# Patient Record
Sex: FEMALE | Race: OTHER | NOT HISPANIC OR LATINO | Employment: OTHER | URBAN - METROPOLITAN AREA
[De-identification: names, ages, dates, MRNs, and addresses within clinical notes are randomized per-mention and may not be internally consistent; named-entity substitution may affect disease eponyms.]

---

## 2017-03-28 ENCOUNTER — ALLSCRIPTS OFFICE VISIT (OUTPATIENT)
Dept: OTHER | Facility: OTHER | Age: 57
End: 2017-03-28

## 2017-03-28 ENCOUNTER — HOSPITAL ENCOUNTER (OUTPATIENT)
Dept: RADIOLOGY | Facility: HOSPITAL | Age: 57
Discharge: HOME | End: 2017-03-28
Attending: ORTHOPAEDIC SURGERY
Payer: COMMERCIAL

## 2017-03-28 DIAGNOSIS — M25.551 PAIN IN RIGHT HIP: ICD-10-CM

## 2017-03-28 PROCEDURE — 73502 X-RAY EXAM HIP UNI 2-3 VIEWS: CPT

## 2017-06-13 ENCOUNTER — GENERIC CONVERSION - ENCOUNTER (OUTPATIENT)
Dept: OTHER | Facility: OTHER | Age: 57
End: 2017-06-13

## 2017-06-13 ENCOUNTER — HOSPITAL ENCOUNTER (OUTPATIENT)
Dept: RADIOLOGY | Facility: HOSPITAL | Age: 57
Discharge: HOME | End: 2017-06-13
Attending: ORTHOPAEDIC SURGERY
Payer: COMMERCIAL

## 2017-06-13 DIAGNOSIS — M25.551 PAIN IN RIGHT HIP: ICD-10-CM

## 2017-06-13 PROCEDURE — 73502 X-RAY EXAM HIP UNI 2-3 VIEWS: CPT

## 2017-06-27 ENCOUNTER — APPOINTMENT (OUTPATIENT)
Dept: PHYSICAL THERAPY | Facility: CLINIC | Age: 57
End: 2017-06-27
Payer: COMMERCIAL

## 2017-06-27 ENCOUNTER — GENERIC CONVERSION - ENCOUNTER (OUTPATIENT)
Dept: OTHER | Facility: OTHER | Age: 57
End: 2017-06-27

## 2017-06-27 DIAGNOSIS — M25.551 PAIN IN RIGHT HIP: ICD-10-CM

## 2017-06-27 PROCEDURE — 97161 PT EVAL LOW COMPLEX 20 MIN: CPT

## 2017-06-29 ENCOUNTER — APPOINTMENT (OUTPATIENT)
Dept: PHYSICAL THERAPY | Facility: CLINIC | Age: 57
End: 2017-06-29
Payer: COMMERCIAL

## 2017-06-29 PROCEDURE — 97110 THERAPEUTIC EXERCISES: CPT

## 2017-06-29 PROCEDURE — G0283 ELEC STIM OTHER THAN WOUND: HCPCS

## 2017-06-29 PROCEDURE — 97014 ELECTRIC STIMULATION THERAPY: CPT

## 2017-07-06 ENCOUNTER — APPOINTMENT (OUTPATIENT)
Dept: PHYSICAL THERAPY | Facility: CLINIC | Age: 57
End: 2017-07-06
Payer: COMMERCIAL

## 2017-07-06 PROCEDURE — 97110 THERAPEUTIC EXERCISES: CPT

## 2017-07-06 PROCEDURE — 97035 APP MDLTY 1+ULTRASOUND EA 15: CPT

## 2017-07-06 PROCEDURE — G0283 ELEC STIM OTHER THAN WOUND: HCPCS

## 2017-07-06 PROCEDURE — 97014 ELECTRIC STIMULATION THERAPY: CPT

## 2017-07-10 ENCOUNTER — APPOINTMENT (OUTPATIENT)
Dept: PHYSICAL THERAPY | Facility: CLINIC | Age: 57
End: 2017-07-10
Payer: COMMERCIAL

## 2017-07-10 PROCEDURE — G0283 ELEC STIM OTHER THAN WOUND: HCPCS

## 2017-07-10 PROCEDURE — 97110 THERAPEUTIC EXERCISES: CPT

## 2017-07-10 PROCEDURE — 97014 ELECTRIC STIMULATION THERAPY: CPT

## 2017-07-24 ENCOUNTER — APPOINTMENT (OUTPATIENT)
Dept: PHYSICAL THERAPY | Facility: CLINIC | Age: 57
End: 2017-07-24
Payer: COMMERCIAL

## 2017-07-27 ENCOUNTER — APPOINTMENT (OUTPATIENT)
Dept: PHYSICAL THERAPY | Facility: CLINIC | Age: 57
End: 2017-07-27
Payer: COMMERCIAL

## 2017-10-03 ENCOUNTER — ALLSCRIPTS OFFICE VISIT (OUTPATIENT)
Dept: OTHER | Facility: OTHER | Age: 57
End: 2017-10-03

## 2017-10-03 ENCOUNTER — HOSPITAL ENCOUNTER (OUTPATIENT)
Dept: RADIOLOGY | Facility: HOSPITAL | Age: 57
Discharge: HOME | End: 2017-10-03
Attending: ORTHOPAEDIC SURGERY
Payer: COMMERCIAL

## 2017-10-03 DIAGNOSIS — M25.551 PAIN IN RIGHT HIP: ICD-10-CM

## 2017-10-03 PROCEDURE — 73502 X-RAY EXAM HIP UNI 2-3 VIEWS: CPT

## 2017-10-04 ENCOUNTER — TRANSCRIBE ORDERS (OUTPATIENT)
Dept: ADMINISTRATIVE | Facility: HOSPITAL | Age: 57
End: 2017-10-04

## 2017-10-04 DIAGNOSIS — M25.551 RIGHT HIP PAIN: Primary | ICD-10-CM

## 2017-10-10 ENCOUNTER — HOSPITAL ENCOUNTER (OUTPATIENT)
Dept: RADIOLOGY | Facility: HOSPITAL | Age: 57
Discharge: HOME | End: 2017-10-10
Attending: ORTHOPAEDIC SURGERY
Payer: COMMERCIAL

## 2017-10-10 DIAGNOSIS — M25.551 RIGHT HIP PAIN: ICD-10-CM

## 2017-10-10 PROCEDURE — 77002 NEEDLE LOCALIZATION BY XRAY: CPT

## 2017-10-10 PROCEDURE — 20610 DRAIN/INJ JOINT/BURSA W/O US: CPT

## 2017-10-10 RX ORDER — BUPIVACAINE HYDROCHLORIDE 2.5 MG/ML
30 INJECTION, SOLUTION EPIDURAL; INFILTRATION; INTRACAUDAL
Status: COMPLETED | OUTPATIENT
Start: 2017-10-10 | End: 2017-10-10

## 2017-10-10 RX ORDER — METHYLPREDNISOLONE ACETATE 80 MG/ML
80 INJECTION, SUSPENSION INTRA-ARTICULAR; INTRALESIONAL; INTRAMUSCULAR; SOFT TISSUE
Status: COMPLETED | OUTPATIENT
Start: 2017-10-10 | End: 2017-10-10

## 2017-10-10 RX ORDER — LIDOCAINE HYDROCHLORIDE 10 MG/ML
20 INJECTION, SOLUTION INFILTRATION; PERINEURAL
Status: COMPLETED | OUTPATIENT
Start: 2017-10-10 | End: 2017-10-10

## 2017-10-10 RX ADMIN — METHYLPREDNISOLONE ACETATE 80 MG: 80 INJECTION, SUSPENSION INTRA-ARTICULAR; INTRALESIONAL; INTRAMUSCULAR; SOFT TISSUE at 16:11

## 2017-10-10 RX ADMIN — LIDOCAINE HYDROCHLORIDE 20 ML: 10 INJECTION, SOLUTION INFILTRATION; PERINEURAL at 16:11

## 2017-10-10 RX ADMIN — IOHEXOL 5 ML: 300 INJECTION, SOLUTION INTRAVENOUS at 16:10

## 2017-10-10 RX ADMIN — BUPIVACAINE HYDROCHLORIDE 30 ML: 2.5 INJECTION, SOLUTION EPIDURAL; INFILTRATION; INTRACAUDAL at 16:11

## 2017-10-27 NOTE — PROGRESS NOTES
Assessment  1  Avascular necrosis of hip, right (733 42) (M87 051)    Plan  Avascular necrosis of hip, right    · Follow-up Visit in 4 Weeks Evaluation and Treatment  Follow-up  Status: Hold For -  Scheduling  Requested for: [id number]   · Fluoroscopic Guidance For Needle Placement; Status:Active; Requested for:03Oct2017;   Right hip pain    · * XR HIP/PELV 2-3 VWS RIGHT W PELVIS IF PERFORMED; Status:Active -  Retrospective By Protocol Authorization; Requested [id number];    · Fluoroscopic Guidance For Needle Placement; Status:Hold For -  Scheduling,Retrospective Authorization; Requested [id number];     Discussion/Summary    This patient is afraid of joint injections  We had a long discussion about next steps  This is bothering her enough that is keeping her awake at night  She would like to proceed with a hip replacement  I explained her that I think at this point would be worth trying a cortisone shot to see we can make her hip last longer  I said this in the face of the, knowing it  This problem is secondary to avascular necrosis but I think we're at a point where were choosing between surgery or an injection and even if an injection fails, there is less possible right morbidity from the injection and the risks of surgery  She agreed and wants to proceed with injection  See her back in follow-up in one month to see how she is doing  History of Present Illness  HPI: This patient returns today for follow-up visit  Regarding her right hip pain  She has avascular necrosis and it is creating significant problem with groin pain  She also has issues with falling asleep at night because of right groin  She is refused any injections into the right hip, she fears joint injection      Review of Systems    Constitutional: No fever, no chills, feels well, no tiredness, no recent weight gain or loss  Eyes: No complaints of eyesight problems, no red eyes  ENT: no loss of hearing, no nosebleeds, no sore throat  Cardiovascular: No complaints of chest pain, no palpitations, no leg claudication or lower extremity edema  Respiratory: no compliants of shortness of breath, no wheezing, no cough  Gastrointestinal: no complaints of abdominal pain, no constipation, no nausea or diarrhea, no vomiting, no bloody stools  Genitourinary: no complaints of dysuria, no incontinence  Musculoskeletal: as noted in HPI  Integumentary: no complaints of skin rash or lesion, no itching or dry skin, no skin wounds  Neurological: no complaints of headache, no confusion, no numbness or tingling, no dizziness  Endocrine: No complaints of muscle weakness, no feelings of weakness, no frequent urination, no excessive thirst    Psychiatric: No suicidal thoughts, no anxiety, no feelings of depression  Active Problems  1  Right hip pain (719 45) (M25 661)    Past Medical History    The active problems and past medical history were reviewed and updated today  Surgical History    The surgical history was reviewed and updated today  Family History    The family history was reviewed and updated today  Social History   · Never a smoker  The social history was reviewed and updated today  The social history was reviewed and is unchanged  Current Meds   1  Acetaminophen-Codeine #3 300-30 MG Oral Tablet; TAKE 1 TABLET 4 TIMES DAILY AS   NEEDED FOR PAIN;   Therapy: [id number] to (Evaluate:07Apr2017); Last Rx:28Mar2017 Ordered   2  Codeine Sulfate 30 MG Oral Tablet; TAKE 1 TABLET EVERY 6 HOURS AS NEEDED; Therapy: [id number] to (Evaluate:14Oct2017); Last Rx:[id number] Ordered   3  Fenofibric Acid TABS; Therapy: (Recorded:28Mar2017) to Recorded   4  Flonase 50 MCG/ACT SUSP; Therapy: (Recorded:28Mar2017) to Recorded   5  Percocet TABS; Therapy: (Recorded:28Mar2017) to Recorded   6  Protonix 40 MG Oral Packet; Therapy: (Recorded:28Mar2017) to Recorded   7  Symbicort AERO; Therapy: (Recorded:28Mar2017) to Recorded   8  Vitamin C 100 MG Oral Tablet; Therapy: (Recorded:28Mar2017) to Recorded   9  Vitamin D 1000 UNIT CAPS; Therapy: (Recorded:28Mar2017) to Recorded    The medication list was reviewed and updated today  Allergies  1  Aspirin TABS   2  Penicillins    Physical Exam  There is pain with flexion, extension, internal, external rotation of the right hip  Pain is focused in the groin  Neurovascular exam the right lower extremity is grossly intact        Signatures   Electronically signed by :  TASIA Leal ; Oct  3 2017  6:26PM EST                       (Author)

## 2017-11-14 ENCOUNTER — GENERIC CONVERSION - ENCOUNTER (OUTPATIENT)
Dept: OTHER | Facility: OTHER | Age: 57
End: 2017-11-14

## 2018-01-15 VITALS
HEIGHT: 63 IN | WEIGHT: 165 LBS | BODY MASS INDEX: 29.23 KG/M2 | DIASTOLIC BLOOD PRESSURE: 81 MMHG | SYSTOLIC BLOOD PRESSURE: 130 MMHG | HEART RATE: 75 BPM

## 2018-01-22 ENCOUNTER — GENERIC CONVERSION - ENCOUNTER (OUTPATIENT)
Dept: OTHER | Facility: OTHER | Age: 58
End: 2018-01-22

## 2018-01-22 VITALS — HEART RATE: 78 BPM | HEIGHT: 63 IN | SYSTOLIC BLOOD PRESSURE: 118 MMHG | DIASTOLIC BLOOD PRESSURE: 70 MMHG

## 2018-01-22 VITALS — HEART RATE: 71 BPM | SYSTOLIC BLOOD PRESSURE: 117 MMHG | HEIGHT: 63 IN | DIASTOLIC BLOOD PRESSURE: 76 MMHG

## 2018-01-24 NOTE — RESULT NOTES
Verified Results  FL INJECTION RIGHT HIP (STEROIDS) [id number] 03:14PM Nimisha Aiken     Test Name Result Flag Reference   [facility] INJECTION RIGHT HIP (STEROIDS) (Report)     RIGHT HIP INJECTION     INDICATION: Chronic right hip pain  COMPARISON: 10/3/2017 XR Right Hip/Pelvis     IMAGES: 4     FLUOROSCOPY TIME:  0 15 minutes     FINDINGS:     After the risks and benefits of the procedure were thoroughly explained, informed consent was obtained  The patient verbalized expressed understanding of the above risks and wished to proceed with the procedure  Final standard time-out procedure performed  The patient was prepped and draped in the usual sterile fashion  1% lidocaine solution was utilized for local anesthesia  Intermittent fluoroscopy was utilized for placement a 20 gauge 3 5 inch spinal needle within the right hip joint  After    positioning was confirmed with 3 mL of Omnipaque 300, a solution comprised of 1 mL 80 mg/mL Depomedrol, 2 mL of 0 25% Sensorcaine and 2 mL 1% Xylocaine was injected into the right hip joint  The patient tolerated the procedure well  There were no    complications  I asked the patient to call us with any questions, concerns, or acute problems  The patient expressed understanding of the above  IMPRESSION:     Technically successful right hip steroid injection  Procedure was performed by Mary Jo Vang PA-C under the direct supervision of Dr Fernando Oakes  PERFORMED, DICTATED AND SIGNED BY: David Bobby PA-C       Workstation performed: [id number]     Signed by:   Fernando Oakes MD   10/10/17     * XR HIP/PELV 2-3 VWS RIGHT W PELVIS IF PERFORMED 03Oct2017 05:44PM Rickey Roseann Order Number: [id number]   Performing Comments:  6     Test Name Result Flag Reference   * XR HIP/PELV 2-3 VWS RIGHT (Report)     RIGHT HIP     INDICATION: Right hip pain       COMPARISON: June 23, 2017     VIEWS: AP pelvis and 2 coned down views of the hip     IMAGES: 3 FINDINGS:     There is no acute fracture or dislocation  Joint spaces are preserved  Sclerotic changes again noted within the right femoral head without evidence of collapse  Femoral head maintains normal shape and orientation  Soft tissues are unremarkable  IMPRESSION:     Stable sclerotic changes within the right femoral head without evidence of collapse         Workstation performed: [id number]     Signed by:   Kellie Heath MD   10/8/17                               Plan  Right hip pain    · Fluoroscopic Guidance For Needle Placement; Status:Hold For - Scheduling; Requested  MQF:[id number];

## 2018-02-13 ENCOUNTER — HOSPITAL ENCOUNTER (OUTPATIENT)
Dept: RADIOLOGY | Facility: HOSPITAL | Age: 58
Discharge: HOME | End: 2018-02-13
Attending: ORTHOPAEDIC SURGERY
Payer: COMMERCIAL

## 2018-02-13 ENCOUNTER — OFFICE VISIT (OUTPATIENT)
Dept: OBGYN CLINIC | Facility: HOSPITAL | Age: 58
End: 2018-02-13
Payer: COMMERCIAL

## 2018-02-13 VITALS — DIASTOLIC BLOOD PRESSURE: 77 MMHG | SYSTOLIC BLOOD PRESSURE: 134 MMHG

## 2018-02-13 DIAGNOSIS — M87.051 AVASCULAR NECROSIS OF BONE OF RIGHT HIP (HCC): ICD-10-CM

## 2018-02-13 DIAGNOSIS — M25.551 PAIN IN RIGHT HIP: Primary | ICD-10-CM

## 2018-02-13 DIAGNOSIS — M54.16 LUMBAR RADICULAR PAIN: ICD-10-CM

## 2018-02-13 PROCEDURE — 99214 OFFICE O/P EST MOD 30 MIN: CPT | Performed by: ORTHOPAEDIC SURGERY

## 2018-02-13 PROCEDURE — 73502 X-RAY EXAM HIP UNI 2-3 VIEWS: CPT

## 2018-02-13 RX ORDER — ACETAMINOPHEN AND CODEINE PHOSPHATE 300; 30 MG/1; MG/1
1 TABLET ORAL EVERY 6 HOURS PRN
Qty: 30 TABLET | Refills: 0 | Status: SHIPPED | OUTPATIENT
Start: 2018-02-13 | End: 2019-03-06

## 2018-02-13 NOTE — PROGRESS NOTES
59 y o female presenting to the office for evaluation of right hip pain/avascular necrosis of the hip  Patient had a corticosteroid injection on 10/10/17 with numbness in the RLE for the first day with increased pain for 4 days  She then had a few weeks of improved pain and then the pain returned  Patient has groin pain and lateral thigh pain  Patient has low back pain  She states that the pain increases with ambulatory activities and keeps her awake at night  Patient denies any other acute or associated complaints  Review of Systems  Review of systems negative unless otherwise specified in HPI    Past Medical History  No past medical history on file  Past Surgical History  No past surgical history on file  Current Medications  No current outpatient prescriptions on file prior to visit  No current facility-administered medications on file prior to visit  Physical exam  · General: Awake, Alert, Oriented  · Eyes: Pupils equal, round and reactive to light  · Heart: regular rate and rhythm  · Lungs: No audible wheezing  · Abdomen: soft  right Lower extremity  · Tender to palpation anterior hip joint and lateral thigh  · Minimally limited ROM  · Pain with resisted flexion of the hip  · Sensation intact  · Positive SLR    Imaging  No significant changes noted on x-ray    Procedure  none    Assessment/Plan:   59 y  o female with avascular necrosis at the hip and lumbar radicular pain  Discussed with patient that as she cannot definitively no improvement from the hip corticosteroid injection, it would be better to evaluate for lumbar pain contributing to some of the pain that she is having during the day  Discussed with patient which she get a lumbar MRI to re-evaluate and may consider getting a Spine and Pain consult on her once those results are in  Discussed with patient that if we cannot prove that this pain is coming from the hip, that a total hip replacement may not relieve her symptoms   Patient to follow up after MRI completed

## 2018-02-16 ENCOUNTER — TELEPHONE (OUTPATIENT)
Dept: OBGYN CLINIC | Facility: HOSPITAL | Age: 58
End: 2018-02-16

## 2019-01-15 ENCOUNTER — HOSPITAL ENCOUNTER (OUTPATIENT)
Dept: RADIOLOGY | Facility: HOSPITAL | Age: 59
Discharge: HOME | End: 2019-01-15
Attending: ORTHOPAEDIC SURGERY
Payer: COMMERCIAL

## 2019-01-15 ENCOUNTER — EVALUATION (OUTPATIENT)
Dept: PHYSICAL THERAPY | Facility: CLINIC | Age: 59
End: 2019-01-15
Payer: COMMERCIAL

## 2019-01-15 ENCOUNTER — OFFICE VISIT (OUTPATIENT)
Dept: OBGYN CLINIC | Facility: HOSPITAL | Age: 59
End: 2019-01-15
Payer: COMMERCIAL

## 2019-01-15 VITALS
HEIGHT: 63 IN | BODY MASS INDEX: 28.35 KG/M2 | DIASTOLIC BLOOD PRESSURE: 77 MMHG | HEART RATE: 69 BPM | SYSTOLIC BLOOD PRESSURE: 122 MMHG | WEIGHT: 160 LBS

## 2019-01-15 DIAGNOSIS — G62.9 NEUROPATHY: ICD-10-CM

## 2019-01-15 DIAGNOSIS — M25.551 PAIN IN RIGHT HIP: Primary | ICD-10-CM

## 2019-01-15 DIAGNOSIS — M54.16 LUMBAR RADICULAR PAIN: ICD-10-CM

## 2019-01-15 DIAGNOSIS — M54.16 LUMBAR RADICULAR PAIN: Primary | ICD-10-CM

## 2019-01-15 DIAGNOSIS — M25.551 PAIN IN RIGHT HIP: ICD-10-CM

## 2019-01-15 PROCEDURE — G8991 OTHER PT/OT GOAL STATUS: HCPCS | Performed by: PHYSICAL THERAPIST

## 2019-01-15 PROCEDURE — 99213 OFFICE O/P EST LOW 20 MIN: CPT | Performed by: ORTHOPAEDIC SURGERY

## 2019-01-15 PROCEDURE — 97162 PT EVAL MOD COMPLEX 30 MIN: CPT | Performed by: PHYSICAL THERAPIST

## 2019-01-15 PROCEDURE — 73522 X-RAY EXAM HIPS BI 3-4 VIEWS: CPT

## 2019-01-15 PROCEDURE — G8990 OTHER PT/OT CURRENT STATUS: HCPCS | Performed by: PHYSICAL THERAPIST

## 2019-01-15 PROCEDURE — 72110 X-RAY EXAM L-2 SPINE 4/>VWS: CPT

## 2019-01-15 RX ORDER — HYDROXYCHLOROQUINE SULFATE 200 MG/1
200 TABLET, FILM COATED ORAL 2 TIMES DAILY WITH MEALS
COMMUNITY
Start: 2018-12-07

## 2019-01-15 RX ORDER — EPINASTINE HCL 0.05 %
1 DROPS OPHTHALMIC (EYE) 2 TIMES DAILY PRN
COMMUNITY
Start: 2018-11-21 | End: 2021-10-12

## 2019-01-15 RX ORDER — OXYCODONE AND ACETAMINOPHEN 10; 325 MG/1; MG/1
1 TABLET ORAL EVERY 8 HOURS PRN
Refills: 0 | COMMUNITY
Start: 2018-12-27 | End: 2019-03-06

## 2019-01-15 RX ORDER — LEVOFLOXACIN 500 MG/1
750 TABLET, FILM COATED ORAL EVERY 24 HOURS
COMMUNITY
End: 2019-03-06

## 2019-01-15 RX ORDER — CIPROFLOXACIN 500 MG/1
500 TABLET, FILM COATED ORAL 2 TIMES DAILY
Refills: 1 | COMMUNITY
Start: 2018-12-21 | End: 2019-02-27 | Stop reason: ALTCHOICE

## 2019-01-15 RX ORDER — GABAPENTIN 100 MG/1
100 CAPSULE ORAL 3 TIMES DAILY
Qty: 90 CAPSULE | Refills: 0 | Status: SHIPPED | OUTPATIENT
Start: 2019-01-15 | End: 2019-02-11 | Stop reason: SDUPTHER

## 2019-01-15 RX ORDER — LEVALBUTEROL INHALATION SOLUTION 1.25 MG/3ML
1.25 SOLUTION RESPIRATORY (INHALATION) EVERY 4 HOURS PRN
COMMUNITY
Start: 2018-12-12 | End: 2019-11-14 | Stop reason: ALTCHOICE

## 2019-01-15 NOTE — PROGRESS NOTES
59 y o  female presenting to the office for follow up of right sided back and hip pain  Patient has a history of AVN of the right hip and has had corticosteroid injection therapy without benefit previously  She has history of RA, and is currently on treatment for this  Patient currently has had worsening and new presentation of symptoms  She now has pain radiating down the buttock to the toes  She has singling sensation along multiple areas of the lower leg and a weakness on the right sided  Patient has improvement in symptoms with forward flexion at the spine  Patient has significant QOL limitations due to this pain  ROS  Review of Systems   Constitutional: Negative for fever and unexpected weight change  HENT: Negative for hearing loss, nosebleeds and sore throat  Eyes: Negative for pain, redness and visual disturbance  Respiratory: Negative for cough, shortness of breath and wheezing  Cardiovascular: Negative for chest pain, palpitations and leg swelling  Gastrointestinal: Negative for abdominal pain, nausea and vomiting  Endocrine: Negative for polydipsia and polyuria  Genitourinary: Negative for dysuria and hematuria  Skin: Negative for rash and wound  Neurological: Negative for dizziness and headaches  Psychiatric/Behavioral: Negative for agitation and suicidal ideas  History reviewed  No pertinent past medical history  History reviewed  No pertinent surgical history  Results Reviewed     None          Physical Exam  Physical Exam   Constitutional: She is oriented to person, place, and time  She appears well-developed and well-nourished  HENT:   Head: Normocephalic and atraumatic  Eyes: Pupils are equal, round, and reactive to light  EOM are normal    Neck: Neck supple  No tracheal deviation present  Cardiovascular: Normal rate and regular rhythm  Pulmonary/Chest: Effort normal and breath sounds normal    Abdominal: There is no guarding     Neurological: She is alert and oriented to person, place, and time  Skin: Skin is warm and dry  Psychiatric: She has a normal mood and affect  Her behavior is normal      Back Exam     Tenderness   The patient is experiencing tenderness in the thoracic  Range of Motion   Extension: normal   Flexion: abnormal   Lateral Bend Right: abnormal   Lateral Bend Left: abnormal   Rotation Right: abnormal   Rotation Left: abnormal     Muscle Strength   Right Quadriceps:  4/5   Left Quadriceps:  5/5   Right Hamstrings:  4/5   Left Hamstrings:  5/5     Tests   Straight leg raise right: positive  Straight leg raise left: positive    Other   Back sensation: decreased with tingling sensation to light touch on lateral and medial aspects of the lower leg  Gait: abnormal         Imaging  I personally reviewed these images : significant arthritic changes on the lumbar spine x-rays  Moderate arthritic changes to bilateral hip x-rays    Procedures  none    Assessment/Plan  59 y o  female    1  Lumbar radicular pain  - XR spine lumbar minimum 4 views non injury; Future  - MRI lumbar spine wo contrast; Future  - Ambulatory referral to Physical Therapy; Future  - gabapentin (NEURONTIN) 100 mg capsule; Take 1 capsule (100 mg total) by mouth 3 (three) times a day  Dispense: 90 capsule; Refill: 0    2  Pain in right hip  - XR hips bilateral 3-4 vw w pelvis if performed; Future  - Ambulatory referral to Physical Therapy; Future  - gabapentin (NEURONTIN) 100 mg capsule; Take 1 capsule (100 mg total) by mouth 3 (three) times a day  Dispense: 90 capsule; Refill: 0    3  Neuropathy  - MRI lumbar spine wo contrast; Future  - Ambulatory referral to Physical Therapy; Future  - gabapentin (NEURONTIN) 100 mg capsule; Take 1 capsule (100 mg total) by mouth 3 (three) times a day  Dispense: 90 capsule; Refill: 0    - discussed with patient that at this time, we need to distinguish primary back vs  Hip pain   Recommend that she start PT for the back, but due to new neuropathy and weakness, patient needs MRI to evaluate for nerve compression  --- ordered gabapentin to help with neuropathy pain symptoms  --- cannot tolerate NSAIDs due to GI issues  - follow up after MRI   --- needs to be done under sedation due to patient's anxiety with closed spaces  Open MRIs do not provide adequate enough quality for evaluation and are not recommended for this patient

## 2019-01-15 NOTE — PROGRESS NOTES
PT Evaluation     Today's date: 1/15/2019  Patient name: Lavon Wang  : 1960  MRN: [id number]  Referring provider: Pelon Nails PA-C  Dx:   Encounter Diagnosis     ICD-10-CM    1  Lumbar radicular pain M54 16 Ambulatory referral to Physical Therapy     PT plan of care cert/re-cert   2  Pain in right hip M25 551 Ambulatory referral to Physical Therapy   3  Neuropathy G62 9 Ambulatory referral to Physical Therapy       Start Time: 1600  Stop Time: 7260  Total time in clinic (min): 45 minutes    Assessment  Assessment details: Pt is a 58 y/o female presenting to physical therapy with chief complaint of R hip, lumbar and RLE pain  Pt presents with increased TTP over the R greater trochanter and throughout the lumbar SPs  Unable to properly assess joint play secondary to pain  Grade 1 lumbar P-A joint mobs generated sxs down the RLE  In addition to this, pt presents with sciatic nerve tension in the RLE, which is consistent with lumbar radiculopathy  Pt's increased TTP throughout the hip joint in combination with (+) FADIR may also suggest a hip pathology  Pt would benefit from physical therapy in order to improve pain, radicular sxs, TTP, and strength in order to return to PLOF  Impairments: abnormal gait, abnormal or restricted ROM, activity intolerance, impaired balance, impaired physical strength, lacks appropriate home exercise program and pain with function  Functional limitations: sitting, lifting, walking  Symptom irritability: moderateUnderstanding of Dx/Px/POC: good   Prognosis: good    Goals  ST weeks  1  Pt will demonstrate independence with HEP  2  Pt will improve lumbar AROM by 10%  3  Pt will improve RLE strength by at least 1/2 grade  4  Pt will report pain no more than 5/10  5  Pt will generate proper TA contraction without cuing to show improved NM control    LT weeks  1  Pt will improve lumbar AROM to at least 80% in all directions  2  Pt will report pain no more than 2/10  3  Pt will be able to stand/sit for at least 30 minutes without pain to return to PLOF  4  Pt will be able to lift at least 10lbs from floor to waist without pain  5  Pt will have no TTP over the R greater trochanter  6  Pt will improve RLE strength to at least 4+/5    Plan  Patient would benefit from: skilled physical therapy  Planned modality interventions: cryotherapy and thermotherapy: hydrocollator packs  Planned therapy interventions: therapeutic exercise, therapeutic activities, stretching, strengthening, patient education, neuromuscular re-education, massage, manual therapy, balance, gait training and home exercise program  Frequency: 2x week  Duration in weeks: 4  Treatment plan discussed with: patient        Subjective Evaluation    History of Present Illness  Mechanism of injury: Pt reports her back pain has been here for about 1 month  Pt reports pain does go down the R leg  She reports she can't sit for a long time and turning over while sleeping is painful  Pt reports she does not do heavy housework due to COPD  She reports her back hurts when she stands to cook but she stand anyway  Pain  Current pain ratin  At best pain ratin  At worst pain ratin  Quality: sharp  Relieving factors: heat and medications          Objective     Tenderness     Right Hip   Tenderness in the PSIS and greater trochanter       Active Range of Motion     Additional Active Range of Motion Details  Lumbar AROM:  Flexion: 75%  Extension: 25%  R SG:10%  L SG: 10%    Joint Play     Additional Joint Play Details  Unable to attest secondary to pain    Strength/Myotome Testing     Left Hip   Planes of Motion   Flexion: 4+  Abduction: 4+  External rotation: 4+  Internal rotation: 4+    Right Hip   Planes of Motion   Flexion: 4-  Abduction: 4-  External rotation: 4-  Internal rotation: 4-    Left Knee   Flexion: 4+  Extension: 4+    Right Knee   Flexion: 4-  Extension: 4-    Left Ankle/Foot   Dorsiflexion: 4+    Right Ankle/Foot   Dorsiflexion: 4-    Tests       Thoracic   Positive slump  Lumbar   Positive slumped  Right Hip   Positive FADIR         Flowsheet Rows      Most Recent Value   PT/OT G-Codes   Current Score  31   Projected Score  48   FOTO information reviewed  Yes   Assessment Type  Evaluation   G code set  Other PT/OT Primary   Other PT Primary Current Status ()  CL   Other PT Primary Goal Status ()  CK          Precautions: COPD, possible AVN R hip    Daily Treatment Diary     Manual  1/15            R hip stretching             Lumbar STM                                                        Exercise Diary  1/15            Bike             BERNADETTE             Bridges             Clamshells             Standing hip 3-way             DLS ball press             SKFO             STS                                                                                                                                                                             Modalities

## 2019-01-17 ENCOUNTER — APPOINTMENT (OUTPATIENT)
Dept: PHYSICAL THERAPY | Facility: CLINIC | Age: 59
End: 2019-01-17
Payer: COMMERCIAL

## 2019-01-22 ENCOUNTER — OFFICE VISIT (OUTPATIENT)
Dept: PHYSICAL THERAPY | Facility: CLINIC | Age: 59
End: 2019-01-22
Payer: COMMERCIAL

## 2019-01-22 DIAGNOSIS — M54.16 LUMBAR RADICULAR PAIN: Primary | ICD-10-CM

## 2019-01-22 DIAGNOSIS — G62.9 NEUROPATHY: ICD-10-CM

## 2019-01-22 DIAGNOSIS — M25.551 PAIN IN RIGHT HIP: ICD-10-CM

## 2019-01-22 PROCEDURE — 97140 MANUAL THERAPY 1/> REGIONS: CPT

## 2019-01-22 PROCEDURE — 97110 THERAPEUTIC EXERCISES: CPT

## 2019-01-22 NOTE — PROGRESS NOTES
Daily Note     Today's date: 2019  Patient name: Josette Reeves  : 1960  MRN: [id number]  Referring provider: Lali Barraza PA-C  Dx:   Encounter Diagnosis     ICD-10-CM    1  Lumbar radicular pain M54 16    2  Pain in right hip M25 551    3  Neuropathy G62 9                   Subjective: Upon presentation patient repots pain with movement and activity  Objective: See treatment diary below    Precautions: COPD, possible AVN R hip    Daily Treatment Diary     Manual  1/15 1/22           R hip stretching  2'           Lumbar STM  6'                                                      Exercise Diary  1/15 1/22           Bike  Pain! BERNADETTE  5 min           Bridges             Clamshells             Standing hip 3-way             DLS ball press             SKFO             STS  2x5           LAQ  10x           Supine marches  10x           Seated add/add  20x           Seated marches  10x                                                                                                                       Modalities                                                       Assessment: Patient unable to tolerate bike, and any supine/prone TE this visit  TTP over lumbar paraspinals, light stm had no effect on pain  Patient unable to tolerate gentle hip PROM this visit therefore held  Patient deferred [address] post session  Patient offers no increase in sx post session  Plan: Cont per POC

## 2019-01-29 ENCOUNTER — APPOINTMENT (OUTPATIENT)
Dept: PHYSICAL THERAPY | Facility: CLINIC | Age: 59
End: 2019-01-29
Payer: COMMERCIAL

## 2019-01-31 ENCOUNTER — OFFICE VISIT (OUTPATIENT)
Dept: PHYSICAL THERAPY | Facility: CLINIC | Age: 59
End: 2019-01-31
Payer: COMMERCIAL

## 2019-01-31 DIAGNOSIS — M54.16 LUMBAR RADICULAR PAIN: Primary | ICD-10-CM

## 2019-01-31 DIAGNOSIS — M25.551 PAIN IN RIGHT HIP: ICD-10-CM

## 2019-01-31 DIAGNOSIS — G62.9 NEUROPATHY: ICD-10-CM

## 2019-01-31 PROCEDURE — 97110 THERAPEUTIC EXERCISES: CPT | Performed by: PHYSICAL THERAPIST

## 2019-01-31 NOTE — PROGRESS NOTES
Daily Note     Today's date: 2019  Patient name: Geneva King  : 1960  MRN: [id number]  Referring provider: Carolina Yan PA-C  Dx:   Encounter Diagnosis     ICD-10-CM    1  Lumbar radicular pain M54 16    2  Pain in right hip M25 551    3  Neuropathy G62 9        Start Time: 1702  Stop Time: 1735  Total time in clinic (min): 33 minutes    Subjective: Pt reports she is in a lot of pain  Objective: See treatment diary below  Precautions: COPD, possible AVN R hip     Daily Treatment Diary      Manual  1/15 1/22 1/31                 R hip stretching   2'                   Lumbar STM   6' 5'                                                                                               Exercise Diary  1/15 1/22 1/31                 Bike   Pain!                   BERNADETTE   5 min                   Bridges                       Clamshells                       Standing hip 3-way                       DLS ball press     8"P00                 SKFO                       STS   2x5                   SAQ/LAQ   10x 20x                 Supine marches   10x 10x                 Seated add/add   20x 5"x20                 Seated marches   10x 10x                                                                                                                                                                                                                       Modalities                                                                                                      Assessment: Pt has significantly decreased exercise tolerance due to pain in the lumbar spine and the R posterior hip  Pt unable to tolerate manual therapy to the lumbar spine  Plan: Progress as tolerated

## 2019-02-05 ENCOUNTER — OFFICE VISIT (OUTPATIENT)
Dept: PHYSICAL THERAPY | Facility: CLINIC | Age: 59
End: 2019-02-05
Payer: COMMERCIAL

## 2019-02-05 DIAGNOSIS — M54.16 LUMBAR RADICULAR PAIN: Primary | ICD-10-CM

## 2019-02-05 DIAGNOSIS — M25.551 PAIN IN RIGHT HIP: ICD-10-CM

## 2019-02-05 DIAGNOSIS — G62.9 NEUROPATHY: ICD-10-CM

## 2019-02-05 PROCEDURE — 97110 THERAPEUTIC EXERCISES: CPT

## 2019-02-05 PROCEDURE — 97140 MANUAL THERAPY 1/> REGIONS: CPT

## 2019-02-05 NOTE — PROGRESS NOTES
Daily Note     Today's date: 2019  Patient name: Josette Reeves  : 1960  MRN: [id number]  Referring provider: Lali Barraza PA-C  Dx:   Encounter Diagnosis     ICD-10-CM    1  Lumbar radicular pain M54 16    2  Pain in right hip M25 551    3  Neuropathy G62 9                   Subjective: Upon presentation patient reports her hip is bothering her, reports her hip pain increases with prolonged sitting  Objective: See treatment diary below  Precautions: COPD, possible AVN R hip     Daily Treatment Diary      Manual  1/15 1/22 1/31  25               R hip stretching   2'    10'               Lumbar STM   6' 5'                                                                                               Exercise Diary  1/15 1/22 1/31  2               Bike   Pain!                   BERNADETTE   5 min                   Bridges        [place]               Clamshells        [place]               Standing hip 3-way                       DLS ball press     7"G78  5"x20               SKFO        [place]               STS   2x5    [place]               SAQ/LAQ   10x 20x  20x               Supine marches   10x 10x  10x               Seated add/add   20x 5"x20  5"x20               Seated marches   10x 10x  15x                                                                                                                                                                                                                     Modalities                                                                                                    Assessment: Patient demonstrating slight improvement in exercise tolerance, however exercise tolerance remains poor  Patient denies any increase in pain or sx t/o TE and post session  Consider additional TE NV  Plan: Cont per POC

## 2019-02-07 ENCOUNTER — APPOINTMENT (OUTPATIENT)
Dept: PHYSICAL THERAPY | Facility: CLINIC | Age: 59
End: 2019-02-07
Payer: COMMERCIAL

## 2019-02-11 DIAGNOSIS — M25.551 PAIN IN RIGHT HIP: ICD-10-CM

## 2019-02-11 DIAGNOSIS — M54.16 LUMBAR RADICULAR PAIN: ICD-10-CM

## 2019-02-11 DIAGNOSIS — G62.9 NEUROPATHY: ICD-10-CM

## 2019-02-11 RX ORDER — GABAPENTIN 100 MG/1
CAPSULE ORAL
Qty: 90 CAPSULE | Refills: 0 | Status: SHIPPED | OUTPATIENT
Start: 2019-02-11 | End: 2019-03-06

## 2019-02-12 ENCOUNTER — APPOINTMENT (OUTPATIENT)
Dept: PHYSICAL THERAPY | Facility: CLINIC | Age: 59
End: 2019-02-12
Payer: COMMERCIAL

## 2019-02-19 ENCOUNTER — OFFICE VISIT (OUTPATIENT)
Dept: PHYSICAL THERAPY | Facility: CLINIC | Age: 59
End: 2019-02-19
Payer: COMMERCIAL

## 2019-02-19 DIAGNOSIS — K21.9 GASTROESOPHAGEAL REFLUX DISEASE WITHOUT ESOPHAGITIS: Primary | ICD-10-CM

## 2019-02-19 DIAGNOSIS — M25.551 PAIN IN RIGHT HIP: ICD-10-CM

## 2019-02-19 DIAGNOSIS — G62.9 NEUROPATHY: ICD-10-CM

## 2019-02-19 DIAGNOSIS — M54.16 LUMBAR RADICULAR PAIN: Primary | ICD-10-CM

## 2019-02-19 PROCEDURE — 97110 THERAPEUTIC EXERCISES: CPT | Performed by: PHYSICAL THERAPIST

## 2019-02-19 PROCEDURE — 97140 MANUAL THERAPY 1/> REGIONS: CPT | Performed by: PHYSICAL THERAPIST

## 2019-02-19 RX ORDER — PANTOPRAZOLE SODIUM 40 MG/1
40 TABLET, DELAYED RELEASE ORAL DAILY
Qty: 30 TABLET | Refills: 11 | Status: SHIPPED | OUTPATIENT
Start: 2019-02-19 | End: 2019-03-12

## 2019-02-19 NOTE — PROGRESS NOTES
Daily Note     Today's date: 2019  Patient name: Grabiel Leslie  : 1960  MRN: [id number]  Referring provider: Orlin Pardo PA-C  Dx:   Encounter Diagnosis     ICD-10-CM    1  Lumbar radicular pain M54 16    2  Pain in right hip M25 551    3  Neuropathy G62 9        Start Time: 5712  Stop Time: 9614  Total time in clinic (min): 25 minutes    Subjective: Pt reports her back and hip are still hurting and sometimes she gets pain in the front  Objective: See treatment diary below  Precautions: COPD, possible AVN R hip     Daily Treatment Diary      Manual  1/15 1/22 1/31  2/5 2/19             R hip stretching   2'    10' 8'             Lumbar STM   6' 5'                                                                                               Exercise Diary  1/15 1/22 1/31  2/5 2/19             Bike   Pain!                   BERNADETTE   5 min     5'             Bridges        [place]               Clamshells        [place]               Standing hip 3-way                       DLS ball press     4"Y85  5"x20 5"x20             SKFO        NV               STS   2x5    NV               SAQ/LAQ   10x 20x  20x 25x             Supine marches   10x 10x  10x               Seated add/add   20x 5"x20  5"x20 5"x20             Seated marches   10x 10x  15x 20x                                                                                                                                                                                                                   Modalities                                                                                                    Assessment: Pt able to tolerate R hip PROM and stretching with minimal pain at end range  Pt able to tolerate hip strengthening and DLS exercises with decreased pain compared to previous visits  Pt reported muscle fatigue following exercises  Plan: Progress as tolerated

## 2019-02-19 NOTE — TELEPHONE ENCOUNTER
Pt of Dr Carmen Melvin       Pt is requesting a 3month [facility]

## 2019-02-21 NOTE — TELEPHONE ENCOUNTER
[facility]ed [facility] and gave verbal for qty 90 , 3 refills for Pantoprazole 40 mg taken once a day

## 2019-02-22 ENCOUNTER — TRANSCRIBE ORDERS (OUTPATIENT)
Dept: ADMINISTRATIVE | Facility: HOSPITAL | Age: 59
End: 2019-02-22

## 2019-02-22 DIAGNOSIS — M54.16 LUMBAR RADICULOPATHY: Primary | ICD-10-CM

## 2019-02-25 ENCOUNTER — APPOINTMENT (OUTPATIENT)
Dept: LAB | Facility: HOSPITAL | Age: 59
End: 2019-02-25
Payer: COMMERCIAL

## 2019-02-25 DIAGNOSIS — M54.16 LUMBAR RADICULOPATHY: ICD-10-CM

## 2019-02-25 LAB
BUN SERPL-MCNC: 15 MG/DL (ref 5–25)
CREAT SERPL-MCNC: 0.87 MG/DL (ref 0.6–1.3)
GFR SERPL CREATININE-BSD FRML MDRD: 73 ML/MIN/1.73SQ M

## 2019-02-25 PROCEDURE — 84520 ASSAY OF UREA NITROGEN: CPT

## 2019-02-25 PROCEDURE — 36415 COLL VENOUS BLD VENIPUNCTURE: CPT

## 2019-02-25 PROCEDURE — 82565 ASSAY OF CREATININE: CPT

## 2019-02-27 ENCOUNTER — ANESTHESIA EVENT (OUTPATIENT)
Dept: RADIOLOGY | Facility: HOSPITAL | Age: 59
End: 2019-02-27

## 2019-02-27 RX ORDER — OXYCODONE HYDROCHLORIDE 5 MG/1
5 TABLET ORAL EVERY 4 HOURS PRN
COMMUNITY
End: 2021-10-12

## 2019-02-27 RX ORDER — ESOMEPRAZOLE MAGNESIUM 40 MG/1
40 CAPSULE, DELAYED RELEASE ORAL
COMMUNITY
End: 2019-03-06

## 2019-02-27 RX ORDER — CODEINE SULFATE 30 MG/1
30 TABLET ORAL EVERY 6 HOURS PRN
COMMUNITY
End: 2019-03-06

## 2019-02-27 RX ORDER — FEXOFENADINE HCL 180 MG/1
180 TABLET ORAL AS NEEDED
COMMUNITY

## 2019-02-27 RX ORDER — ALENDRONATE SODIUM 70 MG/1
70 TABLET ORAL
COMMUNITY
End: 2019-03-06

## 2019-02-27 NOTE — PRE-PROCEDURE INSTRUCTIONS
Pre-Surgery Instructions:   Medication Instructions    alendronate (FOSAMAX) 70 mg tablet Instructed patient per Anesthesia Guidelines   Budesonide-Formoterol Fumarate (SYMBICORT IN) Instructed patient per Anesthesia Guidelines   Cholecalciferol (VITAMIN D3) 2000 units CHEW Instructed patient per Anesthesia Guidelines   codeine 30 mg tablet Instructed patient per Anesthesia Guidelines   Epinastine HCl 0 05 % ophthalmic solution Instructed patient per Anesthesia Guidelines   esomeprazole (NexIUM) 40 MG capsule Instructed patient per Anesthesia Guidelines   FENOFIBRATE PO Instructed patient per Anesthesia Guidelines   fexofenadine (ALLEGRA) 180 MG tablet Instructed patient per Anesthesia Guidelines   hydroxychloroquine (PLAQUENIL) 200 mg tablet Instructed patient per Anesthesia Guidelines   levalbuterol (XOPENEX) 1 25 mg/3 mL nebulizer solution Instructed patient per Anesthesia Guidelines   oxyCODONE (ROXICODONE) 5 mg immediate release tablet Instructed patient per Anesthesia Guidelines   oxyCODONE-acetaminophen (PERCOCET)  mg per tablet Instructed patient per Anesthesia Guidelines   pantoprazole (PROTONIX) 40 mg tablet Instructed patient per Anesthesia Guidelines  PT  VERY AGITATED TRIED TO GIVE MED INSTR BUT PT CONT TO TALK OVER ME INSTR  ON ASC LOC  ,BRING PHOTO ID/MED LIST/INS  INFO , SHOWER REV , NO ASA/NSAIDS/VIT 1 WEEK PREOP

## 2019-03-06 ENCOUNTER — HOSPITAL ENCOUNTER (OUTPATIENT)
Dept: RADIOLOGY | Facility: HOSPITAL | Age: 59
Discharge: HOME | End: 2019-03-06
Payer: COMMERCIAL

## 2019-03-06 ENCOUNTER — ANESTHESIA (OUTPATIENT)
Dept: RADIOLOGY | Facility: HOSPITAL | Age: 59
End: 2019-03-06

## 2019-03-06 ENCOUNTER — TELEPHONE (OUTPATIENT)
Dept: GASTROENTEROLOGY | Facility: CLINIC | Age: 59
End: 2019-03-06

## 2019-03-06 VITALS
HEIGHT: 63 IN | WEIGHT: 163 LBS | DIASTOLIC BLOOD PRESSURE: 63 MMHG | OXYGEN SATURATION: 96 % | SYSTOLIC BLOOD PRESSURE: 133 MMHG | HEART RATE: 74 BPM | RESPIRATION RATE: 16 BRPM | TEMPERATURE: 97.6 F | BODY MASS INDEX: 28.88 KG/M2

## 2019-03-06 DIAGNOSIS — G62.9 NEUROPATHY: ICD-10-CM

## 2019-03-06 DIAGNOSIS — M54.16 LUMBAR RADICULAR PAIN: ICD-10-CM

## 2019-03-06 LAB — GLUCOSE SERPL-MCNC: 81 MG/DL (ref 65–140)

## 2019-03-06 PROCEDURE — 72148 MRI LUMBAR SPINE W/O DYE: CPT

## 2019-03-06 PROCEDURE — 82948 REAGENT STRIP/BLOOD GLUCOSE: CPT

## 2019-03-06 RX ORDER — METOCLOPRAMIDE HYDROCHLORIDE 5 MG/ML
10 INJECTION INTRAMUSCULAR; INTRAVENOUS EVERY 4 HOURS PRN
Status: CANCELLED | OUTPATIENT
Start: 2019-03-06

## 2019-03-06 RX ORDER — ONDANSETRON 2 MG/ML
INJECTION INTRAMUSCULAR; INTRAVENOUS AS NEEDED
Status: DISCONTINUED | OUTPATIENT
Start: 2019-03-06 | End: 2019-03-06 | Stop reason: SURG

## 2019-03-06 RX ORDER — SODIUM CHLORIDE, SODIUM LACTATE, POTASSIUM CHLORIDE, CALCIUM CHLORIDE 600; 310; 30; 20 MG/100ML; MG/100ML; MG/100ML; MG/100ML
75 INJECTION, SOLUTION INTRAVENOUS CONTINUOUS
Status: CANCELLED | OUTPATIENT
Start: 2019-03-06

## 2019-03-06 RX ORDER — LIDOCAINE HYDROCHLORIDE 10 MG/ML
INJECTION, SOLUTION INFILTRATION; PERINEURAL AS NEEDED
Status: DISCONTINUED | OUTPATIENT
Start: 2019-03-06 | End: 2019-03-06 | Stop reason: SURG

## 2019-03-06 RX ORDER — EPHEDRINE SULFATE 50 MG/ML
INJECTION INTRAVENOUS AS NEEDED
Status: DISCONTINUED | OUTPATIENT
Start: 2019-03-06 | End: 2019-03-06 | Stop reason: SURG

## 2019-03-06 RX ORDER — PROPOFOL 10 MG/ML
INJECTION, EMULSION INTRAVENOUS AS NEEDED
Status: DISCONTINUED | OUTPATIENT
Start: 2019-03-06 | End: 2019-03-06 | Stop reason: SURG

## 2019-03-06 RX ORDER — SODIUM CHLORIDE, SODIUM LACTATE, POTASSIUM CHLORIDE, CALCIUM CHLORIDE 600; 310; 30; 20 MG/100ML; MG/100ML; MG/100ML; MG/100ML
125 INJECTION, SOLUTION INTRAVENOUS CONTINUOUS
Status: CANCELLED | OUTPATIENT
Start: 2019-03-06

## 2019-03-06 RX ORDER — ONDANSETRON 2 MG/ML
4 INJECTION INTRAMUSCULAR; INTRAVENOUS EVERY 4 HOURS PRN
Status: CANCELLED | OUTPATIENT
Start: 2019-03-06

## 2019-03-06 RX ORDER — SODIUM CHLORIDE, SODIUM LACTATE, POTASSIUM CHLORIDE, CALCIUM CHLORIDE 600; 310; 30; 20 MG/100ML; MG/100ML; MG/100ML; MG/100ML
INJECTION, SOLUTION INTRAVENOUS CONTINUOUS PRN
Status: DISCONTINUED | OUTPATIENT
Start: 2019-03-06 | End: 2019-03-06 | Stop reason: SURG

## 2019-03-06 RX ORDER — SODIUM CHLORIDE, SODIUM LACTATE, POTASSIUM CHLORIDE, CALCIUM CHLORIDE 600; 310; 30; 20 MG/100ML; MG/100ML; MG/100ML; MG/100ML
125 INJECTION, SOLUTION INTRAVENOUS CONTINUOUS
Status: DISCONTINUED | OUTPATIENT
Start: 2019-03-06 | End: 2019-03-07 | Stop reason: HOSPADM

## 2019-03-06 RX ORDER — METOCLOPRAMIDE HYDROCHLORIDE 5 MG/ML
INJECTION INTRAMUSCULAR; INTRAVENOUS AS NEEDED
Status: DISCONTINUED | OUTPATIENT
Start: 2019-03-06 | End: 2019-03-06 | Stop reason: SURG

## 2019-03-06 RX ADMIN — SODIUM CHLORIDE, SODIUM LACTATE, POTASSIUM CHLORIDE, AND CALCIUM CHLORIDE 125 ML/HR: .6; .31; .03; .02 INJECTION, SOLUTION INTRAVENOUS at 11:12

## 2019-03-06 RX ADMIN — METOCLOPRAMIDE 10 MG: 5 INJECTION, SOLUTION INTRAMUSCULAR; INTRAVENOUS at 12:34

## 2019-03-06 RX ADMIN — PROPOFOL 150 MG: 10 INJECTION, EMULSION INTRAVENOUS at 12:34

## 2019-03-06 RX ADMIN — SODIUM CHLORIDE, SODIUM LACTATE, POTASSIUM CHLORIDE, AND CALCIUM CHLORIDE: .6; .31; .03; .02 INJECTION, SOLUTION INTRAVENOUS at 12:23

## 2019-03-06 RX ADMIN — LIDOCAINE HYDROCHLORIDE 50 MG: 10 INJECTION, SOLUTION INFILTRATION; PERINEURAL at 12:34

## 2019-03-06 RX ADMIN — EPHEDRINE SULFATE 10 MG: 50 INJECTION, SOLUTION INTRAVENOUS at 12:34

## 2019-03-06 RX ADMIN — ONDANSETRON 4 MG: 2 INJECTION INTRAMUSCULAR; INTRAVENOUS at 12:34

## 2019-03-06 NOTE — PROGRESS NOTES
Pt awake and alert  VS stable  Pt's questions answered  Pt notified of transfer to [facility], called [facility] spoke with Luis Manuel Mcdaniel  Pt has no further questions at this time

## 2019-03-06 NOTE — ANESTHESIA POSTPROCEDURE EVALUATION
Post-Op Assessment Note    CV Status:  Stable    Pain management: adequate     Mental Status:  Alert and awake   Hydration Status:  Euvolemic   PONV Controlled:  Controlled   Airway Patency:  Patent   Post Op Vitals Reviewed: Yes      Staff: Anesthesiologist           BP   126/64   Temp   97 6   Pulse  85   Resp   16   SpO2   96

## 2019-03-06 NOTE — ANESTHESIA PREPROCEDURE EVALUATION
Review of Systems/Medical History  Patient summary reviewed  Chart reviewed  No history of anesthetic complications     Cardiovascular  Exercise tolerance (METS): >4,     Pulmonary  Smoker ex-smoker  , COPD moderate- medication dependent , Asthma , PRN med  controlled ,        GI/Hepatic    GERD well controlled,        Negative  ROS        Endo/Other  Negative endo/other ROS      GYN       Hematology   Musculoskeletal    Arthritis (RA)     Neurology      Comment: Lumbar radiculopathy Psychology     Chronic opioid dependence            Physical Exam    Airway    Mallampati score: II  TM Distance: >3 FB  Neck ROM: full     Dental   upper dentures,     Cardiovascular  Rhythm: regular, Rate: normal,     Pulmonary      Other Findings        Anesthesia Plan  ASA Score- 3     Anesthesia Type- general with ASA Monitors  Additional Monitors:   Airway Plan: LMA  Comment: Patients says she has easily removable dentures but refusing to remove them  She understands that these will likely need to removed while she is sleeping and that she cannot proceed without consenting to removal under anesthesia  Plan Factors-    Induction- intravenous  Postoperative Plan-     Informed Consent- Anesthetic plan and risks discussed with patient  I personally reviewed this patient with the CRNA  Discussed and agreed on the Anesthesia Plan with the CRNA  Maria E Ayala

## 2019-03-07 ENCOUNTER — OFFICE VISIT (OUTPATIENT)
Dept: OBGYN CLINIC | Facility: HOSPITAL | Age: 59
End: 2019-03-07
Payer: COMMERCIAL

## 2019-03-07 VITALS
HEIGHT: 63 IN | SYSTOLIC BLOOD PRESSURE: 115 MMHG | DIASTOLIC BLOOD PRESSURE: 73 MMHG | HEART RATE: 80 BPM | BODY MASS INDEX: 28.87 KG/M2

## 2019-03-07 DIAGNOSIS — M79.604 RIGHT LEG PAIN: ICD-10-CM

## 2019-03-07 DIAGNOSIS — M87.051 AVASCULAR NECROSIS OF BONE OF RIGHT HIP (HCC): Primary | ICD-10-CM

## 2019-03-07 PROCEDURE — 99213 OFFICE O/P EST LOW 20 MIN: CPT | Performed by: ORTHOPAEDIC SURGERY

## 2019-03-07 NOTE — PROGRESS NOTES
Assessment:  1  Avascular necrosis of bone of right hip ([facility] )     2  Right leg pain       Patient Active Problem List   Diagnosis    Avascular necrosis of bone of right hip ([facility] )    Lumbar radicular pain           Plan       [place] has a history of rheumatoid arthritis for which she sees Dr Leora Abdalla   She has had many medications offered to her for her joint pain which she has refused  At this time recommendations are made for a referral to the Spine and Pain team for possible injection however she would like to hold off on this  She also known understands that she may undergo a repeat right hip intra-articular cortisone injection in the future since she did have some mild improvement with this  She will call to set either of these when and if she wishes  Patient was seen, examined and plan formulated by Dr Manny Kaur            Subjective:     Patient ID:    Chief Aga Remy 59 y o  female      HPI    78-year-old female who is here for recheck of her right leg  She still noting right lateral posterior and anterior hip pain  She had her MRI of her lumbar spine done which she is here to discuss the results of today         The following portions of the patient's history were reviewed and updated as appropriate: allergies, current medications, past family history, past social history, past surgical history and problem list     All organ systems normal except: as per HPI    Social History     Socioeconomic History    Marital status: /Civil Union     Spouse name: Not on file    Number of children: Not on file    Years of education: Not on file    Highest education level: Not on file   Occupational History    Not on file   Social Needs    Financial resource strain: Not on file    Food insecurity:     Worry: Not on file     Inability: Not on file    Transportation needs:     Medical: Not on file     Non-medical: Not on file   Tobacco Use    Smoking status: Former Smoker    Smokeless tobacco: Never Used   Substance and Sexual Activity    Alcohol use: No    Drug use: Not on file    Sexual activity: Not on file   Lifestyle    Physical activity:     Days per week: Not on file     Minutes per session: Not on file    Stress: Not on file   Relationships    Social connections:     Talks on phone: Not on file     Gets together: Not on file     Attends Jainism service: Not on file     Active member of club or organization: Not on file     Attends meetings of clubs or organizations: Not on file     Relationship status: Not on file    Intimate partner violence:     Fear of current or ex partner: Not on file     Emotionally abused: Not on file     Physically abused: Not on file     Forced sexual activity: Not on file   Other Topics Concern    Not on file   Social History Narrative    Not on file     Past Medical History:   Diagnosis Date    Arthritis     Asthma     Avascular necrosis of bone of hip, right ([facility] )     COPD (chronic obstructive pulmonary disease) ([facility] )     GERD (gastroesophageal reflux disease)     Hypoglycemia     Lumbar radicular pain     Lyme disease     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      Past Surgical History:   Procedure Laterality Date    APPENDECTOMY      CARPAL TUNNEL RELEASE      CHOLECYSTECTOMY      SINUS SURGERY       Allergies   Allergen Reactions    Aspirin Anaphylaxis    Iodine Anaphylaxis    Penicillins Anaphylaxis     Current Outpatient Medications on File Prior to Visit   Medication Sig Dispense Refill    Budesonide-Formoterol Fumarate (SYMBICORT IN) Inhale 2 puffs daily 160-4 50MCG/ACTUATION      Cholecalciferol (VITAMIN D3) 2000 units CHEW Chew 5,000 Units daily       Epinastine HCl 0 05 % ophthalmic solution Administer 1 drop to both eyes 2 (two) times a day as needed       FENOFIBRATE PO Take 135 mg by mouth daily       fexofenadine (ALLEGRA) 180 MG tablet Take 180 mg by mouth daily      hydroxychloroquine (PLAQUENIL) 200 mg tablet Take 200 mg by mouth 2 (two) times a day with meals       levalbuterol (XOPENEX) 1 25 mg/3 mL nebulizer solution Take 1 25 mg by nebulization every 4 (four) hours as needed       oxyCODONE (ROXICODONE) 5 mg immediate release tablet Take 5 mg by mouth every 4 (four) hours as needed for moderate pain      pantoprazole (PROTONIX) 40 mg tablet Take 1 tablet (40 mg total) by mouth daily 30 tablet 11     Current Facility-Administered Medications on File Prior to Visit   Medication Dose Route Frequency Provider Last Rate Last Dose    [DISCONTINUED] ePHEDrine Sulfate SOLN   Intravenous PRN Zak Severino MD   10 mg at 03/06/19 1234    [DISCONTINUED] lactated ringers infusion  125 mL/hr Intravenous Continuous Zak Severino  mL/hr at 03/06/19 1112 125 mL/hr at 03/06/19 1112    [DISCONTINUED] lactated ringers infusion   Intravenous Continuous PRN Zak Severino MD   Stopped at 03/06/19 1310    [DISCONTINUED] lidocaine (XYLOCAINE) 1 % injection    PRN Zak Severino MD   50 mg at 03/06/19 1234    [DISCONTINUED] metoclopramide (REGLAN) injection   Intravenous PRN Zak Severino MD   10 mg at 03/06/19 1234    [DISCONTINUED] ondansetron (ZOFRAN) injection   Intravenous PRN Zak Severino MD   4 mg at 03/06/19 1234    [DISCONTINUED] propofol (DIPRIVAN) 200 MG/20ML bolus injection   Intravenous PRN Zak Severino MD   150 mg at 03/06/19 1234              Objective:        Right Hip Exam     Range of Motion   The patient has normal right hip ROM  Muscle Strength   The patient has normal right hip strength      Other   Erythema: absent  Sensation: normal  Pulse: present    Comments:  Right groin pain with passive motion/flexion                no new imaging today    Portions of the record may have been created with voice recognition software   Occasional wrong word or "sound a like" substitutions may have occurred due to the inherent limitations of voice recognition software   Read the chart carefully and recognize, using context, where substitutions have occurred

## 2019-03-12 ENCOUNTER — OFFICE VISIT (OUTPATIENT)
Dept: GASTROENTEROLOGY | Facility: CLINIC | Age: 59
End: 2019-03-12
Payer: COMMERCIAL

## 2019-03-12 VITALS
DIASTOLIC BLOOD PRESSURE: 66 MMHG | WEIGHT: 162.6 LBS | SYSTOLIC BLOOD PRESSURE: 138 MMHG | BODY MASS INDEX: 28.8 KG/M2 | HEART RATE: 75 BPM

## 2019-03-12 DIAGNOSIS — R10.13 EPIGASTRIC PAIN: ICD-10-CM

## 2019-03-12 DIAGNOSIS — K21.9 GASTROESOPHAGEAL REFLUX DISEASE WITHOUT ESOPHAGITIS: Primary | ICD-10-CM

## 2019-03-12 PROCEDURE — 99214 OFFICE O/P EST MOD 30 MIN: CPT | Performed by: PHYSICIAN ASSISTANT

## 2019-03-12 RX ORDER — PANTOPRAZOLE SODIUM 40 MG/1
40 TABLET, DELAYED RELEASE ORAL DAILY
Qty: 30 TABLET | Refills: 11 | Status: SHIPPED | OUTPATIENT
Start: 2019-03-12 | End: 2021-07-09

## 2019-03-12 NOTE — PROGRESS NOTES
[facility] - Outpatient Follow-up Note  Bonifacio Zhang 59 y o  female MRN: [id number]  Encounter: [id number]          ASSESSMENT AND PLAN:      1  Gastroesophageal reflux disease without esophagitis  - Will do EGD with biopsy  - Will start Pantoprazole 40mg QD  2  Epigastric pain  - Will do RUQ US   ____________________________________________________________________    SUBJECTIVE:    59 year female with GERD and epigastric pain  She reports nausea but denies vomiting  She denies any diarrhea or constipation  She denies any melena or RB  She is on Pantoprazole 40mg; but she does not respond as good to this as she does to Protonix (brand)  She had her GB out 3 years ago but reports pain in the RUQ now  Her last EGD was several years ago  REVIEW OF SYSTEMS IS OTHERWISE NEGATIVE        Historical Information   Past Medical History:   Diagnosis Date    Arthritis     Asthma     Avascular necrosis of bone of hip, right (HCC)     COPD (chronic obstructive pulmonary disease) (HCC)     GERD (gastroesophageal reflux disease)     Hypoglycemia     Lumbar radicular pain     Lyme disease     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HCC)      Past Surgical History:   Procedure Laterality Date    APPENDECTOMY      CARPAL TUNNEL RELEASE      CHOLECYSTECTOMY      SINUS SURGERY       Social History   Social History     Substance and Sexual Activity   Alcohol Use No     Social History     Substance and Sexual Activity   Drug Use Not on file     Social History     Tobacco Use   Smoking Status Former Smoker   Smokeless Tobacco Never Used     Family History   Problem Relation Age of Onset    No Known Problems Mother        Meds/Allergies       Current Outpatient Medications:     Budesonide-Formoterol Fumarate (SYMBICORT IN)    Cholecalciferol (VITAMIN D3) 2000 units CHEW    Epinastine HCl 0 05 % ophthalmic solution    FENOFIBRATE PO    fexofenadine (ALLEGRA) 180 MG tablet   hydroxychloroquine (PLAQUENIL) 200 mg tablet    levalbuterol (XOPENEX) 1 25 mg/3 mL nebulizer solution    oxyCODONE (ROXICODONE) 5 mg immediate release tablet    pantoprazole (PROTONIX) 40 mg tablet    Allergies   Allergen Reactions    Aspirin Anaphylaxis    Iodine Anaphylaxis    Penicillins Anaphylaxis           Objective     Blood pressure 138/66, pulse 75, weight 73 8 kg (162 lb 9 6 oz)  Body mass index is 28 8 kg/m²  PHYSICAL EXAM:      General Appearance:   Alert, cooperative, no distress   HEENT:   Normocephalic, atraumatic, anicteric      Neck:  Supple, symmetrical, trachea midline   Lungs:   Clear to auscultation bilaterally; no rales, rhonchi or wheezing; respirations unlabored    Heart[de-identified]   Regular rate and rhythm; no murmur, rub, or gallop  Abdomen:   Soft, non-tender, non-distended; normal bowel sounds; no masses, no organomegaly    Genitalia:   Deferred    Rectal:   Deferred    Extremities:  No cyanosis, clubbing or edema    Pulses:  2+ and symmetric    Skin:  No jaundice, rashes, or lesions    Lymph nodes:  No palpable cervical lymphadenopathy        Lab Results:   No visits with results within 1 Day(s) from this visit  Latest known visit with results is:   Hospital Outpatient Visit on 03/06/2019   Component Date Value    POC Glucose 03/06/2019 81          Radiology Results:   Mri Lumbar Spine Wo Contrast    Result Date: 3/7/2019  Narrative: MRI LUMBAR SPINE WITHOUT CONTRAST INDICATION: M54 16: Radiculopathy, lumbar region G62 9: Polyneuropathy, unspecified  Low back and bilateral leg pain with weakness COMPARISON:  MR 11/7/2012, x-ray 1/15/2019 TECHNIQUE:  Sagittal T1, sagittal T2, sagittal inversion recovery, axial T1 and axial T2, coronal T2  Patient was prepared for and monitored during this exam by anesthesia personnel  IMAGE QUALITY:  Diagnostic FINDINGS: VERTEBRAL BODIES:  Minor straightening of normal lumbar lordosis  Very minor left convex L2-3 apex scoliosis    Leftward translation of L3 and L4  Normal marrow signal is identified within the visualized bony structures  No discrete marrow lesion  SACRUM:  Normal signal within the sacrum  No evidence of insufficiency or stress fracture  DISTAL CORD AND CONUS:  Normal size and signal within the distal cord and conus  Conus terminates at L2  PARASPINAL SOFT TISSUES:  Paraspinal soft tissues are unremarkable  LOWER THORACIC DISC SPACES:  Normal disc height and signal   No disc herniation, canal stenosis or foraminal narrowing  LUMBAR DISC SPACES: L1-L2:  Circumferential bulge, right greater than left facet arthrosis L2-L3:  Circumferential bulge, marginal osteophytes, mild bilateral facet arthrosis L3-L4:  Circumferential bulge, marginal osteophytes, minor facet arthrosis L4-L5:  Moderate bilateral facet arthrosis, circumferential bulge  L5-S1:  Moderate bilateral facet arthrosis, circumferential bulge  Impression: Relatively stable multilevel degenerative changes which do not result in nerve root compression   Workstation performed: [id number]

## 2019-03-12 NOTE — PATIENT INSTRUCTIONS
Gastroesophageal Reflux Disease   WHAT YOU NEED TO KNOW:   Gastroesophageal reflux occurs when acid and food in the stomach back up into the esophagus  Gastroesophageal reflux disease (GERD) is reflux that occurs more than twice a week for a few weeks  It usually causes heartburn and other symptoms  GERD can cause other health problems over time if it is not treated  DISCHARGE INSTRUCTIONS:   Return to the emergency department if:   · You feel full and cannot burp or vomit  · You have severe chest pain and sudden trouble breathing  · Your bowel movements are black, bloody, or tarry-looking  · Your vomit looks like coffee grounds or has blood in it  Contact your healthcare provider if:   · You vomit large amounts, or you vomit often  · You have trouble breathing after you vomit  · You have trouble swallowing, or pain with swallowing  · You are losing weight without trying  · Your symptoms get worse or do not improve with treatment  · You have questions or concerns about your condition or care  Medicines:   · Medicines  are used to decrease stomach acid  Medicine may also be used to help your lower esophageal sphincter and stomach contract (tighten) more  · Take your medicine as directed  Contact your healthcare provider if you think your medicine is not helping or if you have side effects  Tell him of her if you are allergic to any medicine  Keep a list of the medicines, vitamins, and herbs you take  Include the amounts, and when and why you take them  Bring the list or the pill bottles to follow-up visits  Carry your medicine list with you in case of an emergency  Manage GERD:   · Do not have foods or drinks that may increase heartburn  These include chocolate, peppermint, fried or fatty foods, drinks that contain caffeine, or carbonated drinks (soda)  Other foods include spicy foods, onions, tomatoes, and tomato-based foods   Do not have foods or drinks that can irritate your esophagus, such as citrus fruits, juices, and alcohol  · Do not eat large meals  When you eat a lot of food at one time, your stomach needs more acid to digest it  Eat 6 small meals each day instead of 3 large ones, and eat slowly  Do not eat meals 2 to 3 hours before bedtime  · Elevate the head of your bed  Place 6-inch blocks under the head of your bed frame  You may also use more than one pillow under your head and shoulders while you sleep  · Maintain a healthy weight  If you are overweight, weight loss may help relieve symptoms of GERD  · Do not smoke  Smoking weakens the lower esophageal sphincter and increases the risk of GERD  Ask your healthcare provider for information if you currently smoke and need help to quit  E-cigarettes or smokeless tobacco still contain nicotine  Talk to your healthcare provider before you use these products  · Do not wear clothing that is tight around your waist   Tight clothing can put pressure on your stomach and cause or worsen GERD symptoms  Follow up with your healthcare provider as directed:  Write down your questions so you remember to ask them during your visits  © 2017 [facility] Information is for End User's use only and may not be sold, redistributed or otherwise used for commercial purposes  All illustrations and images included in CareNotes® are the copyrighted property of [employer]  or Cristian [employer]  The above information is an  only  It is not intended as medical advice for individual conditions or treatments  Talk to your doctor, nurse or pharmacist before following any medical regimen to see if it is safe and effective for you

## 2019-03-12 NOTE — LETTER
March 12, 2019     Chandu Magana, [address] [place] [address]    Patient: Adali Bang   YOB: 1960   Date of Visit: 3/12/2019       Dear Dr Judith Dickson: Thank you for referring Adali Bang to me for evaluation  Below are my notes for this consultation  If you have questions, please do not hesitate to call me  I look forward to following your patient along with you  Sincerely,        Jesika Gay PA-C        CC: No Recipients  Jesika Gay [place]  3/12/2019  2:20 PM  Sign at close encounter  [facility] - Outpatient Follow-up Note  Adali Bang 59 y o  female MRN: [id number]  Encounter: [id number]          ASSESSMENT AND PLAN:      1  Gastroesophageal reflux disease without esophagitis  - Will do EGD with biopsy  - Will start Pantoprazole 40mg QD  2  Epigastric pain  - Will do RUQ US   ____________________________________________________________________    SUBJECTIVE:    59 year female with GERD and epigastric pain  She reports nausea but denies vomiting  She denies any diarrhea or constipation  She denies any melena or RB  She is on Pantoprazole 40mg; but she does not respond as good to this as she does to Protonix (brand)  She had her GB out 3 years ago but reports pain in the RUQ now  Her last EGD was several years ago  REVIEW OF SYSTEMS IS OTHERWISE NEGATIVE        Historical Information   Past Medical History:   Diagnosis Date    Arthritis     Asthma     Avascular necrosis of bone of hip, right (HCC)     COPD (chronic obstructive pulmonary disease) (HCC)     GERD (gastroesophageal reflux disease)     Hypoglycemia     Lumbar radicular pain     Lyme disease     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[facility] [address] )      Past Surgical History:   Procedure Laterality Date    APPENDECTOMY      CARPAL TUNNEL RELEASE      CHOLECYSTECTOMY      SINUS SURGERY       Social History   Social History     Substance and Sexual Activity Alcohol Use No     Social History     Substance and Sexual Activity   Drug Use Not on file     Social History     Tobacco Use   Smoking Status Former Smoker   Smokeless Tobacco Never Used     Family History   Problem Relation Age of Onset    No Known Problems Mother        Meds/Allergies       Current Outpatient Medications:     Budesonide-Formoterol Fumarate (SYMBICORT IN)    Cholecalciferol (VITAMIN D3) 2000 units CHEW    Epinastine HCl 0 05 % ophthalmic solution    FENOFIBRATE PO    fexofenadine (ALLEGRA) 180 MG tablet    hydroxychloroquine (PLAQUENIL) 200 mg tablet    levalbuterol (XOPENEX) 1 25 mg/3 mL nebulizer solution    oxyCODONE (ROXICODONE) 5 mg immediate release tablet    pantoprazole (PROTONIX) 40 mg tablet    Allergies   Allergen Reactions    Aspirin Anaphylaxis    Iodine Anaphylaxis    Penicillins Anaphylaxis           Objective     Blood pressure 138/66, pulse 75, weight 73 8 kg (162 lb 9 6 oz)  Body mass index is 28 8 kg/m²  PHYSICAL EXAM:      General Appearance:   Alert, cooperative, no distress   HEENT:   Normocephalic, atraumatic, anicteric      Neck:  Supple, symmetrical, trachea midline   Lungs:   Clear to auscultation bilaterally; no rales, rhonchi or wheezing; respirations unlabored    Heart[de-identified]   Regular rate and rhythm; no murmur, rub, or gallop  Abdomen:   Soft, non-tender, non-distended; normal bowel sounds; no masses, no organomegaly    Genitalia:   Deferred    Rectal:   Deferred    Extremities:  No cyanosis, clubbing or edema    Pulses:  2+ and symmetric    Skin:  No jaundice, rashes, or lesions    Lymph nodes:  No palpable cervical lymphadenopathy        Lab Results:   No visits with results within 1 Day(s) from this visit     Latest known visit with results is:   Hospital Outpatient Visit on 03/06/2019   Component Date Value    POC Glucose 03/06/2019 81          Radiology Results:   Mri Lumbar Spine Wo Contrast    Result Date: 3/7/2019  Narrative: MRI LUMBAR SPINE WITHOUT CONTRAST INDICATION: M54 16: Radiculopathy, lumbar region G62 9: Polyneuropathy, unspecified  Low back and bilateral leg pain with weakness COMPARISON:  MR 11/7/2012, x-ray 1/15/2019 TECHNIQUE:  Sagittal T1, sagittal T2, sagittal inversion recovery, axial T1 and axial T2, coronal T2  Patient was prepared for and monitored during this exam by anesthesia personnel  IMAGE QUALITY:  Diagnostic FINDINGS: VERTEBRAL BODIES:  Minor straightening of normal lumbar lordosis  Very minor left convex L2-3 apex scoliosis  Leftward translation of L3 and L4  Normal marrow signal is identified within the visualized bony structures  No discrete marrow lesion  SACRUM:  Normal signal within the sacrum  No evidence of insufficiency or stress fracture  DISTAL CORD AND CONUS:  Normal size and signal within the distal cord and conus  Conus terminates at L2  PARASPINAL SOFT TISSUES:  Paraspinal soft tissues are unremarkable  LOWER THORACIC DISC SPACES:  Normal disc height and signal   No disc herniation, canal stenosis or foraminal narrowing  LUMBAR DISC SPACES: L1-L2:  Circumferential bulge, right greater than left facet arthrosis L2-L3:  Circumferential bulge, marginal osteophytes, mild bilateral facet arthrosis L3-L4:  Circumferential bulge, marginal osteophytes, minor facet arthrosis L4-L5:  Moderate bilateral facet arthrosis, circumferential bulge  L5-S1:  Moderate bilateral facet arthrosis, circumferential bulge  Impression: Relatively stable multilevel degenerative changes which do not result in nerve root compression   Workstation performed: [id number]

## 2019-03-15 ENCOUNTER — HOSPITAL ENCOUNTER (OUTPATIENT)
Dept: ULTRASOUND IMAGING | Facility: CLINIC | Age: 59
Discharge: HOME | End: 2019-03-15
Payer: COMMERCIAL

## 2019-03-15 DIAGNOSIS — R10.13 EPIGASTRIC PAIN: ICD-10-CM

## 2019-03-15 DIAGNOSIS — K21.9 GASTROESOPHAGEAL REFLUX DISEASE WITHOUT ESOPHAGITIS: ICD-10-CM

## 2019-03-15 PROCEDURE — 76705 ECHO EXAM OF ABDOMEN: CPT

## 2019-05-14 ENCOUNTER — TELEPHONE (OUTPATIENT)
Dept: GASTROENTEROLOGY | Facility: CLINIC | Age: 59
End: 2019-05-14

## 2019-10-24 LAB — HCV AB SER-ACNC: NORMAL

## 2019-10-30 ENCOUNTER — PREP FOR PROCEDURE (OUTPATIENT)
Dept: GASTROENTEROLOGY | Facility: CLINIC | Age: 59
End: 2019-10-30

## 2019-10-30 ENCOUNTER — OFFICE VISIT (OUTPATIENT)
Dept: GASTROENTEROLOGY | Facility: CLINIC | Age: 59
End: 2019-10-30
Payer: COMMERCIAL

## 2019-10-30 VITALS
SYSTOLIC BLOOD PRESSURE: 130 MMHG | HEIGHT: 63 IN | DIASTOLIC BLOOD PRESSURE: 78 MMHG | WEIGHT: 162 LBS | BODY MASS INDEX: 28.7 KG/M2 | HEART RATE: 80 BPM

## 2019-10-30 DIAGNOSIS — K21.9 GASTROESOPHAGEAL REFLUX DISEASE WITHOUT ESOPHAGITIS: Primary | ICD-10-CM

## 2019-10-30 PROCEDURE — 99214 OFFICE O/P EST MOD 30 MIN: CPT | Performed by: PHYSICIAN ASSISTANT

## 2019-10-30 RX ORDER — CODEINE SULFATE 30 MG/1
TABLET ORAL
COMMUNITY
Start: 2017-10-11 | End: 2019-11-14 | Stop reason: ALTCHOICE

## 2019-10-30 RX ORDER — IPRATROPIUM BROMIDE AND ALBUTEROL SULFATE 2.5; .5 MG/3ML; MG/3ML
SOLUTION RESPIRATORY (INHALATION)
COMMUNITY
End: 2019-11-14 | Stop reason: ALTCHOICE

## 2019-10-30 RX ORDER — ESOMEPRAZOLE MAGNESIUM 40 MG/1
CAPSULE, DELAYED RELEASE ORAL
COMMUNITY
End: 2019-11-14 | Stop reason: ALTCHOICE

## 2019-10-30 RX ORDER — ALBUTEROL SULFATE 90 UG/1
AEROSOL, METERED RESPIRATORY (INHALATION)
Refills: 0 | COMMUNITY
Start: 2019-09-25 | End: 2021-12-20 | Stop reason: SDUPTHER

## 2019-10-30 RX ORDER — OMEPRAZOLE 20 MG/1
20 CAPSULE, DELAYED RELEASE ORAL DAILY
Qty: 30 CAPSULE | Refills: 3 | Status: SHIPPED | OUTPATIENT
Start: 2019-10-30 | End: 2019-11-04 | Stop reason: SDUPTHER

## 2019-10-30 RX ORDER — OLOPATADINE HYDROCHLORIDE 2 MG/ML
SOLUTION/ DROPS OPHTHALMIC
COMMUNITY
End: 2021-07-09

## 2019-10-30 RX ORDER — OXYCODONE AND ACETAMINOPHEN 10; 325 MG/1; MG/1
1 TABLET ORAL EVERY 8 HOURS PRN
Refills: 0 | COMMUNITY
Start: 2019-10-06 | End: 2021-10-12

## 2019-10-30 NOTE — PROGRESS NOTES
[facility] - Outpatient Follow-up Note  Laury Green 62 y o  female MRN: [id number]  Encounter: [id number]          ASSESSMENT AND PLAN:      1  Gastroesophageal reflux disease without esophagitis  Will do EGD with biopsy  Will start patient on Prilosec 20 milligrams daily  Patient does not have hepatitis a or B  ______________________________________________________________________    SUBJECTIVE:   33-year-old female who is here for follow-up of acid reflux disease as well as referral for a positive hepatitis a antibody IgG as well as a positive hepatitis B core total antibody  Patient reports that she never rescheduled her endoscopy that was supposed to be due in the spring of 2019  She reports chronic acid reflux disease as well as right upper quadrant abdominal pain  I have explained to the patient she does not have hepatitis a or B      REVIEW OF SYSTEMS IS OTHERWISE NEGATIVE        Historical Information   Past Medical History:   Diagnosis Date    Arthritis     Asthma     Avascular necrosis of bone of hip, right (HCC)     COPD (chronic obstructive pulmonary disease) (HCC)     GERD (gastroesophageal reflux disease)     Hypoglycemia     Infectious viral hepatitis     Lumbar radicular pain     Lyme disease     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HCC)      Past Surgical History:   Procedure Laterality Date    APPENDECTOMY      CARPAL TUNNEL RELEASE      CHOLECYSTECTOMY      SINUS SURGERY       Social History   Social History     Substance and Sexual Activity   Alcohol Use No     Social History     Substance and Sexual Activity   Drug Use Never     Social History     Tobacco Use   Smoking Status Former Smoker   Smokeless Tobacco Never Used     Family History   Problem Relation Age of Onset    No Known Problems Mother        Meds/Allergies       Current Outpatient Medications:     albuterol (PROVENTIL HFA,VENTOLIN HFA) 90 mcg/act inhaler    Budesonide-Formoterol Fumarate (SYMBICORT IN)    Cholecalciferol (VITAMIN D3) 2000 units CHEW    codeine 30 mg tablet    Epinastine HCl 0 05 % ophthalmic solution    esomeprazole (NEXIUM) 40 MG capsule    FENOFIBRATE PO    fexofenadine (ALLEGRA) 180 MG tablet    hydroxychloroquine (PLAQUENIL) 200 mg tablet    ipratropium-albuterol (DUO-NEB) 0 5-2 5 mg/3 mL nebulizer solution    levalbuterol (XOPENEX) 1 25 mg/3 mL nebulizer solution    loteprednol (ALREX) 0 2 % SUSP    olopatadine HCl (PATADAY) 0 2 % opth drops    oxyCODONE (ROXICODONE) 5 mg immediate release tablet    oxyCODONE-acetaminophen (PERCOCET)  mg per tablet    pantoprazole (PROTONIX) 40 mg tablet    omeprazole (PriLOSEC) 20 mg delayed release capsule    Allergies   Allergen Reactions    Aspirin Anaphylaxis    Iodine Anaphylaxis    Penicillins Anaphylaxis           Objective     Blood pressure 130/78, pulse 80, height 5' 3" (1 6 m), weight 73 5 kg (162 lb)  Body mass index is 28 7 kg/m²  PHYSICAL EXAM:      General Appearance:   Alert, cooperative, no distress   HEENT:   Normocephalic, atraumatic, anicteric      Neck:  Supple, symmetrical, trachea midline   Lungs:   Clear to auscultation bilaterally; no rales, rhonchi or wheezing; respirations unlabored    Heart[de-identified]   Regular rate and rhythm; no murmur, rub, or gallop  Abdomen:   Soft, non-tender, non-distended; normal bowel sounds; no masses, no organomegaly    Genitalia:   Deferred    Rectal:   Deferred    Extremities:  No cyanosis, clubbing or edema    Pulses:  2+ and symmetric    Skin:  No jaundice, rashes, or lesions    Lymph nodes:  No palpable cervical lymphadenopathy        Lab Results:   No visits with results within 1 Day(s) from this visit  Latest known visit with results is:   Hospital Outpatient Visit on 03/06/2019   Component Date Value    POC Glucose 03/06/2019 81          Radiology Results:   No results found

## 2019-10-30 NOTE — PATIENT INSTRUCTIONS
Gastroesophageal Reflux Disease   WHAT YOU NEED TO KNOW:   Gastroesophageal reflux occurs when acid and food in the stomach back up into the esophagus  Gastroesophageal reflux disease (GERD) is reflux that occurs more than twice a week for a few weeks  It usually causes heartburn and other symptoms  GERD can cause other health problems over time if it is not treated  DISCHARGE INSTRUCTIONS:   Return to the emergency department if:   · You feel full and cannot burp or vomit  · You have severe chest pain and sudden trouble breathing  · Your bowel movements are black, bloody, or tarry-looking  · Your vomit looks like coffee grounds or has blood in it  Contact your healthcare provider if:   · You vomit large amounts, or you vomit often  · You have trouble breathing after you vomit  · You have trouble swallowing, or pain with swallowing  · You are losing weight without trying  · Your symptoms get worse or do not improve with treatment  · You have questions or concerns about your condition or care  Medicines:   · Medicines  are used to decrease stomach acid  Medicine may also be used to help your lower esophageal sphincter and stomach contract (tighten) more  · Take your medicine as directed  Contact your healthcare provider if you think your medicine is not helping or if you have side effects  Tell him of her if you are allergic to any medicine  Keep a list of the medicines, vitamins, and herbs you take  Include the amounts, and when and why you take them  Bring the list or the pill bottles to follow-up visits  Carry your medicine list with you in case of an emergency  Manage GERD:   · Do not have foods or drinks that may increase heartburn  These include chocolate, peppermint, fried or fatty foods, drinks that contain caffeine, or carbonated drinks (soda)  Other foods include spicy foods, onions, tomatoes, and tomato-based foods   Do not have foods or drinks that can irritate your esophagus, such as citrus fruits, juices, and alcohol  · Do not eat large meals  When you eat a lot of food at one time, your stomach needs more acid to digest it  Eat 6 small meals each day instead of 3 large ones, and eat slowly  Do not eat meals 2 to 3 hours before bedtime  · Elevate the head of your bed  Place 6-inch blocks under the head of your bed frame  You may also use more than one pillow under your head and shoulders while you sleep  · Maintain a healthy weight  If you are overweight, weight loss may help relieve symptoms of GERD  · Do not smoke  Smoking weakens the lower esophageal sphincter and increases the risk of GERD  Ask your healthcare provider for information if you currently smoke and need help to quit  E-cigarettes or smokeless tobacco still contain nicotine  Talk to your healthcare provider before you use these products  · Do not wear clothing that is tight around your waist   Tight clothing can put pressure on your stomach and cause or worsen GERD symptoms  Follow up with your healthcare provider as directed:  Write down your questions so you remember to ask them during your visits  © 2017 [facility] Information is for End User's use only and may not be sold, redistributed or otherwise used for commercial purposes  All illustrations and images included in CareNotes® are the copyrighted property of [employer]  or Cristian Nelson  The above information is an  only  It is not intended as medical advice for individual conditions or treatments  Talk to your doctor, nurse or pharmacist before following any medical regimen to see if it is safe and effective for you

## 2019-11-04 DIAGNOSIS — K21.9 GASTROESOPHAGEAL REFLUX DISEASE WITHOUT ESOPHAGITIS: ICD-10-CM

## 2019-11-05 RX ORDER — OMEPRAZOLE 20 MG/1
20 CAPSULE, DELAYED RELEASE ORAL DAILY
Qty: 90 CAPSULE | Refills: 3 | Status: SHIPPED | OUTPATIENT
Start: 2019-11-05 | End: 2021-07-09

## 2019-11-12 ENCOUNTER — TELEPHONE (OUTPATIENT)
Dept: SURGERY | Facility: HOSPITAL | Age: 59
End: 2019-11-12

## 2019-11-13 ENCOUNTER — TELEPHONE (OUTPATIENT)
Dept: SURGERY | Facility: HOSPITAL | Age: 59
End: 2019-11-13

## 2019-11-14 ENCOUNTER — ANESTHESIA (OUTPATIENT)
Dept: GASTROENTEROLOGY | Facility: HOSPITAL | Age: 59
End: 2019-11-14

## 2019-11-14 ENCOUNTER — ANESTHESIA EVENT (OUTPATIENT)
Dept: GASTROENTEROLOGY | Facility: HOSPITAL | Age: 59
End: 2019-11-14

## 2019-11-14 ENCOUNTER — HOSPITAL ENCOUNTER (OUTPATIENT)
Dept: GASTROENTEROLOGY | Facility: HOSPITAL | Age: 59
Setting detail: OUTPATIENT SURGERY
Discharge: HOME | End: 2019-11-14
Attending: INTERNAL MEDICINE | Admitting: INTERNAL MEDICINE
Payer: COMMERCIAL

## 2019-11-14 VITALS
SYSTOLIC BLOOD PRESSURE: 133 MMHG | OXYGEN SATURATION: 97 % | TEMPERATURE: 98.2 F | BODY MASS INDEX: 28.63 KG/M2 | WEIGHT: 161.6 LBS | HEIGHT: 63 IN | DIASTOLIC BLOOD PRESSURE: 57 MMHG | HEART RATE: 63 BPM | RESPIRATION RATE: 18 BRPM

## 2019-11-14 DIAGNOSIS — K21.9 GASTROESOPHAGEAL REFLUX DISEASE WITHOUT ESOPHAGITIS: ICD-10-CM

## 2019-11-14 PROCEDURE — 43239 EGD BIOPSY SINGLE/MULTIPLE: CPT | Performed by: INTERNAL MEDICINE

## 2019-11-14 PROCEDURE — 88305 TISSUE EXAM BY PATHOLOGIST: CPT | Performed by: PATHOLOGY

## 2019-11-14 RX ORDER — LIDOCAINE HYDROCHLORIDE 20 MG/ML
INJECTION, SOLUTION INFILTRATION; PERINEURAL AS NEEDED
Status: DISCONTINUED | OUTPATIENT
Start: 2019-11-14 | End: 2019-11-14 | Stop reason: SURG

## 2019-11-14 RX ORDER — PROPOFOL 10 MG/ML
INJECTION, EMULSION INTRAVENOUS AS NEEDED
Status: DISCONTINUED | OUTPATIENT
Start: 2019-11-14 | End: 2019-11-14 | Stop reason: SURG

## 2019-11-14 RX ORDER — KETAMINE HCL IN NACL, ISO-OSM 100MG/10ML
SYRINGE (ML) INJECTION AS NEEDED
Status: DISCONTINUED | OUTPATIENT
Start: 2019-11-14 | End: 2019-11-14 | Stop reason: SURG

## 2019-11-14 RX ORDER — SODIUM CHLORIDE, SODIUM LACTATE, POTASSIUM CHLORIDE, CALCIUM CHLORIDE 600; 310; 30; 20 MG/100ML; MG/100ML; MG/100ML; MG/100ML
INJECTION, SOLUTION INTRAVENOUS CONTINUOUS PRN
Status: DISCONTINUED | OUTPATIENT
Start: 2019-11-14 | End: 2019-11-14 | Stop reason: SURG

## 2019-11-14 RX ADMIN — PROPOFOL 100 MG: 10 INJECTION, EMULSION INTRAVENOUS at 10:24

## 2019-11-14 RX ADMIN — SODIUM CHLORIDE, SODIUM LACTATE, POTASSIUM CHLORIDE, AND CALCIUM CHLORIDE: .6; .31; .03; .02 INJECTION, SOLUTION INTRAVENOUS at 10:17

## 2019-11-14 RX ADMIN — LIDOCAINE HYDROCHLORIDE 5 ML: 20 INJECTION, SOLUTION INFILTRATION; PERINEURAL at 10:24

## 2019-11-14 RX ADMIN — Medication 20 MG: at 10:24

## 2019-11-14 NOTE — ANESTHESIA PREPROCEDURE EVALUATION
Review of Systems/Medical History  Patient summary reviewed  Chart reviewed  No history of anesthetic complications     Cardiovascular  Exercise tolerance (METS): >4,     Pulmonary  COPD moderate- medication dependent ,        GI/Hepatic    GERD poorly controlled,             Endo/Other     GYN       Hematology   Musculoskeletal  Back pain , lumbar pain,   Arthritis     Neurology   Psychology           Physical Exam    Airway    Mallampati score: II  TM Distance: >3 FB  Neck ROM: full     Dental   upper dentures,     Cardiovascular      Pulmonary      Other Findings        Anesthesia Plan  ASA Score- 2     Anesthesia Type- IV sedation with anesthesia with ASA Monitors  Additional Monitors:   Airway Plan:         Plan Factors-    Induction- intravenous  Postoperative Plan-     Informed Consent- Anesthetic plan and risks discussed with patient  I personally reviewed this patient with the CRNA  Discussed and agreed on the Anesthesia Plan with the CRNA  Esequiel Vazquez

## 2019-11-14 NOTE — DISCHARGE INSTRUCTIONS
Upper Endoscopy   WHAT YOU NEED TO KNOW:   An upper endoscopy is also called an upper gastrointestinal (GI) endoscopy, or an esophagogastroduodenoscopy (EGD)  You may feel bloated, gassy, or have some abdominal discomfort after your procedure  Your throat may be sore for 24 to 36 hours  You may burp or pass gas from air that is still inside your body  DISCHARGE INSTRUCTIONS:   Call 911 if:   · You have sudden chest pain or trouble breathing  Seek care immediately if:   · You feel dizzy or faint  · You have trouble swallowing  · You have severe throat pain  · Your bowel movements are very dark or black  · Your abdomen is hard and firm and you have severe pain  · You vomit blood  Contact your healthcare provider if:   · You feel full or bloated and cannot burp or pass gas  · You have not had a bowel movement for 3 days after your procedure  · You have neck pain  · You have a fever or chills  · You have nausea or are vomiting  · You have a rash or hives  · You have questions or concerns about your endoscopy  Relieve a sore throat:  Suck on throat lozenges or crushed ice  Gargle with a small amount of warm salt water  Mix 1 teaspoon of salt and 1 cup of warm water to make salt water  Relieve gas and discomfort from bloating:  Lie on your right side with a heating pad on your abdomen  Take short walks to help pass gas  Eat small meals until bloating is relieved  Rest after your procedure:  Do not drive or make important decisions until the day after your procedure  Return to your normal activity as directed  You can usually return to work the day after your procedure  Follow up with your healthcare provider as directed:  Write down your questions so you remember to ask them during your visits  © 2017 [employer]  Information is for End User's use only and may not be sold, redistributed or otherwise used for commercial purposes   All illustrations and images included in [employer] are the copyrighted property of [employer]  or Cristian Nelson  The above information is an  only  It is not intended as medical advice for individual conditions or treatments  Talk to your doctor, nurse or pharmacist before following any medical regimen to see if it is safe and effective for you

## 2019-11-14 NOTE — ANESTHESIA POSTPROCEDURE EVALUATION
Post-Op Assessment Note    CV Status:  Stable  Pain Score: 0    Pain management: adequate     Mental Status:  Sleepy   Hydration Status:  Stable   PONV Controlled:  None   Airway Patency:  Patent and adequate   Post Op Vitals Reviewed: Yes      Staff: CRNA           BP   161/70   Temp     Pulse  80   Resp  16   SpO2   98%

## 2020-11-02 ENCOUNTER — OFFICE VISIT (OUTPATIENT)
Dept: URGENT CARE | Facility: CLINIC | Age: 60
End: 2020-11-02
Payer: COMMERCIAL

## 2020-11-02 ENCOUNTER — HOSPITAL ENCOUNTER (EMERGENCY)
Facility: HOSPITAL | Age: 60
Discharge: HOME | End: 2020-11-02
Attending: EMERGENCY MEDICINE
Payer: COMMERCIAL

## 2020-11-02 ENCOUNTER — APPOINTMENT (EMERGENCY)
Dept: CT IMAGING | Facility: HOSPITAL | Age: 60
End: 2020-11-02
Payer: COMMERCIAL

## 2020-11-02 ENCOUNTER — APPOINTMENT (EMERGENCY)
Dept: RADIOLOGY | Facility: HOSPITAL | Age: 60
End: 2020-11-02
Payer: COMMERCIAL

## 2020-11-02 VITALS
SYSTOLIC BLOOD PRESSURE: 160 MMHG | TEMPERATURE: 97.5 F | RESPIRATION RATE: 18 BRPM | OXYGEN SATURATION: 98 % | HEART RATE: 80 BPM | DIASTOLIC BLOOD PRESSURE: 88 MMHG

## 2020-11-02 VITALS
SYSTOLIC BLOOD PRESSURE: 142 MMHG | TEMPERATURE: 98.3 F | HEART RATE: 63 BPM | OXYGEN SATURATION: 96 % | HEIGHT: 63 IN | RESPIRATION RATE: 20 BRPM | BODY MASS INDEX: 28.36 KG/M2 | DIASTOLIC BLOOD PRESSURE: 65 MMHG | WEIGHT: 160.05 LBS

## 2020-11-02 DIAGNOSIS — R42 DIZZINESS: ICD-10-CM

## 2020-11-02 DIAGNOSIS — R03.0 ELEVATED BLOOD PRESSURE READING: Primary | ICD-10-CM

## 2020-11-02 DIAGNOSIS — R03.0 ELEVATED BLOOD-PRESSURE READING WITHOUT DIAGNOSIS OF HYPERTENSION: ICD-10-CM

## 2020-11-02 DIAGNOSIS — R51.9 HEADACHE: Primary | ICD-10-CM

## 2020-11-02 DIAGNOSIS — R51.9 ACUTE INTRACTABLE HEADACHE, UNSPECIFIED HEADACHE TYPE: ICD-10-CM

## 2020-11-02 DIAGNOSIS — M79.602 LEFT ARM PAIN: ICD-10-CM

## 2020-11-02 LAB
ALBUMIN SERPL BCP-MCNC: 4.2 G/DL (ref 3.5–5)
ALP SERPL-CCNC: 36 U/L (ref 46–116)
ALT SERPL W P-5'-P-CCNC: 35 U/L (ref 12–78)
ANION GAP SERPL CALCULATED.3IONS-SCNC: 7 MMOL/L (ref 4–13)
APTT PPP: 28 SECONDS (ref 23–37)
AST SERPL W P-5'-P-CCNC: 19 U/L (ref 5–45)
BACTERIA UR QL AUTO: NORMAL /HPF
BASOPHILS # BLD AUTO: 0.06 THOUSANDS/ΜL (ref 0–0.1)
BASOPHILS NFR BLD AUTO: 1 % (ref 0–1)
BILIRUB DIRECT SERPL-MCNC: 0.11 MG/DL (ref 0–0.2)
BILIRUB SERPL-MCNC: 0.3 MG/DL (ref 0.2–1)
BILIRUB UR QL STRIP: NEGATIVE
BUN SERPL-MCNC: 12 MG/DL (ref 5–25)
CALCIUM SERPL-MCNC: 10 MG/DL (ref 8.3–10.1)
CHLORIDE SERPL-SCNC: 107 MMOL/L (ref 100–108)
CLARITY UR: CLEAR
CO2 SERPL-SCNC: 28 MMOL/L (ref 21–32)
COLOR UR: YELLOW
CREAT SERPL-MCNC: 0.81 MG/DL (ref 0.6–1.3)
EOSINOPHIL # BLD AUTO: 0.25 THOUSAND/ΜL (ref 0–0.61)
EOSINOPHIL NFR BLD AUTO: 3 % (ref 0–6)
ERYTHROCYTE [DISTWIDTH] IN BLOOD BY AUTOMATED COUNT: 12.9 % (ref 11.6–15.1)
GFR SERPL CREATININE-BSD FRML MDRD: 78 ML/MIN/1.73SQ M
GLUCOSE SERPL-MCNC: 91 MG/DL (ref 65–140)
GLUCOSE SERPL-MCNC: 96 MG/DL (ref 65–140)
GLUCOSE UR STRIP-MCNC: NEGATIVE MG/DL
HCT VFR BLD AUTO: 45.1 % (ref 34.8–46.1)
HGB BLD-MCNC: 14.4 G/DL (ref 11.5–15.4)
HGB UR QL STRIP.AUTO: NEGATIVE
IMM GRANULOCYTES # BLD AUTO: 0.02 THOUSAND/UL (ref 0–0.2)
IMM GRANULOCYTES NFR BLD AUTO: 0 % (ref 0–2)
INR PPP: 1.07 (ref 0.84–1.19)
KETONES UR STRIP-MCNC: NEGATIVE MG/DL
LEUKOCYTE ESTERASE UR QL STRIP: ABNORMAL
LYMPHOCYTES # BLD AUTO: 3 THOUSANDS/ΜL (ref 0.6–4.47)
LYMPHOCYTES NFR BLD AUTO: 38 % (ref 14–44)
MAGNESIUM SERPL-MCNC: 2.1 MG/DL (ref 1.6–2.6)
MCH RBC QN AUTO: 29.3 PG (ref 26.8–34.3)
MCHC RBC AUTO-ENTMCNC: 31.9 G/DL (ref 31.4–37.4)
MCV RBC AUTO: 92 FL (ref 82–98)
MONOCYTES # BLD AUTO: 0.68 THOUSAND/ΜL (ref 0.17–1.22)
MONOCYTES NFR BLD AUTO: 9 % (ref 4–12)
NEUTROPHILS # BLD AUTO: 3.8 THOUSANDS/ΜL (ref 1.85–7.62)
NEUTS SEG NFR BLD AUTO: 49 % (ref 43–75)
NITRITE UR QL STRIP: NEGATIVE
NON-SQ EPI CELLS URNS QL MICRO: NORMAL /HPF
NRBC BLD AUTO-RTO: 0 /100 WBCS
NT-PROBNP SERPL-MCNC: 55 PG/ML
PH UR STRIP.AUTO: 6.5 [PH]
PLATELET # BLD AUTO: 285 THOUSANDS/UL (ref 149–390)
PMV BLD AUTO: 10.5 FL (ref 8.9–12.7)
POTASSIUM SERPL-SCNC: 3.9 MMOL/L (ref 3.5–5.3)
PROT SERPL-MCNC: 7.2 G/DL (ref 6.4–8.2)
PROT UR STRIP-MCNC: NEGATIVE MG/DL
PROTHROMBIN TIME: 13.4 SECONDS (ref 11.6–14.5)
RBC # BLD AUTO: 4.91 MILLION/UL (ref 3.81–5.12)
RBC #/AREA URNS AUTO: NORMAL /HPF
SL AMB POCT GLUCOSE BLD: 91
SODIUM SERPL-SCNC: 142 MMOL/L (ref 136–145)
SP GR UR STRIP.AUTO: 1.01 (ref 1–1.03)
TROPONIN I SERPL-MCNC: <0.02 NG/ML
TSH SERPL DL<=0.05 MIU/L-ACNC: 2.79 UIU/ML (ref 0.36–3.74)
UROBILINOGEN UR QL STRIP.AUTO: 0.2 E.U./DL
WBC # BLD AUTO: 7.81 THOUSAND/UL (ref 4.31–10.16)
WBC #/AREA URNS AUTO: NORMAL /HPF

## 2020-11-02 PROCEDURE — 93005 ELECTROCARDIOGRAM TRACING: CPT

## 2020-11-02 PROCEDURE — 84484 ASSAY OF TROPONIN QUANT: CPT | Performed by: PHYSICIAN ASSISTANT

## 2020-11-02 PROCEDURE — 80048 BASIC METABOLIC PNL TOTAL CA: CPT | Performed by: PHYSICIAN ASSISTANT

## 2020-11-02 PROCEDURE — 83880 ASSAY OF NATRIURETIC PEPTIDE: CPT | Performed by: PHYSICIAN ASSISTANT

## 2020-11-02 PROCEDURE — 99203 OFFICE O/P NEW LOW 30 MIN: CPT | Performed by: PHYSICIAN ASSISTANT

## 2020-11-02 PROCEDURE — 83735 ASSAY OF MAGNESIUM: CPT | Performed by: PHYSICIAN ASSISTANT

## 2020-11-02 PROCEDURE — 85610 PROTHROMBIN TIME: CPT | Performed by: PHYSICIAN ASSISTANT

## 2020-11-02 PROCEDURE — 99284 EMERGENCY DEPT VISIT MOD MDM: CPT

## 2020-11-02 PROCEDURE — G1004 CDSM NDSC: HCPCS

## 2020-11-02 PROCEDURE — 80076 HEPATIC FUNCTION PANEL: CPT | Performed by: PHYSICIAN ASSISTANT

## 2020-11-02 PROCEDURE — 70450 CT HEAD/BRAIN W/O DYE: CPT

## 2020-11-02 PROCEDURE — 99285 EMERGENCY DEPT VISIT HI MDM: CPT | Performed by: PHYSICIAN ASSISTANT

## 2020-11-02 PROCEDURE — 71045 X-RAY EXAM CHEST 1 VIEW: CPT

## 2020-11-02 PROCEDURE — 96375 TX/PRO/DX INJ NEW DRUG ADDON: CPT

## 2020-11-02 PROCEDURE — 93005 ELECTROCARDIOGRAM TRACING: CPT | Performed by: PHYSICIAN ASSISTANT

## 2020-11-02 PROCEDURE — 81001 URINALYSIS AUTO W/SCOPE: CPT | Performed by: PHYSICIAN ASSISTANT

## 2020-11-02 PROCEDURE — 96365 THER/PROPH/DIAG IV INF INIT: CPT

## 2020-11-02 PROCEDURE — 36415 COLL VENOUS BLD VENIPUNCTURE: CPT | Performed by: PHYSICIAN ASSISTANT

## 2020-11-02 PROCEDURE — 85025 COMPLETE CBC W/AUTO DIFF WBC: CPT | Performed by: PHYSICIAN ASSISTANT

## 2020-11-02 PROCEDURE — 82948 REAGENT STRIP/BLOOD GLUCOSE: CPT | Performed by: PHYSICIAN ASSISTANT

## 2020-11-02 PROCEDURE — 85730 THROMBOPLASTIN TIME PARTIAL: CPT | Performed by: PHYSICIAN ASSISTANT

## 2020-11-02 PROCEDURE — 84443 ASSAY THYROID STIM HORMONE: CPT | Performed by: PHYSICIAN ASSISTANT

## 2020-11-02 RX ORDER — MAGNESIUM SULFATE 1 G/100ML
1 INJECTION INTRAVENOUS ONCE
Status: COMPLETED | OUTPATIENT
Start: 2020-11-02 | End: 2020-11-02

## 2020-11-02 RX ORDER — METOCLOPRAMIDE HYDROCHLORIDE 5 MG/ML
10 INJECTION INTRAMUSCULAR; INTRAVENOUS ONCE
Status: COMPLETED | OUTPATIENT
Start: 2020-11-02 | End: 2020-11-02

## 2020-11-02 RX ORDER — DIPHENHYDRAMINE HYDROCHLORIDE 50 MG/ML
25 INJECTION INTRAMUSCULAR; INTRAVENOUS ONCE
Status: COMPLETED | OUTPATIENT
Start: 2020-11-02 | End: 2020-11-02

## 2020-11-02 RX ADMIN — MAGNESIUM SULFATE HEPTAHYDRATE 1 G: 1 INJECTION, SOLUTION INTRAVENOUS at 21:03

## 2020-11-02 RX ADMIN — METOCLOPRAMIDE HYDROCHLORIDE 10 MG: 5 INJECTION INTRAMUSCULAR; INTRAVENOUS at 21:02

## 2020-11-02 RX ADMIN — DIPHENHYDRAMINE HYDROCHLORIDE 25 MG: 50 INJECTION, SOLUTION INTRAMUSCULAR; INTRAVENOUS at 21:02

## 2020-11-02 RX ADMIN — SODIUM CHLORIDE 1000 ML: 0.9 INJECTION, SOLUTION INTRAVENOUS at 21:02

## 2020-11-03 LAB
ATRIAL RATE: 68 BPM
ATRIAL RATE: 76 BPM
ATRIAL RATE: 76 BPM
P AXIS: 68 DEGREES
P AXIS: 68 DEGREES
P AXIS: 87 DEGREES
PR INTERVAL: 130 MS
PR INTERVAL: 130 MS
PR INTERVAL: 136 MS
QRS AXIS: 73 DEGREES
QRS AXIS: 73 DEGREES
QRS AXIS: 93 DEGREES
QRSD INTERVAL: 86 MS
QT INTERVAL: 402 MS
QT INTERVAL: 402 MS
QT INTERVAL: 416 MS
QTC INTERVAL: 442 MS
QTC INTERVAL: 452 MS
QTC INTERVAL: 452 MS
T WAVE AXIS: 33 DEGREES
T WAVE AXIS: 33 DEGREES
T WAVE AXIS: 71 DEGREES
VENTRICULAR RATE: 68 BPM
VENTRICULAR RATE: 76 BPM
VENTRICULAR RATE: 76 BPM

## 2020-11-03 PROCEDURE — 93010 ELECTROCARDIOGRAM REPORT: CPT | Performed by: INTERNAL MEDICINE

## 2021-02-12 ENCOUNTER — TELEPHONE (OUTPATIENT)
Dept: GASTROENTEROLOGY | Facility: CLINIC | Age: 61
End: 2021-02-12

## 2021-02-12 NOTE — TELEPHONE ENCOUNTER
RAMAN: Spoke with patient  History of GERD    Patient last OV October if 2019  Patient c/o upper abdominal pain which she restarted her Nexium 2 days ago  Denies dysphagia, nausea, vomiting, fever, chills, marx ein bowels  Advised patient follow-up with her PCP or go to ED for severe abdominal pain  She will continue Nexium and follow a BLAND diet  Patient is scheduled for an OV on Friday as this is her only day she can obtain a ride

## 2021-02-12 NOTE — TELEPHONE ENCOUNTER
Dr Elsie Dumont - patient called feels like she is chocking, burning in the chest - a lot   Plase call [facility] at [phone] ty

## 2021-03-22 ENCOUNTER — OFFICE VISIT (OUTPATIENT)
Dept: FAMILY MEDICINE CLINIC | Facility: CLINIC | Age: 61
End: 2021-03-22
Payer: COMMERCIAL

## 2021-03-22 VITALS
HEIGHT: 63 IN | WEIGHT: 153 LBS | RESPIRATION RATE: 16 BRPM | SYSTOLIC BLOOD PRESSURE: 146 MMHG | HEART RATE: 88 BPM | DIASTOLIC BLOOD PRESSURE: 70 MMHG | OXYGEN SATURATION: 99 % | BODY MASS INDEX: 27.11 KG/M2 | TEMPERATURE: 99.3 F

## 2021-03-22 DIAGNOSIS — E78.49 OTHER HYPERLIPIDEMIA: ICD-10-CM

## 2021-03-22 DIAGNOSIS — I10 ESSENTIAL HYPERTENSION: ICD-10-CM

## 2021-03-22 DIAGNOSIS — Z12.31 SCREENING MAMMOGRAM, ENCOUNTER FOR: ICD-10-CM

## 2021-03-22 DIAGNOSIS — J30.89 NON-SEASONAL ALLERGIC RHINITIS DUE TO OTHER ALLERGIC TRIGGER: ICD-10-CM

## 2021-03-22 DIAGNOSIS — R10.84 GENERALIZED ABDOMINAL PAIN: ICD-10-CM

## 2021-03-22 DIAGNOSIS — Z00.00 HEALTHCARE MAINTENANCE: Primary | ICD-10-CM

## 2021-03-22 PROBLEM — J30.9 ALLERGIC RHINITIS DUE TO ALLERGEN: Status: ACTIVE | Noted: 2021-03-22

## 2021-03-22 PROBLEM — N63.10 LUMP OF BREAST, RIGHT: Status: ACTIVE | Noted: 2019-02-12

## 2021-03-22 PROBLEM — M47.816 LUMBAR SPONDYLOSIS: Status: ACTIVE | Noted: 2017-06-26

## 2021-03-22 PROBLEM — N83.291 OTHER OVARIAN CYST, RIGHT SIDE: Status: ACTIVE | Noted: 2020-11-13

## 2021-03-22 PROBLEM — M87.051 AVASCULAR NECROSIS OF HIP, RIGHT (HCC): Status: ACTIVE | Noted: 2017-10-03

## 2021-03-22 PROBLEM — J44.9 CHRONIC OBSTRUCTIVE PULMONARY DISEASE (HCC): Status: ACTIVE | Noted: 2019-02-12

## 2021-03-22 PROBLEM — M25.559 ARTHRALGIA OF HIP: Status: ACTIVE | Noted: 2017-03-28

## 2021-03-22 PROBLEM — M54.16 LUMBAR RADICULOPATHY: Status: ACTIVE | Noted: 2017-06-26

## 2021-03-22 PROBLEM — M06.9 RHEUMATOID ARTHRITIS INVOLVING MULTIPLE SITES (HCC): Status: ACTIVE | Noted: 2020-11-06

## 2021-03-22 PROBLEM — J45.41 MODERATE PERSISTENT ASTHMA WITH ACUTE EXACERBATION: Status: ACTIVE | Noted: 2018-02-22

## 2021-03-22 PROCEDURE — 3725F SCREEN DEPRESSION PERFORMED: CPT | Performed by: FAMILY MEDICINE

## 2021-03-22 PROCEDURE — 99386 PREV VISIT NEW AGE 40-64: CPT | Performed by: FAMILY MEDICINE

## 2021-03-22 RX ORDER — LISINOPRIL 10 MG/1
10 TABLET ORAL DAILY
Qty: 90 TABLET | Refills: 1 | Status: SHIPPED | OUTPATIENT
Start: 2021-03-22 | End: 2021-04-02

## 2021-03-22 RX ORDER — MONTELUKAST SODIUM 10 MG/1
10 TABLET ORAL
Qty: 30 TABLET | Refills: 0 | Status: SHIPPED | OUTPATIENT
Start: 2021-03-22 | End: 2021-04-15

## 2021-03-22 RX ORDER — ESOMEPRAZOLE MAGNESIUM 40 MG/1
40 CAPSULE, DELAYED RELEASE ORAL DAILY
COMMUNITY

## 2021-03-22 NOTE — ASSESSMENT & PLAN NOTE
Not well controlled  She was given prescription for singular  Was discussed about possible side effects

## 2021-03-22 NOTE — PROGRESS NOTES
Assessment/Plan:    Generalized abdominal pain   I am going to check abdominal ultrasound  Healthcare maintenance    Discussed about immunizations, diet, exercise and safety measures  Essential hypertension    She was given refill for lisinopril  She is to monitor her blood pressure  Come back in 3 weeks  Allergic rhinitis due to allergen    Not well controlled  She was given prescription for singular  Was discussed about possible side effects  Problem List Items Addressed This Visit        Respiratory    Allergic rhinitis due to allergen       Not well controlled  She was given prescription for singular  Was discussed about possible side effects  Relevant Medications    montelukast (SINGULAIR) 10 mg tablet       Cardiovascular and Mediastinum    Essential hypertension       She was given refill for lisinopril  She is to monitor her blood pressure  Come back in 3 weeks  Relevant Medications    lisinopril (ZESTRIL) 10 mg tablet    Other Relevant Orders    CBC and differential    Comprehensive metabolic panel    Lipid Panel with Direct LDL reflex    TSH, 3rd generation with Free T4 reflex       Other    Other hyperlipidemia    Relevant Orders    CBC and differential    Comprehensive metabolic panel    Lipid Panel with Direct LDL reflex    TSH, 3rd generation with Free T4 reflex    Healthcare maintenance - Primary       Discussed about immunizations, diet, exercise and safety measures  Generalized abdominal pain      I am going to check abdominal ultrasound  Relevant Orders    US abdomen complete      Other Visit Diagnoses     Screening mammogram, encounter for        BMI 27 0-27 9,adult                Subjective:      Patient ID: Tim Gill is a 56 y o  female  Patient presents to establish care  Her only complaint today is low back and left hip pain that radiates down the side and front of her leg  She has an appointment with orthopedic tomorrow   She has a history of COPD and reports shortness of breath when wearing a mask  She reports no side effects from her medications  The following portions of the patient's history were reviewed and updated as appropriate: allergies, current medications, past family history, past medical history, past social history, past surgical history and problem list     Review of Systems   Constitutional: Negative for chills and fever  HENT: Negative for trouble swallowing  Eyes: Negative for visual disturbance  Respiratory: Positive for shortness of breath  Negative for cough  Cardiovascular: Negative for chest pain, palpitations and leg swelling  Gastrointestinal: Positive for abdominal pain  Negative for diarrhea  Endocrine: Negative for cold intolerance and heat intolerance  Genitourinary: Negative for difficulty urinating and dysuria  Musculoskeletal: Positive for arthralgias and back pain  Negative for gait problem  Skin: Negative for rash  Neurological: Negative for dizziness, tremors, seizures and headaches  Hematological: Negative for adenopathy  Psychiatric/Behavioral: Negative for behavioral problems  Objective:      /70 (BP Location: Left arm, Patient Position: Sitting, Cuff Size: Adult)   Pulse 88   Temp 99 3 °F (37 4 °C) (Tympanic)   Resp 16   Ht 5' 3" (1 6 m)   Wt 69 4 kg (153 lb)   SpO2 99%   BMI 27 10 kg/m²          Physical Exam  Vitals signs and nursing note reviewed  Constitutional:       Appearance: Normal appearance  She is well-developed  HENT:      Head: Normocephalic and atraumatic  Eyes:      Pupils: Pupils are equal, round, and reactive to light  Neck:      Musculoskeletal: Normal range of motion and neck supple  Cardiovascular:      Rate and Rhythm: Normal rate and regular rhythm  Heart sounds: Normal heart sounds  Pulmonary:      Effort: Pulmonary effort is normal       Breath sounds: Normal breath sounds     Abdominal:      General: Bowel sounds are normal       Palpations: Abdomen is soft  Tenderness: There is abdominal tenderness (  Right side of her abdomen)  There is no guarding or rebound  Musculoskeletal: Normal range of motion  Lymphadenopathy:      Cervical: No cervical adenopathy  Skin:     General: Skin is warm  Neurological:      Mental Status: She is alert and oriented to person, place, and time  Cranial Nerves: No cranial nerve deficit  BMI Counseling: Body mass index is 27 1 kg/m²  The BMI is above normal  Nutrition recommendations include reducing portion sizes, decreasing overall calorie intake and 3-5 servings of fruits/vegetables daily  Exercise recommendations include moderate aerobic physical activity for 150 minutes/week

## 2021-03-23 ENCOUNTER — TELEPHONE (OUTPATIENT)
Dept: FAMILY MEDICINE CLINIC | Facility: CLINIC | Age: 61
End: 2021-03-23

## 2021-03-23 ENCOUNTER — APPOINTMENT (OUTPATIENT)
Dept: LAB | Facility: HOSPITAL | Age: 61
End: 2021-03-23
Attending: FAMILY MEDICINE
Payer: COMMERCIAL

## 2021-03-23 ENCOUNTER — TELEPHONE (OUTPATIENT)
Dept: ADMINISTRATIVE | Facility: OTHER | Age: 61
End: 2021-03-23

## 2021-03-23 ENCOUNTER — HOSPITAL ENCOUNTER (OUTPATIENT)
Dept: RADIOLOGY | Facility: HOSPITAL | Age: 61
Discharge: HOME | End: 2021-03-23
Attending: ORTHOPAEDIC SURGERY
Payer: COMMERCIAL

## 2021-03-23 ENCOUNTER — OFFICE VISIT (OUTPATIENT)
Dept: OBGYN CLINIC | Facility: HOSPITAL | Age: 61
End: 2021-03-23
Payer: COMMERCIAL

## 2021-03-23 VITALS
HEIGHT: 63 IN | SYSTOLIC BLOOD PRESSURE: 120 MMHG | BODY MASS INDEX: 27.11 KG/M2 | DIASTOLIC BLOOD PRESSURE: 63 MMHG | WEIGHT: 153 LBS | HEART RATE: 73 BPM

## 2021-03-23 DIAGNOSIS — M06.9 RHEUMATOID ARTHRITIS OF HIP, UNSPECIFIED LATERALITY, UNSPECIFIED WHETHER RHEUMATOID FACTOR PRESENT (HCC): ICD-10-CM

## 2021-03-23 DIAGNOSIS — M25.552 PAIN IN LEFT HIP: Primary | ICD-10-CM

## 2021-03-23 DIAGNOSIS — E78.49 OTHER HYPERLIPIDEMIA: ICD-10-CM

## 2021-03-23 DIAGNOSIS — I10 ESSENTIAL HYPERTENSION: ICD-10-CM

## 2021-03-23 DIAGNOSIS — M25.552 PAIN IN LEFT HIP: ICD-10-CM

## 2021-03-23 LAB
ALBUMIN SERPL BCP-MCNC: 4.2 G/DL (ref 3.5–5)
ALP SERPL-CCNC: 35 U/L (ref 46–116)
ALT SERPL W P-5'-P-CCNC: 42 U/L (ref 12–78)
ANION GAP SERPL CALCULATED.3IONS-SCNC: 2 MMOL/L (ref 4–13)
AST SERPL W P-5'-P-CCNC: 24 U/L (ref 5–45)
BASOPHILS # BLD AUTO: 0.07 THOUSANDS/ΜL (ref 0–0.1)
BASOPHILS NFR BLD AUTO: 1 % (ref 0–1)
BILIRUB SERPL-MCNC: 0.48 MG/DL (ref 0.2–1)
BUN SERPL-MCNC: 13 MG/DL (ref 5–25)
CALCIUM SERPL-MCNC: 9.4 MG/DL (ref 8.3–10.1)
CHLORIDE SERPL-SCNC: 111 MMOL/L (ref 100–108)
CHOLEST SERPL-MCNC: 178 MG/DL (ref 50–200)
CO2 SERPL-SCNC: 28 MMOL/L (ref 21–32)
CREAT SERPL-MCNC: 0.78 MG/DL (ref 0.6–1.3)
EOSINOPHIL # BLD AUTO: 0.19 THOUSAND/ΜL (ref 0–0.61)
EOSINOPHIL NFR BLD AUTO: 3 % (ref 0–6)
ERYTHROCYTE [DISTWIDTH] IN BLOOD BY AUTOMATED COUNT: 12.9 % (ref 11.6–15.1)
GFR SERPL CREATININE-BSD FRML MDRD: 82 ML/MIN/1.73SQ M
GLUCOSE P FAST SERPL-MCNC: 80 MG/DL (ref 65–99)
HCT VFR BLD AUTO: 44.8 % (ref 34.8–46.1)
HDLC SERPL-MCNC: 65 MG/DL
HGB BLD-MCNC: 14.5 G/DL (ref 11.5–15.4)
IMM GRANULOCYTES # BLD AUTO: 0.03 THOUSAND/UL (ref 0–0.2)
IMM GRANULOCYTES NFR BLD AUTO: 1 % (ref 0–2)
LDLC SERPL CALC-MCNC: 97 MG/DL (ref 0–100)
LYMPHOCYTES # BLD AUTO: 2.49 THOUSANDS/ΜL (ref 0.6–4.47)
LYMPHOCYTES NFR BLD AUTO: 41 % (ref 14–44)
MCH RBC QN AUTO: 29.9 PG (ref 26.8–34.3)
MCHC RBC AUTO-ENTMCNC: 32.4 G/DL (ref 31.4–37.4)
MCV RBC AUTO: 92 FL (ref 82–98)
MONOCYTES # BLD AUTO: 0.66 THOUSAND/ΜL (ref 0.17–1.22)
MONOCYTES NFR BLD AUTO: 11 % (ref 4–12)
NEUTROPHILS # BLD AUTO: 2.6 THOUSANDS/ΜL (ref 1.85–7.62)
NEUTS SEG NFR BLD AUTO: 43 % (ref 43–75)
NRBC BLD AUTO-RTO: 0 /100 WBCS
PLATELET # BLD AUTO: 288 THOUSANDS/UL (ref 149–390)
PMV BLD AUTO: 11 FL (ref 8.9–12.7)
POTASSIUM SERPL-SCNC: 3.8 MMOL/L (ref 3.5–5.3)
PROT SERPL-MCNC: 7 G/DL (ref 6.4–8.2)
RBC # BLD AUTO: 4.85 MILLION/UL (ref 3.81–5.12)
SODIUM SERPL-SCNC: 141 MMOL/L (ref 136–145)
TRIGL SERPL-MCNC: 79 MG/DL
TSH SERPL DL<=0.05 MIU/L-ACNC: 1.21 UIU/ML (ref 0.36–3.74)
WBC # BLD AUTO: 6.04 THOUSAND/UL (ref 4.31–10.16)

## 2021-03-23 PROCEDURE — 80061 LIPID PANEL: CPT

## 2021-03-23 PROCEDURE — 3008F BODY MASS INDEX DOCD: CPT | Performed by: ORTHOPAEDIC SURGERY

## 2021-03-23 PROCEDURE — 85025 COMPLETE CBC W/AUTO DIFF WBC: CPT

## 2021-03-23 PROCEDURE — 1036F TOBACCO NON-USER: CPT | Performed by: ORTHOPAEDIC SURGERY

## 2021-03-23 PROCEDURE — 73502 X-RAY EXAM HIP UNI 2-3 VIEWS: CPT

## 2021-03-23 PROCEDURE — 36415 COLL VENOUS BLD VENIPUNCTURE: CPT

## 2021-03-23 PROCEDURE — 84443 ASSAY THYROID STIM HORMONE: CPT

## 2021-03-23 PROCEDURE — 80053 COMPREHEN METABOLIC PANEL: CPT

## 2021-03-23 PROCEDURE — 99203 OFFICE O/P NEW LOW 30 MIN: CPT | Performed by: ORTHOPAEDIC SURGERY

## 2021-03-23 NOTE — LETTER
Procedure Request Form: Mammogram      Date Requested: 21  Patient: Lorrane Jana  Patient : 1960   Referring Provider: Lala Danish, MD        Date of Procedure ______________________________       The above patient has informed us that they have completed their   most recent Mammogram at your facility  Please complete   this form and attach all corresponding procedure reports/results  Comments __________________________________________________________  ____________________________________________________________________  ____________________________________________________________________  ____________________________________________________________________    Facility Completing Procedure _________________________________________    Form Completed By (print name) _______________________________________      Signature __________________________________________________________      These reports are needed for  compliance    Please fax this completed form and a copy of the procedure report to our office located at [place] [address] as soon as possible to [phone] attention Payam Sanchez: Phone [phone]    We thank you for your assistance in treating our mutual patient

## 2021-03-23 NOTE — PROGRESS NOTES
Assessment:     left hip pain likely secondary to rheumatoid arthritis    Plan:  Left hip injection to be performed outpatient, referral placed to Dr Newsome Sample   PT exercises to be done at home  I do believe most of patient's symptoms are not only due to her rheumatoid arthritis, but also the deconditioning due to lack of exercising due to the pandemic     Follow up in 3 months at [facility] office    To do next visit:  No follow-ups on file  The above stated was discussed in layman's terms and the patient expressed understanding  All questions were answered to the patient's satisfaction  Subjective:   Tg Estevez is a 56 y o  female who presents Today for consultation for her left hip pain referred by her PCP, Dr Mari Santos  Over the past 2-3 months she has been having increased left hip pain with activity  Her pain radiates to the groin and anterior thigh  She has a baseline history of RA and is on plaquenil per her rheumatologist   She was seen three years prior for right hip arthritis and underwent FL guided injection which helped, currently asymptomatic          Review of systems negative unless otherwise specified in HPI  Review of Systems    Past Medical History:   Diagnosis Date    Arthritis     Asthma     Avascular necrosis of bone of hip, right (HCC)     COPD (chronic obstructive pulmonary disease) (HCC)     GERD (gastroesophageal reflux disease)     Hypoglycemia     Infectious viral hepatitis     Lumbar radicular pain     Lyme disease     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HCC)        Past Surgical History:   Procedure Laterality Date    APPENDECTOMY      CARPAL TUNNEL RELEASE      CHOLECYSTECTOMY      SINUS SURGERY         Family History   Problem Relation Age of Onset    No Known Problems Mother        Social History     Occupational History    Not on file   Tobacco Use    Smoking status: Former Smoker    Smokeless tobacco: Never Used   Substance and Sexual Activity    Alcohol use: No    Drug use: Never    Sexual activity: Not on file         Current Outpatient Medications:     albuterol (PROVENTIL HFA,VENTOLIN HFA) 90 mcg/act inhaler, INHALE 2 PUFFS BY INHALATION ROUTE EVERY 8 HOURS AS NEEDED, Disp: , Rfl: 0    Budesonide-Formoterol Fumarate (SYMBICORT IN), Inhale 2 puffs daily 160-4 50MCG/ACTUATION, Disp: , Rfl:     Cholecalciferol (VITAMIN D3) 2000 units CHEW, Chew 5,000 Units daily , Disp: , Rfl:     Epinastine HCl 0 05 % ophthalmic solution, Administer 1 drop to both eyes 2 (two) times a day as needed , Disp: , Rfl:     esomeprazole (NexIUM) 40 MG capsule, Take 40 mg by mouth 2 (two) times a day, Disp: , Rfl:     FENOFIBRATE PO, Take 135 mg by mouth daily , Disp: , Rfl:     fexofenadine (ALLEGRA) 180 MG tablet, Take 180 mg by mouth daily, Disp: , Rfl:     hydroxychloroquine (PLAQUENIL) 200 mg tablet, Take 200 mg by mouth 2 (two) times a day with meals , Disp: , Rfl:     lisinopril (ZESTRIL) 10 mg tablet, Take 1 tablet (10 mg total) by mouth daily, Disp: 90 tablet, Rfl: 1    magnesium oxide (MAG-OX) 400 mg tablet, Take 1 tablet by mouth 2 (two) times a day, Disp: , Rfl:     montelukast (SINGULAIR) 10 mg tablet, Take 1 tablet (10 mg total) by mouth daily at bedtime, Disp: 30 tablet, Rfl: 0    olopatadine HCl (PATADAY) 0 2 % opth drops, Pataday 0 2 % eye drops, Disp: , Rfl:     omeprazole (PriLOSEC) 20 mg delayed release capsule, Take 1 capsule (20 mg total) by mouth daily, Disp: 90 capsule, Rfl: 3    oxyCODONE (ROXICODONE) 5 mg immediate release tablet, Take 5 mg by mouth every 4 (four) hours as needed for moderate pain, Disp: , Rfl:     oxyCODONE-acetaminophen (PERCOCET)  mg per tablet, Take 1 tablet by mouth every 8 (eight) hours as needed, Disp: , Rfl: 0    pantoprazole (PROTONIX) 40 mg tablet, Take 1 tablet (40 mg total) by mouth daily Brand necessary, Disp: 30 tablet, Rfl: 11    predniSONE 10 mg tablet, Take 10 mg by mouth 3 (three) times a day, Disp: , Rfl:     Allergies   Allergen Reactions    Aspirin Anaphylaxis    Iodine Anaphylaxis    Penicillins Anaphylaxis    Soybean Oil Other (See Comments)            Vitals:    03/23/21 0937   BP: 120/63   Pulse: 73       Objective:                    Left Hip Exam     Range of Motion   Extension: normal   Flexion: normal   External rotation: normal   Internal rotation: normal     Other   Sensation: normal  Pulse: present    Comments:  Groin pain elicited with rotation  NVI distally   Mild diminished strength quad            Diagnostics, reviewed and taken today if performed as documented:    XR L hip personally reviewed by myself show preserved joint space of the left hip, mild osteophyte formation     The attending physician has personally reviewed the pertinent films in PACS and interpretation is as follows:      Procedures, if performed today:    Procedures    None performed      Portions of the record may have been created with voice recognition software  Occasional wrong word or "sound a like" substitutions may have occurred due to the inherent limitations of voice recognition software  Read the chart carefully and recognize, using context, where substitutions have occurred

## 2021-03-23 NOTE — TELEPHONE ENCOUNTER
----- Message from Shahrzad Thompson sent at 3/22/2021  1:21 PM EDT -----  Regarding: Colonoscopy  03/22/21 1:21 PM    Hello, our patient Bonifacio Zhang has had Mammogram completed/performed  Please assist in updating the patient chart by making an External outreach to Dr Hal Tomas facility located in [address] [place] [address], [address]  The date of service is 5-6 years ago    Phone number [phone]    Thank you,  Shahrzad Thompson   [facility] [place]

## 2021-03-23 NOTE — LETTER
Procedure Request Form: Colonoscopy      Date Requested: 21  Patient: Francisco J Honor  Patient : 1960   Referring Provider: Salbador Ag MD        Date of Procedure ______________________________       The above patient has informed us that they have completed their   most recent Colonoscopy at your facility  Please complete   this form and attach all corresponding procedure reports/results  Comments _Patient indicated that she had a colonoscopy perfromed 5 to 6 years ago  _________________________________________________________  ____________________________________________________________________  ____________________________________________________________________  ____________________________________________________________________    Facility Completing Procedure _________________________________________    Form Completed By (print name) _______________________________________      Signature __________________________________________________________      These reports are needed for  compliance    Please fax this completed form and a copy of the procedure report to our office located at [place] [address] as soon as possible to [phone] alex Cuevas: Phone [phone]    We thank you for your assistance in treating our mutual patient

## 2021-03-23 NOTE — TELEPHONE ENCOUNTER
----- Message from Claudio Chen MD sent at 3/23/2021  3:53 PM EDT -----  Her blood work came back normal

## 2021-03-24 NOTE — TELEPHONE ENCOUNTER
Upon review of the In Basket request and the patient's chart, initial outreach has been made via fax, please see Contacts section for details       Thank you  Lawrence Petty

## 2021-03-30 ENCOUNTER — HOSPITAL ENCOUNTER (OUTPATIENT)
Dept: RADIOLOGY | Facility: CLINIC | Age: 61
Discharge: HOME | End: 2021-03-30
Attending: ANESTHESIOLOGY | Admitting: ANESTHESIOLOGY
Payer: COMMERCIAL

## 2021-03-30 VITALS
OXYGEN SATURATION: 98 % | SYSTOLIC BLOOD PRESSURE: 143 MMHG | RESPIRATION RATE: 20 BRPM | TEMPERATURE: 97.2 F | DIASTOLIC BLOOD PRESSURE: 68 MMHG | HEART RATE: 83 BPM

## 2021-03-30 DIAGNOSIS — M25.552 PAIN IN LEFT HIP: ICD-10-CM

## 2021-03-30 DIAGNOSIS — M25.552 PAIN IN LEFT HIP: Primary | ICD-10-CM

## 2021-03-30 PROCEDURE — 77002 NEEDLE LOCALIZATION BY XRAY: CPT | Performed by: ANESTHESIOLOGY

## 2021-03-30 PROCEDURE — 20610 DRAIN/INJ JOINT/BURSA W/O US: CPT | Performed by: ANESTHESIOLOGY

## 2021-03-30 PROCEDURE — 77002 NEEDLE LOCALIZATION BY XRAY: CPT

## 2021-03-30 RX ORDER — LIDOCAINE HYDROCHLORIDE 10 MG/ML
5 INJECTION, SOLUTION EPIDURAL; INFILTRATION; INTRACAUDAL; PERINEURAL ONCE
Status: COMPLETED | OUTPATIENT
Start: 2021-03-30 | End: 2021-03-30

## 2021-03-30 RX ORDER — METHYLPREDNISOLONE ACETATE 40 MG/ML
40 INJECTION, SUSPENSION INTRA-ARTICULAR; INTRALESIONAL; INTRAMUSCULAR; PARENTERAL; SOFT TISSUE ONCE
Status: COMPLETED | OUTPATIENT
Start: 2021-03-30 | End: 2021-03-30

## 2021-03-30 RX ORDER — BUPIVACAINE HCL/PF 2.5 MG/ML
30 VIAL (ML) INJECTION ONCE
Status: COMPLETED | OUTPATIENT
Start: 2021-03-30 | End: 2021-03-30

## 2021-03-30 RX ADMIN — BUPIVACAINE HYDROCHLORIDE 3 ML: 2.5 INJECTION, SOLUTION EPIDURAL; INFILTRATION; INTRACAUDAL at 12:54

## 2021-03-30 RX ADMIN — IOHEXOL 1 ML: 300 INJECTION, SOLUTION INTRAVENOUS at 12:53

## 2021-03-30 RX ADMIN — LIDOCAINE HYDROCHLORIDE 2 ML: 10 INJECTION, SOLUTION EPIDURAL; INFILTRATION; INTRACAUDAL; PERINEURAL at 12:51

## 2021-03-30 RX ADMIN — METHYLPREDNISOLONE ACETATE 40 MG: 40 INJECTION, SUSPENSION INTRA-ARTICULAR; INTRALESIONAL; INTRAMUSCULAR; SOFT TISSUE at 12:54

## 2021-03-30 NOTE — H&P
History of Present Illness: The patient is a 56 y o  female who presents with complaints of   Left hip pain      Patient Active Problem List   Diagnosis    Avascular necrosis of hip, right (HCC)    Lumbar radiculopathy    Gastro-esophageal reflux disease without esophagitis    Chronic obstructive pulmonary disease (HCC)    Arthralgia of hip    Essential hypertension    Other hyperlipidemia    Lumbar spondylosis    Lump of breast, right    Moderate persistent asthma with acute exacerbation    Other ovarian cyst, right side    Rheumatoid arthritis involving multiple sites ([facility] [address] )   [address] maintenance    Allergic rhinitis due to allergen    Generalized abdominal pain       Past Medical History:   Diagnosis Date    Arthritis     Asthma     Avascular necrosis of bone of hip, right (HCC)     COPD (chronic obstructive pulmonary disease) (HCC)     GERD (gastroesophageal reflux disease)     Hypoglycemia     Infectious viral hepatitis     Lumbar radicular pain     Lyme disease     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HCC)        Past Surgical History:   Procedure Laterality Date    APPENDECTOMY      CARPAL TUNNEL RELEASE      CHOLECYSTECTOMY      SINUS SURGERY           Current Outpatient Medications:     albuterol (PROVENTIL HFA,VENTOLIN HFA) 90 mcg/act inhaler, INHALE 2 PUFFS BY INHALATION ROUTE EVERY 8 HOURS AS NEEDED, Disp: , Rfl: 0    Budesonide-Formoterol Fumarate (SYMBICORT IN), Inhale 2 puffs daily 160-4 50MCG/ACTUATION, Disp: , Rfl:     Cholecalciferol (VITAMIN D3) 2000 units CHEW, Chew 5,000 Units daily , Disp: , Rfl:     Epinastine HCl 0 05 % ophthalmic solution, Administer 1 drop to both eyes 2 (two) times a day as needed , Disp: , Rfl:     esomeprazole (NexIUM) 40 MG capsule, Take 40 mg by mouth 2 (two) times a day, Disp: , Rfl:     FENOFIBRATE PO, Take 135 mg by mouth daily , Disp: , Rfl:     fexofenadine (ALLEGRA) 180 MG tablet, Take 180 mg by mouth daily, Disp: , Rfl:    hydroxychloroquine (PLAQUENIL) 200 mg tablet, Take 200 mg by mouth 2 (two) times a day with meals , Disp: , Rfl:     lisinopril (ZESTRIL) 10 mg tablet, Take 1 tablet (10 mg total) by mouth daily, Disp: 90 tablet, Rfl: 1    magnesium oxide (MAG-OX) 400 mg tablet, Take 1 tablet by mouth 2 (two) times a day, Disp: , Rfl:     montelukast (SINGULAIR) 10 mg tablet, Take 1 tablet (10 mg total) by mouth daily at bedtime, Disp: 30 tablet, Rfl: 0    olopatadine HCl (PATADAY) 0 2 % opth drops, Pataday 0 2 % eye drops, Disp: , Rfl:     omeprazole (PriLOSEC) 20 mg delayed release capsule, Take 1 capsule (20 mg total) by mouth daily, Disp: 90 capsule, Rfl: 3    oxyCODONE (ROXICODONE) 5 mg immediate release tablet, Take 5 mg by mouth every 4 (four) hours as needed for moderate pain, Disp: , Rfl:     oxyCODONE-acetaminophen (PERCOCET)  mg per tablet, Take 1 tablet by mouth every 8 (eight) hours as needed, Disp: , Rfl: 0    pantoprazole (PROTONIX) 40 mg tablet, Take 1 tablet (40 mg total) by mouth daily Brand necessary, Disp: 30 tablet, Rfl: 11    predniSONE 10 mg tablet, Take 10 mg by mouth 3 (three) times a day, Disp: , Rfl:     Allergies   Allergen Reactions    Aspirin Anaphylaxis    Iodine Anaphylaxis    Penicillins Anaphylaxis    Soybean Oil Other (See Comments)       Physical Exam:   Vitals:    03/30/21 1215   BP: 131/70   Pulse: 84   Resp: 20   Temp: (!) 97 2 °F (36 2 °C)   SpO2: 98%     General: Awake, Alert, Oriented x 3, Mood and affect appropriate  Respiratory: Respirations even and unlabored  Cardiovascular: Peripheral pulses intact; no edema  Musculoskeletal Exam:   Antalgic gait  ASA Score: 3    Patient/Chart Verification  Patient ID Verified: Verbal  ID Band Applied: No  Consents Confirmed: Procedural  H&P( within 30 days) Verified: To be obtained in the Pre-Procedure area  Interval H&P(within 24 hr) Complete (required for Outpatients and Surgery Admit only):  To be obtained in the Pre-Procedure area  Allergies Reviewed: Yes  Anticoag/NSAID held?: No  Currently on antibiotics?: No  Pre-op Lab/Test Results Available: N/A  Pregnancy Lab Collected: N/A comment    Assessment:   1   Pain in left hip        Plan: left hip intra-articular injection

## 2021-03-30 NOTE — DISCHARGE INSTRUCTIONS

## 2021-03-31 ENCOUNTER — HOSPITAL ENCOUNTER (OUTPATIENT)
Dept: ULTRASOUND IMAGING | Facility: HOSPITAL | Age: 61
Discharge: HOME | End: 2021-03-31
Attending: FAMILY MEDICINE
Payer: COMMERCIAL

## 2021-03-31 DIAGNOSIS — R10.84 GENERALIZED ABDOMINAL PAIN: ICD-10-CM

## 2021-03-31 PROCEDURE — 76700 US EXAM ABDOM COMPLETE: CPT

## 2021-04-01 ENCOUNTER — TELEPHONE (OUTPATIENT)
Dept: FAMILY MEDICINE CLINIC | Facility: CLINIC | Age: 61
End: 2021-04-01

## 2021-04-01 DIAGNOSIS — I10 ESSENTIAL HYPERTENSION: ICD-10-CM

## 2021-04-01 NOTE — TELEPHONE ENCOUNTER
----- Message from [employer] sent at 3/31/2021  5:32 PM EDT -----  Ultrasound showed fatty liver  Please have patient call if she is having continued abdominal pain

## 2021-04-02 RX ORDER — ENALAPRIL MALEATE 10 MG/1
10 TABLET ORAL DAILY
Qty: 30 TABLET | Refills: 3 | Status: SHIPPED | OUTPATIENT
Start: 2021-04-02 | End: 2021-04-15

## 2021-04-06 ENCOUNTER — TELEPHONE (OUTPATIENT)
Dept: PAIN MEDICINE | Facility: CLINIC | Age: 61
End: 2021-04-06

## 2021-04-06 NOTE — TELEPHONE ENCOUNTER
As a follow-up, a second attempt has been made for outreach via fax, please see Contacts section for details      Thank you  Quita Chapman

## 2021-04-09 NOTE — TELEPHONE ENCOUNTER
As a final attempt, a third outreach has been made via telephone call  Please see Contacts section for details  This encounter will be closed and completed by end of day  Should we receive the requested information because of previous outreach attempts, the requested patient's chart will be updated appropriately  There was no answer at dr Iván Gonzales office  I left a detailed message requesting Op report       Thank you  Jostin Pérez

## 2021-04-14 ENCOUNTER — TRANSCRIBE ORDERS (OUTPATIENT)
Dept: ADMINISTRATIVE | Facility: HOSPITAL | Age: 61
End: 2021-04-14

## 2021-04-14 ENCOUNTER — APPOINTMENT (OUTPATIENT)
Dept: LAB | Facility: HOSPITAL | Age: 61
End: 2021-04-14
Attending: INTERNAL MEDICINE
Payer: COMMERCIAL

## 2021-04-14 ENCOUNTER — HOSPITAL ENCOUNTER (OUTPATIENT)
Dept: RADIOLOGY | Facility: HOSPITAL | Age: 61
Discharge: HOME | End: 2021-04-14
Attending: INTERNAL MEDICINE
Payer: COMMERCIAL

## 2021-04-14 DIAGNOSIS — M05.79 SEROPOSITIVE RHEUMATOID ARTHRITIS OF MULTIPLE SITES (HCC): Primary | ICD-10-CM

## 2021-04-14 DIAGNOSIS — J30.89 NON-SEASONAL ALLERGIC RHINITIS DUE TO OTHER ALLERGIC TRIGGER: ICD-10-CM

## 2021-04-14 DIAGNOSIS — M05.79 SEROPOSITIVE RHEUMATOID ARTHRITIS OF MULTIPLE SITES (HCC): ICD-10-CM

## 2021-04-14 LAB
BASOPHILS # BLD AUTO: 0.07 THOUSANDS/ΜL (ref 0–0.1)
BASOPHILS NFR BLD AUTO: 1 % (ref 0–1)
C3 SERPL-MCNC: 122 MG/DL (ref 90–180)
C4 SERPL-MCNC: 26 MG/DL (ref 10–40)
CRP SERPL QL: <3 MG/L
EOSINOPHIL # BLD AUTO: 0.18 THOUSAND/ΜL (ref 0–0.61)
EOSINOPHIL NFR BLD AUTO: 2 % (ref 0–6)
ERYTHROCYTE [DISTWIDTH] IN BLOOD BY AUTOMATED COUNT: 13.2 % (ref 11.6–15.1)
ERYTHROCYTE [SEDIMENTATION RATE] IN BLOOD: 3 MM/HOUR (ref 0–29)
HCT VFR BLD AUTO: 47.5 % (ref 34.8–46.1)
HGB BLD-MCNC: 15.2 G/DL (ref 11.5–15.4)
IGA SERPL-MCNC: 141 MG/DL (ref 70–400)
IGG SERPL-MCNC: 638 MG/DL (ref 700–1600)
IGM SERPL-MCNC: 190 MG/DL (ref 40–230)
IMM GRANULOCYTES # BLD AUTO: 0.05 THOUSAND/UL (ref 0–0.2)
IMM GRANULOCYTES NFR BLD AUTO: 1 % (ref 0–2)
LYMPHOCYTES # BLD AUTO: 3.03 THOUSANDS/ΜL (ref 0.6–4.47)
LYMPHOCYTES NFR BLD AUTO: 36 % (ref 14–44)
MCH RBC QN AUTO: 29.3 PG (ref 26.8–34.3)
MCHC RBC AUTO-ENTMCNC: 32 G/DL (ref 31.4–37.4)
MCV RBC AUTO: 92 FL (ref 82–98)
MONOCYTES # BLD AUTO: 0.86 THOUSAND/ΜL (ref 0.17–1.22)
MONOCYTES NFR BLD AUTO: 10 % (ref 4–12)
NEUTROPHILS # BLD AUTO: 4.34 THOUSANDS/ΜL (ref 1.85–7.62)
NEUTS SEG NFR BLD AUTO: 50 % (ref 43–75)
NRBC BLD AUTO-RTO: 0 /100 WBCS
PLATELET # BLD AUTO: 296 THOUSANDS/UL (ref 149–390)
PMV BLD AUTO: 10.4 FL (ref 8.9–12.7)
RBC # BLD AUTO: 5.18 MILLION/UL (ref 3.81–5.12)
WBC # BLD AUTO: 8.53 THOUSAND/UL (ref 4.31–10.16)

## 2021-04-14 PROCEDURE — 86200 CCP ANTIBODY: CPT

## 2021-04-14 PROCEDURE — 85025 COMPLETE CBC W/AUTO DIFF WBC: CPT

## 2021-04-14 PROCEDURE — 84165 PROTEIN E-PHORESIS SERUM: CPT | Performed by: PATHOLOGY

## 2021-04-14 PROCEDURE — 73130 X-RAY EXAM OF HAND: CPT

## 2021-04-14 PROCEDURE — 86430 RHEUMATOID FACTOR TEST QUAL: CPT

## 2021-04-14 PROCEDURE — 85652 RBC SED RATE AUTOMATED: CPT

## 2021-04-14 PROCEDURE — 84165 PROTEIN E-PHORESIS SERUM: CPT

## 2021-04-14 PROCEDURE — 73110 X-RAY EXAM OF WRIST: CPT

## 2021-04-14 PROCEDURE — 36415 COLL VENOUS BLD VENIPUNCTURE: CPT

## 2021-04-14 PROCEDURE — 86038 ANTINUCLEAR ANTIBODIES: CPT

## 2021-04-14 PROCEDURE — 86431 RHEUMATOID FACTOR QUANT: CPT

## 2021-04-14 PROCEDURE — 86162 COMPLEMENT TOTAL (CH50): CPT

## 2021-04-14 PROCEDURE — 86160 COMPLEMENT ANTIGEN: CPT

## 2021-04-14 PROCEDURE — 82784 ASSAY IGA/IGD/IGG/IGM EACH: CPT

## 2021-04-14 PROCEDURE — 86480 TB TEST CELL IMMUN MEASURE: CPT

## 2021-04-14 PROCEDURE — 82785 ASSAY OF IGE: CPT

## 2021-04-14 PROCEDURE — 73630 X-RAY EXAM OF FOOT: CPT

## 2021-04-14 PROCEDURE — 86140 C-REACTIVE PROTEIN: CPT

## 2021-04-15 ENCOUNTER — OFFICE VISIT (OUTPATIENT)
Dept: FAMILY MEDICINE CLINIC | Facility: CLINIC | Age: 61
End: 2021-04-15
Payer: COMMERCIAL

## 2021-04-15 VITALS
HEART RATE: 84 BPM | DIASTOLIC BLOOD PRESSURE: 78 MMHG | RESPIRATION RATE: 16 BRPM | WEIGHT: 153 LBS | HEIGHT: 63 IN | OXYGEN SATURATION: 97 % | BODY MASS INDEX: 27.11 KG/M2 | SYSTOLIC BLOOD PRESSURE: 130 MMHG | TEMPERATURE: 98.1 F

## 2021-04-15 DIAGNOSIS — I10 ESSENTIAL HYPERTENSION: Primary | ICD-10-CM

## 2021-04-15 DIAGNOSIS — K21.9 GASTRO-ESOPHAGEAL REFLUX DISEASE WITHOUT ESOPHAGITIS: ICD-10-CM

## 2021-04-15 DIAGNOSIS — R10.84 GENERALIZED ABDOMINAL PAIN: ICD-10-CM

## 2021-04-15 DIAGNOSIS — E78.49 OTHER HYPERLIPIDEMIA: ICD-10-CM

## 2021-04-15 DIAGNOSIS — J30.89 NON-SEASONAL ALLERGIC RHINITIS DUE TO OTHER ALLERGIC TRIGGER: ICD-10-CM

## 2021-04-15 LAB
ALBUMIN SERPL ELPH-MCNC: 4.56 G/DL (ref 3.5–5)
ALBUMIN SERPL ELPH-MCNC: 65.1 % (ref 52–65)
ALPHA1 GLOB SERPL ELPH-MCNC: 0.25 G/DL (ref 0.1–0.4)
ALPHA1 GLOB SERPL ELPH-MCNC: 3.6 % (ref 2.5–5)
ALPHA2 GLOB SERPL ELPH-MCNC: 0.64 G/DL (ref 0.4–1.2)
ALPHA2 GLOB SERPL ELPH-MCNC: 9.2 % (ref 7–13)
BETA GLOB ABNORMAL SERPL ELPH-MCNC: 0.49 G/DL (ref 0.4–0.8)
BETA1 GLOB SERPL ELPH-MCNC: 7 % (ref 5–13)
BETA2 GLOB SERPL ELPH-MCNC: 4.7 % (ref 2–8)
BETA2+GAMMA GLOB SERPL ELPH-MCNC: 0.33 G/DL (ref 0.2–0.5)
CH50 SERPL-ACNC: 59 U/ML
CRYOGLOB RF SER-ACNC: ABNORMAL [IU]/ML
GAMMA GLOB ABNORMAL SERPL ELPH-MCNC: 0.73 G/DL (ref 0.5–1.6)
GAMMA GLOB SERPL ELPH-MCNC: 10.4 % (ref 12–22)
IGG/ALB SER: 1.87 {RATIO} (ref 1.1–1.8)
PROT PATTERN SERPL ELPH-IMP: ABNORMAL
PROT SERPL-MCNC: 7 G/DL (ref 6.4–8.2)
RHEUMATOID FACT SER QL LA: POSITIVE
TOTAL IGE SMQN RAST: 110 KU/L (ref 0–113)

## 2021-04-15 PROCEDURE — 3008F BODY MASS INDEX DOCD: CPT | Performed by: FAMILY MEDICINE

## 2021-04-15 PROCEDURE — 3075F SYST BP GE 130 - 139MM HG: CPT | Performed by: FAMILY MEDICINE

## 2021-04-15 PROCEDURE — 3078F DIAST BP <80 MM HG: CPT | Performed by: FAMILY MEDICINE

## 2021-04-15 PROCEDURE — 99214 OFFICE O/P EST MOD 30 MIN: CPT | Performed by: FAMILY MEDICINE

## 2021-04-15 PROCEDURE — 1036F TOBACCO NON-USER: CPT | Performed by: FAMILY MEDICINE

## 2021-04-15 RX ORDER — LISINOPRIL 10 MG/1
10 TABLET ORAL DAILY
Qty: 90 TABLET | Refills: 3 | Status: SHIPPED | OUTPATIENT
Start: 2021-04-15 | End: 2021-05-13 | Stop reason: SINTOL

## 2021-04-15 RX ORDER — AZELASTINE HYDROCHLORIDE, FLUTICASONE PROPIONATE 137; 50 UG/1; UG/1
SPRAY, METERED NASAL
COMMUNITY
Start: 2021-04-06 | End: 2021-12-20 | Stop reason: SDUPTHER

## 2021-04-15 RX ORDER — AMLODIPINE BESYLATE 2.5 MG/1
2.5 TABLET ORAL DAILY
COMMUNITY
Start: 2021-04-01 | End: 2021-04-15

## 2021-04-15 RX ORDER — LEVALBUTEROL INHALATION SOLUTION 1.25 MG/3ML
SOLUTION RESPIRATORY (INHALATION)
COMMUNITY
Start: 2021-04-06 | End: 2022-07-01

## 2021-04-15 RX ORDER — MONTELUKAST SODIUM 10 MG/1
TABLET ORAL
Qty: 30 TABLET | Refills: 3 | Status: SHIPPED | OUTPATIENT
Start: 2021-04-15 | End: 2021-04-15

## 2021-04-15 NOTE — PROGRESS NOTES
Assessment/Plan:  Gastro-esophageal reflux disease without esophagitis    Stable  Continue same  Will continue to monitor  Essential hypertension    Better controlled  Continue on lisinopril  Will continue to monitor  Allergic rhinitis due to allergen    Continue same  Will continue to monitor  Other hyperlipidemia    Well controlled without meds  Continue to monitor  Generalized abdominal pain    Ultrasound did not show any acute pathology  Continue to monitor  Diagnoses and all orders for this visit:    Essential hypertension  -     lisinopril (ZESTRIL) 10 mg tablet; Take 1 tablet (10 mg total) by mouth daily    Gastro-esophageal reflux disease without esophagitis    Non-seasonal allergic rhinitis due to other allergic trigger    Other hyperlipidemia    Generalized abdominal pain    Other orders  -     levalbuterol (XOPENEX) 1 25 mg/3 mL nebulizer solution  -     Azelastine-Fluticasone 137-50 MCG/ACT SUSP  -     Discontinue: amLODIPine (NORVASC) 2 5 mg tablet; Take 2 5 mg by mouth daily          There are no Patient Instructions on file for this visit  Return in about 6 months (around 10/15/2021)  Subjective:      Patient ID: Jesus Manuel Vazquez is a 56 y o  female  Chief Complaint   Patient presents with    Hypertension     3 week follow up    Screening Colonoscopy     refused    mammogram screening     refused         She is here today for follow-up multiple medical problems  She has been taking lisinopril 10 mg daily  Her blood pressure is better controlled  She stop taking Norvasc  Her blood work came back showing normal cholesterol  She was given singular for her allergic rhinitis but she stated she had side effects and had to stop it        The following portions of the patient's history were reviewed and updated as appropriate: allergies, current medications, past family history, past medical history, past social history, past surgical history and problem list     Review of Systems   Constitutional: Negative for chills and fever  HENT: Negative for trouble swallowing  Eyes: Negative for visual disturbance  Respiratory: Negative for cough and shortness of breath  Cardiovascular: Negative for chest pain, palpitations and leg swelling  Gastrointestinal: Negative for abdominal pain, constipation and diarrhea  Endocrine: Negative for cold intolerance and heat intolerance  Genitourinary: Negative for difficulty urinating and dysuria  Musculoskeletal: Negative for gait problem  Skin: Negative for rash  Neurological: Negative for dizziness, tremors, seizures and headaches  Hematological: Negative for adenopathy  Psychiatric/Behavioral: Negative for behavioral problems           Current Outpatient Medications   Medication Sig Dispense Refill    albuterol (PROVENTIL HFA,VENTOLIN HFA) 90 mcg/act inhaler INHALE 2 PUFFS BY INHALATION ROUTE EVERY 8 HOURS AS NEEDED  0    Azelastine-Fluticasone 137-50 MCG/ACT SUSP       Budesonide-Formoterol Fumarate (SYMBICORT IN) Inhale 2 puffs daily 160-4 50MCG/ACTUATION      Cholecalciferol (VITAMIN D3) 2000 units CHEW Chew 5,000 Units daily       esomeprazole (NexIUM) 40 MG capsule Take 40 mg by mouth 2 (two) times a day      FENOFIBRATE PO Take 135 mg by mouth daily       fexofenadine (ALLEGRA) 180 MG tablet Take 180 mg by mouth daily      hydroxychloroquine (PLAQUENIL) 200 mg tablet Take 200 mg by mouth 2 (two) times a day with meals       levalbuterol (XOPENEX) 1 25 mg/3 mL nebulizer solution       magnesium oxide (MAG-OX) 400 mg tablet Take 1 tablet by mouth 2 (two) times a day      olopatadine HCl (PATADAY) 0 2 % opth drops Pataday 0 2 % eye drops      oxyCODONE-acetaminophen (PERCOCET)  mg per tablet Take 1 tablet by mouth every 8 (eight) hours as needed  0    predniSONE 10 mg tablet Take 10 mg by mouth 3 (three) times a day      Epinastine HCl 0 05 % ophthalmic solution Administer 1 drop to both eyes 2 (two) times a day as needed       lisinopril (ZESTRIL) 10 mg tablet Take 1 tablet (10 mg total) by mouth daily 90 tablet 3    omeprazole (PriLOSEC) 20 mg delayed release capsule Take 1 capsule (20 mg total) by mouth daily (Patient not taking: Reported on 4/15/2021) 90 capsule 3    oxyCODONE (ROXICODONE) 5 mg immediate release tablet Take 5 mg by mouth every 4 (four) hours as needed for moderate pain      pantoprazole (PROTONIX) 40 mg tablet Take 1 tablet (40 mg total) by mouth daily Brand necessary (Patient not taking: Reported on 4/15/2021) 30 tablet 11     No current facility-administered medications for this visit  Objective:    /78 (BP Location: Left arm, Patient Position: Sitting, Cuff Size: Standard)   Pulse 84   Temp 98 1 °F (36 7 °C) (Tympanic)   Resp 16   Ht 5' 3" (1 6 m)   Wt 69 4 kg (153 lb)   SpO2 97%   BMI 27 10 kg/m²        Physical Exam  Vitals signs and nursing note reviewed  Constitutional:       Appearance: Normal appearance  She is well-developed  HENT:      Head: Normocephalic and atraumatic  Eyes:      Pupils: Pupils are equal, round, and reactive to light  Neck:      Musculoskeletal: Normal range of motion and neck supple  Cardiovascular:      Rate and Rhythm: Normal rate and regular rhythm  Heart sounds: Normal heart sounds  Pulmonary:      Effort: Pulmonary effort is normal       Breath sounds: Normal breath sounds  Abdominal:      General: Bowel sounds are normal       Palpations: Abdomen is soft  Musculoskeletal: Normal range of motion  Lymphadenopathy:      Cervical: No cervical adenopathy  Skin:     General: Skin is warm  Neurological:      Mental Status: She is alert and oriented to person, place, and time  Cranial Nerves: No cranial nerve deficit                  Tanesha Meyer MD

## 2021-04-16 LAB
CCP AB SER IA-ACNC: 333
RYE IGE QN: NEGATIVE

## 2021-04-19 ENCOUNTER — TELEPHONE (OUTPATIENT)
Dept: CARDIOLOGY CLINIC | Facility: CLINIC | Age: 61
End: 2021-04-19

## 2021-04-19 LAB
GAMMA INTERFERON BACKGROUND BLD IA-ACNC: 0.05 IU/ML
M TB IFN-G BLD-IMP: NEGATIVE
M TB IFN-G CD4+ BCKGRND COR BLD-ACNC: 0.11 IU/ML
M TB IFN-G CD4+ BCKGRND COR BLD-ACNC: 0.12 IU/ML
MITOGEN IGNF BCKGRD COR BLD-ACNC: >10 IU/ML

## 2021-04-19 NOTE — TELEPHONE ENCOUNTER
As a final attempt, a third outreach has been made via telephone call  Please see Contacts section for details  This encounter will be closed and completed by end of day  Should we receive the requested information because of previous outreach attempts, the requested patient's chart will be updated appropriately  I was able to speak with Rico Fisher  She stated that she will look in their previous system to see if they have colonoscopy results for this patient  If so, she will fax over       Thank you  Lawrence Petty

## 2021-04-19 NOTE — TELEPHONE ENCOUNTER
[phone]  Pt spouse called requesting to re-establish care with DR ISAAC Crawford states they are friends of DR Mar Mccracken who saw some abnormality around pt heart

## 2021-04-20 NOTE — TELEPHONE ENCOUNTER
Upon review of the In Basket request we were able to locate, review, and update the patient chart as requested for CRC: Colonoscopy  Any additional questions or concerns should be emailed to the Practice Liaisons via [email or web address] email, please do not reply via In Basket      Thank you  Bell Elizabeth

## 2021-05-13 ENCOUNTER — OFFICE VISIT (OUTPATIENT)
Dept: CARDIOLOGY CLINIC | Facility: CLINIC | Age: 61
End: 2021-05-13
Payer: COMMERCIAL

## 2021-05-13 VITALS
DIASTOLIC BLOOD PRESSURE: 72 MMHG | HEART RATE: 77 BPM | SYSTOLIC BLOOD PRESSURE: 124 MMHG | BODY MASS INDEX: 27.1 KG/M2 | HEIGHT: 63 IN | OXYGEN SATURATION: 97 %

## 2021-05-13 DIAGNOSIS — I10 HYPERTENSION, ESSENTIAL: Primary | ICD-10-CM

## 2021-05-13 DIAGNOSIS — F17.200 SMOKING: ICD-10-CM

## 2021-05-13 DIAGNOSIS — I31.3 PERICARDIAL EFFUSION: ICD-10-CM

## 2021-05-13 DIAGNOSIS — R06.02 SHORTNESS OF BREATH: ICD-10-CM

## 2021-05-13 PROCEDURE — 3078F DIAST BP <80 MM HG: CPT | Performed by: INTERNAL MEDICINE

## 2021-05-13 PROCEDURE — 1036F TOBACCO NON-USER: CPT | Performed by: INTERNAL MEDICINE

## 2021-05-13 PROCEDURE — 99243 OFF/OP CNSLTJ NEW/EST LOW 30: CPT | Performed by: INTERNAL MEDICINE

## 2021-05-13 PROCEDURE — 3074F SYST BP LT 130 MM HG: CPT | Performed by: INTERNAL MEDICINE

## 2021-05-13 RX ORDER — LOSARTAN POTASSIUM 25 MG/1
25 TABLET ORAL DAILY
Qty: 30 TABLET | Refills: 5 | Status: SHIPPED | OUTPATIENT
Start: 2021-05-13 | End: 2021-06-30 | Stop reason: ALTCHOICE

## 2021-05-13 NOTE — LETTER
May 13, 2021     Referral [address] [place] [id number]    Patient: Ha Victoria   YOB: 1960   Date of Visit: 5/13/2021       Dear Dr Lopez Beverage:    Thank you for referring Ha Victoria to me for evaluation  Below are my notes for this consultation  If you have questions, please do not hesitate to call me  I look forward to following your patient along with you  Sincerely,        Yury Duran MD        CC: MD Yury Boone MD  5/13/2021 10:38 PM  Sign when Signing Visit                                             Cardiology Consultation     Ha Victoria  [id number]  1960  [address]  [facility] [address] [facility] [place] [address]     this patient presents for cardiology consultation and the evaluation  Patient has history of COPD and smoking followed by Pulmonary Dr Butler Courser  Patient had a recent CT of the chest which showed possible small pericardial effusion as well as coronary artery calcification  Patient does not have any history of significant coronary artery disease  Patient did have cardiac catheterization a few years ago which was negative for significant CAD  Patient also had pharmacological stress test done at [facility] which was negative for ischemia  Patient has cut down smoking  She does have some shortness of breath which is stable  No history of leg edema orthopnea PND  She does have history of anxiety which is stable  1  Hypertension, essential  losartan (COZAAR) 25 mg tablet    Echo complete with contrast if indicated   2  Shortness of breath  Echo complete with contrast if indicated   3  Pericardial effusion     4   Smoking       Patient Active Problem List   Diagnosis    Avascular necrosis of hip, right (HCC)    Lumbar radiculopathy    Gastro-esophageal reflux disease without esophagitis    Chronic obstructive pulmonary disease (HCC)    Arthralgia of hip    Essential hypertension    Other hyperlipidemia    Lumbar spondylosis    Lump of breast, right    Moderate persistent asthma with acute exacerbation    Other ovarian cyst, right side    Rheumatoid arthritis involving multiple sites ([facility] [address] )   [address] maintenance    Allergic rhinitis due to allergen    Generalized abdominal pain    Primary osteoarthritis of left hip     Past Medical History:   Diagnosis Date    Arthritis     Asthma     Avascular necrosis of bone of hip, right (HCC)     COPD (chronic obstructive pulmonary disease) (HCC)     GERD (gastroesophageal reflux disease)     Hypoglycemia     Infectious viral hepatitis     Lumbar radicular pain     Lyme disease     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[facility])      Social History     Socioeconomic History    Marital status: /Civil Union     Spouse name: Not on file    Number of children: Not on file    Years of education: Not on file    Highest education level: Not on file   Occupational History    Not on file   Social Needs    Financial resource strain: Not on file    Food insecurity     Worry: Not on file     Inability: Not on file   [employer] needs     Medical: Not on file     Non-medical: Not on file   Tobacco Use    Smoking status: Former Smoker    Smokeless tobacco: Never Used   Substance and Sexual Activity    Alcohol use: No    Drug use: Never    Sexual activity: Not on file   Lifestyle    Physical activity     Days per week: Not on file     Minutes per session: Not on file    Stress: Not on file   Relationships    Social connections     Talks on phone: Not on file     Gets together: Not on file     Attends Spiritism service: Not on file     Active member of club or organization: Not on file     Attends meetings of clubs or organizations: Not on file     Relationship status: Not on file    Intimate partner violence     Fear of current or ex partner: Not on file     Emotionally abused: Not on file Physically abused: Not on file     Forced sexual activity: Not on file   Other Topics Concern    Not on file   Social History Narrative    Not on file      Family History   Problem Relation Age of Onset    No Known Problems Mother      Past Surgical History:   Procedure Laterality Date    APPENDECTOMY      CARPAL TUNNEL RELEASE      CHOLECYSTECTOMY      SINUS SURGERY         Current Outpatient Medications:     albuterol (PROVENTIL HFA,VENTOLIN HFA) 90 mcg/act inhaler, INHALE 2 PUFFS BY INHALATION ROUTE EVERY 8 HOURS AS NEEDED, Disp: , Rfl: 0    Azelastine-Fluticasone 137-50 MCG/ACT SUSP, , Disp: , Rfl:     Budesonide-Formoterol Fumarate (SYMBICORT IN), Inhale 2 puffs daily 160-4 50MCG/ACTUATION, Disp: , Rfl:     Cholecalciferol (VITAMIN D3) 2000 units CHEW, Chew 5,000 Units daily , Disp: , Rfl:     Epinastine HCl 0 05 % ophthalmic solution, Administer 1 drop to both eyes 2 (two) times a day as needed , Disp: , Rfl:     esomeprazole (NexIUM) 40 MG capsule, Take 40 mg by mouth 2 (two) times a day, Disp: , Rfl:     FENOFIBRATE PO, Take 135 mg by mouth daily , Disp: , Rfl:     fexofenadine (ALLEGRA) 180 MG tablet, Take 180 mg by mouth daily, Disp: , Rfl:     hydroxychloroquine (PLAQUENIL) 200 mg tablet, Take 200 mg by mouth 2 (two) times a day with meals , Disp: , Rfl:     levalbuterol (XOPENEX) 1 25 mg/3 mL nebulizer solution, , Disp: , Rfl:     magnesium oxide (MAG-OX) 400 mg tablet, Take 1 tablet by mouth 2 (two) times a day, Disp: , Rfl:     oxyCODONE (ROXICODONE) 5 mg immediate release tablet, Take 5 mg by mouth every 4 (four) hours as needed for moderate pain, Disp: , Rfl:     oxyCODONE-acetaminophen (PERCOCET)  mg per tablet, Take 1 tablet by mouth every 8 (eight) hours as needed, Disp: , Rfl: 0    predniSONE 10 mg tablet, Take 10 mg by mouth 3 (three) times a day as needed , Disp: , Rfl:     losartan (COZAAR) 25 mg tablet, Take 1 tablet (25 mg total) by mouth daily, Disp: 30 tablet, Rfl: 5    olopatadine HCl (PATADAY) 0 2 % opth drops, Pataday 0 2 % eye drops, Disp: , Rfl:     omeprazole (PriLOSEC) 20 mg delayed release capsule, Take 1 capsule (20 mg total) by mouth daily (Patient not taking: Reported on 4/15/2021), Disp: 90 capsule, Rfl: 3    pantoprazole (PROTONIX) 40 mg tablet, Take 1 tablet (40 mg total) by mouth daily Brand necessary (Patient not taking: Reported on 4/15/2021), Disp: 30 tablet, Rfl: 11  Allergies   Allergen Reactions    Aspirin Anaphylaxis    Iodine - Food Allergy Anaphylaxis    Penicillins Anaphylaxis    Soybean Oil - Food Allergy Other (See Comments)     Vitals:    05/13/21 1408   BP: 124/72   Pulse: 77   SpO2: 97%   Height: 5' 3" (1 6 m)       Labs:  Appointment on 04/14/2021   Component Date Value    Sed Rate 04/14/2021 3     WBC 04/14/2021 8 53     RBC 04/14/2021 5 18*    Hemoglobin 04/14/2021 15 2     Hematocrit 04/14/2021 47 5*    MCV 04/14/2021 92     MCH 04/14/2021 29 3     MCHC 04/14/2021 32 0     RDW 04/14/2021 13 2     MPV 04/14/2021 10 4     Platelets 24/61/7496 296     nRBC 04/14/2021 0     Neutrophils Relative 04/14/2021 50     Immat GRANS % 04/14/2021 1     Lymphocytes Relative 04/14/2021 36     Monocytes Relative 04/14/2021 10     Eosinophils Relative 04/14/2021 2     Basophils Relative 04/14/2021 1     Neutrophils Absolute 04/14/2021 4 34     Immature Grans Absolute 04/14/2021 0 05     Lymphocytes Absolute 04/14/2021 3 03     Monocytes Absolute 04/14/2021 0 86     Eosinophils Absolute 04/14/2021 0 18     Basophils Absolute 04/14/2021 0 07     CRP 04/14/2021 <3 0     Rheumatoid Factor 04/14/2021 Positive*    Cyclic Citrullinated Pep* 04/14/2021 333 0     RUSS 04/14/2021 Negative     QFT Nil 04/14/2021 0 05     QFT TB1-NIL 04/14/2021 0 11     QFT TB2-NIL 04/14/2021 0 12     QFT Mitogen-NIL 04/14/2021 >10 00     QFT Final Interpretation 04/14/2021 Negative     C3 Complement 04/14/2021 122 0     C4, COMPLEMENT 04/14/2021 26 0     Compl, Total (CH50) 04/14/2021 59     A/G Ratio 04/14/2021 1 87*    Albumin Electrophoresis 04/14/2021 65 1*    Albumin CONC 04/14/2021 4 56     Alpha 1 04/14/2021 3 6     ALPHA 1 CONC 04/14/2021 0 25     Alpha 2 04/14/2021 9 2     ALPHA 2 CONC 04/14/2021 0 64     Beta-1 04/14/2021 7 0     BETA 1 CONC 04/14/2021 0 49     Beta-2 04/14/2021 4 7     BETA 2 CONC 04/14/2021 0 33     Gamma Globulin 04/14/2021 10 4*    GAMMA CONC 04/14/2021 0 73     Total Protein 04/14/2021 7 0     SPEP Interpretation 04/14/2021 See Comment     IGA 04/14/2021 141 0     IGG 04/14/2021 638 0*    IGM 04/14/2021 190 0     IgE 04/14/2021 110     RF Quantitation 04/14/2021 10 IU/mL*   Appointment on 03/23/2021   Component Date Value    WBC 03/23/2021 6 04     RBC 03/23/2021 4 85     Hemoglobin 03/23/2021 14 5     Hematocrit 03/23/2021 44 8     MCV 03/23/2021 92     MCH 03/23/2021 29 9     MCHC 03/23/2021 32 4     RDW 03/23/2021 12 9     MPV 03/23/2021 11 0     Platelets 09/49/2715 288     nRBC 03/23/2021 0     Neutrophils Relative 03/23/2021 43     Immat GRANS % 03/23/2021 1     Lymphocytes Relative 03/23/2021 41     Monocytes Relative 03/23/2021 11     Eosinophils Relative 03/23/2021 3     Basophils Relative 03/23/2021 1     Neutrophils Absolute 03/23/2021 2 60     Immature Grans Absolute 03/23/2021 0 03     Lymphocytes Absolute 03/23/2021 2 49     Monocytes Absolute 03/23/2021 0 66     Eosinophils Absolute 03/23/2021 0 19     Basophils Absolute 03/23/2021 0 07     Sodium 03/23/2021 141     Potassium 03/23/2021 3 8     Chloride 03/23/2021 111*    CO2 03/23/2021 28     ANION GAP 03/23/2021 2*    BUN 03/23/2021 13     Creatinine 03/23/2021 0 78     Glucose, Fasting 03/23/2021 80     Calcium 03/23/2021 9 4     AST 03/23/2021 24     ALT 03/23/2021 42     Alkaline Phosphatase 03/23/2021 35*    Total Protein 03/23/2021 7 0     Albumin 03/23/2021 4 2     Total Bilirubin 03/23/2021 0 48     eGFR 03/23/2021 82     Cholesterol 03/23/2021 178     Triglycerides 03/23/2021 79     HDL, Direct 03/23/2021 65     LDL Calculated 03/23/2021 97     TSH 3RD GENERATON 03/23/2021 1 210      Imaging: Xr Wrist 3+ Vw Left    Result Date: 4/20/2021  Narrative: LEFT WRIST INDICATION:   M05 77: Rheumatoid arthritis with rheumatoid factor of multiple sites without organ or systems involvement  COMPARISON:  None VIEWS:  XR WRIST 3+ VW LEFT FINDINGS: There is no acute fracture or dislocation  No bony erosions or juxta-articular abnormality to suggest inflammatory arthritis  There are moderate to severe osteoarthritic degenerative changes of the 1st carpometacarpal joint  No lytic or blastic osseous lesion  Soft tissues are unremarkable  Impression: No acute osseous abnormality  No osseous erosions  Moderate to severe osteoarthritic degenerative changes of the left 1st carpometacarpal joint  Workstation performed: [id number]     Xr Wrist 3+ Vw Right    Result Date: 4/20/2021  Narrative: RIGHT WRIST INDICATION:   M05 77: Rheumatoid arthritis with rheumatoid factor of multiple sites without organ or systems involvement  COMPARISON:  None VIEWS:  XR WRIST 3+ VW RIGHT FINDINGS: There is no acute fracture or dislocation  No bony erosions or juxta-articular abnormality to suggest inflammatory arthritis  There are severe osteoarthritic degenerative changes of the 1st carpometacarpal joint  No lytic or blastic osseous lesion  Soft tissues are unremarkable  Impression: No acute osseous abnormality  No osseous erosions  Severe osteoarthritic degenerative changes of the right 1st carpometacarpal joint  Workstation performed: [id number]     Xr Hand 3+ Vw Left    Result Date: 4/20/2021  Narrative: LEFT HAND INDICATION:   M05 77: Rheumatoid arthritis with rheumatoid factor of multiple sites without organ or systems involvement   COMPARISON:  None VIEWS:  XR HAND 3+ VW LEFT For the purposes of institution wide universal language the following terms will apply: (thumb=1st digit/finger, index finger=2nd digit/finger, long finger=3rd digit/finger, ring=4th digit/finger and small finger=5th digit/finger) FINDINGS: There is no acute fracture or dislocation  No bony erosions or juxta-articular abnormality to suggest inflammatory arthritis  There are moderate to severe osteoarthritic degenerative changes of the 1st carpometacarpal joint  No lytic or blastic osseous lesion  Soft tissues are unremarkable  Impression: No acute osseous abnormality  No osseous erosions  Moderate to severe osteoarthritic degenerative changes of the left 1st carpometacarpal joint  Workstation performed: [id number]     Xr Hand 3+ Vw Right    Result Date: 4/20/2021  Narrative: RIGHT HAND INDICATION:   M05 77: Rheumatoid arthritis with rheumatoid factor of multiple sites without organ or systems involvement  COMPARISON:  None VIEWS:  XR HAND 3+ VW RIGHT For the purposes of institution wide universal language the following terms will apply: (thumb=1st digit/finger, index finger=2nd digit/finger, long finger=3rd digit/finger, ring=4th digit/finger and small finger=5th digit/finger) FINDINGS: There is no acute fracture or dislocation  No bony erosions or juxta-articular abnormality to suggest inflammatory arthritis  There are severe osteoarthritic degenerative changes of the 1st carpometacarpal joint  There are osteoarthritic degenerative changes at the right 1st DIP joint  No lytic or blastic osseous lesion  Soft tissues are unremarkable  Impression: No acute osseous abnormality  No osseous erosions  Severe osteoarthritic degenerative changes of the left 1st carpometacarpal joint  Workstation performed: [id number]     Xr Foot 3+ Vw Left    Result Date: 4/20/2021  Narrative: LEFT FOOT INDICATION:   M05 77: Rheumatoid arthritis with rheumatoid factor of multiple sites without organ or systems involvement   COMPARISON:  None VIEWS:  XR FOOT 3+ VW LEFT FINDINGS: There is no acute fracture or dislocation  No bony erosions or juxta-articular abnormality to suggest inflammatory arthritis  No lytic or blastic osseous lesion  Soft tissues are unremarkable  Impression: No acute osseous abnormality  Workstation performed: [id number]     Xr Foot 3+ Vw Right    Result Date: 4/20/2021  Narrative: RIGHT FOOT INDICATION:   M05 77: Rheumatoid arthritis with rheumatoid factor of multiple sites without organ or systems involvement  COMPARISON:  None VIEWS:  XR FOOT 3+ VW RIGHT FINDINGS: There is no acute fracture or dislocation  No bony erosions or juxta-articular abnormality to suggest inflammatory arthritis  There are mild osteoarthritic degenerative changes of the 1st metatarsophalangeal joint  No lytic or blastic osseous lesion  Soft tissues are unremarkable  Impression: No acute osseous abnormality  Workstation performed: [id number]       Review of Systems:  Review of Systems     REVIEW OF SYSTEMS:  Constitutional:  Denies fever or chills   Eyes:  Denies change in visual acuity   HENT:  Denies nasal congestion or sore throat   Respiratory:  Cough and  shortness of breath   Cardiovascular:  Denies chest pain or edema   GI:  Denies abdominal pain, nausea, vomiting, bloody stools or diarrhea   :  Denies dysuria, frequency, difficulty in micturition and nocturia  Musculoskeletal:  Denies back pain or joint pain   Neurologic:  Denies headache, focal weakness or sensory changes   Endocrine:  Denies polyuria or polydipsia   Lymphatic:  Denies swollen glands   Psychiatric:  Denies depression or anxiety     Physical Exam:  Physical Exam  PHYSICAL EXAM:  General:  Patient is not in acute distress   Head: Normocephalic, Atraumatic  HEENT:  Both pupils normal-size atraumatic, normocephalic, nonicteric  Neck:  JVP not raised  Trachea central  No carotid bruit  Respiratory:  normal breath sounds no crackles   no rhonchi  Cardiovascular:  Regular rate and rhythm no S3 no murmurs  GI:  Abdomen soft nontender  No organomegaly  Lymphatic:  No cervical or inguinal lymphadenopathy  Neurologic:  Patient is awake alert, oriented   Grossly nonfocal    Extremities no edema      Discussion/Summary:   patient with symptoms of shortness of breath and smoking with the small pericardial effusion noted on CT of the chest   Patient will be scheduled for an echocardiogram to evaluate ejection fraction and rule out any significant pericardial effusion which is unlikely  Patient did have pharmacological nuclear stress test 1 year ago which was negative for ischemia at [facility]  patient seems to be having cough which could be related to Ace inhibitors  Will discontinue lisinopril and the arm start losartan 25 mg daily  Patient counseled to quit smoking to prevent future cardiovascular events  Symptoms to watch out from cardiac standpoint also discussed with patient and family  Follow-up in 6 months or earlier as needed  Patient is agreeable with the plan of care

## 2021-05-14 NOTE — PROGRESS NOTES
Cardiology Consultation     Grabiel Leslie  [id number]  1960  [address] [address]     this patient presents for cardiology consultation and the evaluation  Patient has history of COPD and smoking followed by Pulmonary Dr Sana Sherman  Patient had a recent CT of the chest which showed possible small pericardial effusion as well as coronary artery calcification  Patient does not have any history of significant coronary artery disease  Patient did have cardiac catheterization a few years ago which was negative for significant CAD  Patient also had pharmacological stress test done at [facility] which was negative for ischemia  Patient has cut down smoking  She does have some shortness of breath which is stable  No history of leg edema orthopnea PND  She does have history of anxiety which is stable  1  Hypertension, essential  losartan (COZAAR) 25 mg tablet    Echo complete with contrast if indicated   2  Shortness of breath  Echo complete with contrast if indicated   3  Pericardial effusion     4   Smoking       Patient Active Problem List   Diagnosis    Avascular necrosis of hip, right (HCC)    Lumbar radiculopathy    Gastro-esophageal reflux disease without esophagitis    Chronic obstructive pulmonary disease (HCC)    Arthralgia of hip    Essential hypertension    Other hyperlipidemia    Lumbar spondylosis    Lump of breast, right    Moderate persistent asthma with acute exacerbation    Other ovarian cyst, right side    Rheumatoid arthritis involving multiple sites [facility])   [address] maintenance    Allergic rhinitis due to allergen    Generalized abdominal pain    Primary osteoarthritis of left hip     Past Medical History:   Diagnosis Date    Arthritis     Asthma     Avascular necrosis of bone of hip, right (HCC)     COPD [facility]chronic obstructive pulmonary disease) ([facility] [address] )     GERD (gastroesophageal reflux disease)     Hypoglycemia     Infectious viral hepatitis     Lumbar radicular pain     Lyme disease     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HCC)      Social History     Socioeconomic History    Marital status: /Civil Union     Spouse name: Not on file    Number of children: Not on file    Years of education: Not on file    Highest education level: Not on file   Occupational History    Not on file   Social Needs    Financial resource strain: Not on file    Food insecurity     Worry: Not on file     Inability: Not on file   [employer] needs     Medical: Not on file     Non-medical: Not on file   Tobacco Use    Smoking status: Former Smoker    Smokeless tobacco: Never Used   Substance and Sexual Activity    Alcohol use: No    Drug use: Never    Sexual activity: Not on file   Lifestyle    Physical activity     Days per week: Not on file     Minutes per session: Not on file    Stress: Not on file   Relationships    Social connections     Talks on phone: Not on file     Gets together: Not on file     Attends [employer] service: Not on file     Active member of club or organization: Not on file     Attends meetings of clubs or organizations: Not on file     Relationship status: Not on file    Intimate partner violence     Fear of current or ex partner: Not on file     Emotionally abused: Not on file     Physically abused: Not on file     Forced sexual activity: Not on file   Other Topics Concern    Not on file   Social History Narrative    Not on file      Family History   Problem Relation Age of Onset    No Known Problems Mother      Past Surgical History:   Procedure Laterality Date    APPENDECTOMY      CARPAL TUNNEL RELEASE      CHOLECYSTECTOMY      SINUS SURGERY         Current Outpatient Medications:     albuterol (PROVENTIL HFA,VENTOLIN HFA) 90 mcg/act inhaler, INHALE 2 PUFFS BY INHALATION ROUTE EVERY 8 HOURS AS NEEDED, Disp: , Rfl: 0    Azelastine-Fluticasone 137-50 MCG/ACT SUSP, , Disp: , Rfl:     Budesonide-Formoterol Fumarate (SYMBICORT IN), Inhale 2 puffs daily 160-4 50MCG/ACTUATION, Disp: , Rfl:     Cholecalciferol (VITAMIN D3) 2000 units CHEW, Chew 5,000 Units daily , Disp: , Rfl:     Epinastine HCl 0 05 % ophthalmic solution, Administer 1 drop to both eyes 2 (two) times a day as needed , Disp: , Rfl:     esomeprazole (NexIUM) 40 MG capsule, Take 40 mg by mouth 2 (two) times a day, Disp: , Rfl:     FENOFIBRATE PO, Take 135 mg by mouth daily , Disp: , Rfl:     fexofenadine (ALLEGRA) 180 MG tablet, Take 180 mg by mouth daily, Disp: , Rfl:     hydroxychloroquine (PLAQUENIL) 200 mg tablet, Take 200 mg by mouth 2 (two) times a day with meals , Disp: , Rfl:     levalbuterol (XOPENEX) 1 25 mg/3 mL nebulizer solution, , Disp: , Rfl:     magnesium oxide (MAG-OX) 400 mg tablet, Take 1 tablet by mouth 2 (two) times a day, Disp: , Rfl:     oxyCODONE (ROXICODONE) 5 mg immediate release tablet, Take 5 mg by mouth every 4 (four) hours as needed for moderate pain, Disp: , Rfl:     oxyCODONE-acetaminophen (PERCOCET)  mg per tablet, Take 1 tablet by mouth every 8 (eight) hours as needed, Disp: , Rfl: 0    predniSONE 10 mg tablet, Take 10 mg by mouth 3 (three) times a day as needed , Disp: , Rfl:     losartan (COZAAR) 25 mg tablet, Take 1 tablet (25 mg total) by mouth daily, Disp: 30 tablet, Rfl: 5    olopatadine HCl (PATADAY) 0 2 % opth drops, Pataday 0 2 % eye drops, Disp: , Rfl:     omeprazole (PriLOSEC) 20 mg delayed release capsule, Take 1 capsule (20 mg total) by mouth daily (Patient not taking: Reported on 4/15/2021), Disp: 90 capsule, Rfl: 3    pantoprazole (PROTONIX) 40 mg tablet, Take 1 tablet (40 mg total) by mouth daily Brand necessary (Patient not taking: Reported on 4/15/2021), Disp: 30 tablet, Rfl: 11  Allergies   Allergen Reactions    Aspirin Anaphylaxis    Iodine - Food Allergy Anaphylaxis    Penicillins Anaphylaxis    Soybean Oil - Food Allergy Other (See Comments)     Vitals:    05/13/21 1408   BP: 124/72   Pulse: 77   SpO2: 97%   Height: 5' 3" (1 6 m)       Labs:  Appointment on 04/14/2021   Component Date Value    Sed Rate 04/14/2021 3     WBC 04/14/2021 8 53     RBC 04/14/2021 5 18*    Hemoglobin 04/14/2021 15 2     Hematocrit 04/14/2021 47 5*    MCV 04/14/2021 92     MCH 04/14/2021 29 3     MCHC 04/14/2021 32 0     RDW 04/14/2021 13 2     MPV 04/14/2021 10 4     Platelets 73/81/0044 296     nRBC 04/14/2021 0     Neutrophils Relative 04/14/2021 50     Immat GRANS % 04/14/2021 1     Lymphocytes Relative 04/14/2021 36     Monocytes Relative 04/14/2021 10     Eosinophils Relative 04/14/2021 2     Basophils Relative 04/14/2021 1     Neutrophils Absolute 04/14/2021 4 34     Immature Grans Absolute 04/14/2021 0 05     Lymphocytes Absolute 04/14/2021 3 03     Monocytes Absolute 04/14/2021 0 86     Eosinophils Absolute 04/14/2021 0 18     Basophils Absolute 04/14/2021 0 07     CRP 04/14/2021 <3 0     Rheumatoid Factor 04/14/2021 Positive*    Cyclic Citrullinated Pep* 04/14/2021 333 0     RUSS 04/14/2021 Negative     QFT Nil 04/14/2021 0 05     QFT TB1-NIL 04/14/2021 0 11     QFT TB2-NIL 04/14/2021 0 12     QFT Mitogen-NIL 04/14/2021 >10 00     QFT Final Interpretation 04/14/2021 Negative     C3 Complement 04/14/2021 122 0     C4, COMPLEMENT 04/14/2021 26 0     Compl, Total (CH50) 04/14/2021 59     A/G Ratio 04/14/2021 1 87*    Albumin Electrophoresis 04/14/2021 65 1*    Albumin CONC 04/14/2021 4 56     Alpha 1 04/14/2021 3 6     ALPHA 1 CONC 04/14/2021 0 25     Alpha 2 04/14/2021 9 2     ALPHA 2 CONC 04/14/2021 0 64     Beta-1 04/14/2021 7 0     BETA 1 CONC 04/14/2021 0 49     Beta-2 04/14/2021 4 7     BETA 2 CONC 04/14/2021 0 33     Gamma Globulin 04/14/2021 10 4*    GAMMA CONC 04/14/2021 0 73     Total Protein 04/14/2021 7 0     SPEP Interpretation 04/14/2021 See Comment     IGA 04/14/2021 141 0     IGG 04/14/2021 638 0*    IGM 04/14/2021 190 0     IgE 04/14/2021 110     RF Quantitation 04/14/2021 10 IU/mL*   Appointment on 03/23/2021   Component Date Value    WBC 03/23/2021 6 04     RBC 03/23/2021 4 85     Hemoglobin 03/23/2021 14 5     Hematocrit 03/23/2021 44 8     MCV 03/23/2021 92     MCH 03/23/2021 29 9     MCHC 03/23/2021 32 4     RDW 03/23/2021 12 9     MPV 03/23/2021 11 0     Platelets 24/82/3274 288     nRBC 03/23/2021 0     Neutrophils Relative 03/23/2021 43     Immat GRANS % 03/23/2021 1     Lymphocytes Relative 03/23/2021 41     Monocytes Relative 03/23/2021 11     Eosinophils Relative 03/23/2021 3     Basophils Relative 03/23/2021 1     Neutrophils Absolute 03/23/2021 2 60     Immature Grans Absolute 03/23/2021 0 03     Lymphocytes Absolute 03/23/2021 2 49     Monocytes Absolute 03/23/2021 0 66     Eosinophils Absolute 03/23/2021 0 19     Basophils Absolute 03/23/2021 0 07     Sodium 03/23/2021 141     Potassium 03/23/2021 3 8     Chloride 03/23/2021 111*    CO2 03/23/2021 28     ANION GAP 03/23/2021 2*    BUN 03/23/2021 13     Creatinine 03/23/2021 0 78     Glucose, Fasting 03/23/2021 80     Calcium 03/23/2021 9 4     AST 03/23/2021 24     ALT 03/23/2021 42     Alkaline Phosphatase 03/23/2021 35*    Total Protein 03/23/2021 7 0     Albumin 03/23/2021 4 2     Total Bilirubin 03/23/2021 0 48     eGFR 03/23/2021 82     Cholesterol 03/23/2021 178     Triglycerides 03/23/2021 79     HDL, Direct 03/23/2021 65     LDL Calculated 03/23/2021 97     TSH 3RD GENERATON 03/23/2021 1 210      Imaging: Xr Wrist 3+ Vw Left    Result Date: 4/20/2021  Narrative: LEFT WRIST INDICATION:   M05 77: Rheumatoid arthritis with rheumatoid factor of multiple sites without organ or systems involvement  COMPARISON:  None VIEWS:  XR WRIST 3+ VW LEFT FINDINGS: There is no acute fracture or dislocation   No bony erosions or juxta-articular abnormality to suggest inflammatory arthritis  There are moderate to severe osteoarthritic degenerative changes of the 1st carpometacarpal joint  No lytic or blastic osseous lesion  Soft tissues are unremarkable  Impression: No acute osseous abnormality  No osseous erosions  Moderate to severe osteoarthritic degenerative changes of the left 1st carpometacarpal joint  Workstation performed: [id number]     Xr Wrist 3+ Vw Right    Result Date: 4/20/2021  Narrative: RIGHT WRIST INDICATION:   M05 77: Rheumatoid arthritis with rheumatoid factor of multiple sites without organ or systems involvement  COMPARISON:  None VIEWS:  XR WRIST 3+ VW RIGHT FINDINGS: There is no acute fracture or dislocation  No bony erosions or juxta-articular abnormality to suggest inflammatory arthritis  There are severe osteoarthritic degenerative changes of the 1st carpometacarpal joint  No lytic or blastic osseous lesion  Soft tissues are unremarkable  Impression: No acute osseous abnormality  No osseous erosions  Severe osteoarthritic degenerative changes of the right 1st carpometacarpal joint  Workstation performed: [id number]     Xr Hand 3+ Vw Left    Result Date: 4/20/2021  Narrative: LEFT HAND INDICATION:   M05 77: Rheumatoid arthritis with rheumatoid factor of multiple sites without organ or systems involvement  COMPARISON:  None VIEWS:  XR HAND 3+ VW LEFT For the purposes of institution wide universal language the following terms will apply: (thumb=1st digit/finger, index finger=2nd digit/finger, long finger=3rd digit/finger, ring=4th digit/finger and small finger=5th digit/finger) FINDINGS: There is no acute fracture or dislocation  No bony erosions or juxta-articular abnormality to suggest inflammatory arthritis  There are moderate to severe osteoarthritic degenerative changes of the 1st carpometacarpal joint  No lytic or blastic osseous lesion  Soft tissues are unremarkable       Impression: No acute osseous abnormality  No osseous erosions  Moderate to severe osteoarthritic degenerative changes of the left 1st carpometacarpal joint  Workstation performed: [id number]     Xr Hand 3+ Vw Right    Result Date: 4/20/2021  Narrative: RIGHT HAND INDICATION:   M05 77: Rheumatoid arthritis with rheumatoid factor of multiple sites without organ or systems involvement  COMPARISON:  None VIEWS:  XR HAND 3+ VW RIGHT For the purposes of institution wide universal language the following terms will apply: (thumb=1st digit/finger, index finger=2nd digit/finger, long finger=3rd digit/finger, ring=4th digit/finger and small finger=5th digit/finger) FINDINGS: There is no acute fracture or dislocation  No bony erosions or juxta-articular abnormality to suggest inflammatory arthritis  There are severe osteoarthritic degenerative changes of the 1st carpometacarpal joint  There are osteoarthritic degenerative changes at the right 1st DIP joint  No lytic or blastic osseous lesion  Soft tissues are unremarkable  Impression: No acute osseous abnormality  No osseous erosions  Severe osteoarthritic degenerative changes of the left 1st carpometacarpal joint  Workstation performed: [id number]     Xr Foot 3+ Vw Left    Result Date: 4/20/2021  Narrative: LEFT FOOT INDICATION:   M05 77: Rheumatoid arthritis with rheumatoid factor of multiple sites without organ or systems involvement  COMPARISON:  None VIEWS:  XR FOOT 3+ VW LEFT FINDINGS: There is no acute fracture or dislocation  No bony erosions or juxta-articular abnormality to suggest inflammatory arthritis  No lytic or blastic osseous lesion  Soft tissues are unremarkable  Impression: No acute osseous abnormality  Workstation performed: [id number]     Xr Foot 3+ Vw Right    Result Date: 4/20/2021  Narrative: RIGHT FOOT INDICATION:   M05 77: Rheumatoid arthritis with rheumatoid factor of multiple sites without organ or systems involvement   COMPARISON:  None VIEWS:  XR FOOT 3+ VW RIGHT FINDINGS: There is no acute fracture or dislocation  No bony erosions or juxta-articular abnormality to suggest inflammatory arthritis  There are mild osteoarthritic degenerative changes of the 1st metatarsophalangeal joint  No lytic or blastic osseous lesion  Soft tissues are unremarkable  Impression: No acute osseous abnormality  Workstation performed: [id number]       Review of Systems:  Review of Systems     REVIEW OF SYSTEMS:  Constitutional:  Denies fever or chills   Eyes:  Denies change in visual acuity   HENT:  Denies nasal congestion or sore throat   Respiratory:  Cough and  shortness of breath   Cardiovascular:  Denies chest pain or edema   GI:  Denies abdominal pain, nausea, vomiting, bloody stools or diarrhea   :  Denies dysuria, frequency, difficulty in micturition and nocturia  Musculoskeletal:  Denies back pain or joint pain   Neurologic:  Denies headache, focal weakness or sensory changes   Endocrine:  Denies polyuria or polydipsia   Lymphatic:  Denies swollen glands   Psychiatric:  Denies depression or anxiety     Physical Exam:  Physical Exam  PHYSICAL EXAM:  General:  Patient is not in acute distress   Head: Normocephalic, Atraumatic  HEENT:  Both pupils normal-size atraumatic, normocephalic, nonicteric  Neck:  JVP not raised  Trachea central  No carotid bruit  Respiratory:  normal breath sounds no crackles  no rhonchi  Cardiovascular:  Regular rate and rhythm no S3 no murmurs  GI:  Abdomen soft nontender  No organomegaly  Lymphatic:  No cervical or inguinal lymphadenopathy  Neurologic:  Patient is awake alert, oriented   Grossly nonfocal    Extremities no edema      Discussion/Summary:   patient with symptoms of shortness of breath and smoking with the small pericardial effusion noted on CT of the chest   Patient will be scheduled for an echocardiogram to evaluate ejection fraction and rule out any significant pericardial effusion which is unlikely    Patient did have pharmacological nuclear stress test 1 year ago which was negative for ischemia at [facility]  patient seems to be having cough which could be related to Ace inhibitors  Will discontinue lisinopril and the arm start losartan 25 mg daily  Patient counseled to quit smoking to prevent future cardiovascular events  Symptoms to watch out from cardiac standpoint also discussed with patient and family  Follow-up in 6 months or earlier as needed  Patient is agreeable with the plan of care

## 2021-06-29 ENCOUNTER — TELEPHONE (OUTPATIENT)
Dept: CARDIOLOGY CLINIC | Facility: CLINIC | Age: 61
End: 2021-06-29

## 2021-06-29 ENCOUNTER — HOSPITAL ENCOUNTER (OUTPATIENT)
Dept: NON INVASIVE DIAGNOSTICS | Facility: CLINIC | Age: 61
Discharge: HOME | End: 2021-06-29
Payer: COMMERCIAL

## 2021-06-29 DIAGNOSIS — R06.02 SHORTNESS OF BREATH: ICD-10-CM

## 2021-06-29 DIAGNOSIS — I10 HYPERTENSION, ESSENTIAL: ICD-10-CM

## 2021-06-29 PROCEDURE — 93306 TTE W/DOPPLER COMPLETE: CPT | Performed by: INTERNAL MEDICINE

## 2021-06-29 PROCEDURE — 93306 TTE W/DOPPLER COMPLETE: CPT

## 2021-06-29 NOTE — TELEPHONE ENCOUNTER
Pt stated she would like to go back on lisinopril  She is having many side effects on losartan   She is having headaches, constipation, depression and a cough associated with chest pressure  Please advise

## 2021-06-30 DIAGNOSIS — I10 HYPERTENSION, ESSENTIAL: Primary | ICD-10-CM

## 2021-06-30 RX ORDER — LISINOPRIL 10 MG/1
10 TABLET ORAL DAILY
Qty: 30 TABLET | Refills: 5
Start: 2021-06-30 | End: 2021-07-09

## 2021-06-30 NOTE — TELEPHONE ENCOUNTER
meds update pt will discontinue lorstartan and will go back to lisinopril 10 mg daily  Set to no print

## 2021-07-09 ENCOUNTER — OFFICE VISIT (OUTPATIENT)
Dept: INTERNAL MEDICINE CLINIC | Facility: CLINIC | Age: 61
End: 2021-07-09
Payer: COMMERCIAL

## 2021-07-09 VITALS
DIASTOLIC BLOOD PRESSURE: 70 MMHG | HEART RATE: 66 BPM | SYSTOLIC BLOOD PRESSURE: 110 MMHG | HEIGHT: 63 IN | WEIGHT: 156.2 LBS | TEMPERATURE: 97 F | OXYGEN SATURATION: 95 % | BODY MASS INDEX: 27.68 KG/M2

## 2021-07-09 DIAGNOSIS — J44.9 CHRONIC OBSTRUCTIVE PULMONARY DISEASE, UNSPECIFIED COPD TYPE (HCC): ICD-10-CM

## 2021-07-09 DIAGNOSIS — E78.49 OTHER HYPERLIPIDEMIA: ICD-10-CM

## 2021-07-09 DIAGNOSIS — J30.89 NON-SEASONAL ALLERGIC RHINITIS DUE TO OTHER ALLERGIC TRIGGER: ICD-10-CM

## 2021-07-09 DIAGNOSIS — I10 ESSENTIAL HYPERTENSION: Primary | ICD-10-CM

## 2021-07-09 DIAGNOSIS — K21.9 GASTRO-ESOPHAGEAL REFLUX DISEASE WITHOUT ESOPHAGITIS: ICD-10-CM

## 2021-07-09 DIAGNOSIS — M06.9 RHEUMATOID ARTHRITIS INVOLVING MULTIPLE SITES, UNSPECIFIED WHETHER RHEUMATOID FACTOR PRESENT (HCC): ICD-10-CM

## 2021-07-09 PROCEDURE — 3008F BODY MASS INDEX DOCD: CPT | Performed by: NURSE PRACTITIONER

## 2021-07-09 PROCEDURE — 99214 OFFICE O/P EST MOD 30 MIN: CPT | Performed by: NURSE PRACTITIONER

## 2021-07-09 PROCEDURE — 3074F SYST BP LT 130 MM HG: CPT | Performed by: NURSE PRACTITIONER

## 2021-07-09 PROCEDURE — 1036F TOBACCO NON-USER: CPT | Performed by: NURSE PRACTITIONER

## 2021-07-09 PROCEDURE — 3078F DIAST BP <80 MM HG: CPT | Performed by: NURSE PRACTITIONER

## 2021-07-09 RX ORDER — PROMETHAZINE HYDROCHLORIDE AND CODEINE PHOSPHATE 6.25; 1 MG/5ML; MG/5ML
SOLUTION ORAL
COMMUNITY
Start: 2021-06-11 | End: 2021-07-09

## 2021-07-09 RX ORDER — AMLODIPINE BESYLATE 5 MG/1
5 TABLET ORAL DAILY
Qty: 30 TABLET | Refills: 0 | Status: SHIPPED | OUTPATIENT
Start: 2021-07-09 | End: 2021-08-02

## 2021-07-09 RX ORDER — PROMETHAZINE HYDROCHLORIDE AND CODEINE PHOSPHATE 6.25; 1 MG/5ML; MG/5ML
5 SYRUP ORAL EVERY 4 HOURS PRN
COMMUNITY
Start: 2021-06-11 | End: 2021-07-09

## 2021-07-09 RX ORDER — ACETAMINOPHEN AND CODEINE PHOSPHATE 300; 30 MG/1; MG/1
TABLET ORAL
COMMUNITY
End: 2021-10-12

## 2021-07-09 NOTE — PROGRESS NOTES
Assessment/Plan:    Gastro-esophageal reflux disease without esophagitis  Takes nexium PRN only  Chronic obstructive pulmonary disease (HCC)  Uses symbicort daily  nebulizers as needed  Recently finished antibiotics and steroids for exacerbation  Advised to follow up with her pulmonologist regarding continued wheezing  Essential hypertension  Stop lisinopril  Start amlodipine 5 mg daily  Nurse visit for BP check in 2 weeks  Rheumatoid arthritis involving multiple sites ([place] [address] )  Stable  On plaquenil   Sees rheumatology     Other hyperlipidemia  Was on fenofibrate in the past   Last lipids were normal off medication  Diagnoses and all orders for this visit:    Essential hypertension  -     amLODIPine (NORVASC) 5 mg tablet; Take 1 tablet (5 mg total) by mouth daily    Chronic obstructive pulmonary disease, unspecified COPD type ([facility])    Rheumatoid arthritis involving multiple sites, unspecified whether rheumatoid factor present ([place] [address] )    Other hyperlipidemia    Gastro-esophageal reflux disease without esophagitis    Non-seasonal allergic rhinitis due to other allergic trigger    Other orders  -     acetaminophen-codeine (TYLENOL with CODEINE #3) 300-30 MG per tablet; acetaminophen 300 mg-codeine 30 mg tablet  -     Discontinue: Promethazine-Codeine 6 25-10 MG/5ML SOLN; TAKE 5 ML BY MOUTH EVERY 4 (FOUR) HOURS AS NEEDED FOR COUGH  (Patient not taking: Reported on 7/9/2021)  -     Discontinue: promethazine-codeine (PHENERGAN WITH CODEINE) 6 25-10 mg/5 mL syrup; Take 5 mL by mouth every 4 (four) hours as needed (Patient not taking: Reported on 7/9/2021)  -     choline fenofibrate (TRILIPIX) 135 MG capsule; fenofibric acid (choline) 135 mg capsule,delayed release (Patient not taking: Reported on 7/9/2021)          Subjective:      Patient ID: Zoraida Zamarripa is a 59 y o  female  Here today to establish care  Was last seen by PCP a few months ago  Sarina Zavala is most concerned about her blood pressure  She is very sensitive to medications and has had side effects from several previous blood pressure medication  She was on losartan and developed headaches, depression  and abdominal pain  She is currently on lisinopril and has developed a constant dry cough  She has been wheezing  She has COPD  She sees pulmonary  She states she was recently on an antibiotic and steroids  She uses her nebulizer regularly  She continues to have symptoms  She does smoke here and there, not regularly  She is fearful to start another blood pressure medication due to side effects  She does not want to gain any weight  She knows that this will cause her to feel depressed  She has RA, she sees rheumatology  It has taken her awhile to find medications that have worked without side effects  The following portions of the patient's history were reviewed and updated as appropriate: allergies, current medications, past family history, past medical history, past social history, past surgical history and problem list     Review of Systems   Constitutional: Negative for activity change, appetite change, fatigue and unexpected weight change  Eyes: Negative for visual disturbance  Respiratory: Positive for cough and wheezing  Negative for chest tightness and shortness of breath  Cardiovascular: Negative for chest pain, palpitations and leg swelling  Gastrointestinal: Negative for abdominal pain, blood in stool, constipation and diarrhea  Genitourinary: Negative for difficulty urinating  Musculoskeletal: Negative for arthralgias  Skin: Negative for rash  Neurological: Positive for headaches  Negative for dizziness, weakness and light-headedness  Psychiatric/Behavioral: Negative for sleep disturbance  Objective:      /70   Pulse 66   Temp (!) 97 °F (36 1 °C)   Ht 5' 3" (1 6 m)   Wt 70 9 kg (156 lb 3 2 oz)   SpO2 95%   BMI 27 67 kg/m²          Physical Exam  Vitals reviewed     Constitutional: Appearance: Normal appearance  She is well-developed  HENT:      Head: Normocephalic and atraumatic  Eyes:      Conjunctiva/sclera: Conjunctivae normal       Pupils: Pupils are equal, round, and reactive to light  Neck:      Thyroid: No thyromegaly  Cardiovascular:      Rate and Rhythm: Normal rate and regular rhythm  Heart sounds: Normal heart sounds  Pulmonary:      Effort: Pulmonary effort is normal       Breath sounds: Examination of the right-upper field reveals wheezing  Examination of the left-upper field reveals wheezing  Wheezing present  Abdominal:      General: Bowel sounds are normal       Palpations: Abdomen is soft  Musculoskeletal:         General: Normal range of motion  Right lower leg: No edema  Left lower leg: No edema  Lymphadenopathy:      Cervical: No cervical adenopathy  Skin:     General: Skin is warm and dry  Neurological:      Mental Status: She is alert and oriented to person, place, and time     Psychiatric:         Behavior: Behavior normal

## 2021-07-09 NOTE — ASSESSMENT & PLAN NOTE
Uses symbicort daily  nebulizers as needed  Recently finished antibiotics and steroids for exacerbation  Advised to follow up with her pulmonologist regarding continued wheezing

## 2021-07-23 ENCOUNTER — CLINICAL SUPPORT (OUTPATIENT)
Dept: INTERNAL MEDICINE CLINIC | Facility: CLINIC | Age: 61
End: 2021-07-23
Payer: COMMERCIAL

## 2021-07-23 VITALS
DIASTOLIC BLOOD PRESSURE: 70 MMHG | TEMPERATURE: 96.5 F | OXYGEN SATURATION: 97 % | SYSTOLIC BLOOD PRESSURE: 130 MMHG | HEART RATE: 72 BPM

## 2021-07-23 DIAGNOSIS — I10 ESSENTIAL HYPERTENSION: Primary | ICD-10-CM

## 2021-07-23 PROCEDURE — 99211 OFF/OP EST MAY X REQ PHY/QHP: CPT

## 2021-07-23 RX ORDER — LEVOFLOXACIN 750 MG/1
750 TABLET ORAL DAILY
COMMUNITY
Start: 2021-07-15 | End: 2021-07-25

## 2021-07-23 NOTE — PROGRESS NOTES
Patient is here for a BP check  Check at home  No     Today; 130/70    Medications; Stopped lisinopril and started amlodipine 5 mg daily     Per PCP continue amlodipine 5 mg daily  Return for regular follow up,  Monitor arm discomfort and bruising and call if worsens

## 2021-08-25 ENCOUNTER — TELEPHONE (OUTPATIENT)
Dept: INTERNAL MEDICINE CLINIC | Facility: CLINIC | Age: 61
End: 2021-08-25

## 2021-08-25 NOTE — TELEPHONE ENCOUNTER
Pt states she is having neck pain, nausea, blurred vision, loss of coordination and and has had these migraines before  Says her BP is usually 118-130/80-70 she thinks the BP meds are giving her migraines since she didn't have a severe blood pressure problem from the start  Daughter checked her BP it was 155/80 @ 11:20 am today

## 2021-08-25 NOTE — TELEPHONE ENCOUNTER
Is she having any other symptoms? Neck pain? Where is the location of the headache? Any nausea/vomiting? Any vision changes, loss of coordination or balance? History of migraines? Has she checked her blood pressure?

## 2021-08-25 NOTE — TELEPHONE ENCOUNTER
Typically blurred vision and loss of coordination are not symptoms of a typical migraine  I would recommend an ER evaluation if she's been having these symptoms  Continue with the blood pressure medication

## 2021-08-25 NOTE — TELEPHONE ENCOUNTER
Pt  Has a headache since Monday  Is takiing regular strength Tylenol q 6hrs  She said the pain is really bad  Wants appt  today-no openings  Said she doesn't have anyone to bring her tomorrow

## 2021-08-26 NOTE — TELEPHONE ENCOUNTER
Migraine medication works only when taken within the first few hours of a migraine  I have not diagnosed her with migraines or have a documented history of these  Would recommend an office visit or again ER especially if having blurred vision or loss of coordination like stated in the previous message     Would need to be office visit to do a neuro eval, not virtual

## 2021-08-26 NOTE — TELEPHONE ENCOUNTER
Pt aware, states she will go to the [facility] [place] [facility]

## 2021-10-04 ENCOUNTER — TELEPHONE (OUTPATIENT)
Dept: OBGYN CLINIC | Facility: HOSPITAL | Age: 61
End: 2021-10-04

## 2021-10-06 ENCOUNTER — TELEPHONE (OUTPATIENT)
Dept: PAIN MEDICINE | Facility: MEDICAL CENTER | Age: 61
End: 2021-10-06

## 2021-10-12 ENCOUNTER — HOSPITAL ENCOUNTER (OUTPATIENT)
Dept: RADIOLOGY | Facility: HOSPITAL | Age: 61
Discharge: HOME | End: 2021-10-12
Payer: COMMERCIAL

## 2021-10-12 ENCOUNTER — OFFICE VISIT (OUTPATIENT)
Dept: INTERNAL MEDICINE CLINIC | Facility: CLINIC | Age: 61
End: 2021-10-12
Payer: COMMERCIAL

## 2021-10-12 VITALS
OXYGEN SATURATION: 98 % | HEIGHT: 63 IN | DIASTOLIC BLOOD PRESSURE: 74 MMHG | TEMPERATURE: 97 F | HEART RATE: 74 BPM | BODY MASS INDEX: 27.67 KG/M2 | SYSTOLIC BLOOD PRESSURE: 130 MMHG

## 2021-10-12 DIAGNOSIS — M54.2 CERVICALGIA: ICD-10-CM

## 2021-10-12 DIAGNOSIS — J44.9 CHRONIC OBSTRUCTIVE PULMONARY DISEASE, UNSPECIFIED COPD TYPE (HCC): ICD-10-CM

## 2021-10-12 DIAGNOSIS — E78.49 OTHER HYPERLIPIDEMIA: ICD-10-CM

## 2021-10-12 DIAGNOSIS — I10 ESSENTIAL HYPERTENSION: ICD-10-CM

## 2021-10-12 DIAGNOSIS — M06.9 RHEUMATOID ARTHRITIS INVOLVING MULTIPLE SITES, UNSPECIFIED WHETHER RHEUMATOID FACTOR PRESENT (HCC): ICD-10-CM

## 2021-10-12 DIAGNOSIS — K21.9 GASTRO-ESOPHAGEAL REFLUX DISEASE WITHOUT ESOPHAGITIS: ICD-10-CM

## 2021-10-12 DIAGNOSIS — M54.2 CERVICALGIA: Primary | ICD-10-CM

## 2021-10-12 DIAGNOSIS — G44.229 CHRONIC TENSION-TYPE HEADACHE, NOT INTRACTABLE: ICD-10-CM

## 2021-10-12 PROCEDURE — 99214 OFFICE O/P EST MOD 30 MIN: CPT | Performed by: NURSE PRACTITIONER

## 2021-10-12 PROCEDURE — 72050 X-RAY EXAM NECK SPINE 4/5VWS: CPT

## 2021-10-12 RX ORDER — LEVALBUTEROL INHALATION SOLUTION 0.63 MG/3ML
SOLUTION RESPIRATORY (INHALATION)
COMMUNITY
Start: 2021-09-22 | End: 2022-01-24

## 2021-10-12 RX ORDER — TOPIRAMATE 25 MG/1
TABLET ORAL
COMMUNITY
Start: 2021-09-23 | End: 2021-10-12

## 2021-10-12 RX ORDER — PROMETHAZINE HYDROCHLORIDE AND CODEINE PHOSPHATE 6.25; 1 MG/5ML; MG/5ML
SOLUTION ORAL
COMMUNITY
Start: 2021-09-16 | End: 2021-10-12

## 2021-10-12 RX ORDER — CLONIDINE HYDROCHLORIDE 0.1 MG/1
0.1 TABLET ORAL 2 TIMES DAILY
Qty: 60 TABLET | Refills: 0 | Status: SHIPPED | OUTPATIENT
Start: 2021-10-12 | End: 2021-11-03

## 2021-10-12 RX ORDER — TOPIRAMATE 25 MG/1
25 TABLET ORAL 2 TIMES DAILY
COMMUNITY
Start: 2021-08-26 | End: 2021-10-12

## 2021-10-12 RX ORDER — GABAPENTIN 100 MG/1
100 CAPSULE ORAL
Qty: 30 CAPSULE | Refills: 0 | Status: SHIPPED | OUTPATIENT
Start: 2021-10-12 | End: 2021-12-20 | Stop reason: SINTOL

## 2021-10-12 RX ORDER — PROMETHAZINE HYDROCHLORIDE AND CODEINE PHOSPHATE 6.25; 1 MG/5ML; MG/5ML
5 SYRUP ORAL EVERY 4 HOURS PRN
COMMUNITY
Start: 2021-09-16 | End: 2021-10-12

## 2021-10-15 ENCOUNTER — TELEPHONE (OUTPATIENT)
Dept: PAIN MEDICINE | Facility: CLINIC | Age: 61
End: 2021-10-15

## 2021-10-26 ENCOUNTER — OFFICE VISIT (OUTPATIENT)
Dept: INTERNAL MEDICINE CLINIC | Facility: CLINIC | Age: 61
End: 2021-10-26
Payer: COMMERCIAL

## 2021-10-26 VITALS
SYSTOLIC BLOOD PRESSURE: 126 MMHG | HEIGHT: 63 IN | HEART RATE: 80 BPM | TEMPERATURE: 97.5 F | OXYGEN SATURATION: 98 % | BODY MASS INDEX: 27.67 KG/M2 | DIASTOLIC BLOOD PRESSURE: 68 MMHG

## 2021-10-26 DIAGNOSIS — G44.229 CHRONIC TENSION-TYPE HEADACHE, NOT INTRACTABLE: ICD-10-CM

## 2021-10-26 DIAGNOSIS — R35.0 URINARY FREQUENCY: ICD-10-CM

## 2021-10-26 DIAGNOSIS — M54.2 CERVICALGIA: ICD-10-CM

## 2021-10-26 DIAGNOSIS — I10 ESSENTIAL HYPERTENSION: Primary | ICD-10-CM

## 2021-10-26 PROCEDURE — 99214 OFFICE O/P EST MOD 30 MIN: CPT | Performed by: NURSE PRACTITIONER

## 2021-10-26 PROCEDURE — 1036F TOBACCO NON-USER: CPT | Performed by: NURSE PRACTITIONER

## 2021-11-03 DIAGNOSIS — I10 ESSENTIAL HYPERTENSION: ICD-10-CM

## 2021-11-03 RX ORDER — CLONIDINE HYDROCHLORIDE 0.1 MG/1
TABLET ORAL
Qty: 60 TABLET | Refills: 0 | Status: SHIPPED | OUTPATIENT
Start: 2021-11-03 | End: 2021-11-26

## 2021-11-12 ENCOUNTER — CONSULT (OUTPATIENT)
Dept: PAIN MEDICINE | Facility: CLINIC | Age: 61
End: 2021-11-12
Payer: COMMERCIAL

## 2021-11-12 DIAGNOSIS — M54.2 NECK PAIN: Primary | ICD-10-CM

## 2021-11-12 DIAGNOSIS — R20.0 NUMBNESS AND TINGLING IN BOTH HANDS: ICD-10-CM

## 2021-11-12 DIAGNOSIS — M54.12 CERVICAL RADICULOPATHY: ICD-10-CM

## 2021-11-12 DIAGNOSIS — M50.30 DDD (DEGENERATIVE DISC DISEASE), CERVICAL: ICD-10-CM

## 2021-11-12 DIAGNOSIS — R20.2 NUMBNESS AND TINGLING IN BOTH HANDS: ICD-10-CM

## 2021-11-12 PROCEDURE — 99244 OFF/OP CNSLTJ NEW/EST MOD 40: CPT | Performed by: ANESTHESIOLOGY

## 2021-11-12 PROCEDURE — 1036F TOBACCO NON-USER: CPT | Performed by: ANESTHESIOLOGY

## 2021-11-26 DIAGNOSIS — I10 ESSENTIAL HYPERTENSION: ICD-10-CM

## 2021-11-26 RX ORDER — CLONIDINE HYDROCHLORIDE 0.1 MG/1
TABLET ORAL
Qty: 60 TABLET | Refills: 0 | Status: SHIPPED | OUTPATIENT
Start: 2021-11-26 | End: 2021-12-20

## 2021-12-16 ENCOUNTER — OFFICE VISIT (OUTPATIENT)
Dept: CARDIOLOGY CLINIC | Facility: CLINIC | Age: 61
End: 2021-12-16
Payer: COMMERCIAL

## 2021-12-16 VITALS
DIASTOLIC BLOOD PRESSURE: 82 MMHG | WEIGHT: 161.8 LBS | BODY MASS INDEX: 28.67 KG/M2 | HEIGHT: 63 IN | SYSTOLIC BLOOD PRESSURE: 140 MMHG | HEART RATE: 85 BPM | OXYGEN SATURATION: 96 %

## 2021-12-16 DIAGNOSIS — I31.3 PERICARDIAL EFFUSION: ICD-10-CM

## 2021-12-16 DIAGNOSIS — I10 HYPERTENSION, ESSENTIAL: Primary | ICD-10-CM

## 2021-12-16 DIAGNOSIS — R06.02 SHORTNESS OF BREATH: ICD-10-CM

## 2021-12-16 DIAGNOSIS — E78.2 MIXED HYPERLIPIDEMIA: ICD-10-CM

## 2021-12-16 DIAGNOSIS — Z20.822 SHORTNESS OF BREATH WITH EXPOSURE TO COVID-19 VIRUS: ICD-10-CM

## 2021-12-16 DIAGNOSIS — R00.2 HEART PALPITATIONS: ICD-10-CM

## 2021-12-16 DIAGNOSIS — F17.200 SMOKING: ICD-10-CM

## 2021-12-16 DIAGNOSIS — R06.02 SHORTNESS OF BREATH WITH EXPOSURE TO COVID-19 VIRUS: ICD-10-CM

## 2021-12-16 PROCEDURE — 99214 OFFICE O/P EST MOD 30 MIN: CPT | Performed by: INTERNAL MEDICINE

## 2021-12-16 RX ORDER — FENOFIBRIC ACID 135 MG/1
CAPSULE, DELAYED RELEASE ORAL
Qty: 90 CAPSULE | Refills: 3 | Status: SHIPPED | OUTPATIENT
Start: 2021-12-16 | End: 2022-07-01 | Stop reason: SDUPTHER

## 2021-12-16 RX ORDER — BENZONATATE 200 MG/1
200 CAPSULE ORAL DAILY PRN
COMMUNITY
Start: 2021-11-04 | End: 2022-01-24

## 2021-12-17 ENCOUNTER — APPOINTMENT (OUTPATIENT)
Dept: LAB | Facility: HOSPITAL | Age: 61
End: 2021-12-17
Payer: COMMERCIAL

## 2021-12-17 DIAGNOSIS — R06.02 SHORTNESS OF BREATH WITH EXPOSURE TO COVID-19 VIRUS: ICD-10-CM

## 2021-12-17 DIAGNOSIS — Z20.822 SHORTNESS OF BREATH WITH EXPOSURE TO COVID-19 VIRUS: ICD-10-CM

## 2021-12-17 LAB
ALBUMIN SERPL BCP-MCNC: 4 G/DL (ref 3.5–5)
ALP SERPL-CCNC: 42 U/L (ref 46–116)
ALT SERPL W P-5'-P-CCNC: 37 U/L (ref 12–78)
ANION GAP SERPL CALCULATED.3IONS-SCNC: 10 MMOL/L (ref 4–13)
AST SERPL W P-5'-P-CCNC: 27 U/L (ref 5–45)
BASOPHILS # BLD AUTO: 0.06 THOUSANDS/ΜL (ref 0–0.1)
BASOPHILS NFR BLD AUTO: 1 % (ref 0–1)
BILIRUB SERPL-MCNC: 0.54 MG/DL (ref 0.2–1)
BUN SERPL-MCNC: 14 MG/DL (ref 5–25)
CALCIUM SERPL-MCNC: 8.8 MG/DL (ref 8.3–10.1)
CHLORIDE SERPL-SCNC: 108 MMOL/L (ref 100–108)
CHOLEST SERPL-MCNC: 187 MG/DL
CO2 SERPL-SCNC: 27 MMOL/L (ref 21–32)
CREAT SERPL-MCNC: 0.75 MG/DL (ref 0.6–1.3)
EOSINOPHIL # BLD AUTO: 0.17 THOUSAND/ΜL (ref 0–0.61)
EOSINOPHIL NFR BLD AUTO: 3 % (ref 0–6)
ERYTHROCYTE [DISTWIDTH] IN BLOOD BY AUTOMATED COUNT: 12.8 % (ref 11.6–15.1)
GFR SERPL CREATININE-BSD FRML MDRD: 85 ML/MIN/1.73SQ M
GLUCOSE P FAST SERPL-MCNC: 89 MG/DL (ref 65–99)
HCT VFR BLD AUTO: 46.2 % (ref 34.8–46.1)
HDLC SERPL-MCNC: 59 MG/DL
HGB BLD-MCNC: 14.9 G/DL (ref 11.5–15.4)
IMM GRANULOCYTES # BLD AUTO: 0.01 THOUSAND/UL (ref 0–0.2)
IMM GRANULOCYTES NFR BLD AUTO: 0 % (ref 0–2)
LDLC SERPL CALC-MCNC: 112 MG/DL (ref 0–100)
LYMPHOCYTES # BLD AUTO: 2.31 THOUSANDS/ΜL (ref 0.6–4.47)
LYMPHOCYTES NFR BLD AUTO: 40 % (ref 14–44)
MCH RBC QN AUTO: 29.5 PG (ref 26.8–34.3)
MCHC RBC AUTO-ENTMCNC: 32.3 G/DL (ref 31.4–37.4)
MCV RBC AUTO: 92 FL (ref 82–98)
MONOCYTES # BLD AUTO: 0.59 THOUSAND/ΜL (ref 0.17–1.22)
MONOCYTES NFR BLD AUTO: 10 % (ref 4–12)
NEUTROPHILS # BLD AUTO: 2.59 THOUSANDS/ΜL (ref 1.85–7.62)
NEUTS SEG NFR BLD AUTO: 46 % (ref 43–75)
NONHDLC SERPL-MCNC: 128 MG/DL
NRBC BLD AUTO-RTO: 0 /100 WBCS
PLATELET # BLD AUTO: 270 THOUSANDS/UL (ref 149–390)
PMV BLD AUTO: 10.6 FL (ref 8.9–12.7)
POTASSIUM SERPL-SCNC: 4 MMOL/L (ref 3.5–5.3)
PROT SERPL-MCNC: 7.1 G/DL (ref 6.4–8.2)
RBC # BLD AUTO: 5.05 MILLION/UL (ref 3.81–5.12)
SODIUM SERPL-SCNC: 145 MMOL/L (ref 136–145)
TRIGL SERPL-MCNC: 78 MG/DL
TSH SERPL DL<=0.05 MIU/L-ACNC: 1.96 UIU/ML (ref 0.36–3.74)
WBC # BLD AUTO: 5.73 THOUSAND/UL (ref 4.31–10.16)

## 2021-12-17 PROCEDURE — 80053 COMPREHEN METABOLIC PANEL: CPT | Performed by: INTERNAL MEDICINE

## 2021-12-17 PROCEDURE — 36415 COLL VENOUS BLD VENIPUNCTURE: CPT | Performed by: INTERNAL MEDICINE

## 2021-12-17 PROCEDURE — 85025 COMPLETE CBC W/AUTO DIFF WBC: CPT | Performed by: INTERNAL MEDICINE

## 2021-12-17 PROCEDURE — 80061 LIPID PANEL: CPT | Performed by: INTERNAL MEDICINE

## 2021-12-17 PROCEDURE — 86769 SARS-COV-2 COVID-19 ANTIBODY: CPT

## 2021-12-17 PROCEDURE — 84443 ASSAY THYROID STIM HORMONE: CPT | Performed by: INTERNAL MEDICINE

## 2021-12-18 LAB — SARS-COV-2 IGG+IGM SERPL QL IA: NORMAL

## 2021-12-20 ENCOUNTER — OFFICE VISIT (OUTPATIENT)
Dept: PULMONOLOGY | Facility: CLINIC | Age: 61
End: 2021-12-20
Payer: COMMERCIAL

## 2021-12-20 VITALS
HEIGHT: 63 IN | BODY MASS INDEX: 28.9 KG/M2 | SYSTOLIC BLOOD PRESSURE: 128 MMHG | OXYGEN SATURATION: 97 % | DIASTOLIC BLOOD PRESSURE: 72 MMHG | TEMPERATURE: 98.1 F | HEART RATE: 81 BPM | WEIGHT: 163.13 LBS

## 2021-12-20 DIAGNOSIS — G43.009 MIGRAINE WITHOUT AURA AND WITHOUT STATUS MIGRAINOSUS, NOT INTRACTABLE: ICD-10-CM

## 2021-12-20 DIAGNOSIS — J45.41 MODERATE PERSISTENT ASTHMA WITH ACUTE EXACERBATION: Primary | ICD-10-CM

## 2021-12-20 DIAGNOSIS — J43.8 OTHER EMPHYSEMA (HCC): ICD-10-CM

## 2021-12-20 DIAGNOSIS — I10 ESSENTIAL HYPERTENSION: ICD-10-CM

## 2021-12-20 PROBLEM — G43.909 MIGRAINE: Status: ACTIVE | Noted: 2021-12-20

## 2021-12-20 PROCEDURE — 99244 OFF/OP CNSLTJ NEW/EST MOD 40: CPT | Performed by: INTERNAL MEDICINE

## 2021-12-20 PROCEDURE — 3008F BODY MASS INDEX DOCD: CPT | Performed by: INTERNAL MEDICINE

## 2021-12-20 RX ORDER — ALBUTEROL SULFATE 90 UG/1
2 AEROSOL, METERED RESPIRATORY (INHALATION) EVERY 4 HOURS PRN
Qty: 54 G | Refills: 3 | Status: SHIPPED | OUTPATIENT
Start: 2021-12-20

## 2021-12-20 RX ORDER — FLUTICASONE PROPIONATE 50 MCG
1 SPRAY, SUSPENSION (ML) NASAL DAILY
Qty: 48 G | Refills: 3 | Status: SHIPPED | OUTPATIENT
Start: 2021-12-20 | End: 2022-01-24

## 2021-12-20 RX ORDER — AZELASTINE 1 MG/ML
2 SPRAY, METERED NASAL 2 TIMES DAILY
Qty: 90 ML | Refills: 3 | Status: SHIPPED | OUTPATIENT
Start: 2021-12-20 | End: 2022-01-24

## 2021-12-20 RX ORDER — PROMETHAZINE HYDROCHLORIDE AND CODEINE PHOSPHATE 6.25; 1 MG/5ML; MG/5ML
SYRUP ORAL AS NEEDED
COMMUNITY
Start: 2021-11-07 | End: 2022-06-17

## 2021-12-20 RX ORDER — BUDESONIDE AND FORMOTEROL FUMARATE DIHYDRATE 160; 4.5 UG/1; UG/1
2 AEROSOL RESPIRATORY (INHALATION) 2 TIMES DAILY
Qty: 30.6 G | Refills: 3 | Status: SHIPPED | OUTPATIENT
Start: 2021-12-20

## 2021-12-20 RX ORDER — CLONIDINE HYDROCHLORIDE 0.1 MG/1
TABLET ORAL
Qty: 60 TABLET | Refills: 0 | Status: SHIPPED | OUTPATIENT
Start: 2021-12-20 | End: 2022-01-12

## 2022-01-12 DIAGNOSIS — I10 ESSENTIAL HYPERTENSION: ICD-10-CM

## 2022-01-12 RX ORDER — CLONIDINE HYDROCHLORIDE 0.1 MG/1
TABLET ORAL
Qty: 60 TABLET | Refills: 0 | Status: SHIPPED | OUTPATIENT
Start: 2022-01-12 | End: 2022-02-03

## 2022-01-14 ENCOUNTER — TELEPHONE (OUTPATIENT)
Dept: NEUROLOGY | Facility: CLINIC | Age: 62
End: 2022-01-14

## 2022-01-14 NOTE — TELEPHONE ENCOUNTER
Called and confirmed upcoming appt, patient has things to show doctor that are not in her chart so declined VV

## 2022-01-18 ENCOUNTER — OFFICE VISIT (OUTPATIENT)
Dept: NEUROLOGY | Facility: CLINIC | Age: 62
End: 2022-01-18
Payer: COMMERCIAL

## 2022-01-18 ENCOUNTER — TELEPHONE (OUTPATIENT)
Dept: NEUROLOGY | Facility: CLINIC | Age: 62
End: 2022-01-18

## 2022-01-18 VITALS
WEIGHT: 157 LBS | DIASTOLIC BLOOD PRESSURE: 68 MMHG | SYSTOLIC BLOOD PRESSURE: 130 MMHG | BODY MASS INDEX: 27.82 KG/M2 | HEIGHT: 63 IN | TEMPERATURE: 96.5 F | HEART RATE: 74 BPM

## 2022-01-18 DIAGNOSIS — G62.9 NEUROPATHY: Primary | ICD-10-CM

## 2022-01-18 PROCEDURE — 99204 OFFICE O/P NEW MOD 45 MIN: CPT | Performed by: PSYCHIATRY & NEUROLOGY

## 2022-01-18 NOTE — TELEPHONE ENCOUNTER
Pt seen in neuro office today for right foot pain  Pt had mri foot by ortho  See full report  Full consult to follow  Pt with known RA and also tear noted in MTP in foot  Pt not seen by rheum recently for this issue  rec follow up with dr Oj Jj   Also to consider podiatry referral   Pt also notes freezing foot at times  Please see if any vascular studies are further warranted  I told pt I would send you task for follow up from rheum and vascular standpoint     thanks

## 2022-01-18 NOTE — ASSESSMENT & PLAN NOTE
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

## 2022-01-18 NOTE — TELEPHONE ENCOUNTER
Pt seen in office today for new right foot pain  Seen by ortho,  Please see mri right foot  Pt notes she is followed by you for neck and back and would also like your input as well re her right foot pain  No preceding injury  Pt describes as painful  Burning and freezing  Pt seen by ortho at [facility] per pt

## 2022-01-18 NOTE — PROGRESS NOTES
Patient ID: Homero Mohan is a 59 y o  female  Assessment/Plan:    Neuropathy  Presents with 2 month history of worsening "burning cold" sensation of her right lateral foot  Follows Dr Eden Mohan (orthopedic) MRI shows chronic tear and osteoarthritis of 1st and 2nd MTP  However, patient has "burning cold" sensation on her lateral Right foot worst at night while sleeping  Shaking and heat pad help  Jan 2022, ESR nml, RUSS nml, Rheumatic Factor 91, CRP nml, TSH nml, Lipid: , Lyme Ab(IgG +IgM) 1 7, Lyme IgM Ab 0 51    Plan:   EMG of lower limbs bilateral to evaluate nerve conduction  B12/Folate check  Follow up with Orthopedic, RA  Recommend to get doppler of lower limbs artery to access circulation   to be cautious with electric heating pad which patient has been using as it has high risk of being burnt if she actually has neuropathy and reduced sensation       Diagnoses and all orders for this visit:    Neuropathy  -     EMG 2 limb lower extremity; Future  -     Vitamin B12/Folate, Serum Panel; Future           Subjective:    Homero Mohan 59year old female with extensive past medical history of rheumatic arthritis, cervicalgia on Gabapentin 100mg QHS prn (patient reports not taking it because it causes her anxious) and follows spine and pain management outpatient presents with 2 months history of worsening "burning cold" sensation of her right lateral foot  She saw Dr Eden Mohan and had an MRI of her right foot which shows chronic tear and osteoarthritis of her first and second MTP however, her abnormal sensation is on the later side (3rd, 4th and 5th toes)  Normally "burning cold" sensation gets worse in the later afternoon and worst at night while sleeping  Heating pad and shaking her foot help  Tylenol does not help  She has no history of Diabetes       The following portions of the patient's history were reviewed and updated as appropriate: She  has a past medical history of Arthritis, Asthma, Avascular necrosis of bone of hip, right ([facility] [address] ), COPD (chronic obstructive pulmonary disease) ([facility] ), GERD (gastroesophageal reflux disease), Hypoglycemia, Infectious viral hepatitis, Lumbar radicular pain, Lyme disease, and 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[facility] )  She   Patient Active Problem List    Diagnosis Date Noted    Neuropathy 01/18/2022    Migraine 12/20/2021    DDD (degenerative disc disease), cervical 11/12/2021    Cervical radiculopathy 11/12/2021    Numbness and tingling in both hands 11/12/2021    Neck pain 10/12/2021    Chronic tension-type headache, not intractable 10/12/2021    Primary osteoarthritis of left hip     Other hyperlipidemia 03/22/2021    Healthcare maintenance 03/22/2021    Allergic rhinitis due to allergen 03/22/2021    Generalized abdominal pain 03/22/2021    Essential hypertension 11/13/2020    Other ovarian cyst, right side 11/13/2020    Rheumatoid arthritis involving multiple sites ([place] [address] ) 11/06/2020    Gastro-esophageal reflux disease without esophagitis 03/12/2019    Chronic obstructive pulmonary disease ([facility] ) 02/12/2019    Lump of breast, right 02/12/2019    Moderate persistent asthma with acute exacerbation 02/22/2018    Avascular necrosis of hip, right ([facility] ) 10/03/2017    Lumbar radiculopathy 06/26/2017    Lumbar spondylosis 06/26/2017    Arthralgia of hip 03/28/2017     She  has a past surgical history that includes Appendectomy; Carpal tunnel release; Cholecystectomy; and Sinus surgery  Her family history includes Bronchiolitis in her mother  She  reports that she has been smoking cigarettes  She has never used smokeless tobacco  She reports that she does not drink alcohol and does not use drugs    Current Outpatient Medications   Medication Sig Dispense Refill    albuterol (PROVENTIL HFA,VENTOLIN HFA) 90 mcg/act inhaler Inhale 2 puffs every 4 (four) hours as needed for wheezing 54 g 3    azelastine (ASTELIN) 0 1 % nasal spray 2 sprays into each nostril 2 (two) times a day Use in each nostril as directed 90 mL 3    benzonatate (TESSALON) 200 MG capsule Take 200 mg by mouth daily as needed      budesonide-formoterol (Symbicort) 160-4 5 mcg/act inhaler Inhale 2 puffs 2 (two) times a day 160-4 50MCG/ACTUATION 30 6 g 3    Cholecalciferol (VITAMIN D3) 2000 units CHEW Chew 5,000 Units daily       Choline Fenofibrate (Fenofibric Acid) 135 MG CPDR 1 cap daily 90 capsule 3    cloNIDine (CATAPRES) 0 1 mg tablet TAKE 1 TABLET BY MOUTH TWICE A DAY 60 tablet 0    esomeprazole (NexIUM) 40 MG capsule Take 40 mg by mouth 2 (two) times a day      fexofenadine (ALLEGRA) 180 MG tablet Take 180 mg by mouth as needed        fluticasone (FLONASE) 50 mcg/act nasal spray 1 spray into each nostril daily 48 g 3    hydroxychloroquine (PLAQUENIL) 200 mg tablet Take 200 mg by mouth 2 (two) times a day with meals       levalbuterol (XOPENEX) 0 63 mg/3 mL nebulizer solution       levalbuterol (XOPENEX) 1 25 mg/3 mL nebulizer solution        magnesium oxide (MAG-OX) 400 mg tablet Take 1 tablet by mouth 2 (two) times a day        predniSONE 10 mg tablet Take 10 mg by mouth as needed (AS needed per patient)        promethazine-codeine (PHENERGAN WITH CODEINE) 6 25-10 mg/5 mL syrup as needed         No current facility-administered medications for this visit       Current Outpatient Medications on File Prior to Visit   Medication Sig    albuterol (PROVENTIL HFA,VENTOLIN HFA) 90 mcg/act inhaler Inhale 2 puffs every 4 (four) hours as needed for wheezing    azelastine (ASTELIN) 0 1 % nasal spray 2 sprays into each nostril 2 (two) times a day Use in each nostril as directed    benzonatate (TESSALON) 200 MG capsule Take 200 mg by mouth daily as needed    budesonide-formoterol (Symbicort) 160-4 5 mcg/act inhaler Inhale 2 puffs 2 (two) times a day 160-4 50MCG/ACTUATION    Cholecalciferol (VITAMIN D3) 2000 units CHEW Chew 5,000 Units daily     Choline Fenofibrate (Fenofibric Acid) 135 MG CPDR 1 cap daily    cloNIDine (CATAPRES) 0 1 mg tablet TAKE 1 TABLET BY MOUTH TWICE A DAY    esomeprazole (NexIUM) 40 MG capsule Take 40 mg by mouth 2 (two) times a day    fexofenadine (ALLEGRA) 180 MG tablet Take 180 mg by mouth as needed      fluticasone (FLONASE) 50 mcg/act nasal spray 1 spray into each nostril daily    hydroxychloroquine (PLAQUENIL) 200 mg tablet Take 200 mg by mouth 2 (two) times a day with meals     levalbuterol (XOPENEX) 0 63 mg/3 mL nebulizer solution     levalbuterol (XOPENEX) 1 25 mg/3 mL nebulizer solution      magnesium oxide (MAG-OX) 400 mg tablet Take 1 tablet by mouth 2 (two) times a day      predniSONE 10 mg tablet Take 10 mg by mouth as needed (AS needed per patient)      promethazine-codeine (PHENERGAN WITH CODEINE) 6 25-10 mg/5 mL syrup as needed       No current facility-administered medications on file prior to visit  She is allergic to aspirin, iodine - food allergy, penicillins, and soybean oil - food allergy            Objective:    Blood pressure 130/68, pulse 74, temperature (!) 96 5 °F (35 8 °C), height 5' 3" (1 6 m), weight 71 2 kg (157 lb)  Physical Exam  Constitutional:       General: She is not in acute distress  Appearance: She is normal weight  HENT:      Head: Normocephalic and atraumatic  Mouth/Throat:      Mouth: Mucous membranes are moist       Pharynx: Oropharynx is clear  No oropharyngeal exudate or posterior oropharyngeal erythema  Eyes:      General: Lids are normal       Extraocular Movements: Extraocular movements intact  Pupils: Pupils are equal, round, and reactive to light  Musculoskeletal:      Cervical back: Normal range of motion  Skin:     General: Skin is warm and dry  Neurological:      General: No focal deficit present  Mental Status: She is alert and oriented to person, place, and time  Coordination: Romberg sign negative        Gait: Gait normal       Deep Tendon Reflexes: Reflex Scores:       Tricep reflexes are 2+ on the right side and 2+ on the left side  Bicep reflexes are 2+ on the right side and 2+ on the left side  Brachioradialis reflexes are 2+ on the right side and 2+ on the left side  Patellar reflexes are 2+ on the right side and 2+ on the left side  Achilles reflexes are 2+ on the right side and 2+ on the left side  Psychiatric:         Mood and Affect: Mood normal          Speech: Speech normal          Thought Content: Thought content normal          Judgment: Judgment normal          Neurological Exam  Mental Status  Alert  Oriented to person, place and time  Speech is normal  Language is fluent with no aphasia  Cranial Nerves  CN II: Visual acuity is normal  Visual fields full to confrontation  CN III, IV, VI: Extraocular movements intact bilaterally  Normal lids and orbits bilaterally  Pupils equal round and reactive to light bilaterally  CN V: Facial sensation is normal   CN VII: Full and symmetric facial movement  CN VIII: Hearing is normal   CN IX, X: Palate elevates symmetrically  Normal gag reflex  CN XI: Shoulder shrug strength is normal   CN XII: Tongue midline without atrophy or fasciculations  Motor  Normal muscle bulk throughout  No fasciculations present  Normal muscle tone  No abnormal involuntary movements  Strength is 5/5 in all four extremities except as noted  Upper extremity 5/5 strength bilatrally  Lower extremity 5/5 strength bilaterally, no difficulty getting up from the exam table and walking, however, patient has a hard time dorsiflex her right foot       Sensory  Sensation is intact to light touch, pinprick, vibration and proprioception in all four extremities  Reflexes  Deep tendon reflexes are 2+ and symmetric except as noted                                             Right                      Left  Brachioradialis                    2+                         2+  Biceps 2+                         2+  Triceps                                2+                         2+  Patellar                                2+                         2+  Achilles                                2+                         2+    Coordination  Right: Finger-to-nose normal   Left: Finger-to-nose normal     Gait  Casual gait is normal including stance, stride, and arm swing  Romberg is absent  ROS:    Review of Systems   Constitutional: Negative  Negative for appetite change and fever  HENT: Negative  Negative for hearing loss, tinnitus, trouble swallowing and voice change  Eyes: Positive for photophobia and visual disturbance  Negative for pain  Respiratory: Negative  Negative for shortness of breath  Cardiovascular: Negative  Negative for palpitations  Gastrointestinal: Negative  Negative for nausea and vomiting  Endocrine: Negative  Negative for cold intolerance  Genitourinary: Negative  Negative for dysuria, frequency and urgency  Musculoskeletal: Positive for gait problem and neck pain  Negative for myalgias  Patient has problems with standing, pain is extreme  Went to ortho was told to see Neuro  Has had a neck problem for years however has gotten worse    Skin: Negative  Negative for rash  Neurological: Positive for numbness and headaches  Negative for dizziness, tremors, seizures, syncope, facial asymmetry, speech difficulty, weakness and light-headedness  Ten years ago was diagnosed with migraines, tried gabapentin could not tolerate  Was having numbness and tingling in lips & feet  Mostly now in feet and occasionally hands   Hematological: Negative  Does not bruise/bleed easily  Psychiatric/Behavioral: Positive for sleep disturbance  Negative for confusion and hallucinations

## 2022-01-20 ENCOUNTER — APPOINTMENT (OUTPATIENT)
Dept: LAB | Facility: HOSPITAL | Age: 62
End: 2022-01-20
Payer: COMMERCIAL

## 2022-01-20 ENCOUNTER — HOSPITAL ENCOUNTER (OUTPATIENT)
Dept: PULMONOLOGY | Facility: HOSPITAL | Age: 62
Discharge: HOME | End: 2022-01-20
Payer: COMMERCIAL

## 2022-01-20 DIAGNOSIS — J45.41 MODERATE PERSISTENT ASTHMA WITH ACUTE EXACERBATION: ICD-10-CM

## 2022-01-20 DIAGNOSIS — G62.9 NEUROPATHY: Primary | ICD-10-CM

## 2022-01-20 DIAGNOSIS — R35.0 URINARY FREQUENCY: ICD-10-CM

## 2022-01-20 LAB
FOLATE SERPL-MCNC: 14.3 NG/ML (ref 3.1–17.5)
VIT B12 SERPL-MCNC: 433 PG/ML (ref 100–900)

## 2022-01-20 PROCEDURE — 82746 ASSAY OF FOLIC ACID SERUM: CPT

## 2022-01-20 PROCEDURE — 94060 EVALUATION OF WHEEZING: CPT | Performed by: INTERNAL MEDICINE

## 2022-01-20 PROCEDURE — 94727 GAS DIL/WSHOT DETER LNG VOL: CPT

## 2022-01-20 PROCEDURE — 94727 GAS DIL/WSHOT DETER LNG VOL: CPT | Performed by: INTERNAL MEDICINE

## 2022-01-20 PROCEDURE — 94729 DIFFUSING CAPACITY: CPT

## 2022-01-20 PROCEDURE — 94729 DIFFUSING CAPACITY: CPT | Performed by: INTERNAL MEDICINE

## 2022-01-20 PROCEDURE — 36415 COLL VENOUS BLD VENIPUNCTURE: CPT

## 2022-01-20 PROCEDURE — 94060 EVALUATION OF WHEEZING: CPT

## 2022-01-20 PROCEDURE — 94760 N-INVAS EAR/PLS OXIMETRY 1: CPT

## 2022-01-20 PROCEDURE — 82607 VITAMIN B-12: CPT

## 2022-01-20 RX ORDER — ALBUTEROL SULFATE 2.5 MG/3ML
2.5 SOLUTION RESPIRATORY (INHALATION) ONCE
Status: COMPLETED | OUTPATIENT
Start: 2022-01-20 | End: 2022-01-20

## 2022-01-20 RX ADMIN — ALBUTEROL SULFATE 2.5 MG: 2.5 SOLUTION RESPIRATORY (INHALATION) at 07:38

## 2022-01-24 ENCOUNTER — OFFICE VISIT (OUTPATIENT)
Dept: PULMONOLOGY | Facility: CLINIC | Age: 62
End: 2022-01-24
Payer: COMMERCIAL

## 2022-01-24 VITALS
BODY MASS INDEX: 28.53 KG/M2 | HEART RATE: 84 BPM | TEMPERATURE: 98.1 F | DIASTOLIC BLOOD PRESSURE: 88 MMHG | WEIGHT: 161 LBS | SYSTOLIC BLOOD PRESSURE: 132 MMHG | OXYGEN SATURATION: 97 % | HEIGHT: 63 IN

## 2022-01-24 DIAGNOSIS — J30.89 NON-SEASONAL ALLERGIC RHINITIS DUE TO OTHER ALLERGIC TRIGGER: ICD-10-CM

## 2022-01-24 DIAGNOSIS — J45.41 MODERATE PERSISTENT ASTHMA WITH ACUTE EXACERBATION: ICD-10-CM

## 2022-01-24 DIAGNOSIS — J43.8 OTHER EMPHYSEMA (HCC): Primary | ICD-10-CM

## 2022-01-24 PROCEDURE — 3008F BODY MASS INDEX DOCD: CPT | Performed by: INTERNAL MEDICINE

## 2022-01-24 PROCEDURE — 99213 OFFICE O/P EST LOW 20 MIN: CPT | Performed by: INTERNAL MEDICINE

## 2022-01-24 RX ORDER — PREDNISONE 10 MG/1
TABLET ORAL
Qty: 12 TABLET | Refills: 0 | Status: SHIPPED | OUTPATIENT
Start: 2022-01-24 | End: 2022-06-17

## 2022-01-24 RX ORDER — TIOTROPIUM BROMIDE INHALATION SPRAY 3.12 UG/1
2 SPRAY, METERED RESPIRATORY (INHALATION) DAILY
Qty: 4 G | Refills: 0 | Status: SHIPPED | COMMUNITY
Start: 2022-01-24 | End: 2022-06-17

## 2022-01-24 RX ORDER — CYCLOBENZAPRINE HCL 5 MG
TABLET ORAL
COMMUNITY
Start: 2022-01-04 | End: 2022-06-17

## 2022-01-24 NOTE — ASSESSMENT & PLAN NOTE
Patient senses persistent nose and sinus problem  I do not think it is likely that there is an acute infection here  Has minor bleeding from the left nasal passage from the septum  Because of postnasal drip and continued sinus problems I recommend that she restart the Flonase which she did not take will while taking the azelastine  I do not think antibiotics are required here  If persistent sinus symptoms she may need revisit with ENT  She had surgery on her sinuses 15 years ago

## 2022-01-24 NOTE — LETTER
January 24, 2022     Rosa Montano MD  [address] [facility] [address] [place], [place] [address] [address]    Patient: Tio Bach   YOB: 1960   Date of Visit: 1/24/2022     Dear Dr Tonya Walters      Thank you for referring Tio Bach to me for evaluation  Below are the relevant portions of my assessment and plan of care  If you have questions, please do not hesitate to call me  I look forward to following [facility] along with you           Sincerely,        Glory Travis MD        CC: No Recipients    Progress Notes:

## 2022-01-24 NOTE — PROGRESS NOTES
Assessment/Plan: Moderate persistent asthma with acute exacerbation  Patient still wheezing today  Has sensed some benefit from restarting Symbicort  Did stop smoking as of a week ago  Is having allergy testing done by Dr Patty Frankel  PFT's show fairly significant airflow obstruction without much bronchodilator improvement  There is moderate air trapping  Decline in DLCO makes me think that there is likely some emphysema also  I suspect this will be more properly labeled as an asthma COPD overlap  I added Spiriva today  Brief course of prednisone  Advised allergy testing has to be off of prednisone  Allergic rhinitis due to allergen  Patient senses persistent nose and sinus problem  I do not think it is likely that there is an acute infection here  Has minor bleeding from the left nasal passage from the septum  Because of postnasal drip and continued sinus problems I recommend that she restart the Flonase which she did not take will while taking the azelastine  I do not think antibiotics are required here  If persistent sinus symptoms she may need revisit with ENT  She had surgery on her sinuses 15 years ago  Diagnoses and all orders for this visit:    Other emphysema ([address] )  -     tiotropium (Spiriva Respimat) 2 5 MCG/ACT AERS inhaler; Inhale 2 puffs daily  -     predniSONE 10 mg tablet; 2 daily for 4 days then 1 daily for four days    Moderate persistent asthma with acute exacerbation    Non-seasonal allergic rhinitis due to other allergic trigger    Other orders  -     cyclobenzaprine (FLEXERIL) 5 mg tablet          Subjective:      Patient ID: Delroy Ponce is a 59 y o  female  This patient returns for evaluation of assumed persistent asthma bordering on COPD  Senses some improvement in breathing with the use of Symbicort  Has use albuterol off and on since  Aware some wheezing  Seems to focus more on concerned about nose and sinus problems  Has some pressure in her sinuses  Some bleeding from the left nostril  I gather she has been taking azelastine on a regular basis but not Flonase  Previous sinus surgery about 15 years ago  Since I met with her she has met with Dr Nelly Garcia for evaluation  Previous allergist had retired  Allergy testing is planned  Recent IgE level normal   CBC did not show an excessive number of eosinophils  Last smoked a week ago      The following portions of the patient's history were reviewed and updated as appropriate: allergies, current medications, past family history, past medical history, past social history, past surgical history and problem list     Review of Systems   Constitutional: Negative for activity change and unexpected weight change  HENT: Positive for congestion, postnasal drip, sinus pressure and sinus pain  Respiratory: Positive for cough, shortness of breath and wheezing  Cardiovascular: Negative for chest pain, palpitations and leg swelling  Allergic/Immunologic: Positive for environmental allergies (May be)  Objective:      /88 (BP Location: Left arm, Patient Position: Sitting, Cuff Size: Adult)   Pulse 84   Temp 98 1 °F (36 7 °C) (Tympanic)   Ht 5' 3" (1 6 m)   Wt 73 kg (161 lb)   SpO2 97%   BMI 28 52 kg/m²          Physical Exam  Vitals reviewed  Constitutional:       Appearance: She is normal weight  She is not ill-appearing  HENT:      Nose: Nose normal       Comments: Minor bleeding left side of septum  Cardiovascular:      Rate and Rhythm: Normal rate and regular rhythm  Pulses:           Radial pulses are 2+ on the right side and 1+ on the left side  Heart sounds: Normal heart sounds  Pulmonary:      Effort: Pulmonary effort is normal       Breath sounds: Wheezing (Bilateral all lung fields) present  No rhonchi  Musculoskeletal:         General: No swelling  Cervical back: No rigidity or tenderness  Right lower leg: No edema  Left lower leg: No edema  Lymphadenopathy:      Cervical: No cervical adenopathy  Skin:     General: Skin is warm and dry  Neurological:      Mental Status: She is alert and oriented to person, place, and time     Psychiatric:         Mood and Affect: Mood normal          Behavior: Behavior normal

## 2022-02-03 DIAGNOSIS — I10 ESSENTIAL HYPERTENSION: ICD-10-CM

## 2022-02-03 RX ORDER — CLONIDINE HYDROCHLORIDE 0.1 MG/1
TABLET ORAL
Qty: 60 TABLET | Refills: 0 | Status: SHIPPED | OUTPATIENT
Start: 2022-02-03 | End: 2022-02-28

## 2022-02-28 DIAGNOSIS — I10 ESSENTIAL HYPERTENSION: ICD-10-CM

## 2022-02-28 RX ORDER — CLONIDINE HYDROCHLORIDE 0.1 MG/1
TABLET ORAL
Qty: 60 TABLET | Refills: 0 | Status: SHIPPED | OUTPATIENT
Start: 2022-02-28 | End: 2022-03-22

## 2022-03-11 ENCOUNTER — OFFICE VISIT (OUTPATIENT)
Dept: PAIN MEDICINE | Facility: CLINIC | Age: 62
End: 2022-03-11
Payer: COMMERCIAL

## 2022-03-11 VITALS
SYSTOLIC BLOOD PRESSURE: 147 MMHG | DIASTOLIC BLOOD PRESSURE: 82 MMHG | HEART RATE: 88 BPM | HEIGHT: 63 IN | WEIGHT: 163 LBS | BODY MASS INDEX: 28.88 KG/M2

## 2022-03-11 DIAGNOSIS — M47.812 CERVICAL SPONDYLOSIS: ICD-10-CM

## 2022-03-11 DIAGNOSIS — M54.12 CERVICAL RADICULOPATHY: Primary | ICD-10-CM

## 2022-03-11 DIAGNOSIS — G89.4 CHRONIC PAIN SYNDROME: ICD-10-CM

## 2022-03-11 DIAGNOSIS — M50.30 DDD (DEGENERATIVE DISC DISEASE), CERVICAL: ICD-10-CM

## 2022-03-11 DIAGNOSIS — M48.02 CERVICAL SPINAL STENOSIS: ICD-10-CM

## 2022-03-11 DIAGNOSIS — F11.20 UNCOMPLICATED OPIOID DEPENDENCE (HCC): ICD-10-CM

## 2022-03-11 DIAGNOSIS — Z79.891 ENCOUNTER FOR LONG-TERM USE OF OPIATE ANALGESIC: ICD-10-CM

## 2022-03-11 PROCEDURE — 99214 OFFICE O/P EST MOD 30 MIN: CPT | Performed by: ANESTHESIOLOGY

## 2022-03-11 PROCEDURE — 3008F BODY MASS INDEX DOCD: CPT | Performed by: ANESTHESIOLOGY

## 2022-03-11 PROCEDURE — 80305 DRUG TEST PRSMV DIR OPT OBS: CPT | Performed by: ANESTHESIOLOGY

## 2022-03-11 RX ORDER — LOSARTAN POTASSIUM 25 MG/1
TABLET ORAL
COMMUNITY
End: 2022-06-17

## 2022-03-11 RX ORDER — ALPRAZOLAM 0.25 MG/1
0.25 TABLET ORAL 3 TIMES DAILY PRN
COMMUNITY
Start: 2022-02-21 | End: 2022-06-17

## 2022-03-11 NOTE — PROGRESS NOTES
Assessment  1  DDD (degenerative disc disease), cervical    2  Cervical radiculopathy    3  Cervical spondylosis    4  Cervical spinal stenosis        Plan  58-year-old female with a history of RA, COPD, carpal tunnel syndrome status post release on the right, returning for follow-up of neck pain that radiates into bilateral upper extremities with associated numbness, paresthesias, and subjective weakness  The patient also complains of numbness and tingling in her feet  MRI of the cervical spine from February 18, 2022 shows multilevel degenerative disc disease and spondylosis from C3-4 to C6-7 with mild-to-moderate central and bilateral foraminal stenosis at these levels  The patient does have an EMG of her lower extremities scheduled with Neurology in the future  The patient did try gabapentin, however was unable tolerate side effects  She is unable to take NSAIDs secondary to allergic reactions  Tylenol is ineffective  The patient's neck pain seems to be multifactorial including myofascial and facet mediated components  Upper extremity symptoms do appear to have a radicular component as well as component of superimposed carpal tunnel syndrome  Patient's lower extremity symptoms seem to be largely secondary to peripheral neuropathy considering lack of compressive pathology in the lumbar spine  1  I will schedule the patient for C6-7 cervical epidural steroid injection to reduce the inflammatory component of her pain  2  The patient did inquire about potential opioid medications for pain relief  Considering medication options are quite limited secondary to side effects this may be an option  I did explain to her that we need to obtain a baseline urine drug screen in accordance with our practice policy  If the patient has and appropriate UDS, may consider initiation of tramadol for severe pain  3  Patient will proceed with EMG of the lower extremities with Neurology    I did discuss with her about discussing potential EMG of the upper extremities to rule out recurrent carpal tunnel syndrome  4  I will follow up the patient in 2 weeks      A urine drug screen was collected at today's office visit as part of our medication management protocol  The point of care testing results were appropriate for what was being prescribed  The specimen will be sent for confirmatory testing  The drug screen is medically necessary because the patient is either dependent on opioid medication or is being considered for opioid medication therapy and the results could impact ongoing or future treatment  The drug screen is to evaluate for the presences or absence of prescribed, non-prescribed, and/or illicit drugs/substances  [place] Prescription Drug Monitoring Program report was reviewed and was appropriate     Complete risks and benefits including bleeding, infection, tissue reaction, nerve injury and allergic reaction were discussed  The approach was demonstrated using models and literature was provided  Verbal and written consent was obtained  My impressions and treatment recommendations were discussed in detail with the patient who verbalized understanding and had no further questions  Discharge instructions were provided  I personally saw and examined the patient and I agree with the above discussed plan of care  No orders of the defined types were placed in this encounter  No orders of the defined types were placed in this encounter  History of Present Illness    Ha Victoria is a 59 y o  female with a history of RA, COPD, carpal tunnel syndrome status post release on the right, returning for follow-up of neck pain that radiates into bilateral upper extremities with associated numbness, paresthesias, and subjective weakness  The patient also complains of numbness and tingling in her feet  She does have some occasional low back pain    She denies any bladder or bowel incontinence or saddle anesthesia  She denies any weakness of the legs  MRI of the cervical spine from February 18, 2022 shows multilevel degenerative disc disease and spondylosis from C3-4 to C6-7 with mild-to-moderate central and bilateral foraminal stenosis at these levels  The patient does have an EMG of her lower extremities scheduled with Neurology in the future  The patient did try gabapentin, however was unable tolerate side effects  She is unable to take NSAIDs secondary to allergic reactions  Tylenol is ineffective  The patient rates her pain moderate to severe and worse in the morning and evening  The pain is intermittent and described as burning, sharp, cramping, numbness, and pins and needles  The pain is worse with standing, walking, bending, exercise, coughing, and sneezing  The pain is alleviated with sitting, relaxation, and lying down  Other than as stated above, the patient denies any interval changes in medications, medical condition, mental condition, symptoms, or allergies since the last office visit  I have personally reviewed and/or updated the patient's past medical history, past surgical history, family history, social history, current medications, allergies, and vital signs today  Review of Systems   Constitutional: Negative for fever and unexpected weight change  HENT: Negative for trouble swallowing  Eyes: Negative for visual disturbance  Respiratory: Positive for shortness of breath  Negative for wheezing  Cardiovascular: Positive for chest pain  Negative for palpitations  Gastrointestinal: Positive for constipation and nausea  Negative for diarrhea and vomiting  Endocrine: Negative for cold intolerance, heat intolerance and polydipsia  Genitourinary: Negative for difficulty urinating and frequency  Musculoskeletal: Positive for joint swelling  Negative for arthralgias, gait problem and myalgias  Pain in extremity   Skin: Negative for rash     Neurological: Positive for dizziness  Negative for seizures, syncope, weakness and headaches  Hematological: Does not bruise/bleed easily  Psychiatric/Behavioral: Negative for dysphoric mood  All other systems reviewed and are negative  Patient Active Problem List   Diagnosis    Avascular necrosis of hip, right (HCC)    Lumbar radiculopathy    Gastro-esophageal reflux disease without esophagitis    Chronic obstructive pulmonary disease (HCC)    Arthralgia of hip    Essential hypertension    Other hyperlipidemia    Lumbar spondylosis    Lump of breast, right    Moderate persistent asthma with acute exacerbation    Other ovarian cyst, right side    Rheumatoid arthritis involving multiple sites [facility])   [address] maintenance    Allergic rhinitis due to allergen    Generalized abdominal pain    Primary osteoarthritis of left hip    Neck pain    Chronic tension-type headache, not intractable    DDD (degenerative disc disease), cervical    Cervical radiculopathy    Numbness and tingling in both hands    Migraine    Neuropathy       Past Medical History:   Diagnosis Date    Arthritis     Asthma     Avascular necrosis of bone of hip, right (HCC)     COPD (chronic obstructive pulmonary disease) (HCC)     GERD (gastroesophageal reflux disease)     Hypoglycemia     Infectious viral hepatitis     Lumbar radicular pain     Lyme disease     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HCC)        Past Surgical History:   Procedure Laterality Date    APPENDECTOMY      CARPAL TUNNEL RELEASE      CHOLECYSTECTOMY      SINUS SURGERY         Family History   Problem Relation Age of Onset    Bronchiolitis Mother        Social History     Occupational History    Not on file   Tobacco Use    Smoking status: Current Every Day Smoker     Types: Cigarettes    Smokeless tobacco: Never Used    Tobacco comment: started smoking    Vaping Use    Vaping Use: Never used   Substance and Sexual Activity    Alcohol use:  No  Drug use: Never    Sexual activity: Not on file       Current Outpatient Medications on File Prior to Visit   Medication Sig    albuterol (PROVENTIL HFA,VENTOLIN HFA) 90 mcg/act inhaler Inhale 2 puffs every 4 (four) hours as needed for wheezing    budesonide-formoterol (Symbicort) 160-4 5 mcg/act inhaler Inhale 2 puffs 2 (two) times a day 160-4 50MCG/ACTUATION    Cholecalciferol (VITAMIN D3) 2000 units CHEW Chew 5,000 Units daily     Choline Fenofibrate (Fenofibric Acid) 135 MG CPDR 1 cap daily    cloNIDine (CATAPRES) 0 1 mg tablet TAKE 1 TABLET BY MOUTH TWICE A DAY    cyclobenzaprine (FLEXERIL) 5 mg tablet     esomeprazole (NexIUM) 40 MG capsule Take 40 mg by mouth 2 (two) times a day    fexofenadine (ALLEGRA) 180 MG tablet Take 180 mg by mouth as needed      hydroxychloroquine (PLAQUENIL) 200 mg tablet Take 200 mg by mouth 2 (two) times a day with meals     levalbuterol (XOPENEX) 1 25 mg/3 mL nebulizer solution      magnesium oxide (MAG-OX) 400 mg tablet Take 1 tablet by mouth 2 (two) times a day      predniSONE 10 mg tablet 2 daily for 4 days then 1 daily for four days    promethazine-codeine (PHENERGAN WITH CODEINE) 6 25-10 mg/5 mL syrup as needed   (Patient not taking: Reported on 1/24/2022 )    tiotropium (Spiriva Respimat) 2 5 MCG/ACT AERS inhaler Inhale 2 puffs daily     No current facility-administered medications on file prior to visit  Allergies   Allergen Reactions    Aspirin Anaphylaxis    Iodine - Food Allergy Anaphylaxis    Penicillins Anaphylaxis    Soybean Oil - Food Allergy Other (See Comments)       Physical Exam    /82   Pulse 88   Ht 5' 3" (1 6 m)   Wt 73 9 kg (163 lb)   BMI 28 87 kg/m²     Constitutional: normal, well developed, well nourished, alert, in no distress and non-toxic and no overt pain behavior    Eyes: anicteric  HEENT: grossly intact  Neck: supple, symmetric, trachea midline and no masses   Pulmonary:even and unlabored  Cardiovascular:No edema or pitting edema present  Skin:Normal without rashes or lesions and well hydrated  Psychiatric:Mood and affect appropriate  Neurologic:Cranial Nerves II-XII grossly intact  Musculoskeletal:normal gait  Bilateral cervical paraspinals and trapezii tender to palpation ropy in texture  Bilateral upper extremity strength 5/5 in all muscle groups  Sensation decreased to light touch in all 10 fingertips  Positive Spurling's bilaterally  Positive Tinel's over bilateral wrists  Bilateral lumbar paraspinals mildly tender to palpation from L3-L5  Bilateral lower extremity strength 5/5 in all muscle groups  Sensation intact to light touch in L3 through S1 dermatomes bilaterally  Negative seated straight leg raise bilaterally  Imaging  CERVICAL SPINE     INDICATION:   M54 2: Cervicalgia  Back pain and neck pain for weeks      COMPARISON:  None     VIEWS:  XR SPINE CERVICAL COMPLETE 4 OR 5 VW NON INJURY         FINDINGS:     No fracture       Normal alignment without subluxation      Mild degenerative disc space narrowing at C4-C5      Moderate degenerative disc space narrowing at C5-C6      Mild degenerative disc space narrowing at C6-C7      The neuroforamina are patent      The prevertebral soft tissues are within normal limits        The lung apices are clear      IMPRESSION:     No acute osseous abnormality      Degenerative changes as above

## 2022-03-15 ENCOUNTER — TELEPHONE (OUTPATIENT)
Dept: RADIOLOGY | Facility: CLINIC | Age: 62
End: 2022-03-15

## 2022-03-15 NOTE — TELEPHONE ENCOUNTER
Patient was contacted and scheduled for KAROLINE   All pre procedure instructions were given to patient   Nothing to eat or drink for 1 hour prior  Loose fitting clothing   Denies Anti Coag's   NSAIDS okay, ASA okay  Denies Antibx   Needs    Patient instructed to continue all routine medications   Patient instructed to contact our office if becomes sick   Refrain from any vaccines 2 weeks before & 2 weeks after  Insurance auth received but is not a guarantee of payment per your insurance company's authorization disclaimer and it is your responsibility to verify your benefits   COVID -19 screening complete

## 2022-03-18 LAB
6MAM UR QL CFM: NEGATIVE NG/ML
7AMINOCLONAZEPAM UR QL CFM: NEGATIVE NG/ML
A-OH ALPRAZ UR QL CFM: NEGATIVE NG/ML
AMPHET UR QL CFM: NEGATIVE NG/ML
AMPHET UR QL CFM: NEGATIVE NG/ML
BUPRENORPHINE UR QL CFM: NEGATIVE NG/ML
BUTALBITAL UR QL CFM: NEGATIVE NG/ML
BZE UR QL CFM: NEGATIVE NG/ML
CODEINE UR QL CFM: NEGATIVE NG/ML
DESIPRAMINE UR QL CFM: NEGATIVE NG/ML
DESIPRAMINE UR QL CFM: NEGATIVE NG/ML
EDDP UR QL CFM: NEGATIVE NG/ML
ETHYL GLUCURONIDE UR QL CFM: NEGATIVE NG/ML
ETHYL SULFATE UR QL SCN: NEGATIVE NG/ML
EUTYLONE UR QL: NEGATIVE NG/ML
FENTANYL UR QL CFM: NEGATIVE NG/ML
GLIADIN IGG SER IA-ACNC: NEGATIVE NG/ML
GLUCOSE 30M P 50 G LAC PO SERPL-MCNC: NEGATIVE NG/ML
HYDROCODONE UR QL CFM: NEGATIVE NG/ML
HYDROCODONE UR QL CFM: NEGATIVE NG/ML
HYDROMORPHONE UR QL CFM: NEGATIVE NG/ML
IMIPRAMINE UR QL CFM: NEGATIVE NG/ML
LORAZEPAM UR QL CFM: NEGATIVE NG/ML
MDMA UR QL CFM: NEGATIVE NG/ML
ME-PHENIDATE UR QL CFM: NEGATIVE NG/ML
MEPERIDINE UR QL CFM: NEGATIVE NG/ML
METHADONE UR QL CFM: NEGATIVE NG/ML
METHAMPHET UR QL CFM: NEGATIVE NG/ML
MORPHINE UR QL CFM: NEGATIVE NG/ML
MORPHINE UR QL CFM: NEGATIVE NG/ML
NORBUPRENORPHINE UR QL CFM: NEGATIVE NG/ML
NORDIAZEPAM UR QL CFM: NEGATIVE NG/ML
NORFENTANYL UR QL CFM: NEGATIVE NG/ML
NORHYDROCODONE UR QL CFM: NEGATIVE NG/ML
NORHYDROCODONE UR QL CFM: NEGATIVE NG/ML
NORMEPERIDINE UR QL CFM: NEGATIVE NG/ML
NOROXYCODONE UR QL CFM: NEGATIVE NG/ML
OLANZAPINE QUANTIFICATION: NEGATIVE NG/ML
OPC-3373 QUANTIFICATION: NEGATIVE
OXAZEPAM UR QL CFM: NEGATIVE NG/ML
OXYCODONE UR QL CFM: NEGATIVE NG/ML
OXYMORPHONE UR QL CFM: NEGATIVE NG/ML
OXYMORPHONE UR QL CFM: NEGATIVE NG/ML
PCP UR QL CFM: NEGATIVE NG/ML
PHENOBARB UR QL CFM: NEGATIVE NG/ML
SECOBARBITAL UR QL CFM: NEGATIVE NG/ML
SL AMB 3-METHYL-FENTANYL QUANTIFICATION: NORMAL NG/ML
SL AMB 4-ANPP QUANTIFICATION: NORMAL NG/ML
SL AMB 4-FIBF QUANTIFICATION: NORMAL NG/ML
SL AMB 5F-ADB-M7 METABOLITE QUANTIFICATION: NEGATIVE NG/ML
SL AMB 7-OH-MITRAGYNINE (KRATOM ALKALOID) QUANTIFICATION: NEGATIVE NG/ML
SL AMB AB-FUBINACA-M3 METABOLITE QUANTIFICATION: NEGATIVE NG/ML
SL AMB ACETYL FENTANYL QUANTIFICATION: NORMAL NG/ML
SL AMB ACETYL NORFENTANYL QUANTIFICATION: NORMAL NG/ML
SL AMB ACRYL FENTANYL QUANTIFICATION: NORMAL NG/ML
SL AMB BUTRYL FENTANYL QUANTIFICATION: NORMAL NG/ML
SL AMB CARFENTANIL QUANTIFICATION: NORMAL NG/ML
SL AMB CLOZAPINE QUANTIFICATION: NEGATIVE NG/ML
SL AMB CTHC (MARIJUANA METABOLITE) QUANTIFICATION: NEGATIVE NG/ML
SL AMB CYCLOPROPYL FENTANYL QUANTIFICATION: NORMAL NG/ML
SL AMB DEXTROMETHORPHAN QUANTIFICATION: NEGATIVE NG/ML
SL AMB DEXTRORPHAN (DEXTROMETHORPHAN METABOLITE) QUANT: NEGATIVE NG/ML
SL AMB DEXTRORPHAN (DEXTROMETHORPHAN METABOLITE) QUANT: NEGATIVE NG/ML
SL AMB FURANYL FENTANYL QUANTIFICATION: NORMAL NG/ML
SL AMB HALOPERIDOL  QUANTIFICATION: NEGATIVE NG/ML
SL AMB HALOPERIDOL METABOLITE QUANTIFICATION: NEGATIVE NG/ML
SL AMB HYDROXYRISPERIDONE QUANTIFICATION: NEGATIVE NG/ML
SL AMB JWH018 METABOLITE QUANTIFICATION: NEGATIVE NG/ML
SL AMB JWH073 METABOLITE QUANTIFICATION: NEGATIVE NG/ML
SL AMB MDMB-FUBINACA-M1 METABOLITE QUANTIFICATION: NEGATIVE NG/ML
SL AMB METHOXYACETYL FENTANYL QUANTIFICATION: NORMAL NG/ML
SL AMB METHYLONE QUANTIFICATION: NEGATIVE NG/ML
SL AMB N-DESMETHYL U-47700 QUANTIFICATION: NORMAL NG/ML
SL AMB N-DESMETHYL-TRAMADOL QUANTIFICATION: NEGATIVE NG/ML
SL AMB N-DESMETHYLCLOZAPINE QUANTIFICATION: NEGATIVE NG/ML
SL AMB NORQUETIAPINE QUANTIFICATION: NEGATIVE NG/ML
SL AMB PHENTERMINE QUANTIFICATION: NEGATIVE NG/ML
SL AMB QUETIAPINE QUANTIFICATION: NEGATIVE NG/ML
SL AMB RCS4 METABOLITE QUANTIFICATION: NEGATIVE NG/ML
SL AMB RISPERIDONE QUANTIFICATION: NEGATIVE NG/ML
SL AMB RITALINIC ACID QUANTIFICATION: NEGATIVE NG/ML
SL AMB U-47700 QUANTIFICATION: NORMAL NG/ML
SPECIMEN DRAWN SERPL: NEGATIVE NG/ML
TAPENTADOL UR QL CFM: NEGATIVE NG/ML
TEMAZEPAM UR QL CFM: NEGATIVE NG/ML
TEMAZEPAM UR QL CFM: NEGATIVE NG/ML
TRAMADOL UR QL CFM: NEGATIVE NG/ML
URATE/CREAT 24H UR: NEGATIVE NG/ML

## 2022-03-22 DIAGNOSIS — I10 ESSENTIAL HYPERTENSION: ICD-10-CM

## 2022-03-22 RX ORDER — CLONIDINE HYDROCHLORIDE 0.1 MG/1
TABLET ORAL
Qty: 60 TABLET | Refills: 0 | Status: SHIPPED | OUTPATIENT
Start: 2022-03-22 | End: 2022-04-20

## 2022-03-25 ENCOUNTER — TELEPHONE (OUTPATIENT)
Dept: PAIN MEDICINE | Facility: CLINIC | Age: 62
End: 2022-03-25

## 2022-03-25 NOTE — TELEPHONE ENCOUNTER
Patient unable to drive in today back hurts really back, requesting Virtual today 3/25 1:45 pm is appt ?     Please advise    Thank you

## 2022-03-30 ENCOUNTER — HOSPITAL ENCOUNTER (OUTPATIENT)
Dept: RADIOLOGY | Facility: CLINIC | Age: 62
Discharge: HOME | End: 2022-03-30
Attending: ANESTHESIOLOGY

## 2022-03-30 VITALS
RESPIRATION RATE: 18 BRPM | TEMPERATURE: 98.8 F | DIASTOLIC BLOOD PRESSURE: 73 MMHG | SYSTOLIC BLOOD PRESSURE: 145 MMHG | OXYGEN SATURATION: 94 % | HEART RATE: 77 BPM

## 2022-03-30 DIAGNOSIS — M54.12 CERVICAL RADICULOPATHY: ICD-10-CM

## 2022-03-30 RX ORDER — PAPAVERINE HCL 150 MG
10 CAPSULE, EXTENDED RELEASE ORAL ONCE
Status: DISCONTINUED | OUTPATIENT
Start: 2022-03-30 | End: 2022-04-03 | Stop reason: HOSPADM

## 2022-03-30 NOTE — H&P
History of Present Illness: The patient is a 59 y o  female who presents with complaints of neck and arm pain      Patient Active Problem List   Diagnosis    Avascular necrosis of hip, right (HCC)    Lumbar radiculopathy    Gastro-esophageal reflux disease without esophagitis    Chronic obstructive pulmonary disease (HCC)    Arthralgia of hip    Essential hypertension    Other hyperlipidemia    Lumbar spondylosis    Lump of breast, right    Moderate persistent asthma with acute exacerbation    Other ovarian cyst, right side    Rheumatoid arthritis involving multiple sites ([facility] [address] )   [address] maintenance    Allergic rhinitis due to allergen    Generalized abdominal pain    Primary osteoarthritis of left hip    Neck pain    Chronic tension-type headache, not intractable    Cervical spondylosis    Cervical radiculopathy    Numbness and tingling in both hands    Migraine    Neuropathy    Cervical spinal stenosis       Past Medical History:   Diagnosis Date    Arthritis     Asthma     Avascular necrosis of bone of hip, right (HCC)     COPD (chronic obstructive pulmonary disease) (HCC)     GERD (gastroesophageal reflux disease)     Hypoglycemia     Infectious viral hepatitis     Lumbar radicular pain     Lyme disease     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HCC)        Past Surgical History:   Procedure Laterality Date    APPENDECTOMY      CARPAL TUNNEL RELEASE      CHOLECYSTECTOMY      SINUS SURGERY           Current Outpatient Medications:     albuterol (PROVENTIL HFA,VENTOLIN HFA) 90 mcg/act inhaler, Inhale 2 puffs every 4 (four) hours as needed for wheezing, Disp: 54 g, Rfl: 3    ALPRAZolam (XANAX) 0 25 mg tablet, Take 0 25 mg by mouth Three times daily as needed, Disp: , Rfl:     budesonide-formoterol (Symbicort) 160-4 5 mcg/act inhaler, Inhale 2 puffs 2 (two) times a day 160-4 50MCG/ACTUATION, Disp: 30 6 g, Rfl: 3    Cholecalciferol (VITAMIN D3) 2000 units CHEW, Chew 5,000 Units daily , Disp: , Rfl:     Choline Fenofibrate (Fenofibric Acid) 135 MG CPDR, 1 cap daily, Disp: 90 capsule, Rfl: 3    cloNIDine (CATAPRES) 0 1 mg tablet, TAKE 1 TABLET BY MOUTH TWICE A DAY, Disp: 60 tablet, Rfl: 0    cyclobenzaprine (FLEXERIL) 5 mg tablet, , Disp: , Rfl:     esomeprazole (NexIUM) 40 MG capsule, Take 40 mg by mouth 2 (two) times a day, Disp: , Rfl:     fexofenadine (ALLEGRA) 180 MG tablet, Take 180 mg by mouth as needed  , Disp: , Rfl:     hydroxychloroquine (PLAQUENIL) 200 mg tablet, Take 200 mg by mouth 2 (two) times a day with meals , Disp: , Rfl:     levalbuterol (XOPENEX) 1 25 mg/3 mL nebulizer solution,  , Disp: , Rfl:     losartan (COZAAR) 25 mg tablet, losartan 25 mg tablet, Disp: , Rfl:     magnesium oxide (MAG-OX) 400 mg tablet, Take 1 tablet by mouth 2 (two) times a day  , Disp: , Rfl:     predniSONE 10 mg tablet, 2 daily for 4 days then 1 daily for four days, Disp: 12 tablet, Rfl: 0    promethazine-codeine (PHENERGAN WITH CODEINE) 6 25-10 mg/5 mL syrup, as needed   (Patient not taking: Reported on 1/24/2022 ), Disp: , Rfl:     tiotropium (Spiriva Respimat) 2 5 MCG/ACT AERS inhaler, Inhale 2 puffs daily, Disp: 4 g, Rfl: 0    Current Facility-Administered Medications:     dexamethasone (PF) (DECADRON) injection 10 mg, 10 mg, Epidural, Once, Vikram Summers, DO    iohexol (OMNIPAQUE) 300 mg/mL injection 50 mL, 50 mL, Epidural, Once, Vikram Summers, DO    Allergies   Allergen Reactions    Aspirin Anaphylaxis    Iodine - Food Allergy Anaphylaxis    Penicillins Anaphylaxis    Soybean Oil - Food Allergy Other (See Comments)       Physical Exam:   Vitals:    03/30/22 0848   BP: 145/73   Pulse: 77   Resp: 18   Temp: 98 8 °F (37 1 °C)   SpO2: 94%     General: Awake, Alert, Oriented x 3, Mood and affect appropriate  Respiratory: Respirations even and unlabored  Cardiovascular: Peripheral pulses intact; no edema  Musculoskeletal Exam:  Bilateral cervical paraspinals tender to palpation  ASA Score: 3    Patient/Chart Verification  Patient ID Verified: Verbal  ID Band Applied: No  Consents Confirmed: Procedural  H&P( within 30 days) Verified: To be obtained in the Pre-Procedure area  Interval H&P(within 24 hr) Complete (required for Outpatients and Surgery Admit only): To be obtained in the Pre-Procedure area  Allergies Reviewed: Yes  Anticoag/NSAID held?: No  Currently on antibiotics?: No  Pre-op Lab/Test Results Available: N/A  Pregnancy Lab Collected: N/A comment    Assessment:   1   Cervical radiculopathy        Plan: C6-7 KAROLINE

## 2022-03-30 NOTE — DISCHARGE INSTRUCTIONS
Epidural Steroid Injection   WHAT YOU NEED TO KNOW:   An epidural steroid injection (DARRYL) is a procedure to inject steroid medicine into the epidural space  The epidural space is between your spinal cord and vertebrae  Steroids reduce inflammation and fluid buildup in your spine that may be causing pain  You may be given pain medicine along with the steroids  ACTIVITY  · Do not drive or operate machinery today  · No strenuous activity today - bending, lifting, etc   · You may resume normal activites starting tomorrow - start slowly and as tolerated  · You may shower today, but no tub baths or hot tubs  · You may have numbness for several hours from the local anesthetic  Please use caution and common sense, especially with weight-bearing activities  CARE OF THE INJECTION SITE  · If you have soreness or pain, apply ice to the area today (20 minutes on/20 minutes off)  · Starting tomorrow, you may use warm, moist heat or ice if needed  · You may have an increase or change in your discomfort for 36-48 hours after your treatment  · Apply ice and continue with any pain medication you have been prescribed  · Notify [facility] if you have any of the following: redness, drainage, swelling, headache, stiff neck or fever above 100°F     SPECIAL INSTRUCTIONS  · Our office will contact you in approximately 7 days for a progress report  MEDICATIONS  · Continue to take all routine medications  · Our office may have instructed you to hold some medications  As no general anesthesia was used in today's procedure, you should not experience any side effects related to anesthesia  If you have a problem specifically related to your procedure, please call our office at [phone]  Problems not related to your procedure should be directed to your primary care physician

## 2022-04-18 ENCOUNTER — TELEPHONE (OUTPATIENT)
Dept: PAIN MEDICINE | Facility: CLINIC | Age: 62
End: 2022-04-18

## 2022-04-18 DIAGNOSIS — F41.9 ANXIETY: Primary | ICD-10-CM

## 2022-04-18 RX ORDER — DIAZEPAM 5 MG/1
TABLET ORAL
Qty: 2 TABLET | Refills: 0 | Status: SHIPPED | OUTPATIENT
Start: 2022-04-18 | End: 2022-06-17

## 2022-04-18 NOTE — TELEPHONE ENCOUNTER
S/w pt  Pt states that Alka Stephens told her that he would prescribe Valium for her to take before procedure 4/19  Pt is extremely nervous regarding procedure  Pt takes Xanax prn that she states does not help  Advised will notify KASANDRA and CB

## 2022-04-18 NOTE — TELEPHONE ENCOUNTER
Patient's daughter Chinedu Leon states she was told by Dr Tony Tolbert he would be able to prescribe a sedative Vallium for patient so she can take for her injection scheduled tomorrow   Please reach out to her with status on when she's able to fill this prescription today, [facility]    Call back# [phone]

## 2022-04-19 ENCOUNTER — HOSPITAL ENCOUNTER (OUTPATIENT)
Dept: RADIOLOGY | Facility: CLINIC | Age: 62
Discharge: HOME | End: 2022-04-19
Attending: ANESTHESIOLOGY | Admitting: ANESTHESIOLOGY
Payer: COMMERCIAL

## 2022-04-19 VITALS
HEART RATE: 89 BPM | SYSTOLIC BLOOD PRESSURE: 153 MMHG | RESPIRATION RATE: 20 BRPM | DIASTOLIC BLOOD PRESSURE: 80 MMHG | OXYGEN SATURATION: 96 % | TEMPERATURE: 97.7 F

## 2022-04-19 PROCEDURE — 62321 NJX INTERLAMINAR CRV/THRC: CPT | Performed by: ANESTHESIOLOGY

## 2022-04-19 PROCEDURE — A9585 GADOBUTROL INJECTION: HCPCS | Performed by: ANESTHESIOLOGY

## 2022-04-19 RX ORDER — PAPAVERINE HCL 150 MG
10 CAPSULE, EXTENDED RELEASE ORAL ONCE
Status: COMPLETED | OUTPATIENT
Start: 2022-04-19 | End: 2022-04-19

## 2022-04-19 RX ADMIN — GADOBUTROL 1 ML: 604.72 INJECTION INTRAVENOUS at 14:37

## 2022-04-19 RX ADMIN — DEXAMETHASONE SODIUM PHOSPHATE 10 MG: 10 INJECTION, SOLUTION INTRAMUSCULAR; INTRAVENOUS at 14:39

## 2022-04-19 NOTE — H&P
History of Present Illness: The patient is a 59 y o  female who presents with complaints of neck and arm pain      Patient Active Problem List   Diagnosis    Avascular necrosis of hip, right (HCC)    Lumbar radiculopathy    Gastro-esophageal reflux disease without esophagitis    Chronic obstructive pulmonary disease (HCC)    Arthralgia of hip    Essential hypertension    Other hyperlipidemia    Lumbar spondylosis    Lump of breast, right    Moderate persistent asthma with acute exacerbation    Other ovarian cyst, right side    Rheumatoid arthritis involving multiple sites ([facility] [address] )   [address] maintenance    Allergic rhinitis due to allergen    Generalized abdominal pain    Primary osteoarthritis of left hip    Neck pain    Chronic tension-type headache, not intractable    Cervical spondylosis    Cervical radiculopathy    Numbness and tingling in both hands    Migraine    Neuropathy    Cervical spinal stenosis       Past Medical History:   Diagnosis Date    Arthritis     Asthma     Avascular necrosis of bone of hip, right (HCC)     COPD (chronic obstructive pulmonary disease) (HCC)     GERD (gastroesophageal reflux disease)     Hypoglycemia     Infectious viral hepatitis     Lumbar radicular pain     Lyme disease     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HCC)        Past Surgical History:   Procedure Laterality Date    APPENDECTOMY      CARPAL TUNNEL RELEASE      CHOLECYSTECTOMY      SINUS SURGERY           Current Outpatient Medications:     albuterol (PROVENTIL HFA,VENTOLIN HFA) 90 mcg/act inhaler, Inhale 2 puffs every 4 (four) hours as needed for wheezing, Disp: 54 g, Rfl: 3    ALPRAZolam (XANAX) 0 25 mg tablet, Take 0 25 mg by mouth Three times daily as needed, Disp: , Rfl:     budesonide-formoterol (Symbicort) 160-4 5 mcg/act inhaler, Inhale 2 puffs 2 (two) times a day 160-4 50MCG/ACTUATION, Disp: 30 6 g, Rfl: 3    Cholecalciferol (VITAMIN D3) 2000 units CHEW, Chew 5,000 Units daily , Disp: , Rfl:     Choline Fenofibrate (Fenofibric Acid) 135 MG CPDR, 1 cap daily, Disp: 90 capsule, Rfl: 3    cloNIDine (CATAPRES) 0 1 mg tablet, TAKE 1 TABLET BY MOUTH TWICE A DAY, Disp: 60 tablet, Rfl: 0    cyclobenzaprine (FLEXERIL) 5 mg tablet, , Disp: , Rfl:     diazepam (VALIUM) 5 mg tablet, Take 1 tablet 30 minutes prior to procedure and may take additional tablet immediately prior to procedure as needed for anxiety, Disp: 2 tablet, Rfl: 0    esomeprazole (NexIUM) 40 MG capsule, Take 40 mg by mouth 2 (two) times a day, Disp: , Rfl:     fexofenadine (ALLEGRA) 180 MG tablet, Take 180 mg by mouth as needed  , Disp: , Rfl:     hydroxychloroquine (PLAQUENIL) 200 mg tablet, Take 200 mg by mouth 2 (two) times a day with meals , Disp: , Rfl:     levalbuterol (XOPENEX) 1 25 mg/3 mL nebulizer solution,  , Disp: , Rfl:     losartan (COZAAR) 25 mg tablet, losartan 25 mg tablet, Disp: , Rfl:     magnesium oxide (MAG-OX) 400 mg tablet, Take 1 tablet by mouth 2 (two) times a day  , Disp: , Rfl:     predniSONE 10 mg tablet, 2 daily for 4 days then 1 daily for four days, Disp: 12 tablet, Rfl: 0    promethazine-codeine (PHENERGAN WITH CODEINE) 6 25-10 mg/5 mL syrup, as needed   (Patient not taking: Reported on 1/24/2022 ), Disp: , Rfl:     tiotropium (Spiriva Respimat) 2 5 MCG/ACT AERS inhaler, Inhale 2 puffs daily, Disp: 4 g, Rfl: 0    Current Facility-Administered Medications:     dexamethasone (PF) (DECADRON) injection 10 mg, 10 mg, Epidural, Once, Vikram Summers, DO    Gadobutrol injection (SINGLE-DOSE) SOLN 7 5 mL, 7 5 mL, Other, Once in imaging, Vikram Summers, DO    Allergies   Allergen Reactions    Aspirin Anaphylaxis    Iodine - Food Allergy Anaphylaxis    Penicillins Anaphylaxis    Soybean Oil - Food Allergy Other (See Comments)       Physical Exam:   Vitals:    04/19/22 1419   BP: 141/85   Pulse: 85   Resp: 20   Temp: 97 7 °F (36 5 °C)   SpO2: 97%     General: Awake, Alert, Oriented x 3, Mood and affect appropriate  Respiratory: Respirations even and unlabored  Cardiovascular: Peripheral pulses intact; no edema  Musculoskeletal Exam:  Bilateral cervical paraspinals tender to palpation  ASA Score: 3    Patient/Chart Verification  Patient ID Verified: Verbal  ID Band Applied: No  Consents Confirmed: Procedural,To be obtained in the Pre-Procedure area  H&P( within 30 days) Verified: To be obtained in the Pre-Procedure area  Allergies Reviewed:  Yes  Anticoag/NSAID held?: NA  Currently on antibiotics?: No    Assessment:  Cervical radiculopathy    Plan: C6-7 KAROLINE

## 2022-04-19 NOTE — DISCHARGE INSTRUCTIONS
Epidural Steroid Injection   WHAT YOU NEED TO KNOW:   An epidural steroid injection (DARRYL) is a procedure to inject steroid medicine into the epidural space  The epidural space is between your spinal cord and vertebrae  Steroids reduce inflammation and fluid buildup in your spine that may be causing pain  You may be given pain medicine along with the steroids  ACTIVITY  · Do not drive or operate machinery today  · No strenuous activity today - bending, lifting, etc   · You may resume normal activites starting tomorrow - start slowly and as tolerated  · You may shower today, but no tub baths or hot tubs  · You may have numbness for several hours from the local anesthetic  Please use caution and common sense, especially with weight-bearing activities  CARE OF THE INJECTION SITE  · If you have soreness or pain, apply ice to the area today (20 minutes on/20 minutes off)  · Starting tomorrow, you may use warm, moist heat or ice if needed  · You may have an increase or change in your discomfort for 36-48 hours after your treatment  · Apply ice and continue with any pain medication you have been prescribed  · Notify [facility] if you have any of the following: redness, drainage, swelling, headache, stiff neck or fever above 100°F     SPECIAL INSTRUCTIONS  · Our office will contact you in approximately 7 days for a progress report  MEDICATIONS  · Continue to take all routine medications  · Our office may have instructed you to hold some medications  As no general anesthesia was used in today's procedure, you should not experience any side effects related to anesthesia  If you have a problem specifically related to your procedure, please call our office at [phone]  Problems not related to your procedure should be directed to your primary care physician

## 2022-04-20 DIAGNOSIS — I10 ESSENTIAL HYPERTENSION: ICD-10-CM

## 2022-04-20 RX ORDER — CLONIDINE HYDROCHLORIDE 0.1 MG/1
TABLET ORAL
Qty: 60 TABLET | Refills: 0 | Status: SHIPPED | OUTPATIENT
Start: 2022-04-20 | End: 2022-05-17

## 2022-04-25 ENCOUNTER — OFFICE VISIT (OUTPATIENT)
Dept: PAIN MEDICINE | Facility: CLINIC | Age: 62
End: 2022-04-25
Payer: COMMERCIAL

## 2022-04-25 VITALS
WEIGHT: 160 LBS | DIASTOLIC BLOOD PRESSURE: 77 MMHG | HEIGHT: 63 IN | HEART RATE: 80 BPM | SYSTOLIC BLOOD PRESSURE: 129 MMHG | BODY MASS INDEX: 28.35 KG/M2

## 2022-04-25 DIAGNOSIS — M25.551 RIGHT HIP PAIN: ICD-10-CM

## 2022-04-25 DIAGNOSIS — M48.02 CERVICAL SPINAL STENOSIS: ICD-10-CM

## 2022-04-25 DIAGNOSIS — M47.812 CERVICAL SPONDYLOSIS: ICD-10-CM

## 2022-04-25 DIAGNOSIS — M54.12 CERVICAL RADICULOPATHY: ICD-10-CM

## 2022-04-25 DIAGNOSIS — G89.4 CHRONIC PAIN SYNDROME: Primary | ICD-10-CM

## 2022-04-25 DIAGNOSIS — M47.816 LUMBAR SPONDYLOSIS: ICD-10-CM

## 2022-04-25 PROCEDURE — 99214 OFFICE O/P EST MOD 30 MIN: CPT | Performed by: ANESTHESIOLOGY

## 2022-04-25 PROCEDURE — 3008F BODY MASS INDEX DOCD: CPT | Performed by: ANESTHESIOLOGY

## 2022-04-25 RX ORDER — TRAMADOL HYDROCHLORIDE 50 MG/1
50 TABLET ORAL 2 TIMES DAILY PRN
Qty: 60 TABLET | Refills: 1 | Status: SHIPPED | OUTPATIENT
Start: 2022-04-25

## 2022-04-25 NOTE — PROGRESS NOTES
Assessment  1  Chronic pain syndrome    2  Cervical radiculopathy    3  Cervical spinal stenosis    4  Cervical spondylosis    5  Right hip pain    6  Lumbar spondylosis        Plan  18-year-old female with a history of RA, COPD, carpal tunnel syndrome status post release on the right,  chronic pain syndrome, cervical degenerative disc disease, spondylosis, stenosis, cervical radiculopathy, and lumbar spondylosis returning for follow-up  Aside from her neck and arm symptoms, the patient has a secondary complaint of right hip pain  She denies any trauma  The patient did try gabapentin, however was unable tolerate side effects  She is unable to take NSAIDs secondary to allergic reactions  Tylenol is ineffective  She did recently have a cervical epidural steroid injection April 19, 2022 and has not noted much relief with this yet  1  I will start tramadol 50 mg b i d  p r n  for severe pain  Patient's baseline urine drug screen was appropriate  2  I did explain to the patient it can take up to 2 weeks for her to notice full effect of cervical epidural steroid injection  Will follow-up at the 2 week venkata to check on her progress   3  Patient will proceed with EMG of the lower extremities as ordered by Neurology  4  I will order an x-ray of the right hip  5  I will follow up the patient in 6-8 weeks or sooner if needed      There are risks associated with opioid medications, including dependence, addiction and tolerance  The patient understands and agrees to use these medications only as prescribed  Potential side effects of the medications include, but are not limited to, constipation, drowsiness, addiction, impaired judgment and risk of fatal overdose if not taken as prescribed  The patient was warned against driving while taking sedation medications  Sharing medications is a felony  At this point in time, the patient is showing no signs of addiction, abuse, diversion or suicidal ideation      Opioid contract was reviewed and signed while the patient was in the office today  [place] Prescription Drug Monitoring Program report was reviewed and was appropriate       My impressions and treatment recommendations were discussed in detail with the patient who verbalized understanding and had no further questions  Discharge instructions were provided  I personally saw and examined the patient and I agree with the above discussed plan of care  No orders of the defined types were placed in this encounter  No orders of the defined types were placed in this encounter  History of Present Illness    Radha Canela is a 59 y o  female with a history of RA, COPD, carpal tunnel syndrome status post release on the right,  chronic pain syndrome, cervical degenerative disc disease, spondylosis, stenosis, cervical radiculopathy, and lumbar spondylosis returning for follow-up  The patient does complain of neck pain that radiates into bilateral upper extremities with associated numbness and paresthesias  She also complains of axial low back pain with occasional numbness and tingling in her feet  She has a new complaint of right hip pain with intermittent radiation into the right groin  She denies any trauma  The patient did try gabapentin, however was unable tolerate side effects  She is unable to take NSAIDs secondary to allergic reactions  Tylenol is ineffective  She did recently have a cervical epidural steroid injection April 19, 2022 and has not noted much relief with this yet  The patient rates her pain moderate to severe and occasional   The pain is worse in the morning and at night  The pain is described as stiffness, sharp, and numbness  The pain is worse with standing, walking, bending, and exercise  The pain is alleviated with sitting, relaxation, and lying down      Other than as stated above, the patient denies any interval changes in medications, medical condition, mental condition, symptoms, or allergies since the last office visit  I have personally reviewed and/or updated the patient's past medical history, past surgical history, family history, social history, current medications, allergies, and vital signs today  Review of Systems   Constitutional: Negative for fever and unexpected weight change  HENT: Negative for trouble swallowing  Eyes: Negative for visual disturbance  Respiratory: Negative for shortness of breath and wheezing  Cardiovascular: Positive for chest pain  Negative for palpitations  Gastrointestinal: Negative for constipation, diarrhea, nausea and vomiting  Endocrine: Negative for cold intolerance, heat intolerance and polydipsia  Genitourinary: Negative for difficulty urinating and frequency  Musculoskeletal: Positive for joint swelling  Negative for arthralgias, gait problem and myalgias  Skin: Negative for rash  Neurological: Negative for dizziness, seizures, syncope, weakness and headaches  Hematological: Does not bruise/bleed easily  Psychiatric/Behavioral: Negative for dysphoric mood  All other systems reviewed and are negative        Patient Active Problem List   Diagnosis    Avascular necrosis of hip, right (HCC)    Lumbar radiculopathy    Gastro-esophageal reflux disease without esophagitis    Chronic obstructive pulmonary disease (HCC)    Arthralgia of hip    Essential hypertension    Other hyperlipidemia    Lumbar spondylosis    Lump of breast, right    Moderate persistent asthma with acute exacerbation    Other ovarian cyst, right side    Rheumatoid arthritis involving multiple sites [facility])   [address] maintenance    Allergic rhinitis due to allergen    Generalized abdominal pain    Primary osteoarthritis of left hip    Neck pain    Chronic tension-type headache, not intractable    Cervical spondylosis    Cervical radiculopathy    Numbness and tingling in both hands    Migraine    Neuropathy    Cervical spinal stenosis       Past Medical History:   Diagnosis Date    Arthritis     Asthma     Avascular necrosis of bone of hip, right (HCC)     COPD (chronic obstructive pulmonary disease) (HCC)     GERD (gastroesophageal reflux disease)     Hypoglycemia     Infectious viral hepatitis     Lumbar radicular pain     Lyme disease     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HCC)        Past Surgical History:   Procedure Laterality Date    APPENDECTOMY      CARPAL TUNNEL RELEASE      CHOLECYSTECTOMY      SINUS SURGERY         Family History   Problem Relation Age of Onset    Bronchiolitis Mother        Social History     Occupational History    Not on file   Tobacco Use    Smoking status: Current Every Day Smoker     Types: Cigarettes    Smokeless tobacco: Never Used    Tobacco comment: started smoking    Vaping Use    Vaping Use: Never used   Substance and Sexual Activity    Alcohol use: No    Drug use: Never    Sexual activity: Not on file       Current Outpatient Medications on File Prior to Visit   Medication Sig    albuterol (PROVENTIL HFA,VENTOLIN HFA) 90 mcg/act inhaler Inhale 2 puffs every 4 (four) hours as needed for wheezing    ALPRAZolam (XANAX) 0 25 mg tablet Take 0 25 mg by mouth Three times daily as needed    budesonide-formoterol (Symbicort) 160-4 5 mcg/act inhaler Inhale 2 puffs 2 (two) times a day 160-4 50MCG/ACTUATION    Cholecalciferol (VITAMIN D3) 2000 units CHEW Chew 5,000 Units daily     Choline Fenofibrate (Fenofibric Acid) 135 MG CPDR 1 cap daily    cloNIDine (CATAPRES) 0 1 mg tablet TAKE 1 TABLET BY MOUTH TWICE A DAY    cyclobenzaprine (FLEXERIL) 5 mg tablet     diazepam (VALIUM) 5 mg tablet Take 1 tablet 30 minutes prior to procedure and may take additional tablet immediately prior to procedure as needed for anxiety    esomeprazole (NexIUM) 40 MG capsule Take 40 mg by mouth 2 (two) times a day    fexofenadine (ALLEGRA) 180 MG tablet Take 180 mg by mouth as needed      hydroxychloroquine (PLAQUENIL) 200 mg tablet Take 200 mg by mouth 2 (two) times a day with meals     levalbuterol (XOPENEX) 1 25 mg/3 mL nebulizer solution      losartan (COZAAR) 25 mg tablet losartan 25 mg tablet    magnesium oxide (MAG-OX) 400 mg tablet Take 1 tablet by mouth 2 (two) times a day      predniSONE 10 mg tablet 2 daily for 4 days then 1 daily for four days    promethazine-codeine (PHENERGAN WITH CODEINE) 6 25-10 mg/5 mL syrup as needed   (Patient not taking: Reported on 1/24/2022 )    tiotropium (Spiriva Respimat) 2 5 MCG/ACT AERS inhaler Inhale 2 puffs daily     No current facility-administered medications on file prior to visit  Allergies   Allergen Reactions    Aspirin Anaphylaxis    Iodine - Food Allergy Anaphylaxis    Penicillins Anaphylaxis    Soybean Oil - Food Allergy Other (See Comments)       Physical Exam      Contract signed: 04/25/2022    There were no vitals taken for this visit  Constitutional: normal, well developed, well nourished, alert, in no distress and non-toxic and no overt pain behavior  Eyes: anicteric  HEENT: grossly intact  Neck: supple, symmetric, trachea midline and no masses   Pulmonary:even and unlabored  Cardiovascular:No edema or pitting edema present  Skin:Normal without rashes or lesions and well hydrated  Psychiatric:Mood and affect appropriate  Neurologic:Cranial Nerves II-XII grossly intact  Musculoskeletal:normal gait  Bilateral cervical paraspinals and trapezii tender to palpation ropy in texture  Bilateral upper extremity strength 5/5 in all muscle groups  Sensation intact to light touch in C5 through T1 dermatomes bilaterally  Positive Spurling's bilaterally  Bilateral lumbar paraspinals tender to palpation from L3-L5  Bilateral SI joints nontender to palpation  Bilateral lower extremity strength 5/5 in all muscle groups  Sensation intact to light touch in L3 through S1 dermatomes bilaterally    Negative straight leg raise bilaterally  Negative Marvin's test bilaterally  External rotation of the right hip reproduced right hip pain  Negative Stinchfield test on the right  Imaging      Study Result    Narrative & Impression   MRI LUMBAR SPINE WITHOUT CONTRAST     INDICATION: M54 16: Radiculopathy, lumbar region  G62 9: Polyneuropathy, unspecified  Low back and bilateral leg pain with weakness     COMPARISON:  MR 11/7/2012, x-ray 1/15/2019     TECHNIQUE:  Sagittal T1, sagittal T2, sagittal inversion recovery, axial T1 and axial T2, coronal T2  Patient was prepared for and monitored during this exam by anesthesia personnel      IMAGE QUALITY:  Diagnostic     FINDINGS:     VERTEBRAL BODIES:  Minor straightening of normal lumbar lordosis  Very minor left convex L2-3 apex scoliosis  Leftward translation of L3 and L4  Normal marrow signal is identified within the visualized bony structures  No discrete marrow lesion      SACRUM:  Normal signal within the sacrum  No evidence of insufficiency or stress fracture      DISTAL CORD AND CONUS:  Normal size and signal within the distal cord and conus    Conus terminates at L2      PARASPINAL SOFT TISSUES:  Paraspinal soft tissues are unremarkable      LOWER THORACIC DISC SPACES:  Normal disc height and signal   No disc herniation, canal stenosis or foraminal narrowing      LUMBAR DISC SPACES:     L1-L2:  Circumferential bulge, right greater than left facet arthrosis     L2-L3:  Circumferential bulge, marginal osteophytes, mild bilateral facet arthrosis     L3-L4:  Circumferential bulge, marginal osteophytes, minor facet arthrosis     L4-L5:  Moderate bilateral facet arthrosis, circumferential bulge      L5-S1:  Moderate bilateral facet arthrosis, circumferential bulge      IMPRESSION:     Relatively stable multilevel degenerative changes which do not result in nerve root compression         Workstation performed: [id number]     Study Result    Narrative & Impression   LUMBAR SPINE     INDICATION:   M54 16: Radiculopathy, lumbar region      COMPARISON:  MR lumbar spine from [facility]     VIEWS:  XR SPINE LUMBAR MINIMUM 4 VIEWS NON INJURY  Images: 4  (AP and lateral views in neutral, flexion and extension)     FINDINGS:     There is no evidence of acute fracture or destructive osseous lesion      Mild levoscoliosis apex at the L2-3 level      Disc space narrowing L1-2 and L2-3 with scattered marginal endplate osteophytes      The pedicles appear intact  No evidence of dynamic instability detected during flexion or extension      There are atherosclerotic calcifications  Soft tissues are otherwise unremarkable   Surgical clips right upper quadrant      IMPRESSION:     No acute osseous abnormality        Mild levoscoliosis and degenerative changes as described         Workstation performed: [id number]

## 2022-04-26 ENCOUNTER — TELEPHONE (OUTPATIENT)
Dept: PAIN MEDICINE | Facility: CLINIC | Age: 62
End: 2022-04-26

## 2022-04-26 NOTE — TELEPHONE ENCOUNTER
Pt reports 40% improvement post inj   Pain level 6-7/10  Pt aware I will call next week for an update

## 2022-05-17 DIAGNOSIS — I10 ESSENTIAL HYPERTENSION: ICD-10-CM

## 2022-05-17 RX ORDER — CLONIDINE HYDROCHLORIDE 0.1 MG/1
TABLET ORAL
Qty: 60 TABLET | Refills: 0 | Status: SHIPPED | OUTPATIENT
Start: 2022-05-17 | End: 2022-06-13

## 2022-05-26 ENCOUNTER — TELEPHONE (OUTPATIENT)
Dept: NEUROLOGY | Facility: CLINIC | Age: 62
End: 2022-05-26

## 2022-05-26 NOTE — TELEPHONE ENCOUNTER
Spoke to patient and confirmed her 5/31/2022 @ 10 AM appointment with Dr Isai Powers and confirmed new office address of [address], [place]

## 2022-05-31 ENCOUNTER — OFFICE VISIT (OUTPATIENT)
Dept: NEUROLOGY | Facility: CLINIC | Age: 62
End: 2022-05-31
Payer: COMMERCIAL

## 2022-05-31 VITALS
HEART RATE: 91 BPM | DIASTOLIC BLOOD PRESSURE: 82 MMHG | WEIGHT: 160 LBS | BODY MASS INDEX: 28.35 KG/M2 | TEMPERATURE: 97.5 F | HEIGHT: 63 IN | SYSTOLIC BLOOD PRESSURE: 132 MMHG

## 2022-05-31 DIAGNOSIS — G43.009 MIGRAINE WITHOUT AURA AND WITHOUT STATUS MIGRAINOSUS, NOT INTRACTABLE: Primary | ICD-10-CM

## 2022-05-31 DIAGNOSIS — G62.9 NEUROPATHY: ICD-10-CM

## 2022-05-31 PROBLEM — M54.81 BILATERAL OCCIPITAL NEURALGIA: Status: ACTIVE | Noted: 2022-05-31

## 2022-05-31 PROBLEM — R41.3 MEMORY IMPAIRMENT: Status: ACTIVE | Noted: 2022-05-31

## 2022-05-31 PROCEDURE — 3008F BODY MASS INDEX DOCD: CPT | Performed by: ANESTHESIOLOGY

## 2022-05-31 PROCEDURE — 99215 OFFICE O/P EST HI 40 MIN: CPT | Performed by: PSYCHIATRY & NEUROLOGY

## 2022-05-31 NOTE — ASSESSMENT & PLAN NOTE
Patient here for f/u neuropathy of her R foot  It is improving and she is not having many issues  She stopped gabapentin due to vivid dreams, constipation  W/U:   B12 , folate nl    Plan   - continue f/u with pain management   - would recommend to get EMG of LE to better localize neuropathy of her R foot

## 2022-05-31 NOTE — ASSESSMENT & PLAN NOTE
Previously was seen by [facility] neurology for her migraines, tension headaches and bilat subocciptal neuralgia  Her neurologist she saw retired and she did not f/u with [facility] to get set up with a new headache specialist     She has not gotten to her ED or for PCP in regards this  She has new change in laterality as well as new pain that has bothered her over the past year   Compared to her migraines that's she had     Past CT head 11/2020:  No acute intracranial abnormality        Plan   - will put in for MRI brain and MRA brain open   - continue f/u with Pain in regards to suboccipital neuralgia and possible injections for that

## 2022-05-31 NOTE — PROGRESS NOTES
Patient ID: Aroldo Doty is a 57 y o  female  Assessment/Plan:    Neuropathy  Patient here for f/u neuropathy of her R foot  It is improving and she is not having many issues  She stopped gabapentin due to vivid dreams, constipation  W/U:   B12 , folate nl    Plan   - continue f/u with pain management   - would recommend to get EMG of LE to better localize neuropathy of her R foot  Migraine  See plan under suboccipital neuralgia      Bilateral occipital neuralgia  Previously was seen by [facility] neurology for her migraines, tension headaches and bilat subocciptal neuralgia  Her neurologist she saw retired and she did not f/u with [facility] to get set up with a new headache specialist     She has not gotten to her ED or for PCP in regards this  She has new change in laterality as well as new pain that has bothered her over the past year  Compared to her migraines that's she had     Past CT head 11/2020:  No acute intracranial abnormality        Plan   - will put in for MRI brain and MRA brain open   - continue f/u with Pain in regards to suboccipital neuralgia and possible injections for that           Problem List Items Addressed This Visit        Cardiovascular and Mediastinum    Migraine - Primary     See plan under suboccipital neuralgia             Relevant Orders    MRI brain without contrast    MRI angiogram head without contrast       Nervous and Auditory    Neuropathy     Patient here for f/u neuropathy of her R foot  It is improving and she is not having many issues  She stopped gabapentin due to vivid dreams, constipation  W/U:   B12 , folate nl    Plan   - continue f/u with pain management   - would recommend to get EMG of LE to better localize neuropathy of her R foot                       Subjective:    HPI         59year old female with extensive past medical history of rheumatic arthritis, cervicalgia on Gabapentin 100mg QHS prn (patient reports not taking it because it causes her anxious) and follows spine and pain management outpatient here for f/u for history of worsening "burning cold" sensation of her right lateral foot  She saw Dr Georgia Anderson and had an MRI of her right foot which shows chronic tear and osteoarthritis of her first and second MTP however, her abnormal sensation is on the later side (3rd, 4th and 5th toes)  She follows w/ Dr Blas Lang with pain management and was started on tramadol in 04/22 and had a recenti cerivcal epiduralsteroid injection in 04/22  No EMG done  She;'s still thinking about the EMG since she think it'll be painful  She's having major headaches on the left side  Her neuropathy is doing much better and that appears to be helping her  Headaches   She complains of head pain in the left posterior side of her head that is squeezing and pressure like pain  Then there is numbness and moves anteriorly  Her vision becomes very blurry  This lasts for a few times a day that lasts a few hours intermittently and has woke up in the middle of the night due to the pain  She wakes up with the medication  Tylenol sometimes helps and takes the edge away  Gabapentin causes her to have vivid dreams and depression and once she stopped it it's been better  It doesn't seem to be positional    She uses tylenol and it seems to help  It occurs about 3-5x/week  She has photophobia and phonophobia as well as nausea  She has lacrimation sometimes and no conjunctivitis  Memory  Her  drove her here today  She states her memory is going down  She states is having word finding difficulty and stutters  She doesn't drive and lives with  and son w/ family  She denies any hallucinations and seems to be able misplacing things  She stated she has burned eggs and something in the moment distracted her  But she remembered that she cooked the eggs  She does not handle finances  She does not feel she gets lost in familiar places          She's had this for a few months  She has a history of migraines  This head pain is new for her  She normally has migraines with pain all over head  Last visit in 01/22:  "Melina from pain mx as well as dr Chandan Garcia from rheum  Mri foot  with chronic tear noted at  second MTP and associated fibrosis  Pt exam notable for min dec vib aaron lowers  Pt with 5-/5 dorsiflexion of right foot and 5-/5 right iliopsoas with give way weakness with hip pain  Also rec emg to see if any help with any localization of  lateral plantar nerve on the right vs any more proximal pathology given history of back and hip issues for years  "         EMG not done  B12 , folate nl  LE dopplers:  MRI in 02/22 done of C-spine no report    The following portions of the patient's history were reviewed and updated as appropriate:   She  has a past medical history of Arthritis, Asthma, Avascular necrosis of bone of hip, right ([address] ), COPD (chronic obstructive pulmonary disease) ([address] ), GERD (gastroesophageal reflux disease), Hypoglycemia, Infectious viral hepatitis, Lumbar radicular pain, Lyme disease, and 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[address] )    She   Patient Active Problem List    Diagnosis Date Noted    Memory impairment 05/31/2022    Bilateral occipital neuralgia 05/31/2022    Cervical spinal stenosis 03/11/2022    Neuropathy 01/18/2022    Migraine 12/20/2021    Cervical spondylosis 11/12/2021    Cervical radiculopathy 11/12/2021    Numbness and tingling in both hands 11/12/2021    Neck pain 10/12/2021    Chronic tension-type headache, not intractable 10/12/2021    Primary osteoarthritis of left hip     Other hyperlipidemia 03/22/2021    Healthcare maintenance 03/22/2021    Allergic rhinitis due to allergen 03/22/2021    Generalized abdominal pain 03/22/2021    Essential hypertension 11/13/2020    Other ovarian cyst, right side 11/13/2020    Rheumatoid arthritis involving multiple sites ([address] ) 11/06/2020    Gastro-esophageal reflux disease without esophagitis 03/12/2019    Chronic obstructive pulmonary disease ([facility] ) 02/12/2019    Lump of breast, right 02/12/2019    Moderate persistent asthma with acute exacerbation 02/22/2018    Avascular necrosis of hip, right (HCC) 10/03/2017    Lumbar radiculopathy 06/26/2017    Lumbar spondylosis 06/26/2017    Right hip pain 03/28/2017     She  has a past surgical history that includes Appendectomy; Carpal tunnel release; Cholecystectomy; and Sinus surgery  Her family history includes Bronchiolitis in her mother  She  reports that she has been smoking cigarettes  She has never used smokeless tobacco  She reports that she does not drink alcohol and does not use drugs    Current Outpatient Medications   Medication Sig Dispense Refill    albuterol (PROVENTIL HFA,VENTOLIN HFA) 90 mcg/act inhaler Inhale 2 puffs every 4 (four) hours as needed for wheezing 54 g 3    ALPRAZolam (XANAX) 0 25 mg tablet Take 0 25 mg by mouth Three times daily as needed      budesonide-formoterol (Symbicort) 160-4 5 mcg/act inhaler Inhale 2 puffs 2 (two) times a day 160-4 50MCG/ACTUATION 30 6 g 3    Cholecalciferol (VITAMIN D3) 2000 units CHEW Chew 5,000 Units daily       cloNIDine (CATAPRES) 0 1 mg tablet TAKE 1 TABLET BY MOUTH TWICE A DAY 60 tablet 0    cyclobenzaprine (FLEXERIL) 5 mg tablet       esomeprazole (NexIUM) 40 MG capsule Take 40 mg by mouth in the morning      hydroxychloroquine (PLAQUENIL) 200 mg tablet Take 200 mg by mouth 2 (two) times a day with meals       levalbuterol (XOPENEX) 1 25 mg/3 mL nebulizer solution        magnesium oxide (MAG-OX) 400 mg tablet Take 1 tablet by mouth 2 (two) times a day        traMADol (Ultram) 50 mg tablet Take 1 tablet (50 mg total) by mouth 2 (two) times a day as needed for severe pain 60 tablet 1    Choline Fenofibrate (Fenofibric Acid) 135 MG CPDR 1 cap daily 90 capsule 3    diazepam (VALIUM) 5 mg tablet Take 1 tablet 30 minutes prior to procedure and may take additional tablet immediately prior to procedure as needed for anxiety (Patient not taking: Reported on 5/31/2022) 2 tablet 0    fexofenadine (ALLEGRA) 180 MG tablet Take 180 mg by mouth as needed        losartan (COZAAR) 25 mg tablet losartan 25 mg tablet (Patient not taking: Reported on 5/31/2022)      predniSONE 10 mg tablet 2 daily for 4 days then 1 daily for four days (Patient not taking: Reported on 5/31/2022) 12 tablet 0    promethazine-codeine (PHENERGAN WITH CODEINE) 6 25-10 mg/5 mL syrup as needed   (Patient not taking: No sig reported)      tiotropium (Spiriva Respimat) 2 5 MCG/ACT AERS inhaler Inhale 2 puffs daily (Patient not taking: Reported on 5/31/2022) 4 g 0     No current facility-administered medications for this visit       Current Outpatient Medications on File Prior to Visit   Medication Sig    albuterol (PROVENTIL HFA,VENTOLIN HFA) 90 mcg/act inhaler Inhale 2 puffs every 4 (four) hours as needed for wheezing    ALPRAZolam (XANAX) 0 25 mg tablet Take 0 25 mg by mouth Three times daily as needed    budesonide-formoterol (Symbicort) 160-4 5 mcg/act inhaler Inhale 2 puffs 2 (two) times a day 160-4 50MCG/ACTUATION    Cholecalciferol (VITAMIN D3) 2000 units CHEW Chew 5,000 Units daily     cloNIDine (CATAPRES) 0 1 mg tablet TAKE 1 TABLET BY MOUTH TWICE A DAY    cyclobenzaprine (FLEXERIL) 5 mg tablet     esomeprazole (NexIUM) 40 MG capsule Take 40 mg by mouth in the morning    hydroxychloroquine (PLAQUENIL) 200 mg tablet Take 200 mg by mouth 2 (two) times a day with meals     levalbuterol (XOPENEX) 1 25 mg/3 mL nebulizer solution      magnesium oxide (MAG-OX) 400 mg tablet Take 1 tablet by mouth 2 (two) times a day      traMADol (Ultram) 50 mg tablet Take 1 tablet (50 mg total) by mouth 2 (two) times a day as needed for severe pain    Choline Fenofibrate (Fenofibric Acid) 135 MG CPDR 1 cap daily    diazepam (VALIUM) 5 mg tablet Take 1 tablet 30 minutes prior to procedure and may take additional tablet immediately prior to procedure as needed for anxiety (Patient not taking: Reported on 5/31/2022)    fexofenadine (ALLEGRA) 180 MG tablet Take 180 mg by mouth as needed      losartan (COZAAR) 25 mg tablet losartan 25 mg tablet (Patient not taking: Reported on 5/31/2022)    predniSONE 10 mg tablet 2 daily for 4 days then 1 daily for four days (Patient not taking: Reported on 5/31/2022)    promethazine-codeine (PHENERGAN WITH CODEINE) 6 25-10 mg/5 mL syrup as needed   (Patient not taking: No sig reported)    tiotropium (Spiriva Respimat) 2 5 MCG/ACT AERS inhaler Inhale 2 puffs daily (Patient not taking: Reported on 5/31/2022)     No current facility-administered medications on file prior to visit  She is allergic to aspirin, iodine - food allergy, penicillins, and soybean oil - food allergy            Objective:    Blood pressure 132/82, pulse 91, temperature 97 5 °F (36 4 °C), height 5' 3" (1 6 m), weight 72 6 kg (160 lb)  Physical Exam  Vitals and nursing note reviewed  HENT:      Head: Normocephalic  Nose: Nose normal       Mouth/Throat:      Mouth: Mucous membranes are moist    Eyes:      General: Lids are normal       Extraocular Movements: Extraocular movements intact  Conjunctiva/sclera: Conjunctivae normal       Pupils: Pupils are equal, round, and reactive to light  Cardiovascular:      Rate and Rhythm: Normal rate and regular rhythm  Pulses: Normal pulses  Heart sounds: No murmur heard  Pulmonary:      Effort: Pulmonary effort is normal       Breath sounds: Normal breath sounds  Abdominal:      General: Abdomen is flat  Bowel sounds are normal  There is no distension  Palpations: Abdomen is soft  Tenderness: There is no abdominal tenderness  Musculoskeletal:      Cervical back: Normal range of motion and neck supple  No rigidity or tenderness  Skin:     General: Skin is warm  Neurological:      Mental Status: She is oriented to person, place, and time  Deep Tendon Reflexes: Strength normal       Reflex Scores:       Tricep reflexes are 2+ on the right side and 2+ on the left side  Bicep reflexes are 2+ on the right side and 2+ on the left side  Brachioradialis reflexes are 2+ on the right side and 2+ on the left side  Patellar reflexes are 2+ on the right side and 2+ on the left side  Achilles reflexes are 2+ on the right side and 2+ on the left side  Neurological Exam  Mental Status  Awake, alert and oriented to person, place and time  Oriented to person, place, and time  Cranial Nerves  CN II: Visual acuity is normal  Visual fields full to confrontation  CN III, IV, VI: Extraocular movements intact bilaterally  Normal lids and orbits bilaterally  Pupils equal round and reactive to light bilaterally  CN V: Facial sensation is normal   CN VII: Full and symmetric facial movement  CN VIII: Hearing is normal   CN IX, X: Palate elevates symmetrically  Normal gag reflex  CN XI: Shoulder shrug strength is normal   CN XII: Tongue midline without atrophy or fasciculations  Motor   Strength is 5/5 throughout all four extremities  Sensory  Light touch is normal in upper and lower extremities  Pinprick is normal in upper and lower extremities  Vibration is normal in upper and lower extremities  Proprioception is normal in upper and lower extremities       Reflexes                                            Right                      Left  Brachioradialis                    2+                         2+  Biceps                                 2+                         2+  Triceps                                2+                         2+  Finger flex                           2+                         2+  Hamstring                            2+                         2+  Patellar                                2+                         2+  Achilles                                2+ 2+    Coordination  Right: Finger-to-nose normal  Rapid alternating movement normal Left: Finger-to-nose normal  Heel-to-shin normal     Gait  Casual gait is normal including stance, stride, and arm swing  ROS:    Review of Systems   Constitutional: Negative  Negative for appetite change and fever  HENT: Negative  Negative for hearing loss, tinnitus, trouble swallowing and voice change  Eyes: Negative  Negative for photophobia and pain  Respiratory: Negative  Negative for shortness of breath  Cardiovascular: Negative  Negative for palpitations  Gastrointestinal: Negative  Negative for nausea and vomiting  Endocrine: Negative  Negative for cold intolerance  Genitourinary: Negative  Negative for dysuria, frequency and urgency  Musculoskeletal: Negative  Negative for myalgias and neck pain  Neuropathy is much better   Skin: Negative  Negative for rash  Neurological: Positive for speech difficulty and headaches  Negative for dizziness, tremors, seizures, syncope, facial asymmetry, weakness, light-headedness and numbness  HX on L side of head  Had migraines at  Last visit but unable to discuss focused only on Feet/neuropathy   Hematological: Negative  Does not bruise/bleed easily  Psychiatric/Behavioral: Positive for confusion  Negative for hallucinations and sleep disturbance  Memory issues  Speech can't get the words out, stutters   All other systems reviewed and are negative   and family live with her so not alone   Does not drive

## 2022-06-07 ENCOUNTER — TELEPHONE (OUTPATIENT)
Dept: NEUROLOGY | Facility: CLINIC | Age: 62
End: 2022-06-07

## 2022-06-07 NOTE — TELEPHONE ENCOUNTER
Received vm from patient's daughter Ady Velazquez regarding patient's MRI  She states PA is needed  Spoke w/Karly  She advised that she spoke w/Pre-Cert Team earlier today and they are aware of needed PA  Daughter stated they are trying to find a facility that will perform MRA in open setting  Provided daughter with following information:    [facility]  (p) [phone]    Daughter will call to see if they can accommodate MRA imaging      Pre-Cert - fyi

## 2022-06-08 NOTE — TELEPHONE ENCOUNTER
I called and spoke with patient to give her an update     Brain MRI was approved per [employer]  Approval # [id number] Valid 06/08/2022-07/07/2022    I explained that the Brain MRA was still pending and I would call her once the insurance company rendered a decision  She informed me she would be making an appt for the Brain MRI right away  Since that was approved, she would rather do the tests separately since she is claustrophobic  [address]

## 2022-06-17 ENCOUNTER — OFFICE VISIT (OUTPATIENT)
Dept: INTERNAL MEDICINE CLINIC | Facility: CLINIC | Age: 62
End: 2022-06-17
Payer: COMMERCIAL

## 2022-06-17 VITALS
HEIGHT: 63 IN | TEMPERATURE: 97 F | OXYGEN SATURATION: 98 % | BODY MASS INDEX: 28.6 KG/M2 | HEART RATE: 80 BPM | SYSTOLIC BLOOD PRESSURE: 126 MMHG | DIASTOLIC BLOOD PRESSURE: 80 MMHG | WEIGHT: 161.4 LBS

## 2022-06-17 DIAGNOSIS — Z13.29 SCREENING FOR THYROID DISORDER: ICD-10-CM

## 2022-06-17 DIAGNOSIS — M06.9 RHEUMATOID ARTHRITIS INVOLVING MULTIPLE SITES, UNSPECIFIED WHETHER RHEUMATOID FACTOR PRESENT (HCC): ICD-10-CM

## 2022-06-17 DIAGNOSIS — J45.41 MODERATE PERSISTENT ASTHMA WITH ACUTE EXACERBATION: ICD-10-CM

## 2022-06-17 DIAGNOSIS — Z12.31 SCREENING MAMMOGRAM FOR BREAST CANCER: ICD-10-CM

## 2022-06-17 DIAGNOSIS — J43.8 OTHER EMPHYSEMA (HCC): ICD-10-CM

## 2022-06-17 DIAGNOSIS — M54.81 BILATERAL OCCIPITAL NEURALGIA: ICD-10-CM

## 2022-06-17 DIAGNOSIS — I10 ESSENTIAL HYPERTENSION: Primary | ICD-10-CM

## 2022-06-17 DIAGNOSIS — Z13.0 SCREENING FOR DEFICIENCY ANEMIA: ICD-10-CM

## 2022-06-17 DIAGNOSIS — E78.49 OTHER HYPERLIPIDEMIA: ICD-10-CM

## 2022-06-17 DIAGNOSIS — K21.9 GASTRO-ESOPHAGEAL REFLUX DISEASE WITHOUT ESOPHAGITIS: ICD-10-CM

## 2022-06-17 DIAGNOSIS — G62.9 NEUROPATHY: ICD-10-CM

## 2022-06-17 PROBLEM — R20.2 NUMBNESS AND TINGLING IN BOTH HANDS: Status: RESOLVED | Noted: 2021-11-12 | Resolved: 2022-06-17

## 2022-06-17 PROBLEM — R20.0 NUMBNESS AND TINGLING IN BOTH HANDS: Status: RESOLVED | Noted: 2021-11-12 | Resolved: 2022-06-17

## 2022-06-17 PROBLEM — R10.84 GENERALIZED ABDOMINAL PAIN: Status: RESOLVED | Noted: 2021-03-22 | Resolved: 2022-06-17

## 2022-06-17 PROBLEM — Z00.00 HEALTHCARE MAINTENANCE: Status: RESOLVED | Noted: 2021-03-22 | Resolved: 2022-06-17

## 2022-06-17 PROBLEM — N83.291 OTHER OVARIAN CYST, RIGHT SIDE: Status: RESOLVED | Noted: 2020-11-13 | Resolved: 2022-06-17

## 2022-06-17 PROBLEM — N63.10 LUMP OF BREAST, RIGHT: Status: RESOLVED | Noted: 2019-02-12 | Resolved: 2022-06-17

## 2022-06-17 PROBLEM — M54.2 NECK PAIN: Status: RESOLVED | Noted: 2021-10-12 | Resolved: 2022-06-17

## 2022-06-17 PROBLEM — J44.9 CHRONIC OBSTRUCTIVE PULMONARY DISEASE (HCC): Status: RESOLVED | Noted: 2019-02-12 | Resolved: 2022-06-17

## 2022-06-17 PROCEDURE — 99214 OFFICE O/P EST MOD 30 MIN: CPT | Performed by: NURSE PRACTITIONER

## 2022-06-17 PROCEDURE — 3008F BODY MASS INDEX DOCD: CPT | Performed by: NURSE PRACTITIONER

## 2022-06-17 PROCEDURE — 3725F SCREEN DEPRESSION PERFORMED: CPT | Performed by: NURSE PRACTITIONER

## 2022-06-17 RX ORDER — LEVALBUTEROL TARTRATE 45 UG/1
AEROSOL, METERED ORAL
COMMUNITY
Start: 2022-06-08 | End: 2022-07-01 | Stop reason: ALTCHOICE

## 2022-06-17 RX ORDER — LEVOFLOXACIN 750 MG/1
TABLET ORAL
COMMUNITY
Start: 2022-06-08 | End: 2022-06-17

## 2022-06-17 RX ORDER — DEXTROMETHORPHAN HYDROBROMIDE AND PROMETHAZINE HYDROCHLORIDE 15; 6.25 MG/5ML; MG/5ML
SYRUP ORAL
COMMUNITY
End: 2022-07-01

## 2022-06-17 NOTE — ASSESSMENT & PLAN NOTE
Recently finished antibiotics and steroids with improvement  On Symbicort twice daily and albuterol as needed  Sees pulmonary

## 2022-06-17 NOTE — ASSESSMENT & PLAN NOTE
She was having neuropathy symptoms of the right foot  These have resolved after hip and back injections

## 2022-06-17 NOTE — PROGRESS NOTES
Assessment/Plan:    Gastro-esophageal reflux disease without esophagitis  Stable  On PPI daily  Rheumatoid arthritis involving multiple sites ([facility] )  Stable  On plaquenil  Sees rheumatology  Essential hypertension  Well controlled  On clonidine  Other hyperlipidemia  On fenofibrate  Check lipids  Moderate persistent asthma with acute exacerbation  Recently finished antibiotics and steroids with improvement  On Symbicort twice daily and albuterol as needed  Sees pulmonary  Neuropathy  She was having neuropathy symptoms of the right foot  These have resolved after hip and back injections  Bilateral occipital neuralgia  Sees neurology  MRI/MRA brain pending  Diagnoses and all orders for this visit:    Essential hypertension    Other hyperlipidemia  -     Comprehensive metabolic panel; Future  -     Lipid Panel with Direct LDL reflex; Future    Rheumatoid arthritis involving multiple sites, unspecified whether rheumatoid factor present ([facility] )    Screening for deficiency anemia  -     CBC and differential; Future    Screening for thyroid disorder  -     TSH, 3rd generation with Free T4 reflex; Future    Gastro-esophageal reflux disease without esophagitis    Screening mammogram for breast cancer  -     Mammo screening bilateral w 3d & cad; Future    Other emphysema (HCC)    Moderate persistent asthma with acute exacerbation    Neuropathy    Bilateral occipital neuralgia    Other orders  -     promethazine-dextromethorphan (PHENERGAN-DM) 6 25-15 mg/5 mL oral syrup; promethazine-DM 6 25 mg-15 mg/5 mL oral syrup   TAKE 5 MILLILITERS BY MOUTH 4 TIMES A DAY AS NEEDED FOR COUGH  -     Discontinue: levofloxacin (LEVAQUIN) 750 mg tablet; TAKE 1 TABLET BY MOUTH EVERY DAY FOR 10 DAYS  -     levalbuterol (XOPENEX HFA) 45 mcg/act inhaler; INHALE 2 PUFFS EVERY 6 HOURS AS NEEDED FOR WHEEZING          Subjective:      Patient ID: Kelin Lynn is a 57 y o  female      Here today for follow up  Mallory Watkins is doing ok  She recently finished antibiotics and steroids for exacerbation of asthma and upper respiratory infection  Her breathing has improved  She still has some some chest tightness  She had recent back and hip injections  She was having right foot pain but since the injection this has resolved  She is planning on traveling in august  She is requesting xanax for the flight  She is also requesting antibiotics and steroids in case her asthma flares up  The following portions of the patient's history were reviewed and updated as appropriate: allergies, current medications, past family history, past medical history, past social history, past surgical history and problem list     Review of Systems   Constitutional: Negative for activity change, appetite change, fatigue and unexpected weight change  Eyes: Negative for visual disturbance  Respiratory: Positive for chest tightness  Negative for cough, shortness of breath and wheezing  Cardiovascular: Negative for chest pain, palpitations and leg swelling  Gastrointestinal: Negative for abdominal pain, blood in stool, constipation and diarrhea  Genitourinary: Negative for difficulty urinating  Musculoskeletal: Positive for arthralgias  Skin: Negative for rash  Neurological: Negative for dizziness, weakness, light-headedness and headaches  Psychiatric/Behavioral: Negative for sleep disturbance  Objective:      /80   Pulse 80   Temp (!) 97 °F (36 1 °C)   Ht 5' 3" (1 6 m)   Wt 73 2 kg (161 lb 6 4 oz)   SpO2 98%   BMI 28 59 kg/m²          Physical Exam  Vitals reviewed  Constitutional:       Appearance: Normal appearance  HENT:      Head: Normocephalic and atraumatic  Eyes:      Conjunctiva/sclera: Conjunctivae normal    Cardiovascular:      Rate and Rhythm: Normal rate and regular rhythm  Heart sounds: Normal heart sounds     Pulmonary:      Effort: Pulmonary effort is normal  No respiratory distress  Breath sounds: Normal breath sounds  No wheezing  Abdominal:      General: Bowel sounds are normal       Palpations: Abdomen is soft  Musculoskeletal:         General: Normal range of motion  Cervical back: Neck supple  Right lower leg: No edema  Left lower leg: No edema  Skin:     General: Skin is warm and dry  Neurological:      Mental Status: She is oriented to person, place, and time  Psychiatric:         Mood and Affect: Mood normal          Behavior: Behavior normal        BMI Counseling: Body mass index is 28 59 kg/m²  The BMI is above normal  Nutrition recommendations include reducing portion sizes and decreasing overall calorie intake

## 2022-06-20 ENCOUNTER — APPOINTMENT (OUTPATIENT)
Dept: LAB | Facility: CLINIC | Age: 62
End: 2022-06-20
Payer: COMMERCIAL

## 2022-06-20 LAB
BILIRUB UR QL STRIP: NEGATIVE
CLARITY UR: CLEAR
COLOR UR: NORMAL
GLUCOSE UR STRIP-MCNC: NEGATIVE MG/DL
HGB UR QL STRIP.AUTO: NEGATIVE
KETONES UR STRIP-MCNC: NEGATIVE MG/DL
LEUKOCYTE ESTERASE UR QL STRIP: NEGATIVE
NITRITE UR QL STRIP: NEGATIVE
PH UR STRIP.AUTO: 7 [PH]
PROT UR STRIP-MCNC: NEGATIVE MG/DL
SP GR UR STRIP.AUTO: 1.01 (ref 1–1.03)
UROBILINOGEN UR STRIP-ACNC: <2 MG/DL

## 2022-06-20 PROCEDURE — 81003 URINALYSIS AUTO W/O SCOPE: CPT

## 2022-07-01 ENCOUNTER — OFFICE VISIT (OUTPATIENT)
Dept: CARDIOLOGY CLINIC | Facility: CLINIC | Age: 62
End: 2022-07-01
Payer: COMMERCIAL

## 2022-07-01 VITALS
DIASTOLIC BLOOD PRESSURE: 68 MMHG | SYSTOLIC BLOOD PRESSURE: 120 MMHG | BODY MASS INDEX: 28.56 KG/M2 | HEART RATE: 80 BPM | OXYGEN SATURATION: 96 % | WEIGHT: 161.2 LBS | HEIGHT: 63 IN

## 2022-07-01 DIAGNOSIS — I10 HYPERTENSION, ESSENTIAL: Primary | ICD-10-CM

## 2022-07-01 DIAGNOSIS — I31.39 PERICARDIAL EFFUSION: ICD-10-CM

## 2022-07-01 DIAGNOSIS — E78.2 MIXED HYPERLIPIDEMIA: ICD-10-CM

## 2022-07-01 DIAGNOSIS — R06.02 SHORTNESS OF BREATH: ICD-10-CM

## 2022-07-01 PROCEDURE — 99213 OFFICE O/P EST LOW 20 MIN: CPT | Performed by: INTERNAL MEDICINE

## 2022-07-01 RX ORDER — FENOFIBRIC ACID 135 MG/1
CAPSULE, DELAYED RELEASE ORAL
Qty: 90 CAPSULE | Refills: 3 | Status: SHIPPED | OUTPATIENT
Start: 2022-07-01

## 2022-07-01 NOTE — PROGRESS NOTES
[facility] [place] [id number] [place] [place] [id number]  Cardiology Follow Up    Marcelina Winters  1960  [id number]      1  Hypertension, essential     2  Shortness of breath     3  Mixed hyperlipidemia     4  Pericardial effusion         Chief Complaint   Patient presents with    Follow-up     6 month follow up       Interval History:  Patient presents for follow-up visit  Patient denies any history of chest pain shortness of breath  Patient denies any history of leg edema or orthopnea PND  No history of presyncope syncope  Patient states compliance with the present list of medications  Patient smokes on occasion  Patient had migraine headaches and was scheduled to have MRI of the brain as well as MRA have MRI which was unremarkable  She is supposed to have MRA       Patient Active Problem List   Diagnosis    Avascular necrosis of hip, right (HCC)    Lumbar radiculopathy    Gastro-esophageal reflux disease without esophagitis    Right hip pain    Essential hypertension    Other hyperlipidemia    Lumbar spondylosis    Moderate persistent asthma with acute exacerbation    Rheumatoid arthritis involving multiple sites ([address] )    Allergic rhinitis due to allergen    Primary osteoarthritis of left hip    Chronic tension-type headache, not intractable    Cervical spondylosis    Cervical radiculopathy    Migraine    Neuropathy    Cervical spinal stenosis    Memory impairment    Bilateral occipital neuralgia     Past Medical History:   Diagnosis Date    Arthritis     Asthma     Avascular necrosis of bone of hip, right (HCC)     Chronic obstructive pulmonary disease ([address] ) 2/12/2019    COPD (chronic obstructive pulmonary disease) (HCC)     GERD (gastroesophageal reflux disease)     Hypoglycemia     Infectious viral hepatitis     Lumbar radicular pain     Lyme disease     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[address] )      Social History     Socioeconomic History    Marital status: /[employer]     Spouse name: Not on file    Number of children: Not on file    Years of education: Not on file    Highest education level: Not on file   Occupational History    Not on file   Tobacco Use    Smoking status: Current Every Day Smoker     Types: Cigarettes    Smokeless tobacco: Never Used    Tobacco comment: started smoking    Vaping Use    Vaping Use: Never used   Substance and Sexual Activity    Alcohol use: No    Drug use: Never    Sexual activity: Not on file   Other Topics Concern    Not on file   Social History Narrative    Not on file     Social Determinants of Health     Financial Resource Strain: Not on file   Food Insecurity: Not on file   Transportation Needs: Not on file   Physical Activity: Not on file   Stress: Not on file   Social Connections: Not on file   Intimate Partner Violence: Not on file   Housing Stability: Not on file      Family History   Problem Relation Age of Onset    Bronchiolitis Mother      Past Surgical History:   Procedure Laterality Date    APPENDECTOMY      CARPAL TUNNEL RELEASE      CHOLECYSTECTOMY      SINUS SURGERY         Current Outpatient Medications:     albuterol (PROVENTIL HFA,VENTOLIN HFA) 90 mcg/act inhaler, Inhale 2 puffs every 4 (four) hours as needed for wheezing, Disp: 54 g, Rfl: 3    budesonide-formoterol (Symbicort) 160-4 5 mcg/act inhaler, Inhale 2 puffs 2 (two) times a day 160-4 50MCG/ACTUATION, Disp: 30 6 g, Rfl: 3    Cholecalciferol (VITAMIN D3) 2000 units CHEW, Chew 5,000 Units daily , Disp: , Rfl:     Choline Fenofibrate (Fenofibric Acid) 135 MG CPDR, 1 cap daily, Disp: 90 capsule, Rfl: 3    cloNIDine (CATAPRES) 0 1 mg tablet, TAKE 1 TABLET BY MOUTH TWICE A DAY, Disp: 180 tablet, Rfl: 0    esomeprazole (NexIUM) 40 MG capsule, Take 40 mg by mouth in the morning, Disp: , Rfl:     fexofenadine (ALLEGRA) 180 MG tablet, Take 180 mg by mouth as needed  , Disp: , Rfl:     hydroxychloroquine (PLAQUENIL) 200 mg tablet, Take 200 mg by mouth 2 (two) times a day with meals , Disp: , Rfl:     magnesium oxide (MAG-OX) 400 mg tablet, Take 1 tablet by mouth 2 (two) times a day  , Disp: , Rfl:     traMADol (Ultram) 50 mg tablet, Take 1 tablet (50 mg total) by mouth 2 (two) times a day as needed for severe pain, Disp: 60 tablet, Rfl: 1    levalbuterol (XOPENEX HFA) 45 mcg/act inhaler, INHALE 2 PUFFS EVERY 6 HOURS AS NEEDED FOR WHEEZING, Disp: , Rfl:     levalbuterol (XOPENEX) 1 25 mg/3 mL nebulizer solution,   (Patient not taking: Reported on 7/1/2022), Disp: , Rfl:     promethazine-dextromethorphan (PHENERGAN-DM) 6 25-15 mg/5 mL oral syrup, promethazine-DM 6 25 mg-15 mg/5 mL oral syrup  TAKE 5 MILLILITERS BY MOUTH 4 TIMES A DAY AS NEEDED FOR COUGH (Patient not taking: Reported on 7/1/2022), Disp: , Rfl:   Allergies   Allergen Reactions    Aspirin Anaphylaxis    Iodine - Food Allergy Anaphylaxis    Penicillins Anaphylaxis    Soybean Oil - Food Allergy Other (See Comments)       Labs:  Appointment on 06/20/2022   Component Date Value    Sodium 06/20/2022 141     Potassium 06/20/2022 4 1     Chloride 06/20/2022 111 (A)    CO2 06/20/2022 28     ANION GAP 06/20/2022 2 (A)    BUN 06/20/2022 10     Creatinine 06/20/2022 0 82     Glucose, Fasting 06/20/2022 78     Calcium 06/20/2022 9 4     AST 06/20/2022 19     ALT 06/20/2022 34     Alkaline Phosphatase 06/20/2022 39 (A)    Total Protein 06/20/2022 7 1     Albumin 06/20/2022 3 9     Total Bilirubin 06/20/2022 0 62     eGFR 06/20/2022 75     WBC 06/20/2022 6 77     RBC 06/20/2022 5 03     Hemoglobin 06/20/2022 14 9     Hematocrit 06/20/2022 45 7     MCV 06/20/2022 91     MCH 06/20/2022 29 6     MCHC 06/20/2022 32 6     RDW 06/20/2022 14 0     MPV 06/20/2022 10 5     Platelets 87/47/1715 320     nRBC 06/20/2022 0     Neutrophils Relative 06/20/2022 49     Immat GRANS % 06/20/2022 1     Lymphocytes Relative 06/20/2022 37     Monocytes Relative 06/20/2022 9  Eosinophils Relative 06/20/2022 3     Basophils Relative 06/20/2022 1     Neutrophils Absolute 06/20/2022 3 32     Immature Grans Absolute 06/20/2022 0 05     Lymphocytes Absolute 06/20/2022 2 51     Monocytes Absolute 06/20/2022 0 63     Eosinophils Absolute 06/20/2022 0 20     Basophils Absolute 06/20/2022 0 06     Cholesterol 06/20/2022 183     Triglycerides 06/20/2022 75     HDL, Direct 06/20/2022 62     LDL Calculated 06/20/2022 106 (A)    TSH 3RD GENERATON 06/20/2022 1 770      Imaging: No results found  Review of Systems:  Review of Systems   REVIEW OF SYSTEMS:  Constitutional:  Denies fever or chills   Eyes:  Denies change in visual acuity   HENT:  Denies nasal congestion or sore throat   Respiratory:   shortness of breath   Cardiovascular:  Denies chest pain or edema   GI:  Denies abdominal pain, nausea, vomiting, bloody stools or diarrhea   :  Denies dysuria, frequency, difficulty in micturition and nocturia  Musculoskeletal:  Denies back pain or joint pain   Neurologic:  Denies headache, focal weakness or sensory changes   Endocrine:  Denies polyuria or polydipsia   Lymphatic:  Denies swollen glands   Psychiatric:  Denies depression or anxiety     Physical Exam:    /68 (BP Location: Left arm, Patient Position: Sitting, Cuff Size: Standard)   Pulse 80   Ht 5' 3" (1 6 m)   Wt 73 1 kg (161 lb 3 2 oz)   SpO2 96%   BMI 28 56 kg/m²     Physical Exam   PHYSICAL EXAM:  General:  Patient is not in acute distress   Head: Normocephalic, Atraumatic  HEENT:  Both pupils normal-size atraumatic, normocephalic, nonicteric  Neck:  JVP not raised  Trachea central  No carotid bruit  Respiratory:  Decreased breath sounds bilateral  Cardiovascular:  Regular rate and rhythm no S3 no murmurs  GI:  Abdomen soft nontender  No organomegaly  Lymphatic:  No cervical or inguinal lymphadenopathy  Neurologic:  Patient is awake alert, oriented    Grossly nonfocal  Extremities no edema    Discussion/Summary:  Patient with multiple medical problems who seems to be doing reasonably well from cardiac standpoint  Previous studies reviewed with patient  Medications reviewed and possible side effects discussed  concepts of cardiovascular disease , signs and symptoms of heart disease  Dietary and risk factor modification reinforced  All questions answered  Safety measures reviewed  Patient advised to report any problems prompting medical attention  Blood pressures are well controlled  Recent lipid panel was acceptable  Prescriptions reviewed and refilled  Symptoms to watch out from cardiac standpoint which would indicate the need for further cardiac evaluation also discussed  Follow-up in 6 months or earlier as needed  Follow-up with primary care physician

## 2022-07-11 ENCOUNTER — TELEPHONE (OUTPATIENT)
Dept: NEUROLOGY | Facility: CLINIC | Age: 62
End: 2022-07-11

## 2022-07-11 NOTE — TELEPHONE ENCOUNTER
Patient and daughter called to get authorization for her EMG this week    She can be reached at [phone]

## 2022-07-12 ENCOUNTER — TELEPHONE (OUTPATIENT)
Dept: NEUROLOGY | Facility: CLINIC | Age: 62
End: 2022-07-12

## 2022-07-13 ENCOUNTER — TELEPHONE (OUTPATIENT)
Dept: INTERNAL MEDICINE CLINIC | Facility: CLINIC | Age: 62
End: 2022-07-13

## 2022-07-14 ENCOUNTER — OFFICE VISIT (OUTPATIENT)
Dept: INTERNAL MEDICINE CLINIC | Facility: CLINIC | Age: 62
End: 2022-07-14
Payer: COMMERCIAL

## 2022-07-14 VITALS
BODY MASS INDEX: 29.06 KG/M2 | SYSTOLIC BLOOD PRESSURE: 132 MMHG | WEIGHT: 164 LBS | HEART RATE: 71 BPM | HEIGHT: 63 IN | TEMPERATURE: 97.1 F | OXYGEN SATURATION: 98 % | DIASTOLIC BLOOD PRESSURE: 80 MMHG

## 2022-07-14 DIAGNOSIS — J45.41 MODERATE PERSISTENT ASTHMA WITH ACUTE EXACERBATION: ICD-10-CM

## 2022-07-14 DIAGNOSIS — L23.7 POISON IVY: Primary | ICD-10-CM

## 2022-07-14 PROCEDURE — 99213 OFFICE O/P EST LOW 20 MIN: CPT | Performed by: NURSE PRACTITIONER

## 2022-07-14 RX ORDER — PREDNISONE 20 MG/1
TABLET ORAL
COMMUNITY
Start: 2022-07-11 | End: 2022-07-14 | Stop reason: SDUPTHER

## 2022-07-14 RX ORDER — FLUTICASONE PROPIONATE 50 MCG
SPRAY, SUSPENSION (ML) NASAL
COMMUNITY
Start: 2022-07-12 | End: 2022-08-24 | Stop reason: SDUPTHER

## 2022-07-14 RX ORDER — CETIRIZINE HYDROCHLORIDE 10 MG/1
10 TABLET ORAL DAILY
COMMUNITY
Start: 2022-07-11 | End: 2022-10-18

## 2022-07-14 RX ORDER — DIAZEPAM 5 MG/1
TABLET ORAL
COMMUNITY
Start: 2022-04-18 | End: 2022-08-24

## 2022-07-14 RX ORDER — TRIAMCINOLONE ACETONIDE 5 MG/G
CREAM TOPICAL 2 TIMES DAILY
COMMUNITY
Start: 2022-07-11 | End: 2022-07-21

## 2022-07-14 RX ORDER — PREDNISONE 10 MG/1
TABLET ORAL
Qty: 20 TABLET | Refills: 0 | Status: SHIPPED | OUTPATIENT
Start: 2022-07-14

## 2022-07-14 NOTE — TELEPHONE ENCOUNTER
Spoke w/patient  Advised her of results and recommendations below  Patient verbalized understanding  Patient questioned if authorization was obtained  Advised patient the MRA has been approved

## 2022-07-14 NOTE — PROGRESS NOTES
Assessment/Plan: Moderate persistent asthma with acute exacerbation  Already on steroids for poison ivy  Continue symbicort twice daily  Use xopenex nebulizer every 6 hours  If wheezing continues, recommend scheduling with pulmonary  Diagnoses and all orders for this visit:    Poison ivy  Comments:  will prescribe more prednisone with taper for a total of 14 days  she can continue allegra  She can try famotidine twice daily along with benadryl cream    Orders:  -     predniSONE 10 mg tablet; Take 10 mg twice daily for 3 days then 10 mg once daily for 3 days then 5 mg once daily for 3 days    Moderate persistent asthma with acute exacerbation    Other orders  -     triamcinolone (KENALOG) 0 5 % cream; Apply topically 2 (two) times a day  -     Discontinue: predniSONE 20 mg tablet  -     fluticasone (FLONASE) 50 mcg/act nasal spray  -     diazepam (VALIUM) 5 mg tablet; PLEASE SEE ATTACHED FOR DETAILED DIRECTIONS  -     cetirizine (ZyrTEC) 10 mg tablet; Take 10 mg by mouth daily          Subjective:      Patient ID: Grabiel Leslie is a 57 y o  female  Fabian Lacy is here today for urgent care follow up  She was seen in urgent care 3 days ago for a rash on her hands, arms, and legs  She was diagnosed with poison ivy  She is taking allegra  She was given prednisone for 5 days  She was given topical cream but feels she had an allergic reaction to that and stopped  Since starting the cream she feels more chest tightness and wheezing in her chest  She has started using her nebulizer which is helping  She denies any cough or shortness of breath  The following portions of the patient's history were reviewed and updated as appropriate: allergies, current medications, past family history, past medical history, past social history, past surgical history and problem list     Review of Systems   Constitutional: Negative for activity change, appetite change and fever     HENT: Negative for facial swelling and trouble swallowing  Respiratory: Positive for chest tightness and wheezing  Negative for cough and shortness of breath  Cardiovascular: Negative for chest pain  Gastrointestinal: Negative for abdominal pain  Skin: Positive for rash  Neurological: Negative for dizziness and headaches  Objective:      /80   Pulse 71   Temp (!) 97 1 °F (36 2 °C)   Ht 5' 3" (1 6 m)   Wt 74 4 kg (164 lb)   SpO2 98%   BMI 29 05 kg/m²          Physical Exam  Vitals reviewed  Constitutional:       Appearance: Normal appearance  HENT:      Head: Normocephalic and atraumatic  Eyes:      Conjunctiva/sclera: Conjunctivae normal    Cardiovascular:      Rate and Rhythm: Normal rate and regular rhythm  Heart sounds: Normal heart sounds  Pulmonary:      Effort: No respiratory distress  Breath sounds: Examination of the right-upper field reveals wheezing  Examination of the left-upper field reveals wheezing  Wheezing present  Skin:     General: Skin is warm and dry  Findings: Rash present  Comments: Erythematous papules and vesicles noted on bilateral arms, hands, and legs  Neurological:      Mental Status: She is alert and oriented to person, place, and time     Psychiatric:         Mood and Affect: Mood normal          Behavior: Behavior normal

## 2022-07-14 NOTE — ASSESSMENT & PLAN NOTE
Already on steroids for poison ivy  Continue symbicort twice daily  Use xopenex nebulizer every 6 hours  If wheezing continues, recommend scheduling with pulmonary

## 2022-07-15 ENCOUNTER — HOSPITAL ENCOUNTER (OUTPATIENT)
Dept: MRI IMAGING | Facility: HOSPITAL | Age: 62
Discharge: HOME | End: 2022-07-15
Payer: COMMERCIAL

## 2022-07-15 DIAGNOSIS — G43.009 MIGRAINE WITHOUT AURA AND WITHOUT STATUS MIGRAINOSUS, NOT INTRACTABLE: ICD-10-CM

## 2022-07-15 PROCEDURE — 70544 MR ANGIOGRAPHY HEAD W/O DYE: CPT

## 2022-07-15 PROCEDURE — G1004 CDSM NDSC: HCPCS

## 2022-07-18 ENCOUNTER — TELEPHONE (OUTPATIENT)
Dept: OTHER | Facility: HOSPITAL | Age: 62
End: 2022-07-18

## 2022-08-23 DIAGNOSIS — I10 ESSENTIAL HYPERTENSION: ICD-10-CM

## 2022-08-23 RX ORDER — CLONIDINE HYDROCHLORIDE 0.1 MG/1
TABLET ORAL
Qty: 180 TABLET | Refills: 0 | Status: SHIPPED | OUTPATIENT
Start: 2022-08-23 | End: 2022-08-24 | Stop reason: SDUPTHER

## 2022-08-24 ENCOUNTER — OFFICE VISIT (OUTPATIENT)
Dept: INTERNAL MEDICINE CLINIC | Facility: CLINIC | Age: 62
End: 2022-08-24
Payer: COMMERCIAL

## 2022-08-24 VITALS
DIASTOLIC BLOOD PRESSURE: 78 MMHG | HEIGHT: 63 IN | TEMPERATURE: 97.3 F | BODY MASS INDEX: 28.88 KG/M2 | OXYGEN SATURATION: 96 % | SYSTOLIC BLOOD PRESSURE: 140 MMHG | WEIGHT: 163 LBS | HEART RATE: 85 BPM

## 2022-08-24 DIAGNOSIS — F41.8 SITUATIONAL ANXIETY: ICD-10-CM

## 2022-08-24 DIAGNOSIS — M54.81 BILATERAL OCCIPITAL NEURALGIA: ICD-10-CM

## 2022-08-24 DIAGNOSIS — K21.9 GASTRO-ESOPHAGEAL REFLUX DISEASE WITHOUT ESOPHAGITIS: ICD-10-CM

## 2022-08-24 DIAGNOSIS — I10 ESSENTIAL HYPERTENSION: Primary | ICD-10-CM

## 2022-08-24 DIAGNOSIS — E78.49 OTHER HYPERLIPIDEMIA: ICD-10-CM

## 2022-08-24 DIAGNOSIS — M06.9 RHEUMATOID ARTHRITIS INVOLVING MULTIPLE SITES, UNSPECIFIED WHETHER RHEUMATOID FACTOR PRESENT (HCC): ICD-10-CM

## 2022-08-24 DIAGNOSIS — J45.41 MODERATE PERSISTENT ASTHMA WITH ACUTE EXACERBATION: ICD-10-CM

## 2022-08-24 PROBLEM — M87.051 AVASCULAR NECROSIS OF HIP, RIGHT (HCC): Status: RESOLVED | Noted: 2017-10-03 | Resolved: 2022-08-24

## 2022-08-24 PROCEDURE — 99214 OFFICE O/P EST MOD 30 MIN: CPT | Performed by: NURSE PRACTITIONER

## 2022-08-24 RX ORDER — LEVOFLOXACIN 750 MG/1
TABLET ORAL
COMMUNITY
Start: 2022-08-11

## 2022-08-24 RX ORDER — PROMETHAZINE HYDROCHLORIDE AND CODEINE PHOSPHATE 6.25; 1 MG/5ML; MG/5ML
SYRUP ORAL
COMMUNITY
Start: 2022-08-23

## 2022-08-24 RX ORDER — CLONIDINE HYDROCHLORIDE 0.1 MG/1
0.1 TABLET ORAL 2 TIMES DAILY
Qty: 180 TABLET | Refills: 0 | Status: SHIPPED | OUTPATIENT
Start: 2022-08-24 | End: 2022-10-21 | Stop reason: SDUPTHER

## 2022-08-24 RX ORDER — FLUTICASONE PROPIONATE 50 MCG
1 SPRAY, SUSPENSION (ML) NASAL DAILY
Qty: 16 G | Refills: 2 | Status: SHIPPED | OUTPATIENT
Start: 2022-08-24 | End: 2022-10-17

## 2022-08-24 RX ORDER — BUDESONIDE AND FORMOTEROL FUMARATE DIHYDRATE 160; 4.5 UG/1; UG/1
2 AEROSOL RESPIRATORY (INHALATION) 2 TIMES DAILY
Qty: 30.6 G | Refills: 3 | Status: SHIPPED | OUTPATIENT
Start: 2022-08-24

## 2022-08-24 RX ORDER — ALPRAZOLAM 0.25 MG/1
0.25 TABLET ORAL DAILY PRN
Qty: 30 TABLET | Refills: 0 | Status: SHIPPED | OUTPATIENT
Start: 2022-08-24

## 2022-08-24 NOTE — ASSESSMENT & PLAN NOTE
Recently finished levaquin and steroids  Still with occasional wheezing  On symbicort twice daily and xopenex PRN  Sees pulmonary

## 2022-08-24 NOTE — PROGRESS NOTES
Assessment/Plan:    Gastro-esophageal reflux disease without esophagitis  On PPI daily    Moderate persistent asthma with acute exacerbation  Recently finished levaquin and steroids  Still with occasional wheezing  On symbicort twice daily and xopenex PRN  Sees pulmonary  Essential hypertension  Slightly up today  Continue clonidine for now  Continue home blood pressure monitoring and call if continues to be elevated  Rheumatoid arthritis involving multiple sites ([facility] [address] )  Stable  On plaquenil  Other hyperlipidemia  On fenofibrate  Bilateral occipital neuralgia  No acute findings on MRA  Sees neurology     HM:  Declines bone density and mammogram at this time  Diagnoses and all orders for this visit:    Essential hypertension  -     cloNIDine (CATAPRES) 0 1 mg tablet; Take 1 tablet (0 1 mg total) by mouth 2 (two) times a day    Moderate persistent asthma with acute exacerbation  -     budesonide-formoterol (Symbicort) 160-4 5 mcg/act inhaler; Inhale 2 puffs 2 (two) times a day 160-4 50MCG/ACTUATION  -     fluticasone (FLONASE) 50 mcg/act nasal spray; 1 spray into each nostril daily    Rheumatoid arthritis involving multiple sites, unspecified whether rheumatoid factor present (HCC)    Bilateral occipital neuralgia    Gastro-esophageal reflux disease without esophagitis    Other hyperlipidemia    Situational anxiety  -     ALPRAZolam (XANAX) 0 25 mg tablet; Take 1 tablet (0 25 mg total) by mouth daily as needed for anxiety    Other orders  -     promethazine-codeine (PHENERGAN WITH CODEINE) 6 25-10 mg/5 mL syrup  -     levofloxacin (LEVAQUIN) 750 mg tablet; TAKE 1 TABLET BY MOUTH EVERY DAY FOR 10 DAYS          Subjective:      Patient ID: Maranda Mays is a 57 y o  female  La Mckinney is here today for follow up  She recently saw pulmonary and was treated for exacerbation of asthma  She was treated with steroids and levaquin  She is using her xopenex as needed  Her cough has improved   She still has occasional wheezing  She is going to [place] for 5 weeks to visit family  She is requesting xanax for flight anxiety which she has taken in the past      She has noticed her blood pressure is a little high when she's on steroids  Usually <120/80, but now 130-140/80's  The following portions of the patient's history were reviewed and updated as appropriate: allergies, current medications, past family history, past medical history, past social history, past surgical history and problem list     Review of Systems   Constitutional: Negative for activity change, appetite change, fatigue and unexpected weight change  Eyes: Negative for visual disturbance  Respiratory: Positive for wheezing  Negative for cough, chest tightness and shortness of breath  Cardiovascular: Negative for chest pain, palpitations and leg swelling  Gastrointestinal: Negative for abdominal pain, blood in stool, constipation and diarrhea  Genitourinary: Negative for difficulty urinating  Musculoskeletal: Negative for arthralgias  Skin: Negative for rash  Neurological: Negative for dizziness, weakness, light-headedness and headaches  Psychiatric/Behavioral: Negative for sleep disturbance  Objective:      /78   Pulse 85   Temp (!) 97 3 °F (36 3 °C)   Ht 5' 3" (1 6 m)   Wt 73 9 kg (163 lb)   SpO2 96%   BMI 28 87 kg/m²          Physical Exam  Vitals reviewed  Constitutional:       Appearance: She is well-developed  HENT:      Head: Normocephalic and atraumatic  Eyes:      Conjunctiva/sclera: Conjunctivae normal       Pupils: Pupils are equal, round, and reactive to light  Cardiovascular:      Rate and Rhythm: Normal rate and regular rhythm  Heart sounds: Normal heart sounds  Pulmonary:      Effort: Pulmonary effort is normal       Breath sounds: Examination of the left-lower field reveals wheezing  Wheezing present     Abdominal:      General: Bowel sounds are normal  Palpations: Abdomen is soft  Musculoskeletal:         General: Normal range of motion  Cervical back: Neck supple  Skin:     General: Skin is warm and dry  Neurological:      Mental Status: She is alert and oriented to person, place, and time     Psychiatric:         Behavior: Behavior normal

## 2022-08-24 NOTE — ASSESSMENT & PLAN NOTE
Slightly up today  Continue clonidine for now  Continue home blood pressure monitoring and call if continues to be elevated

## 2022-09-29 ENCOUNTER — TELEPHONE (OUTPATIENT)
Dept: NEUROLOGY | Facility: CLINIC | Age: 62
End: 2022-09-29

## 2022-09-29 NOTE — TELEPHONE ENCOUNTER
Spoke to patient to confirm her 10/4/2022 appointment, however, patient states she cannot keep that appointment and will call back to reschedule as [place] is going [place] the [place]

## 2022-10-03 ENCOUNTER — OFFICE VISIT (OUTPATIENT)
Dept: PAIN MEDICINE | Facility: CLINIC | Age: 62
End: 2022-10-03
Payer: COMMERCIAL

## 2022-10-03 VITALS
WEIGHT: 163 LBS | BODY MASS INDEX: 28.88 KG/M2 | HEART RATE: 79 BPM | DIASTOLIC BLOOD PRESSURE: 85 MMHG | HEIGHT: 63 IN | SYSTOLIC BLOOD PRESSURE: 123 MMHG

## 2022-10-03 DIAGNOSIS — M47.816 LUMBAR SPONDYLOSIS: ICD-10-CM

## 2022-10-03 DIAGNOSIS — M47.812 CERVICAL SPONDYLOSIS: ICD-10-CM

## 2022-10-03 DIAGNOSIS — M54.12 CERVICAL RADICULOPATHY: ICD-10-CM

## 2022-10-03 DIAGNOSIS — M48.02 CERVICAL SPINAL STENOSIS: Primary | ICD-10-CM

## 2022-10-03 DIAGNOSIS — M25.551 RIGHT HIP PAIN: ICD-10-CM

## 2022-10-03 DIAGNOSIS — G89.4 CHRONIC PAIN SYNDROME: ICD-10-CM

## 2022-10-03 PROCEDURE — 99214 OFFICE O/P EST MOD 30 MIN: CPT | Performed by: ANESTHESIOLOGY

## 2022-10-03 RX ORDER — LEVALBUTEROL INHALATION SOLUTION 1.25 MG/3ML
SOLUTION RESPIRATORY (INHALATION)
COMMUNITY
Start: 2022-09-19

## 2022-10-03 NOTE — PROGRESS NOTES
Assessment  1  Cervical spinal stenosis    2  Cervical spondylosis    3  Cervical radiculopathy    4  Lumbar spondylosis    5  Chronic pain syndrome    6  Right hip pain        Plan  66-year-old female with a history of chronic pain syndrome, RA, COPD, CTS, cervical degenerative disc disease, cervical radiculopathy, and lumbar spondylosis returning for follow-up  The patient continues to complain of neck pain that radiates into bilateral upper extremities with associated numbness and paresthesias was well as low back pain predominantly radiating into the posterior aspect of the right lower extremity with associated numbness and paresthesias  The patient also endorses a significant amount of right hip pain that will occasionally radiate into the right groin  MRI of the cervical spine February 18, 2022 shows multilevel degenerative  MRI of the lumbar spine shows some degenerative disc disease and facet arthrosis without any significant central or foraminal stenosis  Of the lower extremities was ordered, however not done yet  An x-ray of the right hip was also ordered at last office visit, however this was not completed either  The patient is unable tolerate NSAIDs secondary to allergy  Tylenol is ineffective  She was unable tolerate gabapentin secondary to side effects  She was prescribed tramadol 50 mg b i d  p r n , however the patient did not find this helpful  She also had a cervical epidural steroid injection April 19, 2022 with only a 40% relief, however the patient feels that her symptoms are actually back to baseline at this point  I did have a long discussion with the patient regarding compliance  The patient has canceled to her was follow ups and did not complete workup including x-ray of the right hip as well as EMG of the lower extremities    The patient was also reluctant to submitting urine drug screen for continuation of opioid therapy and I explained to her that our current prescribing guidance dictates periodic urine drug screening, opioid contract, and the necessity to follow-up at least every 3 months with our office in order to continue opioid treatment  The patient did verbalized understanding to hold off on any further opioid medications at least until hip x-rays completed  1  X-ray of the right hip was reordered   2  Advised patient to proceed with EMG with Neurology   3  May consider trial of Norco, but patient will need to UDS and agree with terms of opioid contract which was previously signed and completed   4  Patient will continue with her home exercise program  5  I will follow up the patient in 2-4 weeks        My impressions and treatment recommendations were discussed in detail with the patient who verbalized understanding and had no further questions  Discharge instructions were provided  I personally saw and examined the patient and I agree with the above discussed plan of care  No orders of the defined types were placed in this encounter  New Medications Ordered This Visit   Medications    levalbuterol (XOPENEX) 1 25 mg/3 mL nebulizer solution       History of Present Illness    Marsha Pollard is a 57 y o  female  with a history of chronic pain syndrome, RA, COPD, CTS, cervical degenerative disc disease, cervical radiculopathy, and lumbar spondylosis returning for follow-up  The patient continues to complain of neck pain that radiates into bilateral upper extremities with associated numbness and paresthesias was well as low back pain predominantly radiating into the posterior aspect of the right lower extremity with associated numbness and paresthesias  The patient also endorses a significant amount of right hip pain that will occasionally radiate into the right groin  The patient does complain of weakness in the upper extremities and right lower extremity  She denies any saddle anesthesia, bladder, or bowel incontinence     MRI of the cervical spine February 18, 2022 shows multilevel degenerative  MRI of the lumbar spine shows some degenerative disc disease and facet arthrosis without any significant central or foraminal stenosis  Of the lower extremities was ordered, however not done yet  An x-ray of the right hip was also ordered at last office visit, however this was not completed either  MRI and MRA of the brain was unremarkable  The patient is unable tolerate NSAIDs secondary to allergy  Tylenol is ineffective  She was unable tolerate gabapentin secondary to side effects  She was prescribed tramadol 50 mg b i d  p r n , however the patient did not find this helpful  She also had a cervical epidural steroid injection April 19, 2022 with only a 40% relief, however the patient feels that her symptoms are actually back to baseline at this point  The patient rates her pain a 9/10 and the pain is worse at night  The pain is described as some, sharp, pressure-like, shooting, numbness, and pins and needles  The pain is worse with standing, walking, and exercise  The pain is alleviated with relaxation  Other than as stated above, the patient denies any interval changes in medications, medical condition, mental condition, symptoms, or allergies since the last office visit  I have personally reviewed and/or updated the patient's past medical history, past surgical history, family history, social history, current medications, allergies, and vital signs today       Review of Systems    Patient Active Problem List   Diagnosis    Lumbar radiculopathy    Gastro-esophageal reflux disease without esophagitis    Right hip pain    Essential hypertension    Other hyperlipidemia    Lumbar spondylosis    Moderate persistent asthma with acute exacerbation    Rheumatoid arthritis involving multiple sites ([facility] [address] [facility]    Allergic rhinitis due to allergen    Primary osteoarthritis of left hip    Chronic tension-type headache, not intractable    Cervical spondylosis  Cervical radiculopathy    Migraine    Neuropathy    Cervical spinal stenosis    Memory impairment    Bilateral occipital neuralgia       Past Medical History:   Diagnosis Date    Arthritis     Asthma     Avascular necrosis of bone of hip, right (HCC)     Avascular necrosis of hip, right (HCC) 10/3/2017    Chronic obstructive pulmonary disease ([address] ) 2/12/2019    [facility]     GERD (gastroesophageal reflux disease)     Hypoglycemia     Infectious viral hepatitis     Lumbar radicular pain     Lyme disease     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[facility])        Past Surgical History:   Procedure Laterality Date    APPENDECTOMY      CARPAL TUNNEL RELEASE      CHOLECYSTECTOMY      SINUS SURGERY         Family History   Problem Relation Age of Onset    Bronchiolitis Mother        Social History     Occupational History    Not on file   Tobacco Use    Smoking status: Current Every Day Smoker     Types: Cigarettes    Smokeless tobacco: Never Used    Tobacco comment: started smoking    Vaping Use    Vaping Use: Never used   Substance and Sexual Activity    Alcohol use: No    Drug use: Never    Sexual activity: Not on file       Current Outpatient Medications on File Prior to Visit   Medication Sig    albuterol (PROVENTIL HFA,VENTOLIN HFA) 90 mcg/act inhaler Inhale 2 puffs every 4 (four) hours as needed for wheezing    ALPRAZolam (XANAX) 0 25 mg tablet Take 1 tablet (0 25 mg total) by mouth daily as needed for anxiety    budesonide-formoterol (Symbicort) 160-4 5 mcg/act inhaler Inhale 2 puffs 2 (two) times a day 160-4 50MCG/ACTUATION    Cholecalciferol (VITAMIN D3) 2000 units CHEW Chew 5,000 Units daily     Choline Fenofibrate (Fenofibric Acid) 135 MG CPDR 1 cap daily    cloNIDine (CATAPRES) 0 1 mg tablet Take 1 tablet (0 1 mg total) by mouth 2 (two) times a day    esomeprazole (NexIUM) 40 MG capsule Take 40 mg by mouth in the morning    fexofenadine (ALLEGRA) 180 MG tablet Take 180 mg by mouth as needed      fluticasone (FLONASE) 50 mcg/act nasal spray 1 spray into each nostril daily    hydroxychloroquine (PLAQUENIL) 200 mg tablet Take 200 mg by mouth 2 (two) times a day with meals     levofloxacin (LEVAQUIN) 750 mg tablet TAKE 1 TABLET BY MOUTH EVERY DAY FOR 10 DAYS    magnesium oxide (MAG-OX) 400 mg tablet Take 1 tablet by mouth 2 (two) times a day      predniSONE 10 mg tablet Take 10 mg twice daily for 3 days then 10 mg once daily for 3 days then 5 mg once daily for 3 days    promethazine-codeine (PHENERGAN WITH CODEINE) 6 25-10 mg/5 mL syrup     traMADol (Ultram) 50 mg tablet Take 1 tablet (50 mg total) by mouth 2 (two) times a day as needed for severe pain    cetirizine (ZyrTEC) 10 mg tablet Take 10 mg by mouth daily    levalbuterol (XOPENEX) 1 25 mg/3 mL nebulizer solution     triamcinolone (KENALOG) 0 5 % cream Apply topically 2 (two) times a day     No current facility-administered medications on file prior to visit  Allergies   Allergen Reactions    Aspirin Anaphylaxis    Iodine - Food Allergy Anaphylaxis    Penicillins Anaphylaxis    Soybean Oil - Food Allergy Other (See Comments)           Contract signed: 04/29/2022  DERRICK/Jonathan signed: 04/29/2022  UDS last taken: 03/11/2022        Physical Exam    Ht 5' 3" (1 6 m)   Wt 73 9 kg (163 lb)   BMI 28 87 kg/m²     Constitutional: normal, well developed, well nourished, alert, in no distress and non-toxic and no overt pain behavior  Eyes: anicteric  HEENT: grossly intact  Neck: supple, symmetric, trachea midline and no masses   Pulmonary:even and unlabored  Cardiovascular:No edema or pitting edema present  Skin:Normal without rashes or lesions and well hydrated  Psychiatric:Mood and affect appropriate  Neurologic:Cranial Nerves II-XII grossly intact  Musculoskeletal:normal gait  Bilateral cervical paraspinals and trapezii tender to palpation ropy in texture    Bilateral upper extremity strength 5/5 in all muscle groups  Sensation intact to light touch in C5 through T1 dermatomes bilaterally  Spurling's bilaterally  Bilateral lumbar paraspinals tender to palpation from L3-L5  Bilateral lower extremity strength 5/5 in all muscle groups  Sensation intact to light touch in L3 through S1 dermatomes bilaterally  Negative seated straight leg raise bilaterally  Imaging  MRA BRAIN WITHOUT CONTRAST     INDICATION:  [id number]: Migraine without aura, not intractable, without status migrainosus     COMPARISON:  None      TECHNIQUE:  Axial 3-D time-of-flight imaging with 3-D reconstructions performed without contrast         IV Contrast:  Not administered      FINDINGS:     IMAGE QUALITY:  Diagnostic      ANATOMY     INTERNAL CAROTID ARTERIES:  Normal flow related signal of the distal cervical, petrous and cavernous segments of the internal carotid arteries  Normal ICA terminus      ANTERIOR CIRCULATION:  Normal A1 segments  Normal anterior communicating artery  Normal flow-related signal of the anterior cerebral arteries      MIDDLE CEREBRAL ARTERY CIRCULATION:  M1 segments  Incidentally noted infundibulum arising from the inferior midportion of the left M1 segment at the level of the anterior temporal artery origin      DISTAL VERTEBRAL ARTERIES:  Distal left vertebral artery is dominant  Distal right vertebral artery is mildly hypoplastic but consistent in caliber the foramen magnum       BASILAR ARTERY:  Normal      POSTERIOR CEREBRAL ARTERIES:  Both posterior cerebral arteries arises from the basilar tip  Both arteries demonstrate normal flow-related enhancement    Patent posterior communicating arteries      IMPRESSION:     Unremarkable MR angiogram of the brain      Anatomic variation of the distal vertebral arteries, hypoplastic on the right

## 2022-10-05 ENCOUNTER — TELEPHONE (OUTPATIENT)
Dept: ADMINISTRATIVE | Facility: OTHER | Age: 62
End: 2022-10-05

## 2022-10-05 NOTE — TELEPHONE ENCOUNTER
----- Message from Lucrecia Hernandez sent at 10/5/2022 10:20 AM EDT -----  Regarding: care gap request  10/05/22 10:20 AM    Hello, our patient attached above has had Hepatitis C completed/performed  Please assist in updating the patient chart by pulling the Care Everywhere (CE) document  The date of service is 2019       Thank you,  Kelsie Quintana [facility] [place] [facility]

## 2022-10-06 NOTE — TELEPHONE ENCOUNTER
Upon review of the In Basket request we were able to locate, review, and update the patient chart as requested for Hepatitis C   Any additional questions or concerns should be emailed to the Practice Liaisons via the appropriate education email address, please do not reply via In Basket      Thank you  Kat Erwin

## 2022-10-10 ENCOUNTER — OFFICE VISIT (OUTPATIENT)
Dept: PAIN MEDICINE | Facility: CLINIC | Age: 62
End: 2022-10-10
Payer: COMMERCIAL

## 2022-10-10 VITALS
BODY MASS INDEX: 29.02 KG/M2 | DIASTOLIC BLOOD PRESSURE: 80 MMHG | HEART RATE: 81 BPM | HEIGHT: 63 IN | WEIGHT: 163.8 LBS | SYSTOLIC BLOOD PRESSURE: 155 MMHG

## 2022-10-10 DIAGNOSIS — M54.16 LUMBAR RADICULOPATHY: ICD-10-CM

## 2022-10-10 DIAGNOSIS — M25.551 RIGHT HIP PAIN: Primary | ICD-10-CM

## 2022-10-10 DIAGNOSIS — M47.816 LUMBAR SPONDYLOSIS: ICD-10-CM

## 2022-10-10 DIAGNOSIS — G89.4 CHRONIC PAIN SYNDROME: ICD-10-CM

## 2022-10-10 PROCEDURE — 99214 OFFICE O/P EST MOD 30 MIN: CPT | Performed by: NURSE PRACTITIONER

## 2022-10-10 NOTE — PROGRESS NOTES
Assessment:  1  Right hip pain    2  Chronic pain syndrome    3  Lumbar radiculopathy    4  Lumbar spondylosis        Plan:  1  Patient was encouraged to make a follow up appointment with [facility] to discuss hip x-ray results and treatment options based off of those results   2  Patient not interested in providing a urine drug screen at this time  3  May consider repeating cervical epidural steroid injection, however patient would like to hold off on this until evaluated from Orthopedics for her right hip complaints as this is most bothersome at this time   4  Will avoid NSAIDs secondary to allergy   5  Patient was encouraged to reschedule EMG  6  Continue with home exercise program  7  Follow-up after evaluation with Orthopedics or sooner if needed    I attest that I have spent at least 25 minutes face to face with the patient and that at least 50% of the time was spent educating and/or discussing the patient's symptoms and treatment plan options  History of Present Illness: The patient is a 57 y o  female with a history of chronic pain syndrome, RA, COPD, CTS, cervical radiculopathy, lumbar spondylosis last seen on 10/03/22 who presents for a follow up office visit in regards to chronic right hip pain that radiates into the right groin  She denies bowel or bladder incontinence or saddle anesthesia  Patient has had good relief with left intra-articular hip injections in the past   She had minimal relief with cervical epidural steroid injection performed in April 2022  Prioritizes her right hip pain as most bothersome  X-ray of the right hip reveals changes suggestive of AVN  He has not seen Orthopedics in about a year and a half  She is unable to tolerate NSAIDs secondary to allergy and finds Tylenol ineffective  She has been unable to tolerate gabapentin secondary to side effects  She found minimal relief with Tramadol  She states oxycodone is effective   She was ordered an EMG which she has not yet completed  Patient rates her pain as 7/10 on the numeric pain rating scale  She constantly has pain throughout the day which is described as cramping, pressure-like, shooting, numbness and pins and needles    I have personally reviewed and/or updated the patient's past medical history, past surgical history, family history, social history, current medications, allergies, and vital signs today         Review of Systems:    Review of Systems      Past Medical History:   Diagnosis Date   • Arthritis    • Asthma    • Avascular necrosis of bone of hip, right ([facility])    • Avascular necrosis of hip, right ([place] [address] ) 10/3/2017   • Chronic obstructive pulmonary disease ([place] [address] ) 2/12/2019   • COPD (chronic obstructive pulmonary disease) ([facility])    • GERD (gastroesophageal reflux disease)    • Hypoglycemia    • Infectious viral hepatitis    • Lumbar radicular pain    • Lyme disease    • 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[place] [address] )        Past Surgical History:   Procedure Laterality Date   • APPENDECTOMY     • CARPAL TUNNEL RELEASE     • CHOLECYSTECTOMY     • SINUS SURGERY         Family History   Problem Relation Age of Onset   • Bronchiolitis Mother        Social History     Occupational History   • Not on file   Tobacco Use   • Smoking status: Current Every Day Smoker     Types: Cigarettes   • Smokeless tobacco: Never Used   • Tobacco comment: started smoking    Vaping Use   • Vaping Use: Never used   Substance and Sexual Activity   • Alcohol use: No   • Drug use: Never   • Sexual activity: Not on file         Current Outpatient Medications:   •  albuterol (PROVENTIL HFA,VENTOLIN HFA) 90 mcg/act inhaler, Inhale 2 puffs every 4 (four) hours as needed for wheezing (Patient not taking: Reported on 10/10/2022), Disp: 54 g, Rfl: 3  •  ALPRAZolam (XANAX) 0 25 mg tablet, Take 1 tablet (0 25 mg total) by mouth daily as needed for anxiety (Patient not taking: Reported on 10/10/2022), Disp: 30 tablet, Rfl: 0  •  budesonide-formoterol (Symbicort) 160-4 5 mcg/act inhaler, Inhale 2 puffs 2 (two) times a day 160-4 50MCG/ACTUATION (Patient not taking: Reported on 10/10/2022), Disp: 30 6 g, Rfl: 3  •  cetirizine (ZyrTEC) 10 mg tablet, Take 10 mg by mouth daily, Disp: , Rfl:   •  Cholecalciferol (VITAMIN D3) 2000 units CHEW, Chew 5,000 Units daily  (Patient not taking: Reported on 10/10/2022), Disp: , Rfl:   •  Choline Fenofibrate (Fenofibric Acid) 135 MG CPDR, 1 cap daily (Patient not taking: Reported on 10/10/2022), Disp: 90 capsule, Rfl: 3  •  cloNIDine (CATAPRES) 0 1 mg tablet, Take 1 tablet (0 1 mg total) by mouth 2 (two) times a day (Patient not taking: Reported on 10/10/2022), Disp: 180 tablet, Rfl: 0  •  esomeprazole (NexIUM) 40 MG capsule, Take 40 mg by mouth in the morning (Patient not taking: Reported on 10/10/2022), Disp: , Rfl:   •  fexofenadine (ALLEGRA) 180 MG tablet, Take 180 mg by mouth as needed   (Patient not taking: Reported on 10/10/2022), Disp: , Rfl:   •  fluticasone (FLONASE) 50 mcg/act nasal spray, 1 spray into each nostril daily (Patient not taking: Reported on 10/10/2022), Disp: 16 g, Rfl: 2  •  hydroxychloroquine (PLAQUENIL) 200 mg tablet, Take 200 mg by mouth 2 (two) times a day with meals  (Patient not taking: Reported on 10/10/2022), Disp: , Rfl:   •  levalbuterol (XOPENEX) 1 25 mg/3 mL nebulizer solution, , Disp: , Rfl:   •  levofloxacin (LEVAQUIN) 750 mg tablet, TAKE 1 TABLET BY MOUTH EVERY DAY FOR 10 DAYS (Patient not taking: Reported on 10/10/2022), Disp: , Rfl:   •  magnesium oxide (MAG-OX) 400 mg tablet, Take 1 tablet by mouth 2 (two) times a day   (Patient not taking: Reported on 10/10/2022), Disp: , Rfl:   •  predniSONE 10 mg tablet, Take 10 mg twice daily for 3 days then 10 mg once daily for 3 days then 5 mg once daily for 3 days (Patient not taking: Reported on 10/10/2022), Disp: 20 tablet, Rfl: 0  •  promethazine-codeine (PHENERGAN WITH CODEINE) 6 25-10 mg/5 mL syrup, , Disp: , Rfl:   •  traMADol (Ultram) 50 mg tablet, Take 1 tablet (50 mg total) by mouth 2 (two) times a day as needed for severe pain (Patient not taking: Reported on 10/10/2022), Disp: 60 tablet, Rfl: 1  •  triamcinolone (KENALOG) 0 5 % cream, Apply topically 2 (two) times a day, Disp: , Rfl:     Allergies   Allergen Reactions   • Aspirin Anaphylaxis   • Iodine - Food Allergy Anaphylaxis   • Penicillins Anaphylaxis   • Soybean Oil - Food Allergy Other (See Comments)       Physical Exam:    /80   Pulse 81   Ht 5' 3" (1 6 m)   Wt 74 3 kg (163 lb 12 8 oz)   BMI 29 02 kg/m²     Constitutional:normal, well developed, well nourished, alert, in no distress and non-toxic and no overt pain behavior  Eyes:anicteric  HEENT:grossly intact  Neck:supple, symmetric, trachea midline and no masses   Pulmonary:even and unlabored  Cardiovascular:No edema or pitting edema present  Skin:Normal without rashes or lesions and well hydrated  Psychiatric:Mood and affect appropriate  Neurologic:Cranial Nerves II-XII grossly intact  Musculoskeletal:antalgic gait but steady without assistive devices      Imaging      PACS Images     Show images for XR hip/pelv 2-3 vws right if performed    Linked Documents    View Image Radiology      Imaging    XR hip/pelv 2-3 vws right if performed (Order: [id number]) - 10/4/2022    Order Report     Order Details      No orders to display         No orders of the defined types were placed in this encounter

## 2022-10-11 ENCOUNTER — TELEPHONE (OUTPATIENT)
Dept: OBGYN CLINIC | Facility: HOSPITAL | Age: 62
End: 2022-10-11

## 2022-10-11 NOTE — TELEPHONE ENCOUNTER
Caller: Pt daughter    Doctor: Dr Jose Rincon    Reason for call: waiting for a call back   Was checking on an appt    Call back#: [phone]

## 2022-10-16 DIAGNOSIS — J45.41 MODERATE PERSISTENT ASTHMA WITH ACUTE EXACERBATION: ICD-10-CM

## 2022-10-17 RX ORDER — FLUTICASONE PROPIONATE 50 MCG
SPRAY, SUSPENSION (ML) NASAL
Qty: 24 ML | Refills: 2 | Status: SHIPPED | OUTPATIENT
Start: 2022-10-17

## 2022-10-18 ENCOUNTER — HOSPITAL ENCOUNTER (OUTPATIENT)
Dept: RADIOLOGY | Facility: HOSPITAL | Age: 62
Discharge: HOME | End: 2022-10-18
Attending: ORTHOPAEDIC SURGERY
Payer: COMMERCIAL

## 2022-10-18 ENCOUNTER — OFFICE VISIT (OUTPATIENT)
Dept: OBGYN CLINIC | Facility: CLINIC | Age: 62
End: 2022-10-18
Payer: COMMERCIAL

## 2022-10-18 VITALS — WEIGHT: 164.8 LBS | HEIGHT: 63 IN | BODY MASS INDEX: 29.2 KG/M2

## 2022-10-18 DIAGNOSIS — M25.551 PAIN IN RIGHT HIP: ICD-10-CM

## 2022-10-18 DIAGNOSIS — M87.00 AVN (AVASCULAR NECROSIS OF BONE) (HCC): Primary | ICD-10-CM

## 2022-10-18 PROCEDURE — 73502 X-RAY EXAM HIP UNI 2-3 VIEWS: CPT

## 2022-10-18 PROCEDURE — 99213 OFFICE O/P EST LOW 20 MIN: CPT | Performed by: ORTHOPAEDIC SURGERY

## 2022-10-18 NOTE — PROGRESS NOTES
Assessment:  1  Pain in right hip  XR hip/pelv 2-3 vws right if performed    FL spine and pain procedure    CANCELED: XR hip/pelv 2-3 vws right if performed    CANCELED: Ambulatory referral to Sports Medicine   2  AVN (avascular necrosis of bone) (HCC)  FL spine and pain procedure    CANCELED: Ambulatory referral to Sports Medicine       Plan:    • Patient has  right hip pain due to AVN without collapse  • Weight bearing as tolerated right lower extremity  • Will be ordering a right hip steroid injection with Dr Vivek Salazar   • She has continue with pain medication Dr Vivek Salazar due to having pain agreement  • Follow-up p r n  The above stated was discussed in layman's terms and the patient expressed understanding  All questions were answered to the patient's satisfaction  Subjective:   Ha Victoria is a 57 y o  female who presents today for right hip pain  She has history of right hip AVN and was treating with Dr Vela  She has a right hip steroid injection 10/10/17 with releif  She has RA and is treating with Dr Sandy Manuel and is on Plaquenil  She does have COPD and is on chronic steroid use  She is having pain over lateral left hip and occasional groin pain  She notes occasional pain radiating down the right leg and occasional numbness or tingling  Pain is worse with bending forward, walking and going up steps  She tried taking Tramadol prescribed by Dr Vivek Salazar with no relief  She is currently treating with Dr Vivek Salazar for lumbar and cervical spine         Review of systems negative unless otherwise specified in HPI    Past Medical History:   Diagnosis Date   • Arthritis    • Asthma    • Avascular necrosis of bone of hip, right (HCC)    • Avascular necrosis of hip, right ([facility] [address] ) 10/3/2017   • [facility] ) 2/12/2019   • COPD (chronic obstructive pulmonary disease) (HCC)    • GERD (gastroesophageal reflux disease)    • Hypoglycemia    • Infectious viral hepatitis    • Lumbar radicular pain    • Lyme disease    • 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[facility] [address] )        Past Surgical History:   Procedure Laterality Date   • APPENDECTOMY     • CARPAL TUNNEL RELEASE     • CHOLECYSTECTOMY     • SINUS SURGERY         Family History   Problem Relation Age of Onset   • Bronchiolitis Mother        Social History     Occupational History   • Not on file   Tobacco Use   • Smoking status: Current Every Day Smoker     Types: Cigarettes   • Smokeless tobacco: Never Used   • Tobacco comment: started smoking    Vaping Use   • Vaping Use: Never used   Substance and Sexual Activity   • Alcohol use: No   • Drug use: Never   • Sexual activity: Not on file         Current Outpatient Medications:   •  albuterol (PROVENTIL HFA,VENTOLIN HFA) 90 mcg/act inhaler, Inhale 2 puffs every 4 (four) hours as needed for wheezing, Disp: 54 g, Rfl: 3  •  ALPRAZolam (XANAX) 0 25 mg tablet, Take 1 tablet (0 25 mg total) by mouth daily as needed for anxiety, Disp: 30 tablet, Rfl: 0  •  cetirizine (ZyrTEC) 10 mg tablet, Take 10 mg by mouth daily, Disp: , Rfl:   •  Cholecalciferol (VITAMIN D3) 2000 units CHEW, Chew 5,000 Units daily, Disp: , Rfl:   •  Choline Fenofibrate (Fenofibric Acid) 135 MG CPDR, 1 cap daily, Disp: 90 capsule, Rfl: 3  •  cloNIDine (CATAPRES) 0 1 mg tablet, Take 1 tablet (0 1 mg total) by mouth 2 (two) times a day, Disp: 180 tablet, Rfl: 0  •  esomeprazole (NexIUM) 40 MG capsule, Take 40 mg by mouth in the morning, Disp: , Rfl:   •  fexofenadine (ALLEGRA) 180 MG tablet, Take 180 mg by mouth as needed, Disp: , Rfl:   •  fluticasone (FLONASE) 50 mcg/act nasal spray, SPRAY 1 SPRAY INTO EACH NOSTRIL EVERY DAY, Disp: 24 mL, Rfl: 2  •  hydroxychloroquine (PLAQUENIL) 200 mg tablet, Take 200 mg by mouth 2 (two) times a day with meals, Disp: , Rfl:   •  levalbuterol (XOPENEX) 1 25 mg/3 mL nebulizer solution, , Disp: , Rfl:   •  magnesium oxide (MAG-OX) 400 mg tablet, Take 1 tablet by mouth 2 (two) times a day, Disp: , Rfl:   • budesonide-formoterol (Symbicort) 160-4 5 mcg/act inhaler, Inhale 2 puffs 2 (two) times a day 160-4 50MCG/ACTUATION (Patient not taking: Reported on 10/18/2022), Disp: 30 6 g, Rfl: 3  •  levofloxacin (LEVAQUIN) 750 mg tablet, TAKE 1 TABLET BY MOUTH EVERY DAY FOR 10 DAYS (Patient not taking: No sig reported), Disp: , Rfl:   •  predniSONE 10 mg tablet, Take 10 mg twice daily for 3 days then 10 mg once daily for 3 days then 5 mg once daily for 3 days (Patient not taking: No sig reported), Disp: 20 tablet, Rfl: 0  •  promethazine-codeine (PHENERGAN WITH CODEINE) 6 25-10 mg/5 mL syrup, , Disp: , Rfl:   •  traMADol (Ultram) 50 mg tablet, Take 1 tablet (50 mg total) by mouth 2 (two) times a day as needed for severe pain (Patient not taking: No sig reported), Disp: 60 tablet, Rfl: 1  •  triamcinolone (KENALOG) 0 5 % cream, Apply topically 2 (two) times a day, Disp: , Rfl:     Allergies   Allergen Reactions   • Aspirin Anaphylaxis   • Iodine - Food Allergy Anaphylaxis   • Penicillins Anaphylaxis   • Soybean Oil - Food Allergy Other (See Comments)          There were no vitals filed for this visit      Objective:            Physical Exam  Physical Exam:      General Appearance:    Alert, cooperative, no distress, appears stated age   Head:    Normocephalic, without obvious abnormality, atraumatic   Eyes:    conjunctiva/corneas clear, both eyes         Nose:   Nares normal, septum midline, no drainage    Throat:   Lips normal; teeth and gums normal   Neck:    symmetrical, trachea midline, ;     thyroid:  no enlargement/   Back:     Symmetric, no curvature, ROM normal   Lungs:   No audible wheezing or labored breathing   Chest Wall:    No tenderness or deformity    Heart:    Regular rate and rhythm                         Pulses:   2+ and symmetric all extremities   Skin:   Skin color, texture, turgor normal, no rashes or lesions   Neurologic:   normal strength, sensation and reflexes     throughout                       Ortho Exam   Right hip   Skin intact  No erythema  TTP greater troch bursa   + Jostin [place] test  + Fadir test  + Stichfield test   NVID     Left hip   Negative Stinchfield test  Neurovascularly Intact Distally     Diagnostics, reviewed and taken today if performed as documented: The attending physician has personally reviewed the pertinent films in PACS and interpretation is as follows: x-ray right hip demonstrates AVN with no collapse  Procedures, if performed today:    Procedures    None performed      Scribe Attestation    I,:  Shane Ramirez am acting as a scribe while in the presence of the attending physician :       I,:  Rehana Nunez MD personally performed the services described in this documentation    as scribed in my presence :             Portions of the record may have been created with voice recognition software  Occasional wrong word or "sound a like" substitutions may have occurred due to the inherent limitations of voice recognition software  Read the chart carefully and recognize, using context, where substitutions have occurred

## 2022-10-21 DIAGNOSIS — I10 ESSENTIAL HYPERTENSION: ICD-10-CM

## 2022-10-21 RX ORDER — CLONIDINE HYDROCHLORIDE 0.1 MG/1
0.1 TABLET ORAL 2 TIMES DAILY
Qty: 180 TABLET | Refills: 0 | Status: SHIPPED | OUTPATIENT
Start: 2022-10-21

## 2022-11-03 ENCOUNTER — TELEPHONE (OUTPATIENT)
Dept: PAIN MEDICINE | Facility: CLINIC | Age: 62
End: 2022-11-03

## 2022-11-03 DIAGNOSIS — M54.12 CERVICAL RADICULOPATHY: Primary | ICD-10-CM

## 2022-11-03 NOTE — TELEPHONE ENCOUNTER
Riddhi Collado    Doctor: Blayne Membreno    Reason for call: Patient called to schedule cervical epidural steroid injection    Call back#: [phone]

## 2022-11-08 DIAGNOSIS — J45.41 MODERATE PERSISTENT ASTHMA WITH ACUTE EXACERBATION: ICD-10-CM

## 2022-11-08 RX ORDER — FLUTICASONE PROPIONATE 50 MCG
SPRAY, SUSPENSION (ML) NASAL
Qty: 48 ML | Refills: 1 | Status: SHIPPED | OUTPATIENT
Start: 2022-11-08

## 2022-11-15 ENCOUNTER — OFFICE VISIT (OUTPATIENT)
Dept: PAIN MEDICINE | Facility: CLINIC | Age: 62
End: 2022-11-15

## 2022-11-15 VITALS
HEIGHT: 63 IN | BODY MASS INDEX: 29.06 KG/M2 | WEIGHT: 164 LBS | SYSTOLIC BLOOD PRESSURE: 127 MMHG | DIASTOLIC BLOOD PRESSURE: 70 MMHG | HEART RATE: 80 BPM

## 2022-11-15 DIAGNOSIS — M47.816 LUMBAR SPONDYLOSIS: ICD-10-CM

## 2022-11-15 DIAGNOSIS — M25.551 RIGHT HIP PAIN: ICD-10-CM

## 2022-11-15 DIAGNOSIS — M47.812 CERVICAL SPONDYLOSIS: ICD-10-CM

## 2022-11-15 DIAGNOSIS — G89.4 CHRONIC PAIN SYNDROME: Primary | ICD-10-CM

## 2022-11-15 DIAGNOSIS — M48.02 CERVICAL SPINAL STENOSIS: ICD-10-CM

## 2022-11-15 DIAGNOSIS — G62.9 NEUROPATHY: ICD-10-CM

## 2022-11-15 DIAGNOSIS — M54.16 LUMBAR RADICULOPATHY: ICD-10-CM

## 2022-11-15 DIAGNOSIS — M54.12 CERVICAL RADICULOPATHY: ICD-10-CM

## 2022-11-15 DIAGNOSIS — M54.81 BILATERAL OCCIPITAL NEURALGIA: ICD-10-CM

## 2022-11-15 RX ORDER — DEXTROMETHORPHAN HYDROBROMIDE AND PROMETHAZINE HYDROCHLORIDE 15; 6.25 MG/5ML; MG/5ML
SYRUP ORAL
COMMUNITY
Start: 2022-10-25

## 2022-11-15 RX ORDER — OXYCODONE HYDROCHLORIDE 10 MG/1
TABLET ORAL
COMMUNITY
Start: 2022-10-28

## 2022-11-15 NOTE — PROGRESS NOTES
Assessment:  1  Chronic pain syndrome    2  Lumbar radiculopathy    3  Cervical radiculopathy    4  Neuropathy    5  Lumbar spondylosis    6  Cervical spondylosis    7  Cervical spinal stenosis    8  Bilateral occipital neuralgia    9  Right hip pain        Plan:  1  I will schedule the patient for a right intra-articular hip injection to address the inflammatory component of the patient's pain  Complete risks and benefits including bleeding, infection, tissue reaction, nerve injury and allergic reaction were discussed  The patient was agreeable and verbalized an understanding  2  Patient may continue oxycodone as prescribed  I did advise that she uses medication as sparingly as possible   3  Patient may continue Tylenol p r n  and should not exceed more than 3000 mg in 24 hours   4  Will avoid NSAIDs secondary to allergy   5  The patient will follow up after the procedure or sooner if needed    History of Present Illness: The patient is a 57 y o  female with a history of RA, COPD, CTS, cervical radiculopathy, lumbar spondylosis and chronic pain syndrome last seen on 10/10/22 who presents for a follow up office visit in regards to chronic right hip pain that radiates into the right groin secondary to avascular necrosis  She is had good relief with intra-articular hip injections in the past   X-ray of the right hip reveals changes suggestive of AVN  She was evaluated by Orthopedics who recommended an intra-articular hip injection  She is managing her pain with Tylenol p r n  and oxycodone 10 mg b i d  p r n  as prescribed by her pulmonologist for RA pain  The patient rates her pain an 8/10 on the numeric pain rating scale    She occasionally has pain in the morning and at night which is described as sharp, cramping, pressure-like, numbness and pins and needles    I have personally reviewed and/or updated the patient's past medical history, past surgical history, family history, social history, current medications, allergies, and vital signs today  Review of Systems:    Review of Systems   Respiratory: Negative for shortness of breath  Cardiovascular: Negative for chest pain  Gastrointestinal: Negative for constipation, diarrhea, nausea and vomiting  Musculoskeletal: Negative for arthralgias, gait problem, joint swelling and myalgias  Skin: Negative for rash  Neurological: Negative for dizziness, seizures and weakness  All other systems reviewed and are negative          Past Medical History:   Diagnosis Date   • Arthritis    • Asthma    • Avascular necrosis of bone of hip, right ([facility])    • Avascular necrosis of hip, right ([facility]) 10/3/2017   • Chronic obstructive pulmonary disease ([facility] ) 2/12/2019   • COPD (chronic obstructive pulmonary disease) ([facility])    • GERD (gastroesophageal reflux disease)    • Hypoglycemia    • Infectious viral hepatitis    • Lumbar radicular pain    • Lyme disease    • 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[place] [address] )        Past Surgical History:   Procedure Laterality Date   • APPENDECTOMY     • CARPAL TUNNEL RELEASE     • CHOLECYSTECTOMY     • SINUS SURGERY         Family History   Problem Relation Age of Onset   • Bronchiolitis Mother        Social History     Occupational History   • Not on file   Tobacco Use   • Smoking status: Current Every Day Smoker     Types: Cigarettes   • Smokeless tobacco: Never Used   • Tobacco comment: started smoking    Vaping Use   • Vaping Use: Never used   Substance and Sexual Activity   • Alcohol use: No   • Drug use: Never   • Sexual activity: Not on file         Current Outpatient Medications:   •  albuterol (PROVENTIL HFA,VENTOLIN HFA) 90 mcg/act inhaler, Inhale 2 puffs every 4 (four) hours as needed for wheezing, Disp: 54 g, Rfl: 3  •  ALPRAZolam (XANAX) 0 25 mg tablet, Take 1 tablet (0 25 mg total) by mouth daily as needed for anxiety, Disp: 30 tablet, Rfl: 0  •  budesonide-formoterol (Symbicort) 160-4 5 mcg/act inhaler, Inhale 2 puffs 2 (two) times a day 160-4 50MCG/ACTUATION (Patient not taking: No sig reported), Disp: 30 6 g, Rfl: 3  •  cetirizine (ZyrTEC) 10 mg tablet, Take 10 mg by mouth daily, Disp: , Rfl:   •  Cholecalciferol (VITAMIN D3) 2000 units CHEW, Chew 5,000 Units daily, Disp: , Rfl:   •  Choline Fenofibrate (Fenofibric Acid) 135 MG CPDR, 1 cap daily, Disp: 90 capsule, Rfl: 3  •  cloNIDine (CATAPRES) 0 1 mg tablet, Take 1 tablet (0 1 mg total) by mouth 2 (two) times a day, Disp: 180 tablet, Rfl: 0  •  esomeprazole (NexIUM) 40 MG capsule, Take 40 mg by mouth in the morning, Disp: , Rfl:   •  fexofenadine (ALLEGRA) 180 MG tablet, Take 180 mg by mouth as needed, Disp: , Rfl:   •  fluticasone (FLONASE) 50 mcg/act nasal spray, SPRAY 1 SPRAY INTO EACH NOSTRIL EVERY DAY, Disp: 48 mL, Rfl: 1  •  hydroxychloroquine (PLAQUENIL) 200 mg tablet, Take 200 mg by mouth 2 (two) times a day with meals, Disp: , Rfl:   •  levalbuterol (XOPENEX) 1 25 mg/3 mL nebulizer solution, , Disp: , Rfl:   •  levofloxacin (LEVAQUIN) 750 mg tablet, TAKE 1 TABLET BY MOUTH EVERY DAY FOR 10 DAYS (Patient not taking: No sig reported), Disp: , Rfl:   •  magnesium oxide (MAG-OX) 400 mg tablet, Take 1 tablet by mouth 2 (two) times a day, Disp: , Rfl:   •  oxyCODONE (ROXICODONE) 10 MG TABS, , Disp: , Rfl:   •  predniSONE 10 mg tablet, Take 10 mg twice daily for 3 days then 10 mg once daily for 3 days then 5 mg once daily for 3 days (Patient not taking: No sig reported), Disp: 20 tablet, Rfl: 0  •  promethazine-codeine (PHENERGAN WITH CODEINE) 6 25-10 mg/5 mL syrup, , Disp: , Rfl:   •  promethazine-dextromethorphan (PHENERGAN-DM) 6 25-15 mg/5 mL oral syrup, TAKE 5 MILLILITERS BY MOUTH 4 TIMES A DAY AS NEEDED FOR COUGH, Disp: , Rfl:   •  traMADol (Ultram) 50 mg tablet, Take 1 tablet (50 mg total) by mouth 2 (two) times a day as needed for severe pain (Patient not taking: No sig reported), Disp: 60 tablet, Rfl: 1  •  triamcinolone (KENALOG) 0 5 % cream, Apply topically 2 (two) times a day, Disp: , Rfl:     Allergies   Allergen Reactions   • Aspirin Anaphylaxis   • Iodine - Food Allergy Anaphylaxis   • Penicillins Anaphylaxis   • Soybean Oil - Food Allergy Other (See Comments)       Physical Exam:    /70   Pulse 80   Ht 5' 3" (1 6 m)   Wt 74 4 kg (164 lb)   BMI 29 05 kg/m²     Constitutional:normal, well developed, well nourished, alert, in no distress and non-toxic and no overt pain behavior    Eyes:anicteric  HEENT:grossly intact  Neck:supple, symmetric, trachea midline and no masses   Pulmonary:even and unlabored  Cardiovascular:No edema or pitting edema present  Skin:Normal without rashes or lesions and well hydrated  Psychiatric:Mood and affect appropriate  Neurologic:Cranial Nerves II-XII grossly intact  Musculoskeletal:Slightly antalgic gait but steady without assistive devices      Imaging  FL spine and pain procedure    (Results Pending)         Orders Placed This Encounter   Procedures   • FL spine and pain procedure

## 2022-12-02 ENCOUNTER — OFFICE VISIT (OUTPATIENT)
Dept: CARDIOLOGY CLINIC | Facility: CLINIC | Age: 62
End: 2022-12-02

## 2022-12-02 VITALS
BODY MASS INDEX: 28.53 KG/M2 | WEIGHT: 161 LBS | RESPIRATION RATE: 16 BRPM | SYSTOLIC BLOOD PRESSURE: 130 MMHG | HEIGHT: 63 IN | HEART RATE: 68 BPM | DIASTOLIC BLOOD PRESSURE: 78 MMHG | OXYGEN SATURATION: 98 %

## 2022-12-02 DIAGNOSIS — E78.2 MIXED HYPERLIPIDEMIA: ICD-10-CM

## 2022-12-02 DIAGNOSIS — R06.02 SHORTNESS OF BREATH: ICD-10-CM

## 2022-12-02 DIAGNOSIS — I10 HYPERTENSION, ESSENTIAL: Primary | ICD-10-CM

## 2022-12-02 NOTE — PROGRESS NOTES
[facility] [place] [id number] [facility] [place] [address]  Cardiology Follow Up    Birgit Hale  1960  [id number]      1  Hypertension, essential        2  Shortness of breath  NM myocardial perfusion spect (rx stress and/or rest)      3  Mixed hyperlipidemia            Chief Complaint   Patient presents with   • Follow-up       Interval History:  Patient presents for follow-up visit  Patient does have some shortness of breath with exertion  Patient also smokes 1 to 2 cigarettes a day  Patient does have history of hyperlipidemia and could not tolerate statins  Patient is on fenofibric acid at this time  Patient is also followed by Pulmonary for COPD/asthma  Patient had recent CT which showed hepatic steatosis as well as coronary artery calcification  Patient had cardiac catheterization while ago which did not show any significant coronary artery disease  She states that she has been compliant all her present medications  She has not been very careful with salt restriction      Patient Active Problem List   Diagnosis   • Lumbar radiculopathy   • Gastro-esophageal reflux disease without esophagitis   • Right hip pain   • Essential hypertension   • Other hyperlipidemia   • Lumbar spondylosis   • Moderate persistent asthma with acute exacerbation   • Rheumatoid arthritis involving multiple sites ([facility] [address] )   • Allergic rhinitis due to allergen   • Primary osteoarthritis of left hip   • Chronic tension-type headache, not intractable   • Cervical spondylosis   • Cervical radiculopathy   • Migraine   • Neuropathy   • Cervical spinal stenosis   • Memory impairment   • Bilateral occipital neuralgia   • Chronic pain syndrome     Past Medical History:   Diagnosis Date   • Arthritis    • Asthma    • Avascular necrosis of bone of hip, right (HCC)    • Avascular necrosis of hip, right ([address] ) 10/3/2017   • Chronic obstructive pulmonary disease ([facility] [address] ) 2/12/2019   • [facility]    • GERD (gastroesophageal reflux disease)    • Hypoglycemia    • Infectious viral hepatitis    • Lumbar radicular pain    • Lyme disease    • 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HCC)      Social History     Socioeconomic History   • Marital status: /Civil Union     Spouse name: Not on file   • Number of children: Not on file   • Years of education: Not on file   • Highest education level: Not on file   Occupational History   • Not on file   Tobacco Use   • Smoking status: Every Day     Types: Cigarettes   • Smokeless tobacco: Never   • Tobacco comments:     started smoking    Vaping Use   • Vaping Use: Never used   Substance and Sexual Activity   • Alcohol use: No   • Drug use: Never   • Sexual activity: Not Currently     Partners: Male   Other Topics Concern   • Not on file   Social History Narrative   • Not on file     Social Determinants of Health     Financial Resource Strain: Not on file   Food Insecurity: Not on file   Transportation Needs: Not on file   Physical Activity: Not on file   Stress: Not on file   Social Connections: Not on file   Intimate Partner Violence: Not on file   Housing Stability: Not on file      Family History   Problem Relation Age of Onset   • Bronchiolitis Mother      Past Surgical History:   Procedure Laterality Date   • APPENDECTOMY     • CARPAL TUNNEL RELEASE     • CHOLECYSTECTOMY     • SINUS SURGERY         Current Outpatient Medications:   •  albuterol (PROVENTIL HFA,VENTOLIN HFA) 90 mcg/act inhaler, Inhale 2 puffs every 4 (four) hours as needed for wheezing, Disp: 54 g, Rfl: 3  •  ALPRAZolam (XANAX) 0 25 mg tablet, Take 1 tablet (0 25 mg total) by mouth daily as needed for anxiety, Disp: 30 tablet, Rfl: 0  •  budesonide-formoterol (Symbicort) 160-4 5 mcg/act inhaler, Inhale 2 puffs 2 (two) times a day 160-4 50MCG/ACTUATION, Disp: 30 6 g, Rfl: 3  •  Cholecalciferol (VITAMIN D3) 2000 units CHEW, Chew 5,000 Units daily, Disp: , Rfl:   •  Choline Fenofibrate (Fenofibric Acid) 135 MG CPDR, 1 cap daily, Disp: 90 capsule, Rfl: 3  •  cloNIDine (CATAPRES) 0 1 mg tablet, Take 1 tablet (0 1 mg total) by mouth 2 (two) times a day, Disp: 180 tablet, Rfl: 0  •  esomeprazole (NexIUM) 40 MG capsule, Take 40 mg by mouth in the morning, Disp: , Rfl:   •  fexofenadine (ALLEGRA) 180 MG tablet, Take 180 mg by mouth as needed, Disp: , Rfl:   •  fluticasone (FLONASE) 50 mcg/act nasal spray, SPRAY 1 SPRAY INTO EACH NOSTRIL EVERY DAY, Disp: 48 mL, Rfl: 1  •  hydroxychloroquine (PLAQUENIL) 200 mg tablet, Take 200 mg by mouth 2 (two) times a day with meals, Disp: , Rfl:   •  levalbuterol (XOPENEX) 1 25 mg/3 mL nebulizer solution, , Disp: , Rfl:   •  levofloxacin (LEVAQUIN) 750 mg tablet, TAKE 1 TABLET BY MOUTH EVERY DAY FOR 10 DAYS, Disp: , Rfl:   •  magnesium oxide (MAG-OX) 400 mg tablet, Take 1 tablet by mouth 2 (two) times a day, Disp: , Rfl:   •  oxyCODONE (ROXICODONE) 10 MG TABS, , Disp: , Rfl:   •  promethazine-codeine (PHENERGAN WITH CODEINE) 6 25-10 mg/5 mL syrup, , Disp: , Rfl:   •  promethazine-dextromethorphan (PHENERGAN-DM) 6 25-15 mg/5 mL oral syrup, TAKE 5 MILLILITERS BY MOUTH 4 TIMES A DAY AS NEEDED FOR COUGH, Disp: , Rfl:   •  traMADol (Ultram) 50 mg tablet, Take 1 tablet (50 mg total) by mouth 2 (two) times a day as needed for severe pain, Disp: 60 tablet, Rfl: 1  Allergies   Allergen Reactions   • Aspirin Anaphylaxis   • Iodine - Food Allergy Anaphylaxis   • Penicillins Anaphylaxis   • Soybean Oil - Food Allergy Other (See Comments)   • Statins Myalgia       Labs:  Telephone on 10/05/2022   Component Date Value   • HEP C AB 10/24/2019 Non-Reactive      Imaging: No results found      Review of Systems:  Review of Systems   REVIEW OF SYSTEMS:  Constitutional:  Denies fever or chills   Eyes:  Denies change in visual acuity   HENT:  Denies nasal congestion or sore throat   Respiratory: shortness of breath   Cardiovascular:  Denies chest pain or edema   GI:  Denies abdominal pain, nausea, vomiting, bloody stools or diarrhea   :  Denies dysuria, frequency, difficulty in micturition and nocturia  Musculoskeletal:  Denies back pain or joint pain   Neurologic:  Denies headache, focal weakness or sensory changes   Endocrine:  Denies polyuria or polydipsia   Lymphatic:  Denies swollen glands   Psychiatric:  anxiety    Physical Exam:    /78 (BP Location: Left arm, Patient Position: Sitting, Cuff Size: Standard)   Pulse 68   Resp 16   Ht 5' 3" (1 6 m)   Wt 73 kg (161 lb)   SpO2 98%   BMI 28 52 kg/m²     Physical Exam   PHYSICAL EXAM:  General:  Patient is not in acute distress   Head: Normocephalic, Atraumatic  HEENT:  Unremarkable  Neck:  JVP not raised  Trachea central  No carotid bruit  Respiratory:  Decreased breath sounds bilateral  Cardiovascular:  Regular rate and rhythm no S3 no murmurs  GI:  Abdomen soft nontender  No organomegaly  Lymphatic:  No cervical or inguinal lymphadenopathy  Neurologic:  Patient is awake alert, oriented   Grossly nonfocal  Extremities no edema    CT imaging studies done at [facility] reviewed with patient and family  Discussion/Summary:  Patient with multiple medical problems who seems to be doing reasonably well from cardiac standpoint  Previous studies reviewed with patient  Medications reviewed and possible side effects discussed  concepts of cardiovascular disease , signs and symptoms of heart disease  Dietary and risk factor modification reinforced  All questions answered  Safety measures reviewed  Patient advised to report any problems prompting medical attention  Patient has symptoms of shortness of breath which is more than usual   Patient has multiple risk factors for coronary artery disease  Patient is unable to exercise on the treadmill because of asthma/pulmonary issues  Patient will be scheduled for pharmacological nuclear stress test to assess for ischemia  Last pharmacological stress test was 2 years ago  Symptoms to watch out from cardiac standpoint which would indicate the need for further cardiac evaluation discussed  Sensitivity and specificity of different modalities of cardiac imaging studies also reviewed  Patient counseled to quit smoking to prevent future cardiovascular events  Patient also counseled about salt restriction  Follow-up in 6 months or earlier as needed  Previous blood work results reviewed

## 2023-01-16 ENCOUNTER — TELEPHONE (OUTPATIENT)
Dept: NEUROLOGY | Facility: CLINIC | Age: 63
End: 2023-01-16

## 2023-01-16 DIAGNOSIS — I10 ESSENTIAL HYPERTENSION: ICD-10-CM

## 2023-01-16 RX ORDER — CLONIDINE HYDROCHLORIDE 0.1 MG/1
TABLET ORAL
Qty: 180 TABLET | Refills: 0 | Status: SHIPPED | OUTPATIENT
Start: 2023-01-16

## 2023-01-16 NOTE — TELEPHONE ENCOUNTER
Patient returned call and accepted appt              [facility] offering patient r/s appt   1/17/23 @ 9:00am Jesusita Reason direct # until 4:30pm, central # for after 4:30pm to return call to confirm/deny offer

## 2023-01-17 ENCOUNTER — OFFICE VISIT (OUTPATIENT)
Dept: NEUROLOGY | Facility: CLINIC | Age: 63
End: 2023-01-17

## 2023-01-17 VITALS
BODY MASS INDEX: 27.82 KG/M2 | TEMPERATURE: 97.2 F | SYSTOLIC BLOOD PRESSURE: 126 MMHG | HEART RATE: 83 BPM | DIASTOLIC BLOOD PRESSURE: 78 MMHG | WEIGHT: 157 LBS | HEIGHT: 63 IN

## 2023-01-17 DIAGNOSIS — M54.81 BILATERAL OCCIPITAL NEURALGIA: ICD-10-CM

## 2023-01-17 DIAGNOSIS — M48.02 CERVICAL SPINAL STENOSIS: ICD-10-CM

## 2023-01-17 DIAGNOSIS — G62.9 NEUROPATHY: Primary | ICD-10-CM

## 2023-01-17 DIAGNOSIS — R41.3 MEMORY DEFICIT: ICD-10-CM

## 2023-01-17 DIAGNOSIS — R41.3 MEMORY IMPAIRMENT: Primary | ICD-10-CM

## 2023-01-17 NOTE — PROGRESS NOTES
Patient ID: Mindy Rodrigues is a 57 y o  female  Assessment/Plan:     Diagnoses and all orders for this visit:    Memory impairment    Bilateral occipital neuralgia    Cervical spinal stenosis        Imaging:  MRI: Unremarkable study  MRA: Unremarkable MR angiogram of the brain  Anatomic variation of the distal vertebral arteries, hypoplastic on the right  Patient is a 68-year-old female who presented to the office for follow-up regarding her imaging results  MRI and MRA were unremarkable  Since there are no structural findings on MRA and MRI that could be contributing to the patient's bilateral occipital neuralgia and resulting in the migraines, it was recommended that patient see her pain management doctor for possible trigger point injections  Patient had a MRI cervical spine done previously which showed cervical stenosis along with compression of nerves  At this time, seems that the patient might benefit from trigger point injections since the possible cause of her occipital neuralgia along with her migraines can be her cervical spinal stenosis  Of note, patient also complaining about confusion  Discussed with patient that we will bring her back in 2 months to address her cognition and confusion  Patient given scripts for lab work to rule out any peripheral causes of cognitive impairment and confusion  At the next visit, we will administer the [facility] for a baseline  Patient will also see neuropsychiatry until next visit  Plan:  - Labs: B1, B6, Folate, B12, Lyme Titers  - Neuropsychiatry consulted placed    The patient indicates understanding of these issues and agrees with the plan  Return in about 2 months (around 3/17/2023) for Recheck, Need MOCA next time  Subjective:    HPI    Patient is a 57year old who presents to the office for a follow-up  Patient is here to discuss results of her MRI and MRA   Patient states that she has been having a lot of migraines the last time we saw her in May  Out of a month, patient states that she has a migraine about 22 days and is only clear for about 8 days  During the time that she has migraine, patient states that she has to take about 2 Tylenol's to get some relief but there is not absolute relief  She states that these migraines occur at various times including the morning, afternoon, and at night  Patient states that she used to take magnesium supplements which used to help but now they are not helping anymore because the brand is different  Patient also stating that she has severe pain in her back and hips and is being treated for that by pain management  Patient also complaining about confusion recently for the past 2 to 3 months  She states that it has been increasing and now it is starting to bother her at this time  Other than that, there are no complaints at this time  The following portions of the patient's history were reviewed and updated as appropriate: She  has a past medical history of Arthritis, Asthma, Avascular necrosis of bone of hip, right ([address] ), Avascular necrosis of hip, right ([address] ) (10/3/2017), Chronic obstructive pulmonary disease ([address] ) (2/12/2019), COPD (chronic obstructive pulmonary disease) ([address] ), GERD (gastroesophageal reflux disease), Hypoglycemia, Infectious viral hepatitis, Lumbar radicular pain, Lyme disease, and 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[address] )       Patient Active Problem List    Diagnosis Date Noted   • Chronic pain syndrome 10/03/2022   • Memory impairment 05/31/2022   • Bilateral occipital neuralgia 05/31/2022   • Cervical spinal stenosis 03/11/2022   • Neuropathy 01/18/2022   • Migraine 12/20/2021   • Cervical spondylosis 11/12/2021   • Cervical radiculopathy 11/12/2021   • Chronic tension-type headache, not intractable 10/12/2021   • Primary osteoarthritis of left hip    • Other hyperlipidemia 03/22/2021   • Allergic rhinitis due to allergen 03/22/2021   • Essential hypertension 11/13/2020   • Rheumatoid arthritis involving multiple sites ([facility] [address] ) 11/06/2020   • Gastro-esophageal reflux disease without esophagitis 03/12/2019   • Moderate persistent asthma with acute exacerbation 02/22/2018   • Lumbar radiculopathy 06/26/2017   • Lumbar spondylosis 06/26/2017   • Right hip pain 03/28/2017     She  has a past surgical history that includes Appendectomy; Carpal tunnel release; Cholecystectomy; and Sinus surgery  Her family history includes Bronchiolitis in her mother  She  reports that she has been smoking cigarettes  She has never used smokeless tobacco  She reports that she does not drink alcohol and does not use drugs    Current Outpatient Medications   Medication Sig Dispense Refill   • albuterol (PROVENTIL HFA,VENTOLIN HFA) 90 mcg/act inhaler Inhale 2 puffs every 4 (four) hours as needed for wheezing 54 g 3   • ALPRAZolam (XANAX) 0 25 mg tablet Take 1 tablet (0 25 mg total) by mouth daily as needed for anxiety 30 tablet 0   • budesonide-formoterol (Symbicort) 160-4 5 mcg/act inhaler Inhale 2 puffs 2 (two) times a day 160-4 50MCG/ACTUATION 30 6 g 3   • Cholecalciferol (VITAMIN D3) 2000 units CHEW Chew 5,000 Units daily     • Choline Fenofibrate (Fenofibric Acid) 135 MG CPDR 1 cap daily 90 capsule 3   • cloNIDine (CATAPRES) 0 1 mg tablet TAKE 1 TABLET BY MOUTH TWICE A  tablet 0   • esomeprazole (NexIUM) 40 MG capsule Take 40 mg by mouth in the morning     • fexofenadine (ALLEGRA) 180 MG tablet Take 180 mg by mouth as needed     • fluticasone (FLONASE) 50 mcg/act nasal spray SPRAY 1 SPRAY INTO EACH NOSTRIL EVERY DAY 48 mL 1   • hydroxychloroquine (PLAQUENIL) 200 mg tablet Take 200 mg by mouth 2 (two) times a day with meals     • levalbuterol (XOPENEX) 1 25 mg/3 mL nebulizer solution      • magnesium oxide (MAG-OX) 400 mg tablet Take 1 tablet by mouth in the morning     • oxyCODONE (ROXICODONE) 10 MG TABS      • promethazine-dextromethorphan (PHENERGAN-DM) 6 25-15 mg/5 mL oral syrup TAKE 5 MILLILITERS BY MOUTH 4 TIMES A DAY AS NEEDED FOR COUGH     • levofloxacin (LEVAQUIN) 750 mg tablet TAKE 1 TABLET BY MOUTH EVERY DAY FOR 10 DAYS (Patient not taking: Reported on 1/17/2023)     • promethazine-codeine (PHENERGAN WITH CODEINE) 6 25-10 mg/5 mL syrup  (Patient not taking: Reported on 1/17/2023)     • traMADol (Ultram) 50 mg tablet Take 1 tablet (50 mg total) by mouth 2 (two) times a day as needed for severe pain (Patient not taking: Reported on 1/17/2023) 60 tablet 1     No current facility-administered medications for this visit       Current Outpatient Medications on File Prior to Visit   Medication Sig   • albuterol (PROVENTIL HFA,VENTOLIN HFA) 90 mcg/act inhaler Inhale 2 puffs every 4 (four) hours as needed for wheezing   • ALPRAZolam (XANAX) 0 25 mg tablet Take 1 tablet (0 25 mg total) by mouth daily as needed for anxiety   • budesonide-formoterol (Symbicort) 160-4 5 mcg/act inhaler Inhale 2 puffs 2 (two) times a day 160-4 50MCG/ACTUATION   • Cholecalciferol (VITAMIN D3) 2000 units CHEW Chew 5,000 Units daily   • Choline Fenofibrate (Fenofibric Acid) 135 MG CPDR 1 cap daily   • cloNIDine (CATAPRES) 0 1 mg tablet TAKE 1 TABLET BY MOUTH TWICE A DAY   • esomeprazole (NexIUM) 40 MG capsule Take 40 mg by mouth in the morning   • fexofenadine (ALLEGRA) 180 MG tablet Take 180 mg by mouth as needed   • fluticasone (FLONASE) 50 mcg/act nasal spray SPRAY 1 SPRAY INTO EACH NOSTRIL EVERY DAY   • hydroxychloroquine (PLAQUENIL) 200 mg tablet Take 200 mg by mouth 2 (two) times a day with meals   • levalbuterol (XOPENEX) 1 25 mg/3 mL nebulizer solution    • magnesium oxide (MAG-OX) 400 mg tablet Take 1 tablet by mouth in the morning   • oxyCODONE (ROXICODONE) 10 MG TABS    • promethazine-dextromethorphan (PHENERGAN-DM) 6 25-15 mg/5 mL oral syrup TAKE 5 MILLILITERS BY MOUTH 4 TIMES A DAY AS NEEDED FOR COUGH   • levofloxacin (LEVAQUIN) 750 mg tablet TAKE 1 TABLET BY MOUTH EVERY DAY FOR 10 DAYS (Patient not taking: Reported on 1/17/2023)   • promethazine-codeine (PHENERGAN WITH CODEINE) 6 25-10 mg/5 mL syrup  (Patient not taking: Reported on 1/17/2023)   • traMADol (Ultram) 50 mg tablet Take 1 tablet (50 mg total) by mouth 2 (two) times a day as needed for severe pain (Patient not taking: Reported on 1/17/2023)     No current facility-administered medications on file prior to visit  She is allergic to aspirin, iodine - food allergy, penicillins, soybean oil - food allergy, and statins            Objective:    Blood pressure 126/78, pulse 83, temperature (!) 97 2 °F (36 2 °C), height 5' 3" (1 6 m), weight 71 2 kg (157 lb)  Physical Exam  Vitals reviewed  Constitutional:       Appearance: Normal appearance  HENT:      Head: Normocephalic and atraumatic  Mouth/Throat:      Mouth: Mucous membranes are moist    Eyes:      General: Lids are normal       Extraocular Movements: Extraocular movements intact  Conjunctiva/sclera: Conjunctivae normal       Pupils: Pupils are equal, round, and reactive to light  Cardiovascular:      Rate and Rhythm: Normal rate  Pulmonary:      Effort: Pulmonary effort is normal    Musculoskeletal:         General: Normal range of motion  Right lower leg: No edema  Left lower leg: No edema  Skin:     General: Skin is warm  Neurological:      Coordination: Romberg sign negative  Deep Tendon Reflexes:      Reflex Scores:       Tricep reflexes are 2+ on the right side and 2+ on the left side  Bicep reflexes are 2+ on the right side and 2+ on the left side  Brachioradialis reflexes are 2+ on the right side and 2+ on the left side  Patellar reflexes are 2+ on the right side and 2+ on the left side  Achilles reflexes are 2+ on the right side and 2+ on the left side  Psychiatric:         Mood and Affect: Mood normal          Behavior: Behavior normal          Thought Content:  Thought content normal          Judgment: Judgment normal          Neurological Exam  Mental Status  Awake, alert and oriented to person, place and time  Cranial Nerves  CN II: Visual acuity is normal  Visual fields full to confrontation  CN III, IV, VI: Extraocular movements intact bilaterally  Normal lids and orbits bilaterally  Pupils equal round and reactive to light bilaterally  CN V: Facial sensation is normal   CN VII: Full and symmetric facial movement  CN VIII: Hearing is normal   CN IX, X: Palate elevates symmetrically  Normal gag reflex  CN XI: Shoulder shrug strength is normal   CN XII: Tongue midline without atrophy or fasciculations  Motor  Normal muscle bulk throughout  No fasciculations present  Normal muscle tone  No abnormal involuntary movements  Strength 4/5 throughout   Sensory  Light touch abnormality: Decreased sensation on the L versus the R  Reflexes                                            Right                      Left  Brachioradialis                    2+                         2+  Biceps                                 2+                         2+  Triceps                                2+                         2+  Patellar                                2+                         2+  Achilles                                2+                         2+    Coordination  Right: Finger-to-nose normal  Rapid alternating movement normal Left: Finger-to-nose normal  Rapid alternating movement normal     Gait  Casual gait is normal including stance, stride, and arm swing  Romberg is absent  ROS:    Review of Systems   Constitutional: Negative  Negative for appetite change and fever  HENT: Negative  Negative for hearing loss, tinnitus, trouble swallowing and voice change  Eyes: Negative  Negative for photophobia, pain and visual disturbance  Respiratory: Negative  Negative for shortness of breath  Cardiovascular: Negative  Negative for palpitations  Gastrointestinal: Negative  Negative for nausea and vomiting  Endocrine: Negative  Negative for cold intolerance  Genitourinary: Negative  Negative for dysuria, frequency and urgency  Musculoskeletal: Negative  Negative for gait problem, myalgias and neck pain  Skin: Negative  Negative for rash  Allergic/Immunologic: Negative  Neurological: Negative for dizziness, tremors, seizures, syncope, facial asymmetry, speech difficulty, weakness, light-headedness, numbness and headaches  Hematological: Negative  Does not bruise/bleed easily  Psychiatric/Behavioral: Negative  Negative for confusion, hallucinations and sleep disturbance  All other systems reviewed and are negative

## 2023-01-24 ENCOUNTER — HOSPITAL ENCOUNTER (OUTPATIENT)
Dept: RADIOLOGY | Facility: CLINIC | Age: 63
Discharge: HOME | End: 2023-01-24
Admitting: ANESTHESIOLOGY

## 2023-01-24 VITALS
TEMPERATURE: 97.1 F | DIASTOLIC BLOOD PRESSURE: 63 MMHG | HEART RATE: 90 BPM | RESPIRATION RATE: 18 BRPM | OXYGEN SATURATION: 98 % | SYSTOLIC BLOOD PRESSURE: 140 MMHG

## 2023-01-24 DIAGNOSIS — M25.551 RIGHT HIP PAIN: ICD-10-CM

## 2023-01-24 RX ORDER — LIDOCAINE HYDROCHLORIDE 10 MG/ML
3 INJECTION, SOLUTION EPIDURAL; INFILTRATION; INTRACAUDAL; PERINEURAL ONCE
Status: COMPLETED | OUTPATIENT
Start: 2023-01-24 | End: 2023-01-24

## 2023-01-24 RX ORDER — METHYLPREDNISOLONE ACETATE 40 MG/ML
40 INJECTION, SUSPENSION INTRA-ARTICULAR; INTRALESIONAL; INTRAMUSCULAR; PARENTERAL; SOFT TISSUE ONCE
Status: COMPLETED | OUTPATIENT
Start: 2023-01-24 | End: 2023-01-24

## 2023-01-24 RX ORDER — BUPIVACAINE HCL/PF 2.5 MG/ML
3 VIAL (ML) INJECTION ONCE
Status: COMPLETED | OUTPATIENT
Start: 2023-01-24 | End: 2023-01-24

## 2023-01-24 RX ADMIN — BUPIVACAINE HYDROCHLORIDE 3 ML: 2.5 INJECTION, SOLUTION EPIDURAL; INFILTRATION; INTRACAUDAL at 10:43

## 2023-01-24 RX ADMIN — METHYLPREDNISOLONE ACETATE 40 MG: 40 INJECTION, SUSPENSION INTRA-ARTICULAR; INTRALESIONAL; INTRAMUSCULAR; SOFT TISSUE at 10:43

## 2023-01-24 RX ADMIN — GADOBUTROL 1 ML: 604.72 INJECTION INTRAVENOUS at 10:43

## 2023-01-24 RX ADMIN — LIDOCAINE HYDROCHLORIDE 3 ML: 10 INJECTION, SOLUTION EPIDURAL; INFILTRATION; INTRACAUDAL; PERINEURAL at 10:42

## 2023-01-24 NOTE — DISCHARGE INSTRUCTIONS
Steroid Joint Injection   WHAT YOU NEED TO KNOW:   A steroid joint injection is a procedure to inject steroid medicine into a joint  Steroid medicine decreases pain and inflammation  The injection may also contain an anesthetic (numbing medicine) to decrease pain  It may be done to treat conditions such as arthritis, gout, or carpal tunnel syndrome  The injections may be given in your knee, ankle, shoulder, elbow, wrist, ankle or sacroiliac joint  Do not apply heat to any area that is numb  If you have discomfort or soreness at the injection site, you may apply ice today, 20 minutes on and 20 minutes off  Tomorrow you may use ice or warm, moist heat  Do not apply ice or heat directly to the skin  You may have an increase or change in the discomfort for 36-48 hours after your treatment  Apply ice and continue with any pain medicine you have been prescribed  Do not do anything strenuous today  You may shower, but no tub baths or hot tubs today  You may resume your normal activities tomorrow, but do not “overdo it”  Resume normal activities slowly when you are feeling better  If you experience redness, drainage or swelling at the injection site, or if you develop a fever above 100 degrees, please call [facility] at [phone] or go to the Emergency Room  Continue to take all routine medicines prescribed by your primary care physician unless otherwise instructed by our staff  Most blood thinners should be started again according to your regularly scheduled dosing  If you have any questions, please give our office a call  As no general anesthesia was used in today's procedure, you should not experience any side effects related to anesthesia  If you are diabetic, the steroids used in today's injection may temporarily increase your blood sugar levels after the first few days after your injection   Please keep a close eye on your sugars and alert the doctor who manages your diabetes if your sugars are significantly high from your baseline or you are symptomatic  If you have a problem specifically related to your procedure, please call our office at [phone]  Problems not related to your procedure should be directed to your primary care physician

## 2023-01-24 NOTE — H&P
History of Present Illness: The patient is a 57 y o  female who presents with complaints of 5hip pain      Past Medical History:   Diagnosis Date   • Arthritis    • Asthma    • Avascular necrosis of bone of hip, right (HCC)    • Avascular necrosis of hip, right (HCC) 10/3/2017   • Chronic obstructive pulmonary disease (HCC) 2/12/2019   • COPD (chronic obstructive pulmonary disease) ([facility])    • GERD (gastroesophageal reflux disease)    • Hypoglycemia    • Infectious viral hepatitis    • Lumbar radicular pain    • Lyme disease    • 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[facility] [address] )        Past Surgical History:   Procedure Laterality Date   • APPENDECTOMY     • CARPAL TUNNEL RELEASE     • CHOLECYSTECTOMY     • SINUS SURGERY           Current Outpatient Medications:   •  albuterol (PROVENTIL HFA,VENTOLIN HFA) 90 mcg/act inhaler, Inhale 2 puffs every 4 (four) hours as needed for wheezing, Disp: 54 g, Rfl: 3  •  ALPRAZolam (XANAX) 0 25 mg tablet, Take 1 tablet (0 25 mg total) by mouth daily as needed for anxiety, Disp: 30 tablet, Rfl: 0  •  budesonide-formoterol (Symbicort) 160-4 5 mcg/act inhaler, Inhale 2 puffs 2 (two) times a day 160-4 50MCG/ACTUATION, Disp: 30 6 g, Rfl: 3  •  Cholecalciferol (VITAMIN D3) 2000 units CHEW, Chew 5,000 Units daily, Disp: , Rfl:   •  Choline Fenofibrate (Fenofibric Acid) 135 MG CPDR, 1 cap daily, Disp: 90 capsule, Rfl: 3  •  cloNIDine (CATAPRES) 0 1 mg tablet, TAKE 1 TABLET BY MOUTH TWICE A DAY, Disp: 180 tablet, Rfl: 0  •  esomeprazole (NexIUM) 40 MG capsule, Take 40 mg by mouth in the morning, Disp: , Rfl:   •  fexofenadine (ALLEGRA) 180 MG tablet, Take 180 mg by mouth as needed, Disp: , Rfl:   •  fluticasone (FLONASE) 50 mcg/act nasal spray, SPRAY 1 SPRAY INTO EACH NOSTRIL EVERY DAY, Disp: 48 mL, Rfl: 1  •  hydroxychloroquine (PLAQUENIL) 200 mg tablet, Take 200 mg by mouth 2 (two) times a day with meals, Disp: , Rfl:   •  levalbuterol (XOPENEX) 1 25 mg/3 mL nebulizer solution, , Disp: , Rfl:   • levofloxacin (LEVAQUIN) 750 mg tablet, TAKE 1 TABLET BY MOUTH EVERY DAY FOR 10 DAYS (Patient not taking: Reported on 1/17/2023), Disp: , Rfl:   •  magnesium oxide (MAG-OX) 400 mg tablet, Take 1 tablet by mouth in the morning, Disp: , Rfl:   •  oxyCODONE (ROXICODONE) 10 MG TABS, , Disp: , Rfl:   •  promethazine-codeine (PHENERGAN WITH CODEINE) 6 25-10 mg/5 mL syrup, , Disp: , Rfl:   •  promethazine-dextromethorphan (PHENERGAN-DM) 6 25-15 mg/5 mL oral syrup, TAKE 5 MILLILITERS BY MOUTH 4 TIMES A DAY AS NEEDED FOR COUGH, Disp: , Rfl:   •  traMADol (Ultram) 50 mg tablet, Take 1 tablet (50 mg total) by mouth 2 (two) times a day as needed for severe pain (Patient not taking: Reported on 1/17/2023), Disp: 60 tablet, Rfl: 1    Allergies   Allergen Reactions   • Aspirin Anaphylaxis   • Iodine - Food Allergy Anaphylaxis   • Penicillins Anaphylaxis   • Soybean Oil - Food Allergy Other (See Comments)   • Statins Myalgia       Physical Exam:   Vitals:    01/24/23 1020   BP: 130/86   Pulse: 87   Resp: 20   Temp: (!) 97 1 °F (36 2 °C)   SpO2: 95%     General: Awake, Alert, Oriented x 3, Mood and affect appropriate  Respiratory: Respirations even and unlabored  Cardiovascular: Peripheral pulses intact; no edema  Musculoskeletal Exam: Antalgic gait    ASA Score: 3    Patient/Chart Verification  Patient ID Verified: Verbal  ID Band Applied: No  Consents Confirmed: Procedural, To be obtained in the Pre-Procedure area  Interval H&P(within 24 hr) Complete (required for Outpatients and Surgery Admit only): To be obtained in the Pre-Procedure area  Allergies Reviewed: Yes  Anticoag/NSAID held?: NA  Currently on antibiotics?: No    Assessment:   1   Right hip pain        Plan: Right intra-aritcular hip injection

## 2023-01-31 ENCOUNTER — TELEPHONE (OUTPATIENT)
Dept: PAIN MEDICINE | Facility: CLINIC | Age: 63
End: 2023-01-31

## 2023-02-01 ENCOUNTER — TELEPHONE (OUTPATIENT)
Dept: CARDIOLOGY CLINIC | Facility: CLINIC | Age: 63
End: 2023-02-01

## 2023-02-01 ENCOUNTER — HOSPITAL ENCOUNTER (OUTPATIENT)
Dept: NON INVASIVE DIAGNOSTICS | Facility: CLINIC | Age: 63
Discharge: HOME | End: 2023-02-01

## 2023-02-01 VITALS
HEART RATE: 72 BPM | WEIGHT: 157 LBS | BODY MASS INDEX: 27.82 KG/M2 | HEIGHT: 63 IN | SYSTOLIC BLOOD PRESSURE: 166 MMHG | OXYGEN SATURATION: 98 % | DIASTOLIC BLOOD PRESSURE: 88 MMHG

## 2023-02-01 DIAGNOSIS — R06.02 SHORTNESS OF BREATH: ICD-10-CM

## 2023-02-01 LAB
NUC STRESS EJECTION FRACTION: 70 %
RATE PRESSURE PRODUCT: NORMAL
SL CV REST NUCLEAR ISOTOPE DOSE: 10 MCI
SL CV STRESS NUCLEAR ISOTOPE DOSE: 32.2 MCI
SL CV STRESS RECOVERY BP: NORMAL MMHG
SL CV STRESS RECOVERY HR: 94 BPM
SL CV STRESS RECOVERY O2 SAT: 98 %
STRESS ANGINA INDEX: 0
STRESS BASELINE BP: NORMAL MMHG
STRESS BASELINE HR: 72 BPM
STRESS O2 SAT REST: 98 %
STRESS PEAK HR: 122 BPM
STRESS POST O2 SAT PEAK: 99 %
STRESS POST PEAK BP: 194 MMHG

## 2023-02-01 RX ADMIN — REGADENOSON 0.4 MG: 0.08 INJECTION, SOLUTION INTRAVENOUS at 13:12

## 2023-02-01 NOTE — TELEPHONE ENCOUNTER
----- Message from Angel Guillen MD sent at 2/1/2023  3:25 PM EST -----  Please call the patient  Stress test overall good   To continue present medications and report any symptoms out of the ordinary

## 2023-02-02 LAB
CHEST PAIN STATEMENT: NORMAL
MAX DIASTOLIC BP: 66 MMHG
MAX HEART RATE: 122 BPM
MAX PREDICTED HEART RATE: 156 BPM
MAX. SYSTOLIC BP: 194 MMHG
PROTOCOL NAME: NORMAL
REASON FOR TERMINATION: NORMAL
TARGET HR FORMULA: NORMAL
TEST INDICATION: NORMAL
TIME IN EXERCISE PHASE: NORMAL

## 2023-02-24 ENCOUNTER — TELEPHONE (OUTPATIENT)
Dept: PSYCHIATRY | Facility: CLINIC | Age: 63
End: 2023-02-24

## 2023-02-24 NOTE — TELEPHONE ENCOUNTER
Spoke to [facility] regarding her referral for neuropsychology  At this time, we will be deferring her referral until after her next appt with neurology  She would like to wait and speak with her neurologist to get a better understanding of why this referral was created as she does not know about it

## 2023-03-29 ENCOUNTER — APPOINTMENT (OUTPATIENT)
Dept: LAB | Facility: CLINIC | Age: 63
End: 2023-03-29

## 2023-03-29 ENCOUNTER — OFFICE VISIT (OUTPATIENT)
Dept: INTERNAL MEDICINE CLINIC | Facility: CLINIC | Age: 63
End: 2023-03-29

## 2023-03-29 VITALS
TEMPERATURE: 97 F | HEIGHT: 63 IN | DIASTOLIC BLOOD PRESSURE: 70 MMHG | OXYGEN SATURATION: 93 % | HEART RATE: 93 BPM | SYSTOLIC BLOOD PRESSURE: 130 MMHG | BODY MASS INDEX: 27.81 KG/M2

## 2023-03-29 DIAGNOSIS — G62.9 NEUROPATHY: ICD-10-CM

## 2023-03-29 DIAGNOSIS — E78.49 OTHER HYPERLIPIDEMIA: ICD-10-CM

## 2023-03-29 DIAGNOSIS — J45.41 MODERATE PERSISTENT ASTHMA WITH ACUTE EXACERBATION: ICD-10-CM

## 2023-03-29 DIAGNOSIS — M06.9 RHEUMATOID ARTHRITIS INVOLVING MULTIPLE SITES, UNSPECIFIED WHETHER RHEUMATOID FACTOR PRESENT (HCC): ICD-10-CM

## 2023-03-29 DIAGNOSIS — G43.009 MIGRAINE WITHOUT AURA AND WITHOUT STATUS MIGRAINOSUS, NOT INTRACTABLE: ICD-10-CM

## 2023-03-29 DIAGNOSIS — K21.9 GASTRO-ESOPHAGEAL REFLUX DISEASE WITHOUT ESOPHAGITIS: ICD-10-CM

## 2023-03-29 DIAGNOSIS — Z13.29 SCREENING FOR THYROID DISORDER: ICD-10-CM

## 2023-03-29 DIAGNOSIS — J45.41 MODERATE PERSISTENT ASTHMA WITH ACUTE EXACERBATION: Primary | ICD-10-CM

## 2023-03-29 DIAGNOSIS — I10 ESSENTIAL HYPERTENSION: ICD-10-CM

## 2023-03-29 DIAGNOSIS — F41.8 SITUATIONAL ANXIETY: ICD-10-CM

## 2023-03-29 DIAGNOSIS — R41.3 MEMORY DEFICIT: ICD-10-CM

## 2023-03-29 DIAGNOSIS — M54.81 BILATERAL OCCIPITAL NEURALGIA: ICD-10-CM

## 2023-03-29 DIAGNOSIS — N64.4 BREAST PAIN, LEFT: ICD-10-CM

## 2023-03-29 LAB
ALBUMIN SERPL BCP-MCNC: 4.4 G/DL (ref 3.5–5)
ALP SERPL-CCNC: 40 U/L (ref 34–104)
ALT SERPL W P-5'-P-CCNC: 25 U/L (ref 7–52)
ANION GAP SERPL CALCULATED.3IONS-SCNC: 7 MMOL/L (ref 4–13)
AST SERPL W P-5'-P-CCNC: 22 U/L (ref 13–39)
BASOPHILS # BLD AUTO: 0.09 THOUSANDS/ÂΜL (ref 0–0.1)
BASOPHILS NFR BLD AUTO: 1 % (ref 0–1)
BILIRUB SERPL-MCNC: 0.58 MG/DL (ref 0.2–1)
BUN SERPL-MCNC: 15 MG/DL (ref 5–25)
CALCIUM SERPL-MCNC: 9.5 MG/DL (ref 8.4–10.2)
CHLORIDE SERPL-SCNC: 107 MMOL/L (ref 96–108)
CHOLEST SERPL-MCNC: 167 MG/DL
CO2 SERPL-SCNC: 26 MMOL/L (ref 21–32)
CREAT SERPL-MCNC: 0.77 MG/DL (ref 0.6–1.3)
EOSINOPHIL # BLD AUTO: 0.23 THOUSAND/ÂΜL (ref 0–0.61)
EOSINOPHIL NFR BLD AUTO: 4 % (ref 0–6)
ERYTHROCYTE [DISTWIDTH] IN BLOOD BY AUTOMATED COUNT: 13.7 % (ref 11.6–15.1)
FOLATE SERPL-MCNC: 15.1 NG/ML (ref 3.1–17.5)
GFR SERPL CREATININE-BSD FRML MDRD: 81 ML/MIN/1.73SQ M
GLUCOSE P FAST SERPL-MCNC: 84 MG/DL (ref 65–99)
HCT VFR BLD AUTO: 45.8 % (ref 34.8–46.1)
HDLC SERPL-MCNC: 55 MG/DL
HGB BLD-MCNC: 14.7 G/DL (ref 11.5–15.4)
IMM GRANULOCYTES # BLD AUTO: 0.02 THOUSAND/UL (ref 0–0.2)
IMM GRANULOCYTES NFR BLD AUTO: 0 % (ref 0–2)
LDLC SERPL CALC-MCNC: 95 MG/DL (ref 0–100)
LYMPHOCYTES # BLD AUTO: 2.27 THOUSANDS/ÂΜL (ref 0.6–4.47)
LYMPHOCYTES NFR BLD AUTO: 34 % (ref 14–44)
MCH RBC QN AUTO: 29.7 PG (ref 26.8–34.3)
MCHC RBC AUTO-ENTMCNC: 32.1 G/DL (ref 31.4–37.4)
MCV RBC AUTO: 93 FL (ref 82–98)
MONOCYTES # BLD AUTO: 0.67 THOUSAND/ÂΜL (ref 0.17–1.22)
MONOCYTES NFR BLD AUTO: 10 % (ref 4–12)
NEUTROPHILS # BLD AUTO: 3.32 THOUSANDS/ÂΜL (ref 1.85–7.62)
NEUTS SEG NFR BLD AUTO: 51 % (ref 43–75)
NRBC BLD AUTO-RTO: 0 /100 WBCS
PLATELET # BLD AUTO: 349 THOUSANDS/UL (ref 149–390)
PMV BLD AUTO: 10.4 FL (ref 8.9–12.7)
POTASSIUM SERPL-SCNC: 4 MMOL/L (ref 3.5–5.3)
PROT SERPL-MCNC: 6.9 G/DL (ref 6.4–8.4)
RBC # BLD AUTO: 4.95 MILLION/UL (ref 3.81–5.12)
SODIUM SERPL-SCNC: 140 MMOL/L (ref 135–147)
TRIGL SERPL-MCNC: 86 MG/DL
TSH SERPL DL<=0.05 MIU/L-ACNC: 1.62 UIU/ML (ref 0.45–4.5)
VIT B12 SERPL-MCNC: 373 PG/ML (ref 100–900)
WBC # BLD AUTO: 6.6 THOUSAND/UL (ref 4.31–10.16)

## 2023-03-29 RX ORDER — ALPRAZOLAM 0.25 MG/1
0.25 TABLET ORAL DAILY PRN
Qty: 30 TABLET | Refills: 0 | Status: SHIPPED | OUTPATIENT
Start: 2023-03-29

## 2023-03-29 NOTE — PROGRESS NOTES
Name: Harish Zafar      : 1960      MRN: [id number]  Encounter Provider: ALETHEA Smart  Encounter Date: 3/29/2023   Encounter department: [facility]     1  Moderate persistent asthma with acute exacerbation  Assessment & Plan:  Per patient with recent pneumonia  Finished antibiotics and steroids  Symptoms are improving  On symbicort and xopenex  Orders:  -     CBC and differential; Future    2  Gastro-esophageal reflux disease without esophagitis  Assessment & Plan: On a PPI daily  3  Essential hypertension  Assessment & Plan:  Stable  On clonidine       4  Other hyperlipidemia  Assessment & Plan: On fenofibrate  Due for labs    Orders:  -     Comprehensive metabolic panel; Future  -     Lipid Panel with Direct LDL reflex; Future    5  Bilateral occipital neuralgia  Assessment & Plan:  Sees neurology  Takes oxycodone as needed  6  Screening for thyroid disorder  -     TSH, 3rd generation with Free T4 reflex; Future    7  Rheumatoid arthritis involving multiple sites, unspecified whether rheumatoid factor present [facility])  Assessment & Plan:  Stable  ?unsure if she's still on plaquenil  Takes oxycodone as needed  Sees rheumatology       8  Breast pain, left  -     US breast left limited (diagnostic); Future; Expected date: 2023    9  Situational anxiety  Assessment & Plan:  Stable  Takes xanax rarely  Refilled today, PDMP appropriate  Advised to avoid taking with oxycodone  Orders:  -     ALPRAZolam (XANAX) 0 25 mg tablet; Take 1 tablet (0 25 mg total) by mouth daily as needed for anxiety    10  Migraine without aura and without status migrainosus, not intractable  Assessment & Plan:  Sees neurology  Jacquelin      Debi Perez is here today for follow up  She is doing ok  Her asthma has been worse since the start of spring  She is using albuterol twice daily if she's outside   She has been seeing her pulmonologist      She continues to have back and hip pain  It is controlled with oxycodone  She had no relief with PT  She did not get relief with hip injections  She is looking at a right hip replacement  She gets her migraines twice a week  Oxycodone helps with her migraines as well  Blood pressures at home run around 130/70's    She has increased reflux with certain foods  She has had left axillary/breast pain for months  She is agreeable to an ultrasound but does not want a mammogram  She has not felt any lumps or noticed any skin changes in her breast      Review of Systems   Constitutional: Negative for activity change, appetite change, fatigue and unexpected weight change  Eyes: Negative for visual disturbance  Respiratory: Negative for cough and shortness of breath  Cardiovascular: Negative for chest pain, palpitations and leg swelling  Gastrointestinal: Negative for abdominal pain, blood in stool, constipation and diarrhea  Genitourinary: Negative for difficulty urinating  Musculoskeletal: Positive for arthralgias and back pain  Skin: Negative for rash  Neurological: Negative for dizziness, light-headedness and headaches  Psychiatric/Behavioral: Negative for sleep disturbance  The patient is not nervous/anxious          Current Outpatient Medications on File Prior to Visit   Medication Sig   • albuterol (PROVENTIL HFA,VENTOLIN HFA) 90 mcg/act inhaler Inhale 2 puffs every 4 (four) hours as needed for wheezing   • budesonide-formoterol (Symbicort) 160-4 5 mcg/act inhaler Inhale 2 puffs 2 (two) times a day 160-4 50MCG/ACTUATION   • Cholecalciferol (VITAMIN D3) 2000 units CHEW Chew 5,000 Units daily   • Choline Fenofibrate (Fenofibric Acid) 135 MG CPDR 1 cap daily   • cloNIDine (CATAPRES) 0 1 mg tablet TAKE 1 TABLET BY MOUTH TWICE A DAY   • esomeprazole (NexIUM) 40 MG capsule Take 40 mg by mouth in the morning   • fexofenadine (ALLEGRA) 180 MG tablet Take 180 mg by mouth as needed "  • fluticasone (FLONASE) 50 mcg/act nasal spray SPRAY 1 SPRAY INTO EACH NOSTRIL EVERY DAY   • hydroxychloroquine (PLAQUENIL) 200 mg tablet Take 200 mg by mouth 2 (two) times a day with meals   • levalbuterol (XOPENEX) 1 25 mg/3 mL nebulizer solution    • magnesium oxide (MAG-OX) 400 mg tablet Take 1 tablet by mouth in the morning   • oxyCODONE (ROXICODONE) 10 MG TABS    • [DISCONTINUED] ALPRAZolam (XANAX) 0 25 mg tablet Take 1 tablet (0 25 mg total) by mouth daily as needed for anxiety   • [DISCONTINUED] levofloxacin (LEVAQUIN) 750 mg tablet TAKE 1 TABLET BY MOUTH EVERY DAY FOR 10 DAYS (Patient not taking: Reported on 1/17/2023)   • [DISCONTINUED] promethazine-codeine (PHENERGAN WITH CODEINE) 6 25-10 mg/5 mL syrup  (Patient not taking: Reported on 1/17/2023)   • [DISCONTINUED] promethazine-dextromethorphan (PHENERGAN-DM) 6 25-15 mg/5 mL oral syrup TAKE 5 MILLILITERS BY MOUTH 4 TIMES A DAY AS NEEDED FOR COUGH   • [DISCONTINUED] traMADol (Ultram) 50 mg tablet Take 1 tablet (50 mg total) by mouth 2 (two) times a day as needed for severe pain (Patient not taking: Reported on 1/17/2023)       Objective     /70   Pulse 93   Temp (!) 97 °F (36 1 °C)   Ht 5' 3\" (1 6 m)   SpO2 93%   BMI 27 81 kg/m²     Physical Exam  Vitals reviewed  Constitutional:       Appearance: Normal appearance  HENT:      Head: Normocephalic and atraumatic  Eyes:      Conjunctiva/sclera: Conjunctivae normal    Cardiovascular:      Rate and Rhythm: Normal rate and regular rhythm  Heart sounds: Normal heart sounds  Pulmonary:      Effort: Pulmonary effort is normal       Breath sounds: Normal breath sounds  Musculoskeletal:         General: Normal range of motion  Right lower leg: No edema  Left lower leg: No edema  Skin:     General: Skin is warm and dry  Neurological:      Mental Status: She is alert and oriented to person, place, and time     Psychiatric:         Mood and Affect: Mood normal          Behavior: " Behavior normal        ALETHEA Patel

## 2023-03-29 NOTE — ASSESSMENT & PLAN NOTE
Per patient with recent pneumonia  Finished antibiotics and steroids  Symptoms are improving  On symbicort and xopenex

## 2023-03-29 NOTE — ASSESSMENT & PLAN NOTE
Stable  Takes xanax rarely  Refilled today, PDMP appropriate  Advised to avoid taking with oxycodone

## 2023-03-30 DIAGNOSIS — I10 ESSENTIAL HYPERTENSION: ICD-10-CM

## 2023-03-30 LAB — B BURGDOR IGG+IGM SER-ACNC: 4.7 AI

## 2023-03-30 RX ORDER — CLONIDINE HYDROCHLORIDE 0.1 MG/1
TABLET ORAL
Qty: 180 TABLET | Refills: 0 | Status: SHIPPED | OUTPATIENT
Start: 2023-03-30

## 2023-03-31 ENCOUNTER — TELEPHONE (OUTPATIENT)
Dept: NEUROLOGY | Facility: CLINIC | Age: 63
End: 2023-03-31

## 2023-03-31 LAB

## 2023-03-31 NOTE — TELEPHONE ENCOUNTER
Spoke to patient  She states she has a history of Lyme's disease and test is always positive  Will await additional labs

## 2023-03-31 NOTE — TELEPHONE ENCOUNTER
Erick Hernandez MD  P Neurology [facility] Clinical Team 3  Let pt know lyme western blot negative

## 2023-03-31 NOTE — TELEPHONE ENCOUNTER
----- Message from Carissa Fernandez MD sent at 3/30/2023  8:41 AM EDT -----  Let pt know screening lyme ab testing postive  Sample sent for western blot  Please have pt call back within one week for final results

## 2023-04-01 LAB — VIT B6 SERPL-MCNC: 7.1 UG/L (ref 3.4–65.2)

## 2023-04-04 ENCOUNTER — OFFICE VISIT (OUTPATIENT)
Dept: NEUROLOGY | Facility: CLINIC | Age: 63
End: 2023-04-04

## 2023-04-04 VITALS
SYSTOLIC BLOOD PRESSURE: 140 MMHG | HEIGHT: 63 IN | HEART RATE: 81 BPM | BODY MASS INDEX: 27.82 KG/M2 | DIASTOLIC BLOOD PRESSURE: 66 MMHG | WEIGHT: 157 LBS

## 2023-04-04 DIAGNOSIS — H04.123 DRY EYES: ICD-10-CM

## 2023-04-04 DIAGNOSIS — R41.3 MEMORY IMPAIRMENT: Primary | ICD-10-CM

## 2023-04-04 DIAGNOSIS — M54.81 BILATERAL OCCIPITAL NEURALGIA: ICD-10-CM

## 2023-04-04 DIAGNOSIS — G62.9 NEUROPATHY: ICD-10-CM

## 2023-04-04 NOTE — PROGRESS NOTES
Patient ID: Jerrica Beaver is a 57 y o  female  Assessment/Plan:    Patient is a 57year old female who presents to the clinic for a follow-up in regards to memory impairment, bilateral occipital neuralgia, and neuropathy  Diagnoses and all orders for this visit:    Memory impairment  Patient states that her memory seems to be getting worse at times  [address] [place] [address] 18/30 previously on 5/31/22  -     Ambulatory referral to Neuropsychology; Future    Labs: B1 - 117 6, B6 - 7 1, B9 - 15 1, B12 - 373, Lyme Titers - negative  MOCA: 18/30  Previous score was also 18/30 about 1 year ago  Plan:   · Schedule appointment with Neuropsychology   · Monitor for worsening symptoms  · Call the office with any worsening symptoms  · No MRI NeuroQuant due to patient's anxiety      Bilateral occipital neuralgia  Patient states that her headaches are stable at the moment and she would not like to get any medications for it at the moment  She also states that she did not see pain management for the trigger point medications because she is not interested in it anymore  Plan:  · Monitor for worsening symptoms      Neuropathy  Patient states that she is still having the neuropathy and at times, it is bad  Once again, she is not interested in anymore medications  Will offer gabapentin if patient is receptive to medications possibly at the next visit  Plan:  · Monitor for worsening symptoms      Dry eyes  Patient states that she get worsening dry eyes with her allergies  All rheumatologic work-up has been done for the patient except for Sjogren's Antibodies as per Epic review  -     Sjogren's Antibodies; Future    Plan:  · Labwork  · Monitor for worsening symptoms  · Schedule eye exam  · Follow with PCP      The patient indicates understanding of these issues and agrees with the plan  Return in about 6 months (around 10/4/2023)  This patient case was discussed with Dr Lizeth Allen        Subjective:    HPI    Patient is a 57 year old female who presents to the clinic for a follow-up in regards to memory impairment  It was decided that at this visit patient was to receive a MOCA test  Patient has been following with us for bilateral occipatal neuralgia and cervical spinal stenosis previously  Since the patient was last seen in the office on 1/17/23, patient states that she has been okay  According to her, her memory has been worsening at times  Also, she states that she had pneumonia and she was sick for 6-7 weeks  She did receive medications from her PCP and did not need to be hospitalized at the time  She states that her allergies are really bad today even also irritates her dry and make them dry  Her last eye exam appointment was last year  Of note, patient states that she will not go to pain management because she is not interested in getting more medications  There are no other complaints at this time  The following portions of the patient's history were reviewed and updated as appropriate: She  has a past medical history of Arthritis, Asthma, Avascular necrosis of bone of hip, right ([address] ), Avascular necrosis of hip, right ([address] ) (10/3/2017), Chronic obstructive pulmonary disease ([address] ) (2/12/2019), COPD (chronic obstructive pulmonary disease) ([address] ), GERD (gastroesophageal reflux disease), Hypoglycemia, Infectious viral hepatitis, Lumbar radicular pain, Lyme disease, and 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[address] )       Patient Active Problem List    Diagnosis Date Noted   • Situational anxiety 03/29/2023   • Chronic pain syndrome 10/03/2022   • Memory impairment 05/31/2022   • Bilateral occipital neuralgia 05/31/2022   • Cervical spinal stenosis 03/11/2022   • Neuropathy 01/18/2022   • Migraine 12/20/2021   • Cervical spondylosis 11/12/2021   • Cervical radiculopathy 11/12/2021   • Chronic tension-type headache, not intractable 10/12/2021   • Primary osteoarthritis of left hip    • Other hyperlipidemia 03/22/2021   • Allergic rhinitis due to allergen 03/22/2021   • Essential hypertension 11/13/2020   • Rheumatoid arthritis involving multiple sites ([facility] ) 11/06/2020   • Gastro-esophageal reflux disease without esophagitis 03/12/2019   • Moderate persistent asthma with acute exacerbation 02/22/2018   • Lumbar radiculopathy 06/26/2017   • Lumbar spondylosis 06/26/2017   • Right hip pain 03/28/2017     She  has a past surgical history that includes Appendectomy; Carpal tunnel release; Cholecystectomy; and Sinus surgery  Her family history includes Bronchiolitis in her mother  She  reports that she has been smoking cigarettes  She has never used smokeless tobacco  She reports that she does not drink alcohol and does not use drugs       Current Outpatient Medications   Medication Sig Dispense Refill   • albuterol (PROVENTIL HFA,VENTOLIN HFA) 90 mcg/act inhaler Inhale 2 puffs every 4 (four) hours as needed for wheezing 54 g 3   • ALPRAZolam (XANAX) 0 25 mg tablet Take 1 tablet (0 25 mg total) by mouth daily as needed for anxiety 30 tablet 0   • budesonide-formoterol (Symbicort) 160-4 5 mcg/act inhaler Inhale 2 puffs 2 (two) times a day 160-4 50MCG/ACTUATION 30 6 g 3   • Cholecalciferol (VITAMIN D3) 2000 units CHEW Chew 5,000 Units daily     • Choline Fenofibrate (Fenofibric Acid) 135 MG CPDR 1 cap daily 90 capsule 3   • cloNIDine (CATAPRES) 0 1 mg tablet TAKE 1 TABLET BY MOUTH TWICE A  tablet 0   • esomeprazole (NexIUM) 40 MG capsule Take 40 mg by mouth in the morning     • fexofenadine (ALLEGRA) 180 MG tablet Take 180 mg by mouth daily     • fluticasone (FLONASE) 50 mcg/act nasal spray SPRAY 1 SPRAY INTO EACH NOSTRIL EVERY DAY 48 mL 1   • hydroxychloroquine (PLAQUENIL) 200 mg tablet Take 200 mg by mouth 2 (two) times a day with meals     • levalbuterol (XOPENEX) 1 25 mg/3 mL nebulizer solution      • magnesium oxide (MAG-OX) 400 mg tablet Take 1 tablet by mouth in the morning     • oxyCODONE (ROXICODONE) 10 MG TABS        No current "facility-administered medications for this visit  Current Outpatient Medications on File Prior to Visit   Medication Sig   • albuterol (PROVENTIL HFA,VENTOLIN HFA) 90 mcg/act inhaler Inhale 2 puffs every 4 (four) hours as needed for wheezing   • ALPRAZolam (XANAX) 0 25 mg tablet Take 1 tablet (0 25 mg total) by mouth daily as needed for anxiety   • budesonide-formoterol (Symbicort) 160-4 5 mcg/act inhaler Inhale 2 puffs 2 (two) times a day 160-4 50MCG/ACTUATION   • Cholecalciferol (VITAMIN D3) 2000 units CHEW Chew 5,000 Units daily   • Choline Fenofibrate (Fenofibric Acid) 135 MG CPDR 1 cap daily   • cloNIDine (CATAPRES) 0 1 mg tablet TAKE 1 TABLET BY MOUTH TWICE A DAY   • esomeprazole (NexIUM) 40 MG capsule Take 40 mg by mouth in the morning   • fexofenadine (ALLEGRA) 180 MG tablet Take 180 mg by mouth daily   • fluticasone (FLONASE) 50 mcg/act nasal spray SPRAY 1 SPRAY INTO EACH NOSTRIL EVERY DAY   • hydroxychloroquine (PLAQUENIL) 200 mg tablet Take 200 mg by mouth 2 (two) times a day with meals   • levalbuterol (XOPENEX) 1 25 mg/3 mL nebulizer solution    • magnesium oxide (MAG-OX) 400 mg tablet Take 1 tablet by mouth in the morning   • oxyCODONE (ROXICODONE) 10 MG TABS      No current facility-administered medications on file prior to visit  She is allergic to aspirin, iodine - food allergy, penicillins, soybean oil - food allergy, and statins            Objective:    Blood pressure 140/66, pulse 81, height 5' 3\" (1 6 m), weight 71 2 kg (157 lb)  Physical Exam  Vitals reviewed  Constitutional:       Appearance: Normal appearance  HENT:      Head: Normocephalic and atraumatic  Mouth/Throat:      Mouth: Mucous membranes are moist    Eyes:      General: Lids are normal       Extraocular Movements: Extraocular movements intact  Conjunctiva/sclera: Conjunctivae normal       Pupils: Pupils are equal, round, and reactive to light  Cardiovascular:      Rate and Rhythm: Normal rate     Pulmonary: " Effort: Pulmonary effort is normal    Musculoskeletal:         General: Normal range of motion  Skin:     General: Skin is warm  Neurological:      Coordination: Romberg sign positive  Deep Tendon Reflexes:      Reflex Scores:       Tricep reflexes are 2+ on the right side and 2+ on the left side  Bicep reflexes are 2+ on the right side and 2+ on the left side  Brachioradialis reflexes are 2+ on the right side and 2+ on the left side  Patellar reflexes are 2+ on the right side and 2+ on the left side  Psychiatric:         Mood and Affect: Mood normal          Speech: Speech normal          Behavior: Behavior normal          Neurological Exam  Mental Status  Awake, alert and oriented to person, place and time  Oriented to person, place and time  Recalls 1 of 3 objects immediately  Immediate recall: 1/5  Able to copy figure  Clock drawing is normal  Speech is normal  Language is fluent with no aphasia  Unable to perform serial calculations  and 3 backward  Fund of knowledge is abnormal     Cranial Nerves  CN II: Visual acuity is normal  Visual fields full to confrontation  CN III, IV, VI: Extraocular movements intact bilaterally  Normal lids and orbits bilaterally  Pupils equal round and reactive to light bilaterally  CN V: Facial sensation is normal   CN VII: Full and symmetric facial movement  CN VIII: Hearing is normal   CN IX, X: Palate elevates symmetrically  Normal gag reflex  CN XI: Shoulder shrug strength is normal   CN XII: Tongue midline without atrophy or fasciculations  Motor  Normal muscle bulk throughout  No fasciculations present  Normal muscle tone  No abnormal involuntary movements  All strength 4/5 throughout  Sensory  Light touch abnormality: Decreased sensation below the knee b/l       Reflexes                                            Right                      Left  Brachioradialis                    2+                         2+  Biceps 2+                         2+  Triceps                                2+                         2+  Patellar                                2+                         2+    Coordination  Right: Finger-to-nose normal  Rapid alternating movement normal Left: Finger-to-nose normal  Rapid alternating movement normal     Gait  Casual gait is normal including stance, stride, and arm swing  Toe walking abnormality: Can do with support due to back pain  Heel walking abnormality: Can do with support due to back pain  Romberg is present  Patient unsteady on her feet due to chronic back pain           ROS:    Review of Systems   Constitutional: Negative  Negative for appetite change and fever  HENT: Negative  Negative for hearing loss, tinnitus, trouble swallowing and voice change  Eyes: Negative  Negative for photophobia, pain and visual disturbance  Respiratory: Negative  Negative for shortness of breath  Cardiovascular: Negative  Negative for palpitations  Gastrointestinal: Negative  Negative for nausea and vomiting  Endocrine: Negative  Negative for cold intolerance  Genitourinary: Negative  Negative for dysuria, frequency and urgency  Musculoskeletal: Negative  Negative for gait problem, myalgias and neck pain  Skin: Negative  Negative for rash  Allergic/Immunologic: Negative  Neurological: Negative  Negative for dizziness, tremors, seizures, syncope, facial asymmetry, speech difficulty, weakness, light-headedness, numbness and headaches  Hematological: Negative  Does not bruise/bleed easily  Psychiatric/Behavioral: Negative  Negative for confusion, hallucinations and sleep disturbance  All other systems reviewed and are negative

## 2023-04-28 ENCOUNTER — TELEPHONE (OUTPATIENT)
Dept: PSYCHIATRY | Facility: CLINIC | Age: 63
End: 2023-04-28

## 2023-04-28 NOTE — TELEPHONE ENCOUNTER
Pt would like to close the neuropsychology referral at this time  She would like to speak with Dr Shoshana Maxwell at their next appt to see if it is really required of her

## 2023-05-03 RX ORDER — AZELASTINE HYDROCHLORIDE 137 UG/1
SPRAY, METERED NASAL
COMMUNITY
Start: 2023-04-09

## 2023-05-04 ENCOUNTER — OFFICE VISIT (OUTPATIENT)
Dept: GASTROENTEROLOGY | Facility: CLINIC | Age: 63
End: 2023-05-04

## 2023-05-04 VITALS
SYSTOLIC BLOOD PRESSURE: 140 MMHG | WEIGHT: 165 LBS | HEART RATE: 80 BPM | OXYGEN SATURATION: 99 % | DIASTOLIC BLOOD PRESSURE: 82 MMHG | HEIGHT: 63 IN | BODY MASS INDEX: 29.23 KG/M2

## 2023-05-04 DIAGNOSIS — R14.0 BLOATING: ICD-10-CM

## 2023-05-04 DIAGNOSIS — K59.00 CONSTIPATION, UNSPECIFIED CONSTIPATION TYPE: Primary | ICD-10-CM

## 2023-05-04 DIAGNOSIS — K21.9 GASTRO-ESOPHAGEAL REFLUX DISEASE WITHOUT ESOPHAGITIS: ICD-10-CM

## 2023-05-04 NOTE — LETTER
May 4, 2023     Susie Toth, [address]    Patient: Sammy Rios   YOB: 1960   Date of Visit: 5/4/2023       Dear Dr Zane Valdez: Thank you for referring Sammy Rios to me for evaluation  Below are my notes for this consultation  If you have questions, please do not hesitate to call me  I look forward to following your patient along with you  Sincerely,        Rosalind Arreola PA-C        CC: No Recipients  Rosalind Arreola PA-C  5/4/2023 11:16 AM  Sign when Signing Visit  Answers for HPI/ROS submitted by the patient on 5/2/2023  Chronicity: chronic  Onset: more than 1 month ago  Onset quality: gradual  Frequency: daily  Progression since onset: waxing and waning  Pain location: epigastric region  Pain - numeric: 7/10  Pain quality: burning  Radiates to: chest, back  anorexia: Yes  arthralgias: Yes  belching: Yes  constipation: Yes  diarrhea: No  dysuria: No  fever: No  flatus: No  frequency: Yes  headaches: Yes  hematochezia: No  hematuria: No  melena: No  myalgias: Yes  nausea: No  weight loss: No  vomiting: No  Aggravated by: eating  Relieved by: belching    [facility] - Outpatient Follow-up Note  Sammy Rios 57 y o  female MRN: [id number]  Encounter: [id number]          ASSESSMENT AND PLAN:      1  Constipation, unspecified constipation type  2  Gastro-esophageal reflux disease without esophagitis  3  Bloating  -Patient will begin Linzess 72 mcg daily   -Patient will begin align probiotic daily   -Patient will continue to utilize over-the-counter Nexium therapy   -Encouraged high-fiber diet and multiple small meals throughout the course of the day  -Will have patient follow-up in 6 to 8 weeks      -Repeat colonoscopy in 2025   ______________________________________________________________________    SUBJECTIVE:    26-year-old female with a past medical history significant for gastroesophageal reflux disease, asthma, COPD, migraines the GI clinic today for follow-up  Is been quite sometime since patient was evaluated  She reports she had been doing quite well up until recently starting medication for her high blood pressure  Patient reports that she is experiencing abdominal bloating and gas  She reports that she is now more constipated  She reports that her constipation is causing significant distress and abdominal fullness which in turn will cause episodes of shortness of breath  She denies any melena or rectal bleeding  She reports that she is trying to eat light  She is also concerned because she cannot lose weight  Patient's last colonoscopy was performed in 2015 and this was a normal examination  REVIEW OF SYSTEMS IS OTHERWISE NEGATIVE        Historical Information   Past Medical History:   Diagnosis Date    Arthritis     Asthma     [facility] hip[facility] right ([facility])     Avascular necrosis of hip, right ([facility] [address] ) 10/3/2017    [facility] obstructive pulmonary disease ([facility] ) 2/12/2019    COPD (chronic obstructive pulmonary disease) ([facility])     GERD (gastroesophageal reflux disease)     Hypoglycemia     Infectious viral hepatitis     Lumbar radicular pain     Lyme disease     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[facility])      Past Surgical History:   Procedure Laterality Date    APPENDECTOMY      CARPAL TUNNEL RELEASE      CHOLECYSTECTOMY      SINUS SURGERY       Social History   Social History     Substance and Sexual Activity   Alcohol Use No     Social History     Substance and Sexual Activity   Drug Use Never     Social History     Tobacco Use   Smoking Status Every Day    Types: Cigarettes   Smokeless Tobacco Never   Tobacco Comments    started smoking      Family History   Problem Relation Age of Onset    Bronchiolitis Mother        Meds/Allergies        Current Outpatient Medications:     albuterol (PROVENTIL HFA,VENTOLIN HFA) 90 mcg/act inhaler    ALPRAZolam (XANAX) 0 25 mg "tablet    Azelastine HCl 137 MCG/SPRAY SOLN    Cholecalciferol (VITAMIN D3) 2000 units CHEW    Choline Fenofibrate (Fenofibric Acid) 135 MG CPDR    cloNIDine (CATAPRES) 0 1 mg tablet    fexofenadine (ALLEGRA) 180 MG tablet    fluticasone (FLONASE) 50 mcg/act nasal spray    hydroxychloroquine (PLAQUENIL) 200 mg tablet    levalbuterol (XOPENEX) 1 25 mg/3 mL nebulizer solution    magnesium oxide (MAG-OX) 400 mg tablet    oxyCODONE (ROXICODONE) 10 MG TABS    budesonide-formoterol (Symbicort) 160-4 5 mcg/act inhaler    esomeprazole (NexIUM) 40 MG capsule    Allergies   Allergen Reactions    Aspirin Anaphylaxis    Iodine - Food Allergy Anaphylaxis    Penicillins Anaphylaxis    Soybean Oil - Food Allergy Other (See Comments)    Statins Myalgia           Objective      Blood pressure 140/82, pulse 80, height 5' 3\" (1 6 m), weight 74 8 kg (165 lb), SpO2 99 %  Body mass index is 29 23 kg/m²  PHYSICAL EXAM:      General Appearance:   Alert, cooperative, no distress   HEENT:   Normocephalic, atraumatic, anicteric      Neck:  Supple, symmetrical, trachea midline   Lungs:   Clear to auscultation bilaterally; no rales, rhonchi or wheezing; respirations unlabored    Heart[de-identified]   Regular rate and rhythm; no murmur, rub, or gallop  Abdomen:   Soft, non-tender, non-distended; normal bowel sounds; no masses, no organomegaly    Genitalia:   Deferred    Rectal:   Deferred    Extremities:  No cyanosis, clubbing or edema    Pulses:  2+ and symmetric    Skin:  No jaundice, rashes, or lesions    Lymph nodes:  No palpable cervical lymphadenopathy        Lab Results:   No visits with results within 1 Day(s) from this visit     Latest known visit with results is:   Appointment on 03/29/2023   Component Date Value    Vitamin B-12 03/29/2023 373     Vitamin B1, Whole Blood 03/29/2023 117 6     Vitamin B6 03/29/2023 7 1     Lyme Total Antibodies 03/29/2023 4 7 (H)     Folate 03/29/2023 15 1     Sodium 03/29/2023 140     " Potassium 03/29/2023 4 0     Chloride 03/29/2023 107     CO2 03/29/2023 26     ANION GAP 03/29/2023 7     BUN 03/29/2023 15     Creatinine 03/29/2023 0 77     Glucose, Fasting 03/29/2023 84     Calcium 03/29/2023 9 5     AST 03/29/2023 22     ALT 03/29/2023 25     Alkaline Phosphatase 03/29/2023 40     Total Protein 03/29/2023 6 9     Albumin 03/29/2023 4 4     Total Bilirubin 03/29/2023 0 58     eGFR 03/29/2023 81     WBC 03/29/2023 6 60     RBC 03/29/2023 4 95     Hemoglobin 03/29/2023 14 7     Hematocrit 03/29/2023 45 8     MCV 03/29/2023 93     MCH 03/29/2023 29 7     MCHC 03/29/2023 32 1     RDW 03/29/2023 13 7     MPV 03/29/2023 10 4     Platelets 91/82/2505 349     nRBC 03/29/2023 0     Neutrophils Relative 03/29/2023 51     Immat GRANS % 03/29/2023 0     Lymphocytes Relative 03/29/2023 34     Monocytes Relative 03/29/2023 10     Eosinophils Relative 03/29/2023 4     Basophils Relative 03/29/2023 1     Neutrophils Absolute 03/29/2023 3 32     Immature Grans Absolute 03/29/2023 0 02     Lymphocytes Absolute 03/29/2023 2 27     Monocytes Absolute 03/29/2023 0 67     Eosinophils Absolute 03/29/2023 0 23     Basophils Absolute 03/29/2023 0 09     TSH 3RD GENERATON 03/29/2023 1 618     Cholesterol 03/29/2023 167     Triglycerides 03/29/2023 86     HDL, Direct 03/29/2023 55     LDL Calculated 03/29/2023 95     Lyme 18 kD IgG 03/29/2023 Absent     Lyme 23 kD IgG 03/29/2023 Absent     Lyme 28 kD IgG 03/29/2023 Absent     Lyme 30 kD IgG 03/29/2023 Absent     Lyme 39 kD IgG 03/29/2023 Present (A)     Lyme 41 kD IgG 03/29/2023 Present (A)     Lyme 45 kD IgG 03/29/2023 Absent     Lyme 58 kD IgG 03/29/2023 Absent     Lyme 66 kD IgG 03/29/2023 Absent     Lyme 93 kD IgG 03/29/2023 Absent     Lyme 23 kD IgM 03/29/2023 Absent     Lyme 39 kD IgM 03/29/2023 Absent     Lyme 41 kD IgM 03/29/2023 Absent     Lyme IgG WB Interp  03/29/2023 Negative     Lyme IgM WB Interp  03/29/2023 Negative          Radiology Results:   No results found

## 2023-05-04 NOTE — PROGRESS NOTES
Answers for HPI/ROS submitted by the patient on 5/2/2023  Chronicity: chronic  Onset: more than 1 month ago  Onset quality: gradual  Frequency: daily  Progression since onset: waxing and waning  Pain location: epigastric region  Pain - numeric: 7/10  Pain quality: burning  Radiates to: chest, back  anorexia: Yes  arthralgias: Yes  belching: Yes  constipation: Yes  diarrhea: No  dysuria: No  fever: No  flatus: No  frequency: Yes  headaches: Yes  hematochezia: No  hematuria: No  melena: No  myalgias: Yes  nausea: No  weight loss: No  vomiting: No  Aggravated by: eating  Relieved by: belching    [facility] - Outpatient Follow-up Note  Melodie Boys 57 y o  female MRN: [id number]  Encounter: [id number]          ASSESSMENT AND PLAN:      1  Constipation, unspecified constipation type  2  Gastro-esophageal reflux disease without esophagitis  3  Bloating  -Patient will begin Linzess 72 mcg daily   -Patient will begin align probiotic daily   -Patient will continue to utilize over-the-counter Nexium therapy   -Encouraged high-fiber diet and multiple small meals throughout the course of the day  -Will have patient follow-up in 6 to 8 weeks  -Repeat colonoscopy in 2025   ______________________________________________________________________    SUBJECTIVE:    27-year-old female with a past medical history significant for gastroesophageal reflux disease, asthma, COPD, migraines the GI clinic today for follow-up  Is been quite sometime since patient was evaluated  She reports she had been doing quite well up until recently starting medication for her high blood pressure  Patient reports that she is experiencing abdominal bloating and gas  She reports that she is now more constipated  She reports that her constipation is causing significant distress and abdominal fullness which in turn will cause episodes of shortness of breath  She denies any melena or rectal bleeding    She reports that she is trying to eat light  She is also concerned because she cannot lose weight  Patient's last colonoscopy was performed in 2015 and this was a normal examination  REVIEW OF SYSTEMS IS OTHERWISE NEGATIVE        Historical Information   Past Medical History:   Diagnosis Date    Arthritis     Asthma     Avascular necrosis of bone of hip, right (HCC)     Avascular necrosis of hip, right ([facility] [address] ) 10/3/2017    Chronic obstructive pulmonary disease ([facility] ) 2/12/2019    COPD (chronic obstructive pulmonary disease) (HCC)     GERD (gastroesophageal reflux disease)     Hypoglycemia     Infectious viral hepatitis     Lumbar radicular pain     Lyme disease     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HCC)      Past Surgical History:   Procedure Laterality Date    APPENDECTOMY      CARPAL TUNNEL RELEASE      CHOLECYSTECTOMY      SINUS SURGERY       Social History   Social History     Substance and Sexual Activity   Alcohol Use No     Social History     Substance and Sexual Activity   Drug Use Never     Social History     Tobacco Use   Smoking Status Every Day    Types: Cigarettes   Smokeless Tobacco Never   Tobacco Comments    started smoking      Family History   Problem Relation Age of Onset    Bronchiolitis Mother        Meds/Allergies       Current Outpatient Medications:     albuterol (PROVENTIL HFA,VENTOLIN HFA) 90 mcg/act inhaler    ALPRAZolam (XANAX) 0 25 mg tablet    Azelastine HCl 137 MCG/SPRAY SOLN    Cholecalciferol (VITAMIN D3) 2000 units CHEW    Choline Fenofibrate (Fenofibric Acid) 135 MG CPDR    cloNIDine (CATAPRES) 0 1 mg tablet    fexofenadine (ALLEGRA) 180 MG tablet    fluticasone (FLONASE) 50 mcg/act nasal spray    hydroxychloroquine (PLAQUENIL) 200 mg tablet    levalbuterol (XOPENEX) 1 25 mg/3 mL nebulizer solution    magnesium oxide (MAG-OX) 400 mg tablet    oxyCODONE (ROXICODONE) 10 MG TABS    budesonide-formoterol (Symbicort) 160-4 5 mcg/act inhaler    esomeprazole (NexIUM) 40 MG "capsule    Allergies   Allergen Reactions    Aspirin Anaphylaxis    Iodine - Food Allergy Anaphylaxis    Penicillins Anaphylaxis    Soybean Oil - Food Allergy Other (See Comments)    Statins Myalgia           Objective     Blood pressure 140/82, pulse 80, height 5' 3\" (1 6 m), weight 74 8 kg (165 lb), SpO2 99 %  Body mass index is 29 23 kg/m²  PHYSICAL EXAM:      General Appearance:   Alert, cooperative, no distress   HEENT:   Normocephalic, atraumatic, anicteric      Neck:  Supple, symmetrical, trachea midline   Lungs:   Clear to auscultation bilaterally; no rales, rhonchi or wheezing; respirations unlabored    Heart[de-identified]   Regular rate and rhythm; no murmur, rub, or gallop  Abdomen:   Soft, non-tender, non-distended; normal bowel sounds; no masses, no organomegaly    Genitalia:   Deferred    Rectal:   Deferred    Extremities:  No cyanosis, clubbing or edema    Pulses:  2+ and symmetric    Skin:  No jaundice, rashes, or lesions    Lymph nodes:  No palpable cervical lymphadenopathy        Lab Results:   No visits with results within 1 Day(s) from this visit     Latest known visit with results is:   Appointment on 03/29/2023   Component Date Value    Vitamin B-12 03/29/2023 373     Vitamin B1, Whole Blood 03/29/2023 117 6     Vitamin B6 03/29/2023 7 1     Lyme Total Antibodies 03/29/2023 4 7 (H)     Folate 03/29/2023 15 1     Sodium 03/29/2023 140     Potassium 03/29/2023 4 0     Chloride 03/29/2023 107     CO2 03/29/2023 26     ANION GAP 03/29/2023 7     BUN 03/29/2023 15     Creatinine 03/29/2023 0 77     Glucose, Fasting 03/29/2023 84     Calcium 03/29/2023 9 5     AST 03/29/2023 22     ALT 03/29/2023 25     Alkaline Phosphatase 03/29/2023 40     Total Protein 03/29/2023 6 9     Albumin 03/29/2023 4 4     Total Bilirubin 03/29/2023 0 58     eGFR 03/29/2023 81     WBC 03/29/2023 6 60     RBC 03/29/2023 4 95     Hemoglobin 03/29/2023 14 7     Hematocrit 03/29/2023 45 8     MCV " 03/29/2023 93     MCH 03/29/2023 29 7     MCHC 03/29/2023 32 1     RDW 03/29/2023 13 7     MPV 03/29/2023 10 4     Platelets 94/74/3971 349     nRBC 03/29/2023 0     Neutrophils Relative 03/29/2023 51     Immat GRANS % 03/29/2023 0     Lymphocytes Relative 03/29/2023 34     Monocytes Relative 03/29/2023 10     Eosinophils Relative 03/29/2023 4     Basophils Relative 03/29/2023 1     Neutrophils Absolute 03/29/2023 3 32     Immature Grans Absolute 03/29/2023 0 02     Lymphocytes Absolute 03/29/2023 2 27     Monocytes Absolute 03/29/2023 0 67     Eosinophils Absolute 03/29/2023 0 23     Basophils Absolute 03/29/2023 0 09     TSH 3RD GENERATON 03/29/2023 1 618     Cholesterol 03/29/2023 167     Triglycerides 03/29/2023 86     HDL, Direct 03/29/2023 55     LDL Calculated 03/29/2023 95     Lyme 18 kD IgG 03/29/2023 Absent     Lyme 23 kD IgG 03/29/2023 Absent     Lyme 28 kD IgG 03/29/2023 Absent     Lyme 30 kD IgG 03/29/2023 Absent     Lyme 39 kD IgG 03/29/2023 Present (A)     Lyme 41 kD IgG 03/29/2023 Present (A)     Lyme 45 kD IgG 03/29/2023 Absent     Lyme 58 kD IgG 03/29/2023 Absent     Lyme 66 kD IgG 03/29/2023 Absent     Lyme 93 kD IgG 03/29/2023 Absent     Lyme 23 kD IgM 03/29/2023 Absent     Lyme 39 kD IgM 03/29/2023 Absent     Lyme 41 kD IgM 03/29/2023 Absent     Lyme IgG WB Interp  03/29/2023 Negative     Lyme IgM WB Interp  03/29/2023 Negative          Radiology Results:   No results found

## 2023-05-11 ENCOUNTER — NURSE TRIAGE (OUTPATIENT)
Dept: OTHER | Facility: OTHER | Age: 63
End: 2023-05-11

## 2023-05-11 NOTE — TELEPHONE ENCOUNTER
"Regarding: SOB Chest pain  ----- Message from [facility] sent at 5/11/2023  4:32 PM EDT -----  \"I taking medication am im having really bad side effects, SOB, chest pain\"    "

## 2023-05-11 NOTE — TELEPHONE ENCOUNTER
TC on call provider patient's symptoms of bloating which is causing her increased SOB and chest discomfort  Patient stated that it is from the medication and she can manage her other symptoms, but abdomen is very bloated  On call provider advised to have patient get magnesium citrate and drink that to help move her bowels and help with abdominal distention  [facility] for patient showed medication in stock; called pharmacy but unable to check stock at this time  Patient advised to purchase the magnesium citrate and see if that helps with his bloating/constipation  Advised if patient is unable to purchase it, please call back tonight  Patient advised to follow up with the office tomorrow; she said that after taking the Linzess, her constipation and abdominal distention has gotten much worse  The on call did mention doubling the patient's dose, but patient stated she wanted to know if there were any alternatives to this medication and isn't sure if she wants to continue taking it  Please follow up with patient tomorrow regarding how she is doing and what medication she should continue with

## 2023-05-11 NOTE — TELEPHONE ENCOUNTER
TC on call provider patient's symptoms of bloating which is causing her increased SOB and chest discomfort  Patient stated that it is from the medication and she can manage her other symptoms, but abdomen is very bloated  She has had these symptoms in the past due to her constipation, but they have significantly worsened since starting the Linzess, per patient  On call provider advised to have patient get magnesium citrate and drink that to help move her bowels and help with abdominal distention  [facility] showed medication in stock; called pharmacy but unable to check stock at this time  Patient advised to purchase the magnesium citrate and see if that helps with his bloating/constipation  Advised if patient is unable to purchase it, please call back tonight  Patient advised to follow up with the office tomorrow; she said that after taking the Linzess, her constipation and abdominal distention has gotten much worse  The on call did mention doubling the patient's dose, but patient stated she wanted to know if there were any alternatives to this medication and isn't sure if she wants to continue taking it  Please follow up with patient tomorrow regarding how she is doing and what medication she should continue with

## 2023-05-11 NOTE — TELEPHONE ENCOUNTER
"  Reason for Disposition  • Patient says chest pain feels exactly the same as previously diagnosed 'heartburn' and describes burning in chest and accompanying sour taste in mouth    Answer Assessment - Initial Assessment Questions  1  LOCATION: \"Where does it hurt? \"        Heaviness in upper part of chest in middle of chest near breasts and on sides  Patient stated she feels really bloated and is having a hard time taking a breath  Patient also has asthma  2  RADIATION: \"Does the pain go anywhere else? \" (e g , into neck, jaw, arms, back)     Bilateral sides  3  ONSET: \"When did the chest pain begin? \" (Minutes, hours or days)       Patient has been taking the Linzess at 2pm, after she eats about an hour then the pain in stomach and chest    4  PATTERN \"Does the pain come and go, or has it been constant since it started? \"  \"Does it get worse with exertion? \"       Feels SOB with movement  5  DURATION: \"How long does it last\" (e g , seconds, minutes, hours)      Constant  6  SEVERITY: \"How bad is the pain? \"  (e g , Scale 1-10; mild, moderate, or severe)     - MILD (1-3): doesn't interfere with normal activities      - MODERATE (4-7): interferes with normal activities or awakens from sleep     - SEVERE (8-10): excruciating pain, unable to do any normal activities        moderate  7  CARDIAC RISK FACTORS: \"Do you have any history of heart problems or risk factors for heart disease? \" (e g , angina, prior heart attack; diabetes, high blood pressure, high cholesterol, smoker, or strong family history of heart disease)      Denies  8  PULMONARY RISK FACTORS: \"Do you have any history of lung disease? \"  (e g , blood clots in lung, asthma, emphysema, birth control pills)      Asthma  9  CAUSE: \"What do you think is causing the chest pain? \"      Patient thinks it is from stomach being so bloated  Patient has been increasing her use of nebulizer, stated it is from her stomach being so bloated     10  OTHER SYMPTOMS: \"Do you have " "any other symptoms? \" (e g , dizziness, nausea, vomiting, sweating, fever, difficulty breathing, cough)        Feels SOB    Patient thinks she had a last BM on Tuesday  Patient was insistent that her SOB was caused by her abdominal distention      Protocols used: CHEST PAIN-ADULT-[place]    "

## 2023-05-16 ENCOUNTER — TELEPHONE (OUTPATIENT)
Dept: GASTROENTEROLOGY | Facility: CLINIC | Age: 63
End: 2023-05-16

## 2023-05-16 DIAGNOSIS — K59.00 CONSTIPATION, UNSPECIFIED CONSTIPATION TYPE: Primary | ICD-10-CM

## 2023-05-16 RX ORDER — LUBIPROSTONE 8 UG/1
8 CAPSULE ORAL 2 TIMES DAILY WITH MEALS
Qty: 60 CAPSULE | Refills: 3 | Status: SHIPPED | OUTPATIENT
Start: 2023-05-16

## 2023-05-17 ENCOUNTER — TELEPHONE (OUTPATIENT)
Dept: GASTROENTEROLOGY | Facility: CLINIC | Age: 63
End: 2023-05-17

## 2023-05-17 DIAGNOSIS — K59.00 CONSTIPATION, UNSPECIFIED CONSTIPATION TYPE: Primary | ICD-10-CM

## 2023-05-17 NOTE — TELEPHONE ENCOUNTER
----- Message from Christian Amaral sent at 5/17/2023 10:47 AM EDT -----  Regarding: medication  Contact: [phone]  Thank you for your help

## 2023-05-17 NOTE — TELEPHONE ENCOUNTER
----- Message from Alecia Blizzard sent at 2023  2:21 PM EDT -----  [address]: [phone]  I would prefer staying on the medication I already have  I feel is too much on me with all these changes   Please let me know how you move forward

## 2023-05-25 ENCOUNTER — TELEPHONE (OUTPATIENT)
Dept: INTERNAL MEDICINE CLINIC | Facility: CLINIC | Age: 63
End: 2023-05-25

## 2023-05-25 DIAGNOSIS — K59.00 CONSTIPATION, UNSPECIFIED CONSTIPATION TYPE: Primary | ICD-10-CM

## 2023-05-25 NOTE — TELEPHONE ENCOUNTER
Spoke to patient and yes, wants to have x-ray done  Patient has contacted GI and prescribed medication but does not think it is working  Patient also contacted Pulmonologist, does not need to go to ER, has what she needs at home to treat breathing

## 2023-05-25 NOTE — TELEPHONE ENCOUNTER
I can order an obstruction series xray  If ok with this will enter order today  I would recommend she reach out to GI as they are many medication options for constipation  They are the experts on those types of medications  Has she reached out to her pulmonologist regarding her breathing? Does she need ER?  Or symptoms similar to in the past?

## 2023-05-25 NOTE — TELEPHONE ENCOUNTER
Abdominal xray ordered  Can get done at [facility]  Yes I know GI prescribed it but she needs to let them know its not working so they can change it or adjust the dose

## 2023-05-25 NOTE — TELEPHONE ENCOUNTER
Patient called stating constipation continues, bloated belly with pain, causing her asthma to worsen and she is short of breath  [facility] Rush has prescribed medication, but not helping  She called [facility] Rush but more comfortable with you  Patient asking to have x-ray or MRI she is concerned something is wrong

## 2023-05-26 ENCOUNTER — APPOINTMENT (OUTPATIENT)
Dept: LAB | Facility: HOSPITAL | Age: 63
End: 2023-05-26
Attending: PSYCHIATRY & NEUROLOGY

## 2023-05-26 ENCOUNTER — HOSPITAL ENCOUNTER (OUTPATIENT)
Dept: RADIOLOGY | Facility: HOSPITAL | Age: 63
End: 2023-05-26

## 2023-05-26 DIAGNOSIS — H04.123 DRY EYES: ICD-10-CM

## 2023-05-26 DIAGNOSIS — K59.00 CONSTIPATION, UNSPECIFIED CONSTIPATION TYPE: ICD-10-CM

## 2023-05-30 LAB
ENA SS-A AB SER-ACNC: <0.2 AI (ref 0–0.9)
ENA SS-B AB SER-ACNC: <0.2 AI (ref 0–0.9)

## 2023-07-10 DIAGNOSIS — I10 ESSENTIAL HYPERTENSION: ICD-10-CM

## 2023-07-10 RX ORDER — CLONIDINE HYDROCHLORIDE 0.1 MG/1
TABLET ORAL
Qty: 180 TABLET | Refills: 0 | Status: SHIPPED | OUTPATIENT
Start: 2023-07-10

## 2023-07-21 ENCOUNTER — OFFICE VISIT (OUTPATIENT)
Dept: CARDIOLOGY CLINIC | Facility: CLINIC | Age: 63
End: 2023-07-21
Payer: COMMERCIAL

## 2023-07-21 VITALS
SYSTOLIC BLOOD PRESSURE: 128 MMHG | WEIGHT: 167 LBS | DIASTOLIC BLOOD PRESSURE: 64 MMHG | OXYGEN SATURATION: 97 % | HEART RATE: 76 BPM | RESPIRATION RATE: 16 BRPM | HEIGHT: 63 IN | BODY MASS INDEX: 29.59 KG/M2

## 2023-07-21 DIAGNOSIS — I10 HYPERTENSION, ESSENTIAL: Primary | ICD-10-CM

## 2023-07-21 DIAGNOSIS — E78.2 MIXED HYPERLIPIDEMIA: ICD-10-CM

## 2023-07-21 DIAGNOSIS — R06.02 SHORTNESS OF BREATH: ICD-10-CM

## 2023-07-21 PROCEDURE — 99213 OFFICE O/P EST LOW 20 MIN: CPT | Performed by: INTERNAL MEDICINE

## 2023-07-21 RX ORDER — FENOFIBRIC ACID 135 MG/1
CAPSULE, DELAYED RELEASE ORAL
Qty: 90 CAPSULE | Refills: 3 | Status: SHIPPED | OUTPATIENT
Start: 2023-07-21

## 2023-07-21 NOTE — PROGRESS NOTES
[facility] [place] [id number] [place] [address]  Cardiology Follow Up    Whitney Barber  1960  [id number]      1. Hypertension, essential        2. Mixed hyperlipidemia        3. Shortness of breath            Chief Complaint   Patient presents with   • Follow-up       Interval History: Patient presents for follow-up visit. Patient denies any chest pain. Patient does have some shortness of breath which is no more than usual.  No history of leg edema orthopnea PND. She states that she has been compliant with all her present medications. She is also followed by Dr. Ball, pulmonary.     Patient Active Problem List   Diagnosis   • Lumbar radiculopathy   • Gastro-esophageal reflux disease without esophagitis   • Right hip pain   • Essential hypertension   • Other hyperlipidemia   • Lumbar spondylosis   • Moderate persistent asthma with acute exacerbation   • Rheumatoid arthritis involving multiple sites ([address])   • Allergic rhinitis due to allergen   • Primary osteoarthritis of left hip   • Chronic tension-type headache, not intractable   • Cervical spondylosis   • Cervical radiculopathy   • Migraine   • Neuropathy   • Cervical spinal stenosis   • Memory impairment   • Bilateral occipital neuralgia   • Chronic pain syndrome   • Situational anxiety     Past Medical History:   Diagnosis Date   • Arthritis    • Asthma    • Avascular necrosis of bone of hip, right (HCC)    • Avascular necrosis of hip, right (HCC) 10/3/2017   • Chronic obstructive pulmonary disease ([address]) 2/12/2019   • COPD (chronic obstructive pulmonary disease) (HCC)    • GERD (gastroesophageal reflux disease)    • Hypoglycemia    • Infectious viral hepatitis    • Lumbar radicular pain    • Lyme disease    • 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[address])      Social History     Socioeconomic History   • Marital status: /Civil Union     Spouse name: Not on file   • Number of children: Not on file   • Years of education: Not on file   • Highest education level: Not on file   Occupational History   • Not on file   Tobacco Use   • Smoking status: Every Day     Types: Cigarettes   • Smokeless tobacco: Never   • Tobacco comments:     started smoking    Vaping Use   • Vaping Use: Never used   Substance and Sexual Activity   • Alcohol use: No   • Drug use: Never   • Sexual activity: Not Currently     Partners: Male   Other Topics Concern   • Not on file   Social History Narrative   • Not on file     Social Determinants of Health     Financial Resource Strain: Not on file   Food Insecurity: Not on file   Transportation Needs: Not on file   Physical Activity: Not on file   Stress: Not on file   Social Connections: Not on file   Intimate Partner Violence: Not on file   Housing Stability: Not on file      Family History   Problem Relation Age of Onset   • Bronchiolitis Mother      Past Surgical History:   Procedure Laterality Date   • APPENDECTOMY     • CARPAL TUNNEL RELEASE     • CHOLECYSTECTOMY     • SINUS SURGERY         Current Outpatient Medications:   •  albuterol (PROVENTIL HFA,VENTOLIN HFA) 90 mcg/act inhaler, Inhale 2 puffs every 4 (four) hours as needed for wheezing, Disp: 54 g, Rfl: 3  •  ALPRAZolam (XANAX) 0.25 mg tablet, Take 1 tablet (0.25 mg total) by mouth daily as needed for anxiety, Disp: 30 tablet, Rfl: 0  •  Azelastine HCl 137 MCG/SPRAY SOLN, SPRAY 1-2 SPRAYS INTO EACH NOSTRIL TWICE A DAY AS DIRECTED, Disp: , Rfl:   •  Cholecalciferol (VITAMIN D3) 2000 units CHEW, Chew 5,000 Units daily, Disp: , Rfl:   •  Choline Fenofibrate (Fenofibric Acid) 135 MG CPDR, 1 cap daily, Disp: 90 capsule, Rfl: 0  •  cloNIDine (CATAPRES) 0.1 mg tablet, TAKE 1 TABLET BY MOUTH TWICE A DAY, Disp: 180 tablet, Rfl: 0  •  fexofenadine (ALLEGRA) 180 MG tablet, Take 180 mg by mouth daily, Disp: , Rfl:   •  fluticasone (FLONASE) 50 mcg/act nasal spray, SPRAY 1 SPRAY INTO EACH NOSTRIL EVERY DAY, Disp: 48 mL, Rfl: 1  •  hydroxychloroquine (PLAQUENIL) 200 mg tablet, Take 1 tablet (200 mg total) by mouth 2 (two) times a day with meals, Disp: 60 tablet, Rfl: 1  •  levalbuterol (XOPENEX) 1.25 mg/3 mL nebulizer solution, , Disp: , Rfl:   •  linaCLOtide 72 MCG CAPS, Take 72 mcg by mouth daily in the early morning, Disp: 90 capsule, Rfl: 0  •  lubiprostone (AMITIZA) 8 mcg capsule, Take 1 capsule (8 mcg total) by mouth 2 (two) times a day with meals, Disp: 60 capsule, Rfl: 3  •  magnesium oxide (MAG-OX) 400 mg tablet, Take 1 tablet by mouth in the morning, Disp: , Rfl:   •  oxyCODONE (ROXICODONE) 10 MG TABS, , Disp: , Rfl:   •  budesonide-formoterol (Symbicort) 160-4.5 mcg/act inhaler, Inhale 2 puffs 2 (two) times a day 160-4.50MCG/ACTUATION (Patient not taking: Reported on 5/4/2023), Disp: 30.6 g, Rfl: 3  •  esomeprazole (NexIUM) 40 MG capsule, Take 40 mg by mouth in the morning (Patient not taking: Reported on 5/4/2023), Disp: , Rfl:   Allergies   Allergen Reactions   • Aspirin Anaphylaxis   • Iodine - Food Allergy Anaphylaxis   • Penicillins Anaphylaxis   • Soybean Oil - Food Allergy Other (See Comments)   • Statins Myalgia       Labs:  Appointment on 05/26/2023   Component Date Value   • SS-A (RO) Ab 05/26/2023 <0.2    • SS-B (LA) Ab 05/26/2023 <0.2      Imaging: No results found.     Review of Systems:  Review of Systems   REVIEW OF SYSTEMS:  Constitutional:  Denies fever or chills   Eyes:  Denies change in visual acuity   HENT:  Denies nasal congestion or sore throat   Respiratory:   shortness of breath   Cardiovascular:  Denies chest pain or edema   GI:  Denies abdominal pain, nausea, vomiting, bloody stools or diarrhea   :  Denies dysuria, frequency, difficulty in micturition and nocturia  Musculoskeletal: Arthritis  Neurologic:  Denies headache, focal weakness or sensory changes   Endocrine:  Denies polyuria or polydipsia   Lymphatic:  Denies swollen glands   Psychiatric:  Denies depression or anxiety      Physical Exam:    /64 (BP Location: Left arm, Patient Position: Sitting, Cuff Size: Standard)   Pulse 76   Resp 16   Ht 5' 3" (1.6 m)   Wt 75.8 kg (167 lb)   SpO2 97%   BMI 29.58 kg/m²     Physical Exam  PHYSICAL EXAM:  General:  Patient is not in acute distress   Head: Normocephalic, Atraumatic. HEENT:  Both pupils normal-size atraumatic, normocephalic, nonicteric  Neck:  JVP not raised. Trachea central. No carotid bruit  Respiratory:  normal breath sounds no crackles. no rhonchi  Cardiovascular:  Regular rate and rhythm no S3 no murmurs  GI:  Abdomen soft nontender. No organomegaly. Lymphatic:  No cervical or inguinal lymphadenopathy  Neurologic:  Patient is awake alert, oriented . Grossly nonfocal  Extremities no edema    Recent pharmacological nuclear stress test showed no evidence of ischemia with normal ejection fraction. Reviewed with patient and family. Discussion/Summary:  Patient with multiple medical problems who seems to be doing reasonably well from cardiac standpoint. Previous studies reviewed with patient. Medications reviewed and possible side effects discussed. concepts of cardiovascular disease , signs and symptoms of heart disease. Dietary and risk factor modification reinforced. All questions answered. Safety measures reviewed. Patient advised to report any problems prompting medical attention. Results of pharmacological nuclear stress test reviewed with patient and family. Sensitivity and specificity of different modalities of cardiac imaging discussed with patient and family. Symptoms to watch her from cardiac standpoint which would indicate the need for further cardiac evaluation also discussed. Follow-up in 6 months or earlier as needed. Follow-up with primary care physician as well as pulmonary.

## 2023-07-25 ENCOUNTER — HOSPITAL ENCOUNTER (OUTPATIENT)
Dept: RADIOLOGY | Facility: HOSPITAL | Age: 63
Discharge: HOME | End: 2023-07-25
Attending: ORTHOPAEDIC SURGERY
Payer: COMMERCIAL

## 2023-07-25 ENCOUNTER — OFFICE VISIT (OUTPATIENT)
Dept: OBGYN CLINIC | Facility: CLINIC | Age: 63
End: 2023-07-25
Payer: COMMERCIAL

## 2023-07-25 VITALS
HEIGHT: 63 IN | SYSTOLIC BLOOD PRESSURE: 139 MMHG | DIASTOLIC BLOOD PRESSURE: 76 MMHG | HEART RATE: 66 BPM | WEIGHT: 164 LBS | BODY MASS INDEX: 29.06 KG/M2

## 2023-07-25 DIAGNOSIS — M25.551 PAIN IN RIGHT HIP: Primary | ICD-10-CM

## 2023-07-25 DIAGNOSIS — M25.551 PAIN IN RIGHT HIP: ICD-10-CM

## 2023-07-25 PROCEDURE — 73502 X-RAY EXAM HIP UNI 2-3 VIEWS: CPT

## 2023-07-25 PROCEDURE — 99213 OFFICE O/P EST LOW 20 MIN: CPT | Performed by: ORTHOPAEDIC SURGERY

## 2023-07-25 NOTE — PROGRESS NOTES
Orthopedics          Argelia Hidalgo 70 y.o. female MRN: [id number]      Chief Complaint:   right laterally based hip pain    HPI:   70 y. o.female complaining right lateral based hip pain. Patient does have a history of avascular necrosis of her right hip. She denies any right hip groin pain. Patient did have an injection in her right hip several years ago. She denies any right hip groin pain she states the pain is localized to lateral aspect of her right hip worse with laying on the right side worse with weightbearing worse with weather changes. Denies any radiation pain past her right knee denies any change numbness tingling right lower extremity. States pain is mildly relieved with rest and stretching.                 Review Of Systems:   · Skin: Normal  · Neuro: See HPI  · Musculoskeletal: See HPI  · All other systems reviewed and are negative    Past Medical History:   Past Medical History:   Diagnosis Date   • Arthritis    • Asthma    • Avascular necrosis of bone of hip, right (HCC)    • Avascular necrosis of hip, right ([address]) 10/3/2017   • Chronic obstructive pulmonary disease ([address]) 2/12/2019   • COPD (chronic obstructive pulmonary disease) (HCC)    • GERD (gastroesophageal reflux disease)    • Hypoglycemia    • Infectious viral hepatitis    • Lumbar radicular pain    • Lyme disease    • 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[address])        Past Surgical History:   Past Surgical History:   Procedure Laterality Date   • APPENDECTOMY     • CARPAL TUNNEL RELEASE     • CHOLECYSTECTOMY     • SINUS SURGERY         Family History:  Family history reviewed and non-contributory  Family History   Problem Relation Age of Onset   • Bronchiolitis Mother          Social History:  Social History     Socioeconomic History   • Marital status: /Civil Union     Spouse name: None   • Number of children: None   • Years of education: None   • Highest education level: None   Occupational History   • None   Tobacco Use   • Smoking status: Every Day     Types: Cigarettes   • Smokeless tobacco: Never   • Tobacco comments:     started smoking    Vaping Use   • Vaping Use: Never used   Substance and Sexual Activity   • Alcohol use: No   • Drug use: Never   • Sexual activity: Not Currently     Partners: Male   Other Topics Concern   • None   Social History Narrative   • None     Social Determinants of Health     Financial Resource Strain: Not on file   Food Insecurity: Not on file   Transportation Needs: Not on file   Physical Activity: Not on file   Stress: Not on file   Social Connections: Not on file   Intimate Partner Violence: Not on file   Housing Stability: Not on file       Allergies:    Allergies   Allergen Reactions   • Aspirin Anaphylaxis   • Iodine - Food Allergy Anaphylaxis   • Penicillins Anaphylaxis   • Soybean Oil - Food Allergy Other (See Comments)   • Statins Myalgia       Labs:  0   Lab Value Date/Time    HCT 45.8 03/29/2023 1118    HCT 45.7 06/20/2022 1016    HCT 46.2 (H) 12/17/2021 0946    HGB 14.7 03/29/2023 1118    HGB 14.9 06/20/2022 1016    HGB 14.9 12/17/2021 0946    INR 1.07 11/02/2020 2026    WBC 6.60 03/29/2023 1118    WBC 6.77 06/20/2022 1016    WBC 5.73 12/17/2021 0946    ESR 3 04/14/2021 1152    CRP <3.0 04/14/2021 1152       Meds:    Current Outpatient Medications:   •  albuterol (PROVENTIL HFA,VENTOLIN HFA) 90 mcg/act inhaler, Inhale 2 puffs every 4 (four) hours as needed for wheezing, Disp: 54 g, Rfl: 3  •  ALPRAZolam (XANAX) 0.25 mg tablet, Take 1 tablet (0.25 mg total) by mouth daily as needed for anxiety, Disp: 30 tablet, Rfl: 0  •  Azelastine HCl 137 MCG/SPRAY SOLN, SPRAY 1-2 SPRAYS INTO EACH NOSTRIL TWICE A DAY AS DIRECTED, Disp: , Rfl:   •  budesonide-formoterol (Symbicort) 160-4.5 mcg/act inhaler, Inhale 2 puffs 2 (two) times a day 160-4.50MCG/ACTUATION (Patient not taking: Reported on 5/4/2023), Disp: 30.6 g, Rfl: 3  •  Cholecalciferol (VITAMIN D3) 2000 units CHEW, Chew 5,000 Units daily, Disp: , Rfl:   • Choline Fenofibrate (Fenofibric Acid) 135 MG CPDR, 1 cap daily, Disp: 90 capsule, Rfl: 3  •  cloNIDine (CATAPRES) 0.1 mg tablet, TAKE 1 TABLET BY MOUTH TWICE A DAY, Disp: 180 tablet, Rfl: 0  •  esomeprazole (NexIUM) 40 MG capsule, Take 40 mg by mouth in the morning (Patient not taking: Reported on 5/4/2023), Disp: , Rfl:   •  fexofenadine (ALLEGRA) 180 MG tablet, Take 180 mg by mouth daily, Disp: , Rfl:   •  fluticasone (FLONASE) 50 mcg/act nasal spray, SPRAY 1 SPRAY INTO EACH NOSTRIL EVERY DAY, Disp: 48 mL, Rfl: 1  •  hydroxychloroquine (PLAQUENIL) 200 mg tablet, Take 1 tablet (200 mg total) by mouth 2 (two) times a day with meals, Disp: 60 tablet, Rfl: 1  •  levalbuterol (XOPENEX) 1.25 mg/3 mL nebulizer solution, , Disp: , Rfl:   •  linaCLOtide 72 MCG CAPS, Take 72 mcg by mouth daily in the early morning, Disp: 90 capsule, Rfl: 0  •  lubiprostone (AMITIZA) 8 mcg capsule, Take 1 capsule (8 mcg total) by mouth 2 (two) times a day with meals, Disp: 60 capsule, Rfl: 3  •  magnesium oxide (MAG-OX) 400 mg tablet, Take 1 tablet by mouth in the morning, Disp: , Rfl:   •  oxyCODONE (ROXICODONE) 10 MG TABS, , Disp: , Rfl:       Physical Exam:   Vitals:    07/25/23 0909   BP: 139/76   Pulse: 66       General Appearance:    Alert, cooperative, no distress, appears stated age   Head:    Normocephalic, without obvious abnormality, atraumatic   Eyes:    conjunctiva/corneas clear, both eyes        Nose:   Nares normal, septum midline, no drainage    Throat:   Lips normal; teeth and gums normal   Neck:    symmetrical, trachea midline, ;     thyroid:  no enlargement/   Back:     Symmetric, no curvature, ROM normal   Lungs:   No audible wheezing or labored breathing   Chest Wall:    No tenderness or deformity    Heart:    Regular rate and rhythm               Pulses:   2+ and symmetric all extremities   Skin:   Skin color, texture, turgor normal, no rashes or lesions   Neurologic:   normal strength, sensation and reflexes throughout       Musculoskeletal: right lower extremity  · Examination patient's right hip no erythema no effusion active hip flexion greater than 90 degrees no pain with internal/external rotation to 30 degrees with hip flexed at 90 raise hip flexion abduction abduction strength 5 out of 5 sensation intact as well as present negative modified straight leg raise    Radiology:   I personally reviewed the films. X-rays right hip reveal mild osteoarthritic changes subchondral sclerosis AVN of the right hip without collapse noted    _*_*_*_*_*_*_*_*_*_*_*_*_*_*_*_*_*_*_*_*_*_*_*_*_*_*_*_*_*_*_*_*_*_*_*_*_*_*_*_*_*    Assessment:  70 y. o.female with right hip greater enteric bursitis, right hip avascular necrosis stable at this time, right hip arthritis    Plan:   · Weight bearing as tolerated  right lower extremity  · Case discussed with Dr. Jacob López  · Patient provided with home range of motion stretching strength  ·   · Advised patient should her symptoms worsen or persist she may be candidate for greater trochanteric injection  · Patient to follow-up in as-needed basis regarding her right hip        Blas Rodriguez PA-C

## 2023-08-11 ENCOUNTER — TELEPHONE (OUTPATIENT)
Dept: CARDIOLOGY CLINIC | Facility: CLINIC | Age: 63
End: 2023-08-11

## 2023-08-11 NOTE — TELEPHONE ENCOUNTER
Pt's  walked in & had the insur co on speaker phone & they said that the test from 2/01/23 got denied b/c there was no auth for the test so pt got a bill for $478 & we didn't get paid  The insur co is [facility] w/ documentation to support the need of the test & fax it to: [phone] & use claim #: [id number]  Pt did speak to billing & the billing dept said for pt to call her insur

## 2023-08-18 ENCOUNTER — TELEPHONE (OUTPATIENT)
Age: 63
End: 2023-08-18

## 2023-08-18 NOTE — TELEPHONE ENCOUNTER
Patients GI provider:  KATERINE Diaz    Number to return call: ( · [phone]    Reason for call: Pt calling stating she is having car issues.  She rescheduled her visit from today 8/18/2023 TO 11/03/2023    Scheduled procedure/appointment date if applicable: 77/99/9450 with KATERINE Hopkins

## 2023-09-19 DIAGNOSIS — J45.41 MODERATE PERSISTENT ASTHMA WITH ACUTE EXACERBATION: ICD-10-CM

## 2023-09-19 RX ORDER — FLUTICASONE PROPIONATE 50 MCG
1 SPRAY, SUSPENSION (ML) NASAL DAILY
Qty: 48 G | Refills: 1 | Status: SHIPPED | OUTPATIENT
Start: 2023-09-19

## 2023-10-06 ENCOUNTER — OFFICE VISIT (OUTPATIENT)
Dept: INTERNAL MEDICINE CLINIC | Facility: CLINIC | Age: 63
End: 2023-10-06
Payer: COMMERCIAL

## 2023-10-06 VITALS
BODY MASS INDEX: 28.53 KG/M2 | TEMPERATURE: 97.3 F | HEART RATE: 73 BPM | OXYGEN SATURATION: 97 % | HEIGHT: 63 IN | SYSTOLIC BLOOD PRESSURE: 136 MMHG | DIASTOLIC BLOOD PRESSURE: 80 MMHG | WEIGHT: 161 LBS

## 2023-10-06 DIAGNOSIS — J45.41 MODERATE PERSISTENT ASTHMA WITH ACUTE EXACERBATION: Primary | ICD-10-CM

## 2023-10-06 DIAGNOSIS — M06.9 RHEUMATOID ARTHRITIS INVOLVING MULTIPLE SITES, UNSPECIFIED WHETHER RHEUMATOID FACTOR PRESENT (HCC): ICD-10-CM

## 2023-10-06 DIAGNOSIS — F41.8 SITUATIONAL ANXIETY: ICD-10-CM

## 2023-10-06 DIAGNOSIS — H92.01 RIGHT EAR PAIN: ICD-10-CM

## 2023-10-06 DIAGNOSIS — E78.49 OTHER HYPERLIPIDEMIA: ICD-10-CM

## 2023-10-06 DIAGNOSIS — I10 ESSENTIAL HYPERTENSION: ICD-10-CM

## 2023-10-06 DIAGNOSIS — K21.9 GASTRO-ESOPHAGEAL REFLUX DISEASE WITHOUT ESOPHAGITIS: ICD-10-CM

## 2023-10-06 PROCEDURE — 99214 OFFICE O/P EST MOD 30 MIN: CPT | Performed by: NURSE PRACTITIONER

## 2023-10-06 RX ORDER — CLONIDINE HYDROCHLORIDE 0.1 MG/1
0.1 TABLET ORAL 2 TIMES DAILY
Qty: 180 TABLET | Refills: 0 | Status: SHIPPED | OUTPATIENT
Start: 2023-10-06

## 2023-10-06 NOTE — PROGRESS NOTES
Name: Skye Scott      : 1960      MRN: [id number]  Encounter Provider: ALETHEA Díaz  Encounter Date: 10/6/2023   Encounter department: [id number] [facility]     1. Moderate persistent asthma with acute exacerbation  Assessment & Plan:  Sees pulmonary  On symbicort   Uses albuterol and xopenex as needed       2. Essential hypertension  Assessment & Plan:  Stable  Continue clonidine     Orders:  -     Comprehensive metabolic panel; Future  -     CBC and differential; Future  -     cloNIDine (CATAPRES) 0.1 mg tablet; Take 1 tablet (0.1 mg total) by mouth 2 (two) times a day    3. Gastro-esophageal reflux disease without esophagitis  Assessment & Plan:  Off PPI. 4. Rheumatoid arthritis involving multiple sites, unspecified whether rheumatoid factor present [facility])  Assessment & Plan:  Sees rheumatology   On oxycodone as needed   On plaquenil       5. Situational anxiety  Assessment & Plan:  Takes xanax twice a day as needed, prescribed by her pulmonologist       6. Other hyperlipidemia  Assessment & Plan:  Continue fenofibrate       7. Right ear pain  Comments:  start flonase 1 spray in each nostril daily          Subjective      Darrell  is here today for follow up  She is doing ok  She recently traveled from [place]. Today she started with dizziness. She has some right ear pressure. No vision changes. She continues to have right leg numbness. She is scheduled to see neurology. Has not been taking xanax regularly, took it last on the flight home from [place]. Review of Systems   Constitutional: Negative for activity change, appetite change, fatigue and unexpected weight change. HENT: Positive for ear pain. Negative for congestion and sore throat. Eyes: Negative for visual disturbance. Respiratory: Negative for cough, chest tightness, shortness of breath and wheezing.     Cardiovascular: Negative for chest pain, palpitations and leg swelling. Gastrointestinal: Negative for abdominal pain, blood in stool, constipation and diarrhea. Genitourinary: Negative for difficulty urinating. Musculoskeletal: Positive for arthralgias. Skin: Negative for rash. Neurological: Positive for dizziness. Negative for light-headedness and headaches. Psychiatric/Behavioral: Negative for sleep disturbance. The patient is not nervous/anxious.         Current Outpatient Medications on File Prior to Visit   Medication Sig   • albuterol (PROVENTIL HFA,VENTOLIN HFA) 90 mcg/act inhaler Inhale 2 puffs every 4 (four) hours as needed for wheezing   • ALPRAZolam (XANAX) 0.5 mg tablet    • Azelastine HCl 137 MCG/SPRAY SOLN SPRAY 1-2 SPRAYS INTO EACH NOSTRIL TWICE A DAY AS DIRECTED   • benzonatate (TESSALON) 200 MG capsule    • Cholecalciferol (VITAMIN D3) 2000 units CHEW Chew 5,000 Units daily   • Choline Fenofibrate (Fenofibric Acid) 135 MG CPDR 1 cap daily   • fexofenadine (ALLEGRA) 180 MG tablet Take 180 mg by mouth daily   • fluticasone (FLONASE) 50 mcg/act nasal spray INSTILL 1 SPRAY INTO EACH NOSTRIL ONCE DAILY   • levalbuterol (XOPENEX) 1.25 mg/3 mL nebulizer solution    • levofloxacin (LEVAQUIN) 500 mg tablet    • magnesium oxide (MAG-OX) 400 mg tablet Take 1 tablet by mouth in the morning   • oxyCODONE-acetaminophen (PERCOCET)  mg per tablet    • predniSONE 10 mg tablet    • promethazine-codeine (PHENERGAN WITH CODEINE) 6.25-10 mg/5 mL syrup    • promethazine-dextromethorphan (PHENERGAN-DM) 6.25-15 mg/5 mL oral syrup    • [DISCONTINUED] cloNIDine (CATAPRES) 0.1 mg tablet TAKE 1 TABLET BY MOUTH TWICE A DAY   • [DISCONTINUED] linaCLOtide 72 MCG CAPS Take 72 mcg by mouth daily in the early morning   • [DISCONTINUED] lubiprostone (AMITIZA) 8 mcg capsule Take 1 capsule (8 mcg total) by mouth 2 (two) times a day with meals   • hydroxychloroquine (PLAQUENIL) 200 mg tablet Take 1 tablet (200 mg total) by mouth 2 (two) times a day with meals   • [DISCONTINUED] acetaminophen-codeine (TYLENOL with CODEINE #3) 300-30 MG per tablet    • [DISCONTINUED] ALPRAZolam (XANAX) 0.25 mg tablet Take 1 tablet (0.25 mg total) by mouth daily as needed for anxiety   • [DISCONTINUED] budesonide-formoterol (Symbicort) 160-4.5 mcg/act inhaler Inhale 2 puffs 2 (two) times a day 160-4.50MCG/ACTUATION (Patient not taking: Reported on 5/4/2023)   • [DISCONTINUED] diazepam (VALIUM) 5 mg tablet PLEASE SEE ATTACHED FOR DETAILED DIRECTIONS   • [DISCONTINUED] esomeprazole (NexIUM) 40 MG capsule Take 40 mg by mouth in the morning (Patient not taking: Reported on 5/4/2023)   • [DISCONTINUED] oxyCODONE (ROXICODONE) 10 MG TABS    • [DISCONTINUED] traMADol (ULTRAM) 50 mg tablet TAKE 1 TABLET BY MOUTH 2 TIMES A DAY AS NEEDED FOR SEVERE PAIN. Objective     /80   Pulse 73   Temp (!) 97.3 °F (36.3 °C)   Ht 5' 3" (1.6 m)   Wt 73 kg (161 lb)   SpO2 97%   BMI 28.52 kg/m²     Physical Exam  Vitals reviewed. Constitutional:       Appearance: Normal appearance. HENT:      Head: Normocephalic and atraumatic. Right Ear: Tympanic membrane, ear canal and external ear normal.      Left Ear: Tympanic membrane, ear canal and external ear normal.   Eyes:      Conjunctiva/sclera: Conjunctivae normal.   Cardiovascular:      Rate and Rhythm: Normal rate and regular rhythm. Heart sounds: Normal heart sounds. Pulmonary:      Effort: Pulmonary effort is normal.      Breath sounds: Normal breath sounds. Abdominal:      General: Bowel sounds are normal.      Palpations: Abdomen is soft. Musculoskeletal:         General: Normal range of motion. Cervical back: Neck supple. Right lower leg: No edema. Left lower leg: No edema. Skin:     General: Skin is warm and dry. Neurological:      General: No focal deficit present. Mental Status: She is alert and oriented to person, place, and time.    Psychiatric:         Mood and Affect: Mood normal.         Behavior: Behavior normal. [id number] [place]

## 2023-10-26 ENCOUNTER — TELEPHONE (OUTPATIENT)
Dept: NEUROLOGY | Facility: CLINIC | Age: 63
End: 2023-10-26

## 2023-10-26 NOTE — TELEPHONE ENCOUNTER
Called and spoke to patient. Patient confirmed upcoming apt with Veronica Sage PA-C on 11/3/23 @ 2:30 pm in the [facility].

## 2023-11-03 ENCOUNTER — OFFICE VISIT (OUTPATIENT)
Dept: NEUROLOGY | Facility: CLINIC | Age: 63
End: 2023-11-03
Payer: COMMERCIAL

## 2023-11-03 VITALS
HEIGHT: 63 IN | BODY MASS INDEX: 28.17 KG/M2 | WEIGHT: 159 LBS | OXYGEN SATURATION: 96 % | HEART RATE: 82 BPM | RESPIRATION RATE: 18 BRPM | DIASTOLIC BLOOD PRESSURE: 66 MMHG | TEMPERATURE: 97.6 F | SYSTOLIC BLOOD PRESSURE: 144 MMHG

## 2023-11-03 DIAGNOSIS — G62.9 NEUROPATHY: Primary | ICD-10-CM

## 2023-11-03 DIAGNOSIS — M79.671 RIGHT FOOT PAIN: ICD-10-CM

## 2023-11-03 PROCEDURE — 99214 OFFICE O/P EST MOD 30 MIN: CPT | Performed by: PHYSICIAN ASSISTANT

## 2023-11-03 NOTE — PROGRESS NOTES
Patient ID: Felecia Holman is a 70 y.o. female. Assessment:  Patient initially presented approx 1.5 years ago for R foot pain, burning, numbness. 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 She was advised EMG, but has not yet completed this, cites concern for pain with the test.  She was advised to cont to follow with ortho, pain management and rheum for various pain complaints. She has also reported migraines/occipital neuralgia and memory concerns, workup unrevealing. Advised to f/u with pain management for TPI or ONB, but has not. Today her only complaints are of pain. Explained to patient and  that we are unsure of what is causing her RLE pain. She has various ortho issues in the RLE including avascular necrosis of the R hip, prior tear in her foot that she never had addressed after the MRI. The pain she is c/o is in the right foot, right Achilles, right anterior leg (she says "bone pain" in the shin), right hip, low back, neck etc.  She does describe neuropathic qualities to her pain including burning and numbness. Discussed that in order to look for peripheral nerve disorder causing her complaints, EMG is recommended. Discussed this would check for generalized neuropathy (if she has this, would likely be incidental due to asymmetry of her pain and significance of the pain in only the RLE), compressive neuropathy or lumbar radiculopathy. After much discussion, she is agreeable to the EMG. She has not been following with pain management, does not want more injections. She only had injections in the hip for AVN, no surgery being planned at this time. I suggested returning to ortho or podiatry referral for her significant R foot and Achilles pain. She has significant pain with any palpation of her right foot and also with her Achilles, which is not neurologic.   She is agreeable to podiatry referral.    She previously tried low dose gabapentin and had anxiety. Not willing to try anything else. She is getting Percocet for her chronic pain from her pulmonologist regularly. We do not treat neuropathic pain with opioids. This is not something we would manage. She did not bring up anything regarding headaches or memory today, not discussed. Primarily focused on RLE pain. Plan:  -EMG RLE  -podiatry referral  -advised continuing to follow with pain management, rheumatology and ortho for chronic pain complaints, RA and ortho issues such as R hip AVN  -follow up in 6 months (after EMG) with Dr. Martínez Nunez. Patient requested not to come back to this office, as it is too far and prefers [facility] office where she has been seen before. Diagnoses and all orders for this visit:    Neuropathy  -     EMG 1 Limb; Future    Right foot pain  -     Ambulatory Referral to Podiatry; Future           Subjective:    HPI    Yanni Howard is a 70year old female who presents today for neurologic follow up, last seen by Dr. Martínez Nunez in April 2022. She has PMH of RA, chronic pain, asthma, migraines, occipital neuralgia, HTN, anxiety. She initially presented in January 2022 due to right foot pain. 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 She reported a “burning cold” sensation of the right foot, worse at night. She was advised to continue to follow up with ortho, consider podiatry, as well as rheumatology and pain management teams. EMG was ordered. At time of follow up, she had not completed EMG, was “thinking about it”. She saw pain management. She reported neuropathy symptoms were better. At timing of May 2022 follow up she reported headaches on the right side. She also had complaints of memory decline. MRI brain ordered and completed 6/14/22 was a normal study. MRA head 7/15/22 unremarkable.   At timing of follow up in Jan 2023 she was reporting neck pain and occipital headaches. Prior dx of occipital neuralgia. She was advised to follow up with her pain management team.     At timing of last visit in April 2023, patient denied any changes. Memory labs unremarkable. She declined to see pain management to consider trigger point injections and declined any medications for her neuropathy symptoms. She continued to c/o worsening memory, was referred to neuropsychology. Today, patient reports ongoing pain complaints. She reports pain in the neck, low back, R hip, R leg, R foot. She continues to c/o "burning cold" pain in the right foot. She has pain with movements and pain with weight bearing. She tells me her leg hurts--rubs the tibia and says she has "bone pain". Also c/o numbness in the R leg. Denies weakness, only pain. She has not seen pain management team through [facility] since Jan 2023 (R hip injection). She has seen ortho for avascular necrosis of the R hip. She tells me all they offer is injections, which is just temporary and she is not interested in more injections, but is in constant pain. She says her R hip eventually may need to be replaced, but her xrays have been stable, so no surgery offered now. She is now on Percocet for her pain through her pulmonologist with [facility], per chart review, and also getting chronic alprazolam as well. She says Percocet takes the pain away only for a few hours. She denies any falls. No bowel or bladder changes.  wants to know why she was "transferred here" (to the [facility] office), as this is entirely too far away and almost cancelled today. She has been seeing Dr. Leanne Jones in [facility] [place] and does not want to come to this office anymore if possible.     The following portions of the patient's history were reviewed and updated as appropriate: current medications, past family history, past medical history, past social history, past surgical history, and problem list.       Objective:    Blood pressure 144/66, pulse 82, temperature 97.6 °F (36.4 °C), temperature source Temporal, resp. rate 18, height 5' 3" (1.6 m), weight 72.1 kg (159 lb), SpO2 96 %. Physical Exam  Constitutional:       Appearance: Normal appearance. Eyes:      Extraocular Movements: EOM normal.      Pupils: Pupils are equal, round, and reactive to light. Musculoskeletal:      Comments: Pain with any palpation of right foot, right Achilles. When she was showing me where her pain was, I gently squeezed her right calf/Achilles area and she jumped in pain. Also pain with any touch of the top of her right foot. Neurological:      Mental Status: She is alert. Deep Tendon Reflexes: Reflexes are normal and symmetric. Psychiatric:         Mood and Affect: Mood normal.         Speech: Speech normal.         Behavior: Behavior normal.         Neurological Exam  Mental Status  Alert. Oriented to person, place, time and situation. Speech is normal. Language is fluent with no aphasia. Attention and concentration are normal.    Cranial Nerves  CN II: Visual fields full to confrontation. CN III, IV, VI: Extraocular movements intact bilaterally. Pupils equal round and reactive to light bilaterally. CN V: Facial sensation is normal.  CN VII: Full and symmetric facial movement. CN VIII: Hearing is normal.  CN IX, X: Palate elevates symmetrically  CN XI: Shoulder shrug strength is normal.  CN XII: Tongue midline without atrophy or fasciculations. Motor   No abnormal involuntary movements. Strength is 5/5 in all four extremities except as noted. Testing of the RLE was significantly limited by pain. She did not want me to push down on her leg to test hip flexion, stating it was too painful. Pain when testing dorsiflexion of the right foot. Sensory  Light touch is normal in upper and lower extremities.  Vibration abnormality: Decreased vibratory sensation at the levels of the toes bilaterally; very minimal on the left, worse on the right, but max strike was about 8 seconds . Reflexes  Deep tendon reflexes are 2+ and symmetric in all four extremities. Coordination  Right: Finger-to-nose normal.Left: Finger-to-nose normal.    Gait    Somewhat limping/antalgic gait due to RLE pain. ROS:    Review of Systems   Constitutional:  Positive for fatigue. Negative for chills and fever. HENT:  Negative for ear pain and sore throat. Eyes:  Negative for pain and visual disturbance. Respiratory:  Negative for cough and shortness of breath. Cardiovascular:  Negative for chest pain and palpitations. Gastrointestinal:  Negative for abdominal pain and vomiting. Genitourinary:  Negative for dysuria and hematuria. Musculoskeletal:  Positive for back pain (upper and lower), gait problem, neck pain and neck stiffness. Negative for arthralgias. Skin:  Negative for color change and rash. Neurological:  Positive for dizziness, tremors (right hand sometimes), weakness, numbness (feet and sholders) and headaches. Negative for seizures and syncope. Hematological:  Bruises/bleeds easily. Psychiatric/Behavioral:          Depression   All other systems reviewed and are negative.     I personally reviewed and updated the ROS as appropriate

## 2023-11-03 NOTE — PATIENT INSTRUCTIONS
Referral to podiatry  EMG of the right leg  Can try over the counter lidocaine ointment or cream or patches to help with the foot/ankle pain  Follow up in 6 months with Dr. Martínez Nunez

## 2023-12-22 DIAGNOSIS — E78.2 MIXED HYPERLIPIDEMIA: ICD-10-CM

## 2023-12-22 RX ORDER — FENOFIBRIC ACID 135 MG/1
CAPSULE, DELAYED RELEASE ORAL
Qty: 90 CAPSULE | Refills: 3 | Status: SHIPPED | OUTPATIENT
Start: 2023-12-22

## 2024-01-05 NOTE — PROGRESS NOTES
PT Discharge    Today's date: 3/19/2019  Patient name: Cyril Bates  : 1960  MRN: [id number]  Referring provider: Daniela Draper PA-C  Dx:   Encounter Diagnosis     ICD-10-CM    1  Lumbar radicular pain M54 16    2  Pain in right hip M25 551    3  Neuropathy G62 9      Assessment  Assessment details: Pt has not returned to physical therapy since 19 and does not have future appointments scheduled  Subjective and objective information unable to be updated at this time  Pt DC from skilled therapy  Impairments: abnormal gait, abnormal or restricted ROM, activity intolerance, impaired balance, impaired physical strength, lacks appropriate home exercise program and pain with function  Functional limitations: sitting, lifting, walking  Symptom irritability: moderateUnderstanding of Dx/Px/POC: good   Prognosis: good    Goals  ST weeks - NOT MET  1  Pt will demonstrate independence with HEP  2  Pt will improve lumbar AROM by 10%  3  Pt will improve RLE strength by at least 1/2 grade  4  Pt will report pain no more than 5/10  5  Pt will generate proper TA contraction without cuing to show improved NM control    LT weeks - NOT MET  1  Pt will improve lumbar AROM to at least 80% in all directions  2  Pt will report pain no more than 2/10  3  Pt will be able to stand/sit for at least 30 minutes without pain to return to PLOF  4  Pt will be able to lift at least 10lbs from floor to waist without pain  5  Pt will have no TTP over the R greater trochanter  6   Pt will improve RLE strength to at least 4+/5    Plan  Patient would benefit from: skilled physical therapy  Planned modality interventions: cryotherapy and thermotherapy: hydrocollator packs  Planned therapy interventions: therapeutic exercise, therapeutic activities, stretching, strengthening, patient education, neuromuscular re-education, massage, manual therapy, balance, gait training and home exercise program        Subjective Prep Survey      Flowsheet Row Responses   [facility] facility patient discharged from? Non-BH   Is LACE score < 7 ? Non-BH Discharge   Eligibility [facility]   Date of Discharge 01/05/24   Discharge Disposition Home or Self Care   Discharge diagnosis Acute Respiratory Failure   Does the patient have one of the following disease processes/diagnoses(primary or secondary)? Other   Prep survey completed? Yes            Deborah GUERRERO - Registered Nurse           Evaluation    Pain  Current pain ratin  At best pain ratin  At worst pain ratin  Quality: sharp  Relieving factors: heat and medications          Objective     Tenderness     Right Hip   Tenderness in the PSIS and greater trochanter  Active Range of Motion     Additional Active Range of Motion Details  Lumbar AROM:  Flexion: 75%  Extension: 25%  R SG:10%  L SG: 10%    Joint Play     Additional Joint Play Details  Unable to attest secondary to pain    Strength/Myotome Testing     Left Hip   Planes of Motion   Flexion: 4+  Abduction: 4+  External rotation: 4+  Internal rotation: 4+    Right Hip   Planes of Motion   Flexion: 4-  Abduction: 4-  External rotation: 4-  Internal rotation: 4-    Left Knee   Flexion: 4+  Extension: 4+    Right Knee   Flexion: 4-  Extension: 4-    Left Ankle/Foot   Dorsiflexion: 4+    Right Ankle/Foot   Dorsiflexion: 4-    Tests     Right Hip   Positive FADIR

## 2024-01-24 ENCOUNTER — TELEPHONE (OUTPATIENT)
Dept: CARDIOLOGY CLINIC | Facility: CLINIC | Age: 64
End: 2024-01-24

## 2024-01-24 NOTE — TELEPHONE ENCOUNTER
S/w pt. States that she had a CT chest that showed arterial stenosis has changed from mild to moderate. Pt states that she has SOB upon exertion.     Discussed pt with Dr. GRAY. Pt will be seen in office 1/25.

## 2024-01-25 ENCOUNTER — OFFICE VISIT (OUTPATIENT)
Dept: CARDIOLOGY CLINIC | Facility: CLINIC | Age: 64
End: 2024-01-25
Payer: COMMERCIAL

## 2024-01-25 VITALS
RESPIRATION RATE: 16 BRPM | DIASTOLIC BLOOD PRESSURE: 68 MMHG | HEART RATE: 76 BPM | HEIGHT: 63 IN | SYSTOLIC BLOOD PRESSURE: 114 MMHG | WEIGHT: 159 LBS | BODY MASS INDEX: 28.17 KG/M2 | OXYGEN SATURATION: 96 %

## 2024-01-25 DIAGNOSIS — I20.89 ANGINAL EQUIVALENT: ICD-10-CM

## 2024-01-25 DIAGNOSIS — E78.2 MIXED HYPERLIPIDEMIA: ICD-10-CM

## 2024-01-25 DIAGNOSIS — R06.02 SHORTNESS OF BREATH: Primary | ICD-10-CM

## 2024-01-25 DIAGNOSIS — I10 HYPERTENSION, ESSENTIAL: ICD-10-CM

## 2024-01-25 PROCEDURE — 93000 ELECTROCARDIOGRAM COMPLETE: CPT | Performed by: INTERNAL MEDICINE

## 2024-01-25 PROCEDURE — 99214 OFFICE O/P EST MOD 30 MIN: CPT | Performed by: INTERNAL MEDICINE

## 2024-01-25 RX ORDER — BUDESONIDE AND FORMOTEROL FUMARATE DIHYDRATE 160; 4.5 UG/1; UG/1
2 AEROSOL RESPIRATORY (INHALATION) 2 TIMES DAILY
COMMUNITY
Start: 2024-01-08

## 2024-01-25 NOTE — PROGRESS NOTES
[facility]  [address] [id number]  Cardiology Follow Up    Kelsi Cabrera  1960  [id number]      1. Shortness of breath        2. Anginal equivalent        3. Hypertension, essential        4. Mixed hyperlipidemia            Chief Complaint   Patient presents with    Follow-up       Interval History:     This patient presents for follow-up visit.  Patient does have history of COPD followed by pulmonary.  Patient also has history of hypertension, hyperlipidemia with intolerance to statins as well as history of smoking.  Patient smokes 4 to 5 cigarettes a day.  Patient is accompanied by family.  Patient has symptoms of shortness of breath more than usual.  Patient denies any history of leg edema orthopnea PND.  Patient also has occasional episodes of chest discomfort sometimes related to exertion.    Patient Active Problem List   Diagnosis    Lumbar radiculopathy    Gastro-esophageal reflux disease without esophagitis    Right hip pain    Essential hypertension    Other hyperlipidemia    Lumbar spondylosis    Moderate persistent asthma with acute exacerbation    Rheumatoid arthritis involving multiple sites (HCC)    Allergic rhinitis due to allergen    Primary osteoarthritis of left hip    Chronic tension-type headache, not intractable    Cervical spondylosis    Cervical radiculopathy    Migraine    Neuropathy    Cervical spinal stenosis    Memory impairment    Bilateral occipital neuralgia    Chronic pain syndrome    Situational anxiety     Past Medical History:   Diagnosis Date    Arthritis     Asthma     Avascular necrosis of bone of hip, right (HCC)     Avascular necrosis of hip, right (HCC) 10/3/2017    Chronic obstructive pulmonary disease (HCC) 2/12/2019    COPD (chronic obstructive pulmonary disease) (HCC)     GERD (gastroesophageal reflux disease)     Hypoglycemia     Infectious viral hepatitis     Lumbar radicular pain     Lyme disease     Rheumatoid  osteoperiostitis (HCC)      Social History     Socioeconomic History    Marital status: /Civil Union     Spouse name: Not on file    Number of children: Not on file    Years of education: Not on file    Highest education level: Not on file   Occupational History    Not on file   Tobacco Use    Smoking status: Every Day     Types: Cigarettes    Smokeless tobacco: Never    Tobacco comments:     started smoking    Vaping Use    Vaping status: Never Used   Substance and Sexual Activity    Alcohol use: No    Drug use: Never    Sexual activity: Not Currently     Partners: Male   Other Topics Concern    Not on file   Social History Narrative    Not on file     Social Determinants of Health     Financial Resource Strain: Not on file   Food Insecurity: Not on file   Transportation Needs: Not on file   Physical Activity: Not on file   Stress: Not on file   Social Connections: Not on file   Intimate Partner Violence: Not on file   Housing Stability: Not on file      Family History   Problem Relation Age of Onset    Bronchiolitis Mother      Past Surgical History:   Procedure Laterality Date    APPENDECTOMY      CARPAL TUNNEL RELEASE      CHOLECYSTECTOMY      SINUS SURGERY         Current Outpatient Medications:     ALPRAZolam (XANAX) 0.5 mg tablet, , Disp: , Rfl:     Azelastine HCl 137 MCG/SPRAY SOLN, SPRAY 1-2 SPRAYS INTO EACH NOSTRIL TWICE A DAY AS DIRECTED, Disp: , Rfl:     benzonatate (TESSALON) 200 MG capsule, , Disp: , Rfl:     budesonide-formoterol (SYMBICORT) 160-4.5 mcg/act inhaler, Inhale 2 puffs 2 (two) times a day, Disp: , Rfl:     Cholecalciferol (VITAMIN D3) 2000 units CHEW, Chew 5,000 Units daily, Disp: , Rfl:     Choline Fenofibrate (Fenofibric Acid) 135 MG CPDR, 1 cap daily, Disp: 90 capsule, Rfl: 3    cloNIDine (CATAPRES) 0.1 mg tablet, Take 1 tablet (0.1 mg total) by mouth 2 (two) times a day, Disp: 180 tablet, Rfl: 0    fexofenadine (ALLEGRA) 180 MG tablet, Take 180 mg by mouth daily, Disp: , Rfl:      fluticasone (FLONASE) 50 mcg/act nasal spray, INSTILL 1 SPRAY INTO EACH NOSTRIL ONCE DAILY, Disp: 48 g, Rfl: 1    hydroxychloroquine (PLAQUENIL) 200 mg tablet, Take 1 tablet (200 mg total) by mouth 2 (two) times a day with meals, Disp: 60 tablet, Rfl: 1    levalbuterol (XOPENEX HFA) 45 mcg/act inhaler, , Disp: , Rfl:     levalbuterol (XOPENEX) 1.25 mg/3 mL nebulizer solution, , Disp: , Rfl:     levofloxacin (LEVAQUIN) 500 mg tablet, , Disp: , Rfl:     magnesium oxide (MAG-OX) 400 mg tablet, Take 1 tablet by mouth in the morning, Disp: , Rfl:     oxyCODONE-acetaminophen (PERCOCET)  mg per tablet, , Disp: , Rfl:     promethazine-dextromethorphan (PHENERGAN-DM) 6.25-15 mg/5 mL oral syrup, , Disp: , Rfl:   Allergies   Allergen Reactions    Aspirin Anaphylaxis    Iodine - Food Allergy Anaphylaxis    Penicillins Anaphylaxis    Soybean Oil - Food Allergy Other (See Comments)    Statins Myalgia       Labs:  No visits with results within 2 Month(s) from this visit.   Latest known visit with results is:   Appointment on 05/26/2023   Component Date Value    SS-A (RO) Ab 05/26/2023 <0.2     SS-B (LA) Ab 05/26/2023 <0.2      Imaging: CT lung nodule follow-up    Result Date: 1/23/2024  Narrative: CT Lung Cancer Screening History: Former smoker. Comparison: 11/5/2020 CT. Technique: Low Dose CT of the chest was performed without intravenous contrast. Findings: Lungs: No significant emphysematous changes. There are scattered tiny pulmonary nodules measuring less than 3 mm, unlikely consequence. Right lower lobe bleb measures 1.5 cm. Pleura: Normal Airways: Normal Mediastinum/Lymph nodes: Normal Cardiovascular: Normal size heart without pericardial effusion. Normal caliber thoracic aorta. Moderate coronary and aortic calcification. Thyroid/chest wall: Normal Upper abdomen: Cholecystectomy. Mild hepatic steatosis. Bones: Normal    Impression: Impression: No suspicious pulmonary nodule. Degree of coronary artery calcification:  "Moderate. Degree of thoracic aortic calcification: Moderate. Lung-RADS Category 2. Continue annual screening with low dose CT in 12 months. Workstation:[id number]      Review of Systems:  Review of Systems  REVIEW OF SYSTEMS:  Constitutional:  Denies fever or chills   Eyes:  Denies change in visual acuity   HENT:  Denies nasal congestion or sore throat   Respiratory:  shortness of breath   Cardiovascular: Chest discomfort  GI:  Denies abdominal pain, nausea, vomiting, bloody stools or diarrhea   :  Denies dysuria, frequency, difficulty in micturition and nocturia  Musculoskeletal:  Denies back pain or joint pain   Neurologic:  Denies headache, focal weakness or sensory changes   Endocrine:  Denies polyuria or polydipsia   Lymphatic:  Denies swollen glands   Psychiatric:  Denies depression or anxiety    Physical Exam:    /68 (BP Location: Left arm, Patient Position: Sitting, Cuff Size: Standard)   Pulse 76   Resp 16   Ht 5' 3\" (1.6 m)   Wt 72.1 kg (159 lb)   SpO2 96%   BMI 28.17 kg/m²     Physical Exam  PHYSICAL EXAM:  General:  Patient is not in acute distress   Head: Normocephalic, Atraumatic.  HEENT:  Both pupils normal-size atraumatic, normocephalic, nonicteric  Neck:  JVP not raised. Trachea central. No carotid bruit  Respiratory: Decreased breath sounds bilateral  Cardiovascular:  Regular rate and rhythm no S3 no murmurs  GI:  Abdomen soft nontender. No organomegaly.   Lymphatic:  No cervical or inguinal lymphadenopathy  Neurologic:  Patient is awake alert, oriented . Grossly nonfocal  Extremities no edema    EKG shows sinus rhythm and is within normal limits    Patient had lung cancer screening CT chest.  Showed moderate coronary calcification.      Discussion/Summary:    Patient with multiple risk factors for coronary artery disease including hypertension, hyperlipidemia, advanced age as well as smoking and symptoms of chest pain and shortness of breath.  Patient will be scheduled for " pharmacological nuclear stress test to assess for ischemia.  Sensitivity and specificity as well as limitations of different modalities of cardiac imaging discussed with patient and family.  Symptoms to watch out from cardiac standpoint which would indicate the need for further cardiac evaluation including consideration for cardiac catheterization also discussed.    Echocardiogram to assess ejection fraction valves and look for any evidence of pulmonary hypertension given symptoms of shortness of breath and history of COPD/asthma.    Patient strongly counseled to quit smoking to prevent future cardiovascular events.  Patient had significant intolerance to different statins and is only on fenofibrate.  Last lipid profile was acceptable.  Follow-up in 4 to 6 weeks or earlier as needed.

## 2024-02-07 ENCOUNTER — HOSPITAL ENCOUNTER (OUTPATIENT)
Dept: NON INVASIVE DIAGNOSTICS | Facility: CLINIC | Age: 64
Discharge: HOME | End: 2024-02-07
Payer: COMMERCIAL

## 2024-02-07 ENCOUNTER — TELEPHONE (OUTPATIENT)
Dept: CARDIOLOGY CLINIC | Facility: CLINIC | Age: 64
End: 2024-02-07

## 2024-02-07 VITALS
HEART RATE: 62 BPM | SYSTOLIC BLOOD PRESSURE: 138 MMHG | OXYGEN SATURATION: 99 % | BODY MASS INDEX: 28.17 KG/M2 | DIASTOLIC BLOOD PRESSURE: 78 MMHG | HEIGHT: 63 IN | WEIGHT: 159 LBS

## 2024-02-07 VITALS
WEIGHT: 159 LBS | HEART RATE: 76 BPM | DIASTOLIC BLOOD PRESSURE: 68 MMHG | SYSTOLIC BLOOD PRESSURE: 114 MMHG | HEIGHT: 63 IN | BODY MASS INDEX: 28.17 KG/M2

## 2024-02-07 DIAGNOSIS — R06.02 SHORTNESS OF BREATH: ICD-10-CM

## 2024-02-07 DIAGNOSIS — I20.89 ANGINAL EQUIVALENT: ICD-10-CM

## 2024-02-07 LAB
AORTIC ROOT: 3 CM
APICAL FOUR CHAMBER EJECTION FRACTION: 75 %
ASCENDING AORTA: 2.8 CM
E WAVE DECELERATION TIME: 238 MS
E/A RATIO: 0.72
FRACTIONAL SHORTENING: 34 (ref 28–44)
INTERVENTRICULAR SEPTUM IN DIASTOLE (PARASTERNAL SHORT AXIS VIEW): 1 CM
INTERVENTRICULAR SEPTUM: 1 CM (ref 0.6–1.1)
LAAS-AP2: 13.7 CM2
LAAS-AP4: 15.1 CM2
LEFT ATRIUM SIZE: 3.7 CM
LEFT ATRIUM VOLUME (MOD BIPLANE): 35 ML
LEFT ATRIUM VOLUME INDEX (MOD BIPLANE): 20 ML/M2
LEFT INTERNAL DIMENSION IN SYSTOLE: 2.7 CM (ref 2.1–4)
LEFT VENTRICULAR INTERNAL DIMENSION IN DIASTOLE: 4.1 CM (ref 3.5–6)
LEFT VENTRICULAR POSTERIOR WALL IN END DIASTOLE: 1.1 CM
LEFT VENTRICULAR STROKE VOLUME: 47 ML
LVSV (TEICH): 47 ML
MV E'TISSUE VEL-SEP: 9 CM/S
MV PEAK A VEL: 0.9 M/S
MV PEAK E VEL: 65 CM/S
MV STENOSIS PRESSURE HALF TIME: 69 MS
MV VALVE AREA P 1/2 METHOD: 3.19
NUC STRESS DIASTOLIC VOLUME INDEX: 51 ML/M2
NUC STRESS EJECTION FRACTION: 83 %
NUC STRESS SYSTOLIC VOLUME INDEX: 9 ML/M2
RATE PRESSURE PRODUCT: NORMAL
RIGHT ATRIUM AREA SYSTOLE A4C: 11.6 CM2
RIGHT VENTRICLE ID DIMENSION: 2.7 CM
SL CV LEFT ATRIUM LENGTH A2C: 4.7 CM
SL CV LV EF: 65
SL CV PED ECHO LEFT VENTRICLE DIASTOLIC VOLUME (MOD BIPLANE) 2D: 74 ML
SL CV PED ECHO LEFT VENTRICLE SYSTOLIC VOLUME (MOD BIPLANE) 2D: 27 ML
SL CV REST NUCLEAR ISOTOPE DOSE: 10.39 MCI
SL CV STRESS NUCLEAR ISOTOPE DOSE: 31.4 MCI
SL CV STRESS RECOVERY BP: NORMAL MMHG
SL CV STRESS RECOVERY HR: 89 BPM
SL CV STRESS RECOVERY O2 SAT: 99 %
STRESS ANGINA INDEX: 0
STRESS BASELINE BP: NORMAL MMHG
STRESS BASELINE HR: 62 BPM
STRESS O2 SAT REST: 99 %
STRESS PEAK HR: 117 BPM
STRESS POST O2 SAT PEAK: 99 %
STRESS POST PEAK BP: 146 MMHG
STRESS/REST PERFUSION RATIO: 1.22
TRICUSPID ANNULAR PLANE SYSTOLIC EXCURSION: 1.8 CM

## 2024-02-07 PROCEDURE — 93306 TTE W/DOPPLER COMPLETE: CPT | Performed by: INTERNAL MEDICINE

## 2024-02-07 PROCEDURE — 93306 TTE W/DOPPLER COMPLETE: CPT

## 2024-02-07 PROCEDURE — 93017 CV STRESS TEST TRACING ONLY: CPT

## 2024-02-07 PROCEDURE — G1004 CDSM NDSC: HCPCS

## 2024-02-07 PROCEDURE — 93018 CV STRESS TEST I&R ONLY: CPT | Performed by: INTERNAL MEDICINE

## 2024-02-07 PROCEDURE — 78452 HT MUSCLE IMAGE SPECT MULT: CPT

## 2024-02-07 PROCEDURE — A9502 TC99M TETROFOSMIN: HCPCS

## 2024-02-07 PROCEDURE — 93016 CV STRESS TEST SUPVJ ONLY: CPT | Performed by: INTERNAL MEDICINE

## 2024-02-07 PROCEDURE — 78452 HT MUSCLE IMAGE SPECT MULT: CPT | Performed by: INTERNAL MEDICINE

## 2024-02-07 RX ORDER — REGADENOSON 0.08 MG/ML
0.4 INJECTION, SOLUTION INTRAVENOUS ONCE
Status: COMPLETED | OUTPATIENT
Start: 2024-02-07 | End: 2024-02-07

## 2024-02-07 RX ADMIN — REGADENOSON 0.4 MG: 0.08 INJECTION, SOLUTION INTRAVENOUS at 08:56

## 2024-02-07 NOTE — RESULT ENCOUNTER NOTE
Please call the patient.  Echocardiogram shows normal ejection fraction with no significant valvular abnormalities.

## 2024-02-07 NOTE — TELEPHONE ENCOUNTER
----- Message from Anuradha Sauceda MD sent at 2/7/2024  3:20 PM EST -----  Please call the patient.  Echocardiogram shows normal ejection fraction with no significant valvular abnormalities.

## 2024-02-08 ENCOUNTER — TELEPHONE (OUTPATIENT)
Dept: CARDIOLOGY CLINIC | Facility: CLINIC | Age: 64
End: 2024-02-08

## 2024-02-08 NOTE — TELEPHONE ENCOUNTER
----- Message from Anuradha Sauceda MD sent at 2/7/2024  4:53 PM EST -----  Please call the patient.  The stress test was abnormal which could represent ischemia.    Please get this patient scheduled with one of the APs next week to discuss options of cardiac catheterization.    Thank you.

## 2024-02-11 LAB
CHEST PAIN STATEMENT: NORMAL
MAX DIASTOLIC BP: 86 MMHG
MAX PREDICTED HEART RATE: 155 BPM
PROTOCOL NAME: NORMAL
REASON FOR TERMINATION: NORMAL
STRESS POST EXERCISE DUR MIN: 3 MIN
STRESS POST EXERCISE DUR SEC: 0 SEC
STRESS POST PEAK HR: 117 BPM
STRESS POST PEAK SYSTOLIC BP: 148 MMHG
TARGET HR FORMULA: NORMAL

## 2024-02-12 ENCOUNTER — TELEPHONE (OUTPATIENT)
Dept: CARDIOLOGY CLINIC | Facility: CLINIC | Age: 64
End: 2024-02-12

## 2024-02-15 ENCOUNTER — OFFICE VISIT (OUTPATIENT)
Dept: CARDIOLOGY CLINIC | Facility: CLINIC | Age: 64
End: 2024-02-15
Payer: COMMERCIAL

## 2024-02-15 ENCOUNTER — PREP FOR PROCEDURE (OUTPATIENT)
Dept: CARDIOLOGY CLINIC | Facility: CLINIC | Age: 64
End: 2024-02-15

## 2024-02-15 VITALS
BODY MASS INDEX: 28 KG/M2 | SYSTOLIC BLOOD PRESSURE: 136 MMHG | RESPIRATION RATE: 16 BRPM | HEART RATE: 68 BPM | OXYGEN SATURATION: 98 % | DIASTOLIC BLOOD PRESSURE: 70 MMHG | HEIGHT: 63 IN | WEIGHT: 158 LBS

## 2024-02-15 DIAGNOSIS — I20.89 ANGINAL EQUIVALENT: Primary | ICD-10-CM

## 2024-02-15 DIAGNOSIS — R94.39 ABNORMAL STRESS TEST: ICD-10-CM

## 2024-02-15 DIAGNOSIS — I10 ESSENTIAL HYPERTENSION: ICD-10-CM

## 2024-02-15 DIAGNOSIS — E78.2 MIXED HYPERLIPIDEMIA: ICD-10-CM

## 2024-02-15 PROCEDURE — 99214 OFFICE O/P EST MOD 30 MIN: CPT

## 2024-02-15 RX ORDER — NITROGLYCERIN 0.4 MG/1
0.4 TABLET SUBLINGUAL
Qty: 30 TABLET | Refills: 3 | Status: SHIPPED | OUTPATIENT
Start: 2024-02-15

## 2024-02-15 NOTE — PROGRESS NOTES
[facility] [place] [id number]  Cardiology Office Note    Kelsi Cabrera 63 y.o. female MRN: [id number]    02/15/24          Assessment/Plan:  1. Chest pain/ RAMON/ Abnormal stress test  Pharmacologic MPI showed small reversible defect of the basal to mid anterior wall suggestive of ischemia.  Plan for further evaluation with cardiac catheterization.  Risks, benefits, and alternatives of cardiac catheterization including but not limited to risk of infection, vascular injury, renal failure, bleeding, myocardial infarction, stroke, and death were discussed at length with patient. Patient understands the risks and benefits and wishes to proceed.   Patient will need preadmit to ICU for aspirin desensitization prior to cardiac cath.   Patient will also need premedication for iodine allergy.  SL nitroglycerin ordered. No imdur given chronic headaches. No BB due to baseline bradycardia/COPD  Advised that if patient has worsening symptoms to proceed to the ED for urgent evaluation.     2. Hypertension  Continue clonidine 0.1 mg twice daily    3. Hyperlipidemia  Intolerant of statins; continue fenofibrate    4. Tobacco use  Smoking cessation advised    Follow up: 1 months or sooner as needed  All questions and concerns addressed.  Patient was advised to report any problems requiring medical attention.          1. Anginal equivalent  nitroglycerin (NITROSTAT) 0.4 mg SL tablet    Basic metabolic panel    CBC and differential      2. Mixed hyperlipidemia        3. Essential hypertension            HPI: Kelsi Cabrera is a 63 y.o. year old female with PMH of COPD, HTN, HLD, statin intolerance who presents for dyspnea on exertion. Patient is known to Dr. Sauceda.    Patient was evaluated by Dr. Sauceda on 1/25/24 for dyspnea on exertion and chest pain. Patient underwent further evaluation with TTE and pharmacologic MPI. TTE showed EF 65% with no RWMA. Pharmacologic MPI concerning  for a small reversible defect of the basal to mid anterior wall suggestive of ischemia.     Testing reviewed with patient. Patient notes ongoing symptoms including pain in her left arm.     Patient has noted allergy to aspirin. She notes hives in the past but is unsure. Her documented allergy notes anaphylaxis. Patient will need aspirin desensitization prior to cardiac cath. Patient expresses understanding.    Patient also has noted allergy to iodine.     Patient endorses chronic headaches.      Patient was instructed to call the office or seek medical attention if any significant chest pain, shortness of breath, palpitations, or lower extremity swelling develop. All medications reviewed and patient is tolerating medications without side effects.     Social history:   Currently smokes.    Pharmacologic MPI 2/7/24    Stress ECG: The ECG was not diagnostic due to pharmacological (vasodilator) stress.    Perfusion Defect Conclusion: The stress/rest perfusion ratio is 1.22. There is no evidence of transient ischemic dilation (TID).    Perfusion: There is a left ventricular perfusion defect that is small in size present in the mid to basal anterior location(s) that is reversible.    Stress Function: Left ventricular function post-stress is normal. Stress ejection fraction is 83%.    Stress Combined Conclusion: Left ventricular perfusion is abnormal.     Abnormal study after pharmacologic vasodilation.  There was a small reversible defect of the basal to mid anterior wall suggestive of ischemia.  Left ventricular function was preserved.    TTE 2/7/24    Left Ventricle: Left ventricular cavity size is normal. Wall thickness is normal. The left ventricular ejection fraction is 65%. Systolic function is normal. Wall motion is normal. Diastolic function is normal for age.           Patient Active Problem List   Diagnosis    Lumbar radiculopathy    Gastro-esophageal reflux disease without esophagitis    Right hip pain     Essential hypertension    Other hyperlipidemia    Lumbar spondylosis    Moderate persistent asthma with acute exacerbation    Rheumatoid arthritis involving multiple sites (HCC)    Allergic rhinitis due to allergen    Primary osteoarthritis of left hip    Chronic tension-type headache, not intractable    Cervical spondylosis    Cervical radiculopathy    Migraine    Neuropathy    Cervical spinal stenosis    Memory impairment    Bilateral occipital neuralgia    Chronic pain syndrome    Situational anxiety       Allergies   Allergen Reactions    Aspirin Anaphylaxis    Iodine - Food Allergy Anaphylaxis    Penicillins Anaphylaxis    Soybean Oil - Food Allergy Other (See Comments)    Statins Myalgia         Current Outpatient Medications:     ALPRAZolam (XANAX) 0.5 mg tablet, , Disp: , Rfl:     benzonatate (TESSALON) 200 MG capsule, , Disp: , Rfl:     budesonide-formoterol (SYMBICORT) 160-4.5 mcg/act inhaler, Inhale 2 puffs 2 (two) times a day, Disp: , Rfl:     Cholecalciferol (VITAMIN D3) 2000 units CHEW, Chew 5,000 Units daily, Disp: , Rfl:     Choline Fenofibrate (Fenofibric Acid) 135 MG CPDR, 1 cap daily, Disp: 90 capsule, Rfl: 3    cloNIDine (CATAPRES) 0.1 mg tablet, Take 1 tablet (0.1 mg total) by mouth 2 (two) times a day, Disp: 180 tablet, Rfl: 0    fexofenadine (ALLEGRA) 180 MG tablet, Take 180 mg by mouth daily, Disp: , Rfl:     fluticasone (FLONASE) 50 mcg/act nasal spray, INSTILL 1 SPRAY INTO EACH NOSTRIL ONCE DAILY, Disp: 48 g, Rfl: 1    hydroxychloroquine (PLAQUENIL) 200 mg tablet, Take 1 tablet (200 mg total) by mouth 2 (two) times a day with meals, Disp: 60 tablet, Rfl: 1    levalbuterol (XOPENEX) 1.25 mg/3 mL nebulizer solution, , Disp: , Rfl:     levofloxacin (LEVAQUIN) 500 mg tablet, , Disp: , Rfl:     magnesium oxide (MAG-OX) 400 mg tablet, Take 1 tablet by mouth in the morning, Disp: , Rfl:     nitroglycerin (NITROSTAT) 0.4 mg SL tablet, Place 1 tablet (0.4 mg total) under the tongue every 5 (five)  minutes as needed for chest pain, Disp: 30 tablet, Rfl: 3    oxyCODONE-acetaminophen (PERCOCET)  mg per tablet, , Disp: , Rfl:     Azelastine HCl 137 MCG/SPRAY SOLN, SPRAY 1-2 SPRAYS INTO EACH NOSTRIL TWICE A DAY AS DIRECTED (Patient not taking: Reported on 2/15/2024), Disp: , Rfl:     levalbuterol (XOPENEX HFA) 45 mcg/act inhaler, , Disp: , Rfl:     promethazine-dextromethorphan (PHENERGAN-DM) 6.25-15 mg/5 mL oral syrup, , Disp: , Rfl:     Past Medical History:   Diagnosis Date    Arthritis     Asthma     Avascular necrosis of bone of hip, right (HCC)     Avascular necrosis of hip, right (HCC) 10/3/2017    Chronic obstructive pulmonary disease ([facility]) 2/12/2019    COPD (chronic obstructive pulmonary disease) ([facility])     GERD (gastroesophageal reflux disease)     Hypoglycemia     Infectious viral hepatitis     Lumbar radicular pain     Lyme disease     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[facility])        Family History   Problem Relation Age of Onset    Bronchiolitis Mother        Past Surgical History:   Procedure Laterality Date    APPENDECTOMY      CARPAL TUNNEL RELEASE      CHOLECYSTECTOMY      SINUS SURGERY         Social History     Socioeconomic History    Marital status: /Civil Union     Spouse name: Not on file    Number of children: Not on file    Years of education: Not on file    Highest education level: Not on file   Occupational History    Not on file   Tobacco Use    Smoking status: Every Day     Types: Cigarettes    Smokeless tobacco: Never    Tobacco comments:     started smoking    Vaping Use    Vaping status: Never Used   Substance and Sexual Activity    Alcohol use: No    Drug use: Never    Sexual activity: Not Currently     Partners: Male   Other Topics Concern    Not on file   Social History Narrative    Not on file     Social Determinants of Health     Financial Resource Strain: Not on file   Food Insecurity: Not on file   Transportation Needs: Not on file   Physical Activity: Not on file  "  Stress: Not on file   Social Connections: Not on file   Intimate Partner Violence: Not on file   Housing Stability: Not on file       Review of symptoms:   Review of Systems   Constitutional:  Negative for chills, diaphoresis and fever.   Respiratory:  Negative for cough, chest tightness and shortness of breath.    Cardiovascular:  Negative for chest pain, palpitations and leg swelling.   Gastrointestinal:  Negative for abdominal distention, blood in stool, nausea and vomiting.   Genitourinary:  Negative for difficulty urinating.   Musculoskeletal:  Negative for arthralgias and back pain.        Left arm pain   Neurological:  Negative for dizziness, syncope, light-headedness and headaches.   Psychiatric/Behavioral:  Negative for agitation and confusion. The patient is not nervous/anxious.         Vitals: /70 (BP Location: Left arm, Patient Position: Sitting, Cuff Size: Standard)   Pulse 68   Resp 16   Ht 5' 3\" (1.6 m)   Wt 71.7 kg (158 lb)   SpO2 98%   BMI 27.99 kg/m²         Physical Exam:     Physical Exam  Vitals and nursing note reviewed.   Constitutional:       General: She is not in acute distress.     Appearance: She is well-developed.   HENT:      Head: Normocephalic and atraumatic.   Eyes:      Conjunctiva/sclera: Conjunctivae normal.   Neck:      Vascular: No carotid bruit.   Cardiovascular:      Rate and Rhythm: Normal rate and regular rhythm.      Heart sounds: Normal heart sounds. No murmur heard.  Pulmonary:      Effort: Pulmonary effort is normal. No respiratory distress.      Breath sounds: Normal breath sounds.   Abdominal:      Palpations: Abdomen is soft.      Tenderness: There is no abdominal tenderness.   Musculoskeletal:         General: No swelling.      Cervical back: Neck supple.      Right lower leg: No edema.      Left lower leg: No edema.   Skin:     General: Skin is warm and dry.      Capillary Refill: Capillary refill takes less than 2 seconds.   Neurological:      Mental " Status: She is alert and oriented to person, place, and time.   Psychiatric:         Mood and Affect: Mood is anxious.            Thank you for allowing me to participate in the care and evaluation of your patient.  Should you have any questions, please feel free to contact me.

## 2024-02-16 ENCOUNTER — TELEPHONE (OUTPATIENT)
Dept: CARDIOLOGY CLINIC | Facility: CLINIC | Age: 64
End: 2024-02-16

## 2024-02-16 ENCOUNTER — PREP FOR PROCEDURE (OUTPATIENT)
Dept: CARDIOLOGY CLINIC | Facility: CLINIC | Age: 64
End: 2024-02-16

## 2024-02-16 DIAGNOSIS — Z88.8 ASPIRIN ALLERGY: Primary | ICD-10-CM

## 2024-02-16 RX ORDER — METHYLPREDNISOLONE 16 MG/1
32 TABLET ORAL
OUTPATIENT
Start: 2024-02-16 | End: 2024-02-17

## 2024-02-16 RX ORDER — DIPHENHYDRAMINE HCL 25 MG
50 TABLET ORAL
OUTPATIENT
Start: 2024-02-16

## 2024-02-16 NOTE — TELEPHONE ENCOUNTER
Kel pending auth # [id number] for admit for ASA desensitization/[id number] & [id number] @ [facility] w/Dr. Mello.  Dr. Sauceda requesting.  Clinical faxed to [phone].  Craig w/[facility].

## 2024-02-17 ENCOUNTER — APPOINTMENT (OUTPATIENT)
Dept: LAB | Facility: CLINIC | Age: 64
End: 2024-02-17
Payer: COMMERCIAL

## 2024-02-17 DIAGNOSIS — I20.89 ANGINAL EQUIVALENT: ICD-10-CM

## 2024-02-17 LAB
ANION GAP SERPL CALCULATED.3IONS-SCNC: 8 MMOL/L
BASOPHILS # BLD AUTO: 0.06 THOUSANDS/ÂΜL (ref 0–0.1)
BASOPHILS NFR BLD AUTO: 1 % (ref 0–1)
BUN SERPL-MCNC: 10 MG/DL (ref 5–25)
CALCIUM SERPL-MCNC: 10.2 MG/DL (ref 8.4–10.2)
CHLORIDE SERPL-SCNC: 105 MMOL/L (ref 96–108)
CO2 SERPL-SCNC: 27 MMOL/L (ref 21–32)
CREAT SERPL-MCNC: 0.77 MG/DL (ref 0.6–1.3)
EOSINOPHIL # BLD AUTO: 0.18 THOUSAND/ÂΜL (ref 0–0.61)
EOSINOPHIL NFR BLD AUTO: 3 % (ref 0–6)
ERYTHROCYTE [DISTWIDTH] IN BLOOD BY AUTOMATED COUNT: 13.1 % (ref 11.6–15.1)
GFR SERPL CREATININE-BSD FRML MDRD: 81 ML/MIN/1.73SQ M
GLUCOSE P FAST SERPL-MCNC: 85 MG/DL (ref 65–99)
HCT VFR BLD AUTO: 45.3 % (ref 34.8–46.1)
HGB BLD-MCNC: 14.9 G/DL (ref 11.5–15.4)
IMM GRANULOCYTES # BLD AUTO: 0.01 THOUSAND/UL (ref 0–0.2)
IMM GRANULOCYTES NFR BLD AUTO: 0 % (ref 0–2)
LYMPHOCYTES # BLD AUTO: 2.11 THOUSANDS/ÂΜL (ref 0.6–4.47)
LYMPHOCYTES NFR BLD AUTO: 39 % (ref 14–44)
MCH RBC QN AUTO: 29.4 PG (ref 26.8–34.3)
MCHC RBC AUTO-ENTMCNC: 32.9 G/DL (ref 31.4–37.4)
MCV RBC AUTO: 90 FL (ref 82–98)
MONOCYTES # BLD AUTO: 0.53 THOUSAND/ÂΜL (ref 0.17–1.22)
MONOCYTES NFR BLD AUTO: 10 % (ref 4–12)
NEUTROPHILS # BLD AUTO: 2.51 THOUSANDS/ÂΜL (ref 1.85–7.62)
NEUTS SEG NFR BLD AUTO: 47 % (ref 43–75)
NRBC BLD AUTO-RTO: 0 /100 WBCS
PLATELET # BLD AUTO: 299 THOUSANDS/UL (ref 149–390)
PMV BLD AUTO: 11.3 FL (ref 8.9–12.7)
POTASSIUM SERPL-SCNC: 4 MMOL/L (ref 3.5–5.3)
RBC # BLD AUTO: 5.06 MILLION/UL (ref 3.81–5.12)
SODIUM SERPL-SCNC: 140 MMOL/L (ref 135–147)
WBC # BLD AUTO: 5.4 THOUSAND/UL (ref 4.31–10.16)

## 2024-02-17 PROCEDURE — 85025 COMPLETE CBC W/AUTO DIFF WBC: CPT

## 2024-02-17 PROCEDURE — 36415 COLL VENOUS BLD VENIPUNCTURE: CPT

## 2024-02-17 PROCEDURE — 80048 BASIC METABOLIC PNL TOTAL CA: CPT

## 2024-02-20 NOTE — TELEPHONE ENCOUNTER
Called pt to advise no auth obtained yet. Will need to reschedule procedure. Pt stated she is going to call her insurance tomorrow. Pt suggesting she stay on the schedule until she speak with insurance tomorrow morning, . I advised pt if no auth is obtained by 2pm pt will be rescheduled.

## 2024-02-21 NOTE — TELEPHONE ENCOUNTER
Cpt 78703 No auth req when done out patient . Ref # [id number], peer to peer for ASA desensitization admit scheduled for this morning. Dr. GRAY notified. Task sent.

## 2024-02-23 NOTE — TELEPHONE ENCOUNTER
Patient's spouse Meghan [phone] contacting office requesting to speak with Mayra regarding pending surgery status.

## 2024-02-26 ENCOUNTER — PREP FOR PROCEDURE (OUTPATIENT)
Dept: CARDIOLOGY CLINIC | Facility: CLINIC | Age: 64
End: 2024-02-26

## 2024-02-26 DIAGNOSIS — Z88.8 ASPIRIN ALLERGY: Primary | ICD-10-CM

## 2024-02-26 NOTE — TELEPHONE ENCOUNTER
Patient to be admitted to [facility] on 2/27/2024 for ASA desensitization prior to her cardiac cath on 2/28/2024. Pt has an allergy to contrast dye. The Cardiac PA at the hospital can do the conference for this pt

## 2024-02-27 ENCOUNTER — HOSPITAL ENCOUNTER (INPATIENT)
Facility: HOSPITAL | Age: 64
LOS: 1 days | Discharge: HOME | DRG: 287 | End: 2024-02-28
Attending: STUDENT IN AN ORGANIZED HEALTH CARE EDUCATION/TRAINING PROGRAM | Admitting: STUDENT IN AN ORGANIZED HEALTH CARE EDUCATION/TRAINING PROGRAM
Payer: COMMERCIAL

## 2024-02-27 DIAGNOSIS — J43.8 OTHER EMPHYSEMA (HCC): ICD-10-CM

## 2024-02-27 DIAGNOSIS — J30.89 NON-SEASONAL ALLERGIC RHINITIS DUE TO OTHER ALLERGIC TRIGGER: ICD-10-CM

## 2024-02-27 DIAGNOSIS — I20.89 ANGINAL EQUIVALENT: ICD-10-CM

## 2024-02-27 DIAGNOSIS — E78.49 OTHER HYPERLIPIDEMIA: ICD-10-CM

## 2024-02-27 DIAGNOSIS — I10 ESSENTIAL HYPERTENSION: Primary | ICD-10-CM

## 2024-02-27 DIAGNOSIS — R94.39 ABNORMAL STRESS TEST: ICD-10-CM

## 2024-02-27 DIAGNOSIS — J45.41 MODERATE PERSISTENT ASTHMA WITH ACUTE EXACERBATION: ICD-10-CM

## 2024-02-27 PROBLEM — Z88.8 ASPIRIN ALLERGY: Status: ACTIVE | Noted: 2024-02-27

## 2024-02-27 PROBLEM — Z72.0 TOBACCO ABUSE: Status: ACTIVE | Noted: 2024-02-27

## 2024-02-27 PROCEDURE — 94664 DEMO&/EVAL PT USE INHALER: CPT

## 2024-02-27 PROCEDURE — 93005 ELECTROCARDIOGRAM TRACING: CPT

## 2024-02-27 PROCEDURE — 94640 AIRWAY INHALATION TREATMENT: CPT

## 2024-02-27 PROCEDURE — 99222 1ST HOSP IP/OBS MODERATE 55: CPT | Performed by: STUDENT IN AN ORGANIZED HEALTH CARE EDUCATION/TRAINING PROGRAM

## 2024-02-27 PROCEDURE — G0379 DIRECT REFER HOSPITAL OBSERV: HCPCS

## 2024-02-27 PROCEDURE — 94760 N-INVAS EAR/PLS OXIMETRY 1: CPT

## 2024-02-27 RX ORDER — OXYCODONE HYDROCHLORIDE 10 MG/1
10 TABLET ORAL EVERY 6 HOURS PRN
Status: DISCONTINUED | OUTPATIENT
Start: 2024-02-27 | End: 2024-02-28 | Stop reason: HOSPADM

## 2024-02-27 RX ORDER — BUDESONIDE AND FORMOTEROL FUMARATE DIHYDRATE 160; 4.5 UG/1; UG/1
2 AEROSOL RESPIRATORY (INHALATION) 2 TIMES DAILY
Status: DISCONTINUED | OUTPATIENT
Start: 2024-02-27 | End: 2024-02-28 | Stop reason: HOSPADM

## 2024-02-27 RX ORDER — DIPHENHYDRAMINE HCL 25 MG
50 TABLET ORAL
Status: DISCONTINUED | OUTPATIENT
Start: 2024-02-27 | End: 2024-02-27

## 2024-02-27 RX ORDER — DIPHENHYDRAMINE HCL 25 MG
25 TABLET ORAL EVERY 6 HOURS
Status: COMPLETED | OUTPATIENT
Start: 2024-02-27 | End: 2024-02-27

## 2024-02-27 RX ORDER — ALPRAZOLAM 0.25 MG/1
0.25 TABLET ORAL
Status: DISCONTINUED | OUTPATIENT
Start: 2024-02-27 | End: 2024-02-28

## 2024-02-27 RX ORDER — LEVALBUTEROL INHALATION SOLUTION 1.25 MG/3ML
1.25 SOLUTION RESPIRATORY (INHALATION) EVERY 6 HOURS PRN
Status: DISCONTINUED | OUTPATIENT
Start: 2024-02-27 | End: 2024-02-28 | Stop reason: HOSPADM

## 2024-02-27 RX ORDER — PANTOPRAZOLE SODIUM 40 MG/10ML
40 INJECTION, POWDER, LYOPHILIZED, FOR SOLUTION INTRAVENOUS
Status: DISCONTINUED | OUTPATIENT
Start: 2024-02-28 | End: 2024-02-28 | Stop reason: HOSPADM

## 2024-02-27 RX ORDER — ASPIRIN 81 MG/1
81 TABLET, CHEWABLE ORAL EVERY 20 MIN
Status: COMPLETED | OUTPATIENT
Start: 2024-02-27 | End: 2024-02-27

## 2024-02-27 RX ORDER — HYDROXYCHLOROQUINE SULFATE 200 MG/1
200 TABLET, FILM COATED ORAL 2 TIMES DAILY WITH MEALS
Status: DISCONTINUED | OUTPATIENT
Start: 2024-02-27 | End: 2024-02-28 | Stop reason: HOSPADM

## 2024-02-27 RX ORDER — MELATONIN
1000 DAILY
Status: DISCONTINUED | OUTPATIENT
Start: 2024-02-28 | End: 2024-02-28 | Stop reason: HOSPADM

## 2024-02-27 RX ORDER — METHYLPREDNISOLONE 16 MG/1
32 TABLET ORAL
Status: COMPLETED | OUTPATIENT
Start: 2024-02-27 | End: 2024-02-28

## 2024-02-27 RX ORDER — LORATADINE 10 MG/1
10 TABLET ORAL DAILY
Status: DISCONTINUED | OUTPATIENT
Start: 2024-02-28 | End: 2024-02-28 | Stop reason: HOSPADM

## 2024-02-27 RX ORDER — ASPIRIN 81 MG/1
40.5 TABLET, CHEWABLE ORAL EVERY 20 MIN
Status: COMPLETED | OUTPATIENT
Start: 2024-02-27 | End: 2024-02-27

## 2024-02-27 RX ORDER — DIPHENHYDRAMINE HYDROCHLORIDE 50 MG/ML
25 INJECTION INTRAMUSCULAR; INTRAVENOUS EVERY 6 HOURS PRN
Status: DISCONTINUED | OUTPATIENT
Start: 2024-02-27 | End: 2024-02-28 | Stop reason: HOSPADM

## 2024-02-27 RX ORDER — LANOLIN ALCOHOL/MO/W.PET/CERES
400 CREAM (GRAM) TOPICAL DAILY
Status: DISCONTINUED | OUTPATIENT
Start: 2024-02-27 | End: 2024-02-28

## 2024-02-27 RX ORDER — ACETAMINOPHEN 325 MG/1
650 TABLET ORAL EVERY 6 HOURS PRN
Status: DISCONTINUED | OUTPATIENT
Start: 2024-02-27 | End: 2024-02-28 | Stop reason: HOSPADM

## 2024-02-27 RX ORDER — METHYLPREDNISOLONE 16 MG/1
32 TABLET ORAL
Status: DISCONTINUED | OUTPATIENT
Start: 2024-02-27 | End: 2024-02-27

## 2024-02-27 RX ORDER — EPINEPHRINE 1 MG/ML
0.3 INJECTION, SOLUTION, CONCENTRATE INTRAVENOUS ONCE AS NEEDED
Status: DISCONTINUED | OUTPATIENT
Start: 2024-02-27 | End: 2024-02-28 | Stop reason: HOSPADM

## 2024-02-27 RX ORDER — MAGNESIUM OXIDE 400 MG/1
400 TABLET ORAL DAILY
Status: DISCONTINUED | OUTPATIENT
Start: 2024-02-27 | End: 2024-02-27

## 2024-02-27 RX ORDER — SODIUM CHLORIDE 9 MG/ML
75 INJECTION, SOLUTION INTRAVENOUS CONTINUOUS
Status: DISCONTINUED | OUTPATIENT
Start: 2024-02-27 | End: 2024-02-27

## 2024-02-27 RX ORDER — DIPHENHYDRAMINE HCL 25 MG
50 TABLET ORAL
Status: DISCONTINUED | OUTPATIENT
Start: 2024-02-28 | End: 2024-02-28

## 2024-02-27 RX ORDER — CHLORHEXIDINE GLUCONATE ORAL RINSE 1.2 MG/ML
15 SOLUTION DENTAL EVERY 12 HOURS SCHEDULED
Status: DISCONTINUED | OUTPATIENT
Start: 2024-02-27 | End: 2024-02-28 | Stop reason: HOSPADM

## 2024-02-27 RX ORDER — CLONIDINE HYDROCHLORIDE 0.1 MG/1
0.1 TABLET ORAL 2 TIMES DAILY
Status: DISCONTINUED | OUTPATIENT
Start: 2024-02-27 | End: 2024-02-28 | Stop reason: HOSPADM

## 2024-02-27 RX ORDER — ALPRAZOLAM 0.5 MG/1
0.5 TABLET ORAL
Status: DISCONTINUED | OUTPATIENT
Start: 2024-02-27 | End: 2024-02-27

## 2024-02-27 RX ORDER — FLUTICASONE PROPIONATE 50 MCG
1 SPRAY, SUSPENSION (ML) NASAL DAILY
Status: DISCONTINUED | OUTPATIENT
Start: 2024-02-28 | End: 2024-02-28 | Stop reason: HOSPADM

## 2024-02-27 RX ORDER — FAMOTIDINE 10 MG/ML
20 INJECTION, SOLUTION INTRAVENOUS ONCE AS NEEDED
Status: DISCONTINUED | OUTPATIENT
Start: 2024-02-27 | End: 2024-02-28 | Stop reason: HOSPADM

## 2024-02-27 RX ORDER — NICOTINE 21 MG/24HR
14 PATCH, TRANSDERMAL 24 HOURS TRANSDERMAL DAILY
Status: DISCONTINUED | OUTPATIENT
Start: 2024-02-28 | End: 2024-02-28 | Stop reason: HOSPADM

## 2024-02-27 RX ORDER — SODIUM CHLORIDE 9 MG/ML
75 INJECTION, SOLUTION INTRAVENOUS CONTINUOUS
Status: DISPENSED | OUTPATIENT
Start: 2024-02-28 | End: 2024-02-28

## 2024-02-27 RX ORDER — NITROGLYCERIN 0.4 MG/1
0.4 TABLET SUBLINGUAL
Status: DISCONTINUED | OUTPATIENT
Start: 2024-02-27 | End: 2024-02-28 | Stop reason: HOSPADM

## 2024-02-27 RX ADMIN — ACETAMINOPHEN 650 MG: 325 TABLET, FILM COATED ORAL at 20:03

## 2024-02-27 RX ADMIN — ASPIRIN 40.5 MG: 81 TABLET, CHEWABLE ORAL at 22:09

## 2024-02-27 RX ADMIN — DIPHENHYDRAMINE HYDROCHLORIDE 25 MG: 25 TABLET ORAL at 23:57

## 2024-02-27 RX ADMIN — ASPIRIN 1 MG: 81 TABLET, CHEWABLE ORAL at 20:49

## 2024-02-27 RX ADMIN — OXYCODONE HYDROCHLORIDE 10 MG: 10 TABLET ORAL at 23:57

## 2024-02-27 RX ADMIN — Medication 400 MG: at 21:52

## 2024-02-27 RX ADMIN — ASPIRIN 81 MG: 81 TABLET, CHEWABLE ORAL at 22:30

## 2024-02-27 RX ADMIN — DIPHENHYDRAMINE HYDROCHLORIDE 25 MG: 25 TABLET ORAL at 20:00

## 2024-02-27 RX ADMIN — ASPIRIN 20 MG: 81 TABLET, CHEWABLE ORAL at 21:50

## 2024-02-27 RX ADMIN — METHYLPREDNISOLONE 32 MG: 16 TABLET ORAL at 21:11

## 2024-02-27 RX ADMIN — ASPIRIN 0.1 MG: 81 TABLET, CHEWABLE ORAL at 20:10

## 2024-02-27 RX ADMIN — ASPIRIN 0.3 MG: 81 TABLET, CHEWABLE ORAL at 20:29

## 2024-02-27 RX ADMIN — LEVALBUTEROL HYDROCHLORIDE 1.25 MG: 1.25 SOLUTION RESPIRATORY (INHALATION) at 19:42

## 2024-02-27 RX ADMIN — ASPIRIN 3 MG: 81 TABLET, CHEWABLE ORAL at 21:09

## 2024-02-27 RX ADMIN — ASPIRIN 10 MG: 81 TABLET, CHEWABLE ORAL at 21:29

## 2024-02-27 RX ADMIN — ALPRAZOLAM 0.25 MG: 0.5 TABLET ORAL at 20:02

## 2024-02-27 NOTE — Clinical Note
Defib pad site: anterior/posterior and left lower flank.Defib pad site assessment: skin integrity intact.

## 2024-02-27 NOTE — PLAN OF CARE
Problem: PAIN - ADULT  Goal: Verbalizes/displays adequate comfort level or baseline comfort level  Description: Interventions:  - Encourage patient to monitor pain and request assistance  - Assess pain using appropriate pain scale  - Administer analgesics based on type and severity of pain and evaluate response  - Implement non-pharmacological measures as appropriate and evaluate response  - Consider cultural and social influences on pain and pain management  - Notify physician/advanced practitioner if interventions unsuccessful or patient reports new pain  2/27/2024 1600 by Rachna Dawkins RN  Outcome: Progressing  2/27/2024 1600 by Rachna Dawkins RN  Outcome: Progressing     Problem: CARDIOVASCULAR - ADULT  Goal: Maintains optimal cardiac output and hemodynamic stability  Description: INTERVENTIONS:  - Monitor I/O, vital signs and rhythm  - Monitor for S/S and trends of decreased cardiac output  - Administer and titrate ordered vasoactive medications to optimize hemodynamic stability  - Assess quality of pulses, skin color and temperature  - Assess for signs of decreased coronary artery perfusion  - Instruct patient to report change in severity of symptoms  Outcome: Progressing     Problem: CARDIOVASCULAR - ADULT  Goal: Absence of cardiac dysrhythmias or at baseline rhythm  Description: INTERVENTIONS:  - Continuous cardiac monitoring, vital signs, obtain 12 lead EKG if ordered  - Administer antiarrhythmic and heart rate control medications as ordered  - Monitor electrolytes and administer replacement therapy as ordered  Outcome: Progressing     Problem: RESPIRATORY - ADULT  Goal: Achieves optimal ventilation and oxygenation  Description: INTERVENTIONS:  - Assess for changes in respiratory status  - Assess for changes in mentation and behavior  - Position to facilitate oxygenation and minimize respiratory effort  - Oxygen administered by appropriate delivery if ordered  - Initiate smoking cessation  education as indicated  - Encourage broncho-pulmonary hygiene including cough, deep breathe, Incentive Spirometry  - Assess the need for suctioning and aspirate as needed  - Assess and instruct to report SOB or any respiratory difficulty  - Respiratory Therapy support as indicated  Outcome: Progressing

## 2024-02-28 ENCOUNTER — TELEPHONE (OUTPATIENT)
Dept: CARDIOLOGY CLINIC | Facility: CLINIC | Age: 64
End: 2024-02-28

## 2024-02-28 VITALS
OXYGEN SATURATION: 94 % | HEIGHT: 63 IN | SYSTOLIC BLOOD PRESSURE: 153 MMHG | WEIGHT: 165.12 LBS | HEART RATE: 75 BPM | RESPIRATION RATE: 16 BRPM | TEMPERATURE: 98.1 F | BODY MASS INDEX: 29.26 KG/M2 | DIASTOLIC BLOOD PRESSURE: 67 MMHG

## 2024-02-28 LAB
ANION GAP SERPL CALCULATED.3IONS-SCNC: 9 MMOL/L
BUN SERPL-MCNC: 10 MG/DL (ref 5–25)
CALCIUM SERPL-MCNC: 9.9 MG/DL (ref 8.4–10.2)
CHLORIDE SERPL-SCNC: 110 MMOL/L (ref 96–108)
CO2 SERPL-SCNC: 22 MMOL/L (ref 21–32)
CREAT SERPL-MCNC: 0.65 MG/DL (ref 0.6–1.3)
ERYTHROCYTE [DISTWIDTH] IN BLOOD BY AUTOMATED COUNT: 12.6 % (ref 11.6–15.1)
GFR SERPL CREATININE-BSD FRML MDRD: 93 ML/MIN/1.73SQ M
GLUCOSE P FAST SERPL-MCNC: 146 MG/DL (ref 65–99)
GLUCOSE SERPL-MCNC: 146 MG/DL (ref 65–140)
HCT VFR BLD AUTO: 42.7 % (ref 34.8–46.1)
HGB BLD-MCNC: 14.4 G/DL (ref 11.5–15.4)
MCH RBC QN AUTO: 30.1 PG (ref 26.8–34.3)
MCHC RBC AUTO-ENTMCNC: 33.7 G/DL (ref 31.4–37.4)
MCV RBC AUTO: 89 FL (ref 82–98)
PLATELET # BLD AUTO: 294 THOUSANDS/UL (ref 149–390)
PMV BLD AUTO: 10.6 FL (ref 8.9–12.7)
POTASSIUM SERPL-SCNC: 4 MMOL/L (ref 3.5–5.3)
RBC # BLD AUTO: 4.79 MILLION/UL (ref 3.81–5.12)
SODIUM SERPL-SCNC: 141 MMOL/L (ref 135–147)
WBC # BLD AUTO: 5.43 THOUSAND/UL (ref 4.31–10.16)

## 2024-02-28 PROCEDURE — 97165 OT EVAL LOW COMPLEX 30 MIN: CPT

## 2024-02-28 PROCEDURE — 93458 L HRT ARTERY/VENTRICLE ANGIO: CPT | Performed by: INTERNAL MEDICINE

## 2024-02-28 PROCEDURE — B2111ZZ FLUOROSCOPY OF MULTIPLE CORONARY ARTERIES USING LOW OSMOLAR CONTRAST: ICD-10-PCS | Performed by: INTERNAL MEDICINE

## 2024-02-28 PROCEDURE — C1769 GUIDE WIRE: HCPCS | Performed by: INTERNAL MEDICINE

## 2024-02-28 PROCEDURE — 85027 COMPLETE CBC AUTOMATED: CPT | Performed by: NURSE PRACTITIONER

## 2024-02-28 PROCEDURE — 80048 BASIC METABOLIC PNL TOTAL CA: CPT | Performed by: NURSE PRACTITIONER

## 2024-02-28 PROCEDURE — 4A023N7 MEASUREMENT OF CARDIAC SAMPLING AND PRESSURE, LEFT HEART, PERCUTANEOUS APPROACH: ICD-10-PCS | Performed by: INTERNAL MEDICINE

## 2024-02-28 PROCEDURE — 99152 MOD SED SAME PHYS/QHP 5/>YRS: CPT | Performed by: INTERNAL MEDICINE

## 2024-02-28 PROCEDURE — 99153 MOD SED SAME PHYS/QHP EA: CPT | Performed by: INTERNAL MEDICINE

## 2024-02-28 PROCEDURE — 97162 PT EVAL MOD COMPLEX 30 MIN: CPT

## 2024-02-28 PROCEDURE — 99238 HOSP IP/OBS DSCHRG MGMT 30/<: CPT

## 2024-02-28 PROCEDURE — C1894 INTRO/SHEATH, NON-LASER: HCPCS | Performed by: INTERNAL MEDICINE

## 2024-02-28 PROCEDURE — 93005 ELECTROCARDIOGRAM TRACING: CPT

## 2024-02-28 PROCEDURE — NC001 PR NO CHARGE: Performed by: STUDENT IN AN ORGANIZED HEALTH CARE EDUCATION/TRAINING PROGRAM

## 2024-02-28 PROCEDURE — B2151ZZ FLUOROSCOPY OF LEFT HEART USING LOW OSMOLAR CONTRAST: ICD-10-PCS | Performed by: INTERNAL MEDICINE

## 2024-02-28 RX ORDER — SODIUM CHLORIDE 9 MG/ML
75 INJECTION, SOLUTION INTRAVENOUS CONTINUOUS
Status: DISPENSED | OUTPATIENT
Start: 2024-02-28 | End: 2024-02-28

## 2024-02-28 RX ORDER — FENTANYL CITRATE 50 UG/ML
INJECTION, SOLUTION INTRAMUSCULAR; INTRAVENOUS CODE/TRAUMA/SEDATION MEDICATION
Status: DISCONTINUED | OUTPATIENT
Start: 2024-02-28 | End: 2024-02-28 | Stop reason: HOSPADM

## 2024-02-28 RX ORDER — ACETAMINOPHEN 325 MG/1
650 TABLET ORAL EVERY 6 HOURS PRN
Start: 2024-02-28

## 2024-02-28 RX ORDER — LIDOCAINE HYDROCHLORIDE 10 MG/ML
INJECTION, SOLUTION EPIDURAL; INFILTRATION; INTRACAUDAL; PERINEURAL CODE/TRAUMA/SEDATION MEDICATION
Status: DISCONTINUED | OUTPATIENT
Start: 2024-02-28 | End: 2024-02-28 | Stop reason: HOSPADM

## 2024-02-28 RX ORDER — ALPRAZOLAM 0.25 MG/1
0.25 TABLET ORAL 2 TIMES DAILY PRN
Status: DISCONTINUED | OUTPATIENT
Start: 2024-02-28 | End: 2024-02-28 | Stop reason: HOSPADM

## 2024-02-28 RX ORDER — NITROGLYCERIN 20 MG/100ML
INJECTION INTRAVENOUS CODE/TRAUMA/SEDATION MEDICATION
Status: DISCONTINUED | OUTPATIENT
Start: 2024-02-28 | End: 2024-02-28 | Stop reason: HOSPADM

## 2024-02-28 RX ORDER — MIDAZOLAM HYDROCHLORIDE 2 MG/2ML
INJECTION, SOLUTION INTRAMUSCULAR; INTRAVENOUS CODE/TRAUMA/SEDATION MEDICATION
Status: DISCONTINUED | OUTPATIENT
Start: 2024-02-28 | End: 2024-02-28 | Stop reason: HOSPADM

## 2024-02-28 RX ORDER — VERAPAMIL HYDROCHLORIDE 2.5 MG/ML
INJECTION, SOLUTION INTRAVENOUS CODE/TRAUMA/SEDATION MEDICATION
Status: DISCONTINUED | OUTPATIENT
Start: 2024-02-28 | End: 2024-02-28 | Stop reason: HOSPADM

## 2024-02-28 RX ORDER — HEPARIN SODIUM 1000 [USP'U]/ML
INJECTION, SOLUTION INTRAVENOUS; SUBCUTANEOUS CODE/TRAUMA/SEDATION MEDICATION
Status: DISCONTINUED | OUTPATIENT
Start: 2024-02-28 | End: 2024-02-28 | Stop reason: HOSPADM

## 2024-02-28 RX ADMIN — SODIUM CHLORIDE 75 ML/HR: 0.9 INJECTION, SOLUTION INTRAVENOUS at 04:32

## 2024-02-28 RX ADMIN — METHYLPREDNISOLONE 32 MG: 16 TABLET ORAL at 06:36

## 2024-02-28 RX ADMIN — DIPHENHYDRAMINE HYDROCHLORIDE 50 MG: 25 TABLET ORAL at 07:29

## 2024-02-28 RX ADMIN — ALPRAZOLAM 0.25 MG: 0.25 TABLET ORAL at 08:02

## 2024-02-28 RX ADMIN — SODIUM CHLORIDE 75 ML/HR: 0.9 INJECTION, SOLUTION INTRAVENOUS at 09:31

## 2024-02-28 NOTE — PHYSICAL THERAPY NOTE
PHYSICAL THERAPY EVALUATION  NAME:  Kelsi Cabrera  DATE: 02/28/24    AGE:   63 y.o.  Mrn:   [id number]  ADMIT DX:  Aspirin allergy  Problem List:   Patient Active Problem List   Diagnosis    Lumbar radiculopathy    Gastro-esophageal reflux disease without esophagitis    COPD (chronic obstructive pulmonary disease) ([facility])    Right hip pain    Essential hypertension    Other hyperlipidemia    Lumbar spondylosis    Moderate persistent asthma with acute exacerbation    Rheumatoid arthritis involving multiple sites ([facility])    Allergic rhinitis due to allergen    Primary osteoarthritis of left hip    Chronic tension-type headache, not intractable    Cervical spondylosis    Cervical radiculopathy    Migraine    Neuropathy    Cervical spinal stenosis    Memory impairment    Bilateral occipital neuralgia    Chronic pain syndrome    Situational anxiety    Aspirin allergy    Abnormal stress test    Tobacco abuse       Past Medical History  Past Medical History:   Diagnosis Date    Arthritis     Asthma     Avascular necrosis of bone of hip, right ([facility])     Avascular necrosis of hip, right ([facility]) 10/3/2017    Chronic obstructive pulmonary disease ([facility]) 2/12/2019    COPD (chronic obstructive pulmonary disease) ([facility])     GERD (gastroesophageal reflux disease)     Hypoglycemia     Infectious viral hepatitis     Lumbar radicular pain     Lyme disease     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[facility])        Past Surgical History  Past Surgical History:   Procedure Laterality Date    APPENDECTOMY      CARPAL TUNNEL RELEASE      CHOLECYSTECTOMY      SINUS SURGERY         Length Of Stay: 1  Performed at least 2 patient identifiers during session: Name and Birthday       02/28/24 0809   PT Last Visit   PT Visit Date 02/28/24   Note Type   Note type Evaluation   Pain Assessment   Pain Assessment Tool 0-10   Pain Score No Pain   Restrictions/Precautions   Weight Bearing Precautions Per Order No   Braces or Orthoses   (none reported)   Other Precautions  Multiple lines;Telemetry   Home Living   Type of Home House   Home Layout Two level;Laundry in basement;Performs ADLs on one level;Able to live on main level with bedroom/bathroom  (2 NIKKI vs no NIKKI)   Bathroom Shower/Tub Tub only   Bathroom Toilet Standard   Bathroom Equipment Shower chair   Bathroom Accessibility Accessible   Home Equipment   (none per pt)   Additional Comments Pt reports ambulatory without AD at baseline   Prior Function   Level of [place] Independent with ADLs;Independent with functional mobility;Independent with IADLS   Lives With Spouse;Family  (son and family, dtr)   Receives Help From Family   IADLs Family/Friend/Other provides meals;Family/Friend/Other provides transportation;Independent with medication management   Falls in the last 6 months 0  (pt denies falls)   General   Family/Caregiver Present No   Cognition   Overall Cognitive Status WFL   Arousal/Participation Alert   Orientation Level Oriented X4   Memory Within functional limits   Following Commands Follows all commands and directions without difficulty   Comments Pt agreeable to PT evaluation   RLE Assessment   RLE Assessment WFL   LLE Assessment   LLE Assessment WFL   Vision-Basic Assessment   Current Vision No visual deficits   Coordination   Sensation WFL   Bed Mobility   Supine to Sit 6  Modified independent   Additional items HOB elevated;Increased time required   Sit to Supine 6  Modified independent   Additional items HOB elevated;Increased time required   Additional Comments pt without complaints of lightheadedness, SOB, chest pain, dizziness throughout session   Transfers   Sit to Stand 6  Modified independent   Additional items Increased time required   Stand to Sit 6  Modified independent   Additional items Increased time required   Additional Comments no AD used during transfers   Ambulation/Elevation   Gait pattern Narrow YESICA;Decreased foot clearance   Gait Assistance 5  Supervision   Additional items Assist x  1;Verbal cues   Assistive Device   (pt requesting to manage IV pole)   Distance 35'   Stair Management Assistance Not tested   Balance   Static Sitting Normal   Dynamic Sitting Fair +   Static Standing Fair +   Dynamic Standing Fair   Ambulatory Fair   Activity Tolerance   Activity Tolerance Patient tolerated treatment well   Medical Staff Made Aware Pt seen as a co-eval with LYNDA Riley due to the patient's co-morbidities, clinical presentation, and present impairments which are a regression from the patient's baseline.   Nurse Made Aware EDGAR Tucker confirmed pt appropraite for PT evaluation   Assessment   Prognosis Good   Problem List Impaired balance;Decreased mobility;Decreased endurance   Assessment Pt is 63 y.o. female seen for moderate-complexity PT evaluation on 2/28/2024 s/p admit to [facility] on 2/27/2024 w/ Aspirin allergy. PT was consulted to assess pt's functional mobility and d/c needs. Order placed for PT eval and tx, w/  out of bed to chair  order. PTA, pt was living with family in a two story home with first floor set up and 2 vs 0 NIKKI. Pt reports independence at baseline with ADLs, IADLs, and functional mobility without AD. At time of eval, patient completed sup <> sit Aura, STS Aura, and ambulated 35' supervision without AD.  Upon evaluation, pt presenting with impaired functional mobility d/t decreased endurance, impaired balance, and decreased mobility. Pertinent PMHx and current co-morbidities affecting pt's physical performance at time of assessment include: aspirin allergy, abnormal stress test, essential HTN, COPD. Personal factors affecting pt at time of eval include: stairs to enter home. The following objective measures performed on IE also reveal limitations: AM-PAC 6-Clicks: 22/24. Pt's clinical presentation is currently evolving seen in pt's presentation of elevated BP, ongoing medical assessment, and on telemetry monitoring. Overall, pt's rehab potential and prognosis to  return to PLOF is good as impacted by objective findings, warranting pt to receive further skilled PT interventions to address impairments, activity limitations, and participation restrictions. Pt to benefit from continued PT services to address deficits as defined above and maximize level of functional independent mobility and consistency. From PT/mobility standpoint, recommendation at time of d/c would be no post acute rehabilitation needs in order to facilitate return to PLOF.   Goals   [facility] Expiration Date 03/09/24   Short Term Goal #1 In 10 days: Ambulate > 150 ft. with least restrictive assistive device modified independent w/o LOB and w/ normalized gait pattern 100% of the time, Navigate 2 stairs modified independent with unilateral handrail to facilitate return to previous living environment, Increase all balance 1/2 grade to decrease risk for falls, and Tolerate standing 3-5 minutes to facilitate functional task performance   PT Treatment Day 0   Plan   Treatment/Interventions Elevations;Therapeutic exercise;Endurance training;Patient/family training;Gait training;Spoke to nursing;OT   PT Frequency 1-2x/wk  (follow up)   Discharge Recommendation   Rehab Resource Intensity Level, PT No post-acute rehabilitation needs   AM-PAC Basic Mobility Inpatient   Turning in Flat Bed Without Bedrails 4   Lying on Back to Sitting on Edge of Flat Bed Without Bedrails 4   Moving Bed to Chair 4   Standing Up From Chair Using Arms 4   Walk in Room 3   Climb 3-5 Stairs With Railing 3   Basic Mobility Inpatient Raw Score 22   Basic Mobility Standardized Score 47.4   Highest Level Of Mobility   JH-HLM Goal 7: Walk 25 feet or more   JH-HLM Achieved 7: Walk 25 feet or more       Time In: 0754  Time Out: 0809  Total Evaluation Minutes: 14    Nancy Cuellar, PT

## 2024-02-28 NOTE — NURSING NOTE
Discharge instructions reviewed with the patient and IV's removed. All questions answered at this time.

## 2024-02-28 NOTE — UTILIZATION REVIEW
Initial Clinical Review    WAS OBSERVATION 2/27/24 @ 1726 CONVERTED TO INPATIENT ADMISSION 2/28/24 @ 1039 DUE TO CONTINUED STAY REQUIRED TO CARE FOR PATIENT WITH DX: ASPIRIN ALLERGY.      Admission: Date/Time/Statement:   Admission Orders (From admission, onward)       Ordered        02/28/24 1039  Inpatient Admission  Once            02/27/24 1726  Place in Observation  Once                          Orders Placed This Encounter   Procedures    Inpatient Admission     Standing Status:   Standing     Number of Occurrences:   1     Order Specific Question:   Level of Care     Answer:   Critical Care [15]     Order Specific Question:   Estimated length of stay     Answer:   More than 2 Midnights     Order Specific Question:   Certification     Answer:   I certify that inpatient services are medically necessary for this patient for a duration of greater than two midnights. See H&P and MD Progress Notes for additional information about the patient's course of treatment.       Initial Presentation: 63 y.o. female presents to the hospital today for ASA desensitization prior to her elective cardiac catheterization tomorrow (2/28). She was evaluated in the outpatient setting by Dr. Sauceda for dyspnea on exertion and chest pain. She underwent a pharmaceutical stress test which was notable for a small reversible defect of the basal to mid anterior wall suggestive of ischemia. She was scheduled for elective cardiac catheterization. Of note, she also has a documented allergy to contrast and will require pre-treatment with benadryl and steroids prior to the procedure. She is referred to the step down unit for monitoring during the ASA desensitization.   PMHX COPD, HTN, HLD (intolerant of statins), and tobacco abuse.   Admitted to Stepdown Level 1 with DX: Aspirin allergy   on exam: tachypnea  PLAN: cont ivf; cont iv protonix; Cardiopulmonary monitoring; ASA desensitization; NPO after MN - for Cardiac cath 2/28 @ 8:30 am;  Premedication for contrast allergy with steroids and benadryl     Date: 2/28/24 - CHANGED TO INPATIENT    Discharge Summary    Summary of Hospital Course: Admitted to ICU for aspirin  desensitization therapy in setting of anticipated cardiac cath for exertional dyspnea with positive stress test. Completed at 2230 on 2/27 without incidence. With premedication for allergy to contrast needed for cardiac cath, amenable to IV pepcid, benadryl and steroids.     Significant Findings, Care, Treatment and Services Provided: Cardiac cath without acute disease. ASA not needed on discharge per cardiology. Desensitization therapy and intervention without issue.     Disposition: Home / self     ED Triage Vitals   Temperature Pulse Respirations Blood Pressure SpO2   02/27/24 1558 02/27/24 1558 02/27/24 1558 02/27/24 1558 02/27/24 1558   99.1 °F (37.3 °C) 75 16 146/63 97 %      Temp Source Heart Rate Source Patient Position - Orthostatic VS BP Location FiO2 (%)   02/27/24 1558 02/27/24 1558 02/27/24 1900 02/27/24 1900 --   Oral Monitor Sitting Left arm       Pain Score       02/27/24 1558       No Pain          Wt Readings from Last 1 Encounters:   02/28/24 74.9 kg (165 lb 2 oz)     Additional Vital Signs:   Date/Time Temp Pulse Resp BP MAP (mmHg) SpO2 O2 Flow Rate (L/min) O2 Device Patient Position - Orthostatic VS   02/28/24 1015 -- 76 -- 137/62 89 93 % -- -- --   02/28/24 1000 -- 71 -- 141/64 92 93 % -- -- --   02/28/24 0945 -- 72 -- 112/71 84 94 % -- -- --   02/28/24 0930 -- 75 20 127/67 90 94 % -- -- --   02/28/24 08:38:57 -- -- -- -- -- -- 2 L/min EtCO2 nasal cannula --   02/28/24 0400 -- 72 22 127/63 88 93 % -- None (Room air) Lying   02/28/24 0000 -- 65 23 Abnormal  137/60 87 95 % -- -- --   02/27/24 2324 97.8 °F (36.6 °C) 77 21 125/70 90 -- -- None (Room air) Lying   02/27/24 2100 -- 75 40 Abnormal  135/60 87 96 % -- None (Room air) Lying   02/27/24 2003 -- 74 31 Abnormal  132/62 89 96 % -- -- --   02/27/24 2000 -- 74 24  Abnormal  132/62 89 95 % -- -- Lying   02/27/24 1943 -- -- -- -- -- 96 % -- None (Room air) --   02/27/24 1900 97.7 °F (36.5 °C) 66 24 Abnormal  146/67 96 94 % -- None (Room air) Sitting   02/27/24 1800 -- 69 24 Abnormal  134/62 89 96 % -- -- --   02/27/24 1700 -- 67 22 131/60 87 95 % -- -- --   02/27/24 1558 99.1 °F (37.3 °C) 75 16 146/63 91 97 %          EKG: NSR        Results from last 7 days   Lab Units 02/28/24  0453   WBC Thousand/uL 5.43   HEMOGLOBIN g/dL 14.4   HEMATOCRIT % 42.7   PLATELETS Thousands/uL 294       Results from last 7 days   Lab Units 02/28/24  0453   SODIUM mmol/L 141   POTASSIUM mmol/L 4.0   CHLORIDE mmol/L 110*   CO2 mmol/L 22   ANION GAP mmol/L 9   BUN mg/dL 10   CREATININE mg/dL 0.65   EGFR ml/min/1.73sq m 93   CALCIUM mg/dL 9.9       Results from last 7 days   Lab Units 02/28/24  0453   GLUCOSE RANDOM mg/dL 146*       Present on Admission:   Abnormal stress test   Essential hypertension   Gastro-esophageal reflux disease without esophagitis   COPD (chronic obstructive pulmonary disease) ([facility])   Tobacco abuse      Admitting Diagnosis: Aspirin allergy    Age/Sex: 63 y.o. female    Admission Orders: SCDs; I/O; neuro checks; Cardiopulmonary monitoring; daily wts; cardiac diet    Scheduled Medications:  budesonide-formoterol, 2 puff, Inhalation, BID  chlorhexidine, 15 mL, Mouth/Throat, Q12H MISSY  cholecalciferol, 1,000 Units, Oral, Daily  cloNIDine, 0.1 mg, Oral, BID  fluticasone, 1 spray, Each Nare, Daily  hydroxychloroquine, 200 mg, Oral, BID With Meals  loratadine, 10 mg, Oral, Daily  nicotine, 14 mg, Transdermal, Daily  pantoprazole, 40 mg, Intravenous, Q24H MISSY      Continuous IV Infusions:  sodium chloride, 75 mL/hr, Intravenous, Continuous      PRN Meds:  acetaminophen, 650 mg, Oral, Q6H PRN  (2/27 rec'd x1)   ALPRAZolam, 0.25 mg, Oral, BID PRN  (2/28 rec'd x1 so far today)   diphenhydrAMINE, 25 mg, Intravenous, Q6H PRN  famotidine, 20 mg, Intravenous, Once PRN  hydrocortisone sodium  succinate, 100 mg, Intravenous, Once PRN  levalbuterol, 1.25 mg, Nebulization, Q6H PRN  nitroglycerin, 0.4 mg, Sublingual, Q5 Min PRN  oxyCODONE, 10 mg, Oral, Q6H PRN  (2/27 rec'd x1)         IP CONSULT TO CASE MANAGEMENT    Network Utilization Review Department  ATTENTION: Please call with any questions or concerns to [phone] and carefully listen to the prompts so that you are directed to the right person. All voicemails are confidential.   For Discharge needs, contact Care Management DC Support Team at [phone] opt. 2  Send all requests for admission clinical reviews, approved or denied determinations and any other requests to dedicated fax number below belonging to the campus where the patient is receiving treatment. List of dedicated fax numbers for the Facilities:  FACILITY NAME UR FAX NUMBER   ADMISSION DENIALS (Administrative/Medical Necessity) [phone]   DISCHARGE SUPPORT TEAM (NETWORK) [phone]   PARENT CHILD HEALTH (Maternity/NICU/Pediatrics) [phone]   [facility] [phone]   [facility] [phone]   [facility] [phone]   [facility] [phone]   [facility] [phone]   [facility] [phone]   [facility] [phone]   [facility] [phone]   [facility] [phone]   [facility] [phone]   [facility] [phone]   [facility] [phone]

## 2024-02-28 NOTE — ASSESSMENT & PLAN NOTE
Patient was seen in the outpatient setting by Dr. Sauceda for evaluation of of dyspnea on exertion and chest pain   She had an outpatient LIBRA that showed EF of 65% and no regional wall motion abnormalities  She had a pharmacologic stress test that was notable for a small reversible defect of the basal to mid anterior wall suggestive of ischemia.  She has an elective cardiac catheterization scheduled tomorrow, 2/28   She presented to the hospital for ASA desensitization prior to the cardiac catheterization should intervention be needed   She is also noted to have an allergy to contrast dye    Plan:  Completed ASA desensitization   Cardiac cath negative  No need for continued ASA per cardiology

## 2024-02-28 NOTE — ASSESSMENT & PLAN NOTE
Patient has a documented allergy to aspirin.  Response is noted to be anaphylaxis.  Patient described hives.  However, she is unsure  Patient has a scheduled outpatient cardiac catheterization for 2/28  She presented to the hospital for ASA desensitization prior to her cardiac catheterization     Plan:   Initiate ASA desensitization protocol   Monitor patient closely in SD level of care for allergic reaction

## 2024-02-28 NOTE — PLAN OF CARE
Problem: PAIN - ADULT  Goal: Verbalizes/displays adequate comfort level or baseline comfort level  Description: Interventions:  - Encourage patient to monitor pain and request assistance  - Assess pain using appropriate pain scale  - Administer analgesics based on type and severity of pain and evaluate response  - Implement non-pharmacological measures as appropriate and evaluate response  - Consider cultural and social influences on pain and pain management  - Notify physician/advanced practitioner if interventions unsuccessful or patient reports new pain  Outcome: Progressing     Problem: INFECTION - ADULT  Goal: Absence or prevention of progression during hospitalization  Description: INTERVENTIONS:  - Assess and monitor for signs and symptoms of infection  - Monitor lab/diagnostic results  - Monitor all insertion sites, i.e. indwelling lines, tubes, and drains  - Monitor endotracheal if appropriate and nasal secretions for changes in amount and color  - [place] appropriate cooling/warming therapies per order  - Administer medications as ordered  - Instruct and encourage patient and family to use good hand hygiene technique  - Identify and instruct in appropriate isolation precautions for identified infection/condition  Outcome: Progressing  Goal: Absence of fever/infection during neutropenic period  Description: INTERVENTIONS:  - Monitor WBC    Outcome: Progressing     Problem: SAFETY ADULT  Goal: Patient will remain free of falls  Description: INTERVENTIONS:  - Educate patient/family on patient safety including physical limitations  - Instruct patient to call for assistance with activity   - Consult OT/PT to assist with strengthening/mobility   - Keep Call bell within reach  - Keep bed low and locked with side rails adjusted as appropriate  - Keep care items and personal belongings within reach  - Initiate and maintain comfort rounds  - Make Fall Risk Sign visible to staff  - Apply yellow socks and bracelet  for high fall risk patients  - Consider moving patient to room near nurses station  Outcome: Progressing  Goal: Maintain or return to baseline ADL function  Description: INTERVENTIONS:  -  Assess patient's ability to carry out ADLs; assess patient's baseline for ADL function and identify physical deficits which impact ability to perform ADLs (bathing, care of mouth/teeth, toileting, grooming, dressing, etc.)  - Assess/evaluate cause of self-care deficits   - Assess range of motion  - Assess patient's mobility; develop plan if impaired  - Assess patient's need for assistive devices and provide as appropriate  - Encourage maximum independence but intervene and supervise when necessary  - Involve family in performance of ADLs  - Assess for home care needs following discharge   - Consider OT consult to assist with ADL evaluation and planning for discharge  - Provide patient education as appropriate  Outcome: Progressing  Goal: Maintains/Returns to pre admission functional level  Description: INTERVENTIONS:  - Perform AM-PAC 6 Click Basic Mobility/ Daily Activity assessment daily.  - Set and communicate daily mobility goal to care team and patient/family/caregiver.   - Collaborate with rehabilitation services on mobility goals if consulted  - Out of bed for toileting  - Record patient progress and toleration of activity level   Outcome: Progressing     Problem: DISCHARGE PLANNING  Goal: Discharge to home or other facility with appropriate resources  Description: INTERVENTIONS:  - Identify barriers to discharge w/patient and caregiver  - Arrange for needed discharge resources and transportation as appropriate  - Identify discharge learning needs (meds, wound care, etc.)  - Arrange for interpretive services to assist at discharge as needed  - Refer to Case Management Department for coordinating discharge planning if the patient needs post-hospital services based on physician/advanced practitioner order or complex needs  related to functional status, cognitive ability, or social support system  Outcome: Progressing     Problem: Knowledge Deficit  Goal: Patient/family/caregiver demonstrates understanding of disease process, treatment plan, medications, and discharge instructions  Description: Complete learning assessment and assess knowledge base.  Interventions:  - Provide teaching at level of understanding  - Provide teaching via preferred learning methods  Outcome: Progressing     Problem: CARDIOVASCULAR - ADULT  Goal: Maintains optimal cardiac output and hemodynamic stability  Description: INTERVENTIONS:  - Monitor I/O, vital signs and rhythm  - Monitor for S/S and trends of decreased cardiac output  - Administer and titrate ordered vasoactive medications to optimize hemodynamic stability  - Assess quality of pulses, skin color and temperature  - Assess for signs of decreased coronary artery perfusion  - Instruct patient to report change in severity of symptoms  Outcome: Progressing  Goal: Absence of cardiac dysrhythmias or at baseline rhythm  Description: INTERVENTIONS:  - Continuous cardiac monitoring, vital signs, obtain 12 lead EKG if ordered  - Administer antiarrhythmic and heart rate control medications as ordered  - Monitor electrolytes and administer replacement therapy as ordered  Outcome: Progressing     Problem: RESPIRATORY - ADULT  Goal: Achieves optimal ventilation and oxygenation  Description: INTERVENTIONS:  - Assess for changes in respiratory status  - Assess for changes in mentation and behavior  - Position to facilitate oxygenation and minimize respiratory effort  - Oxygen administered by appropriate delivery if ordered  - Initiate smoking cessation education as indicated  - Encourage broncho-pulmonary hygiene including cough, deep breathe, Incentive Spirometry  - Assess the need for suctioning and aspirate as needed  - Assess and instruct to report SOB or any respiratory difficulty  - Respiratory Therapy  support as indicated  Outcome: Progressing

## 2024-02-28 NOTE — ASSESSMENT & PLAN NOTE
Patient has a documented allergy to aspirin.  Response is noted to be anaphylaxis.  Patient described hives.  However, she is unsure  Patient has a scheduled outpatient cardiac catheterization for 2/28  She presented to the hospital for ASA desensitization prior to her cardiac catheterization     Plan:   Completed ASA desensitization protocol  With negative cardiac cath, no need to continue ASA per cardiology  Follow up with PCP

## 2024-02-28 NOTE — PLAN OF CARE
Problem: PHYSICAL THERAPY ADULT  Goal: Performs mobility at highest level of function for planned discharge setting.  See evaluation for individualized goals.  Description: Treatment/Interventions: Elevations, Therapeutic exercise, Endurance training, Patient/family training, Gait training, Spoke to nursing, OT          See flowsheet documentation for full assessment, interventions and recommendations.  Note: Prognosis: Good  Problem List: Impaired balance, Decreased mobility, Decreased endurance  Assessment: Pt is 63 y.o. female seen for moderate-complexity PT evaluation on 2/28/2024 s/p admit to [facility] on 2/27/2024 w/ Aspirin allergy. PT was consulted to assess pt's functional mobility and d/c needs. Order placed for PT eval and tx, w/  out of bed to chair  order. PTA, pt was living with family in a two story home with first floor set up and 2 vs 0 NIKKI. Pt reports independence at baseline with ADLs, IADLs, and functional mobility without AD. At time of eval, patient completed sup <> sit Aura, STS Aura, and ambulated 35' supervision without AD.  Upon evaluation, pt presenting with impaired functional mobility d/t decreased endurance, impaired balance, and decreased mobility. Pertinent PMHx and current co-morbidities affecting pt's physical performance at time of assessment include: aspirin allergy, abnormal stress test, essential HTN, COPD. Personal factors affecting pt at time of eval include: stairs to enter home. The following objective measures performed on IE also reveal limitations: AM-PAC 6-Clicks: 22/24. Pt's clinical presentation is currently evolving seen in pt's presentation of elevated BP, ongoing medical assessment, and on telemetry monitoring. Overall, pt's rehab potential and prognosis to return to [facility] is good as impacted by objective findings, warranting pt to receive further skilled PT interventions to address impairments, activity limitations, and participation restrictions.  Pt to benefit from continued PT services to address deficits as defined above and maximize level of functional independent mobility and consistency. From PT/mobility standpoint, recommendation at time of d/c would be no post acute rehabilitation needs in order to facilitate return to PLOF.        Rehab Resource Intensity Level, PT: No post-acute rehabilitation needs    See flowsheet documentation for full assessment.      Nancy Cuellar; PT, DPT

## 2024-02-28 NOTE — PLAN OF CARE
Problem: PAIN - ADULT  Goal: Verbalizes/displays adequate comfort level or baseline comfort level  Description: Interventions:  - Encourage patient to monitor pain and request assistance  - Assess pain using appropriate pain scale  - Administer analgesics based on type and severity of pain and evaluate response  - Implement non-pharmacological measures as appropriate and evaluate response  - Consider cultural and social influences on pain and pain management  - Notify physician/advanced practitioner if interventions unsuccessful or patient reports new pain  Outcome: Progressing     Problem: INFECTION - ADULT  Goal: Absence or prevention of progression during hospitalization  Description: INTERVENTIONS:  - Assess and monitor for signs and symptoms of infection  - Monitor lab/diagnostic results  - Monitor all insertion sites, i.e. indwelling lines, tubes, and drains  - Monitor endotracheal if appropriate and nasal secretions for changes in amount and color  - [place] appropriate cooling/warming therapies per order  - Administer medications as ordered  - Instruct and encourage patient and family to use good hand hygiene technique  - Identify and instruct in appropriate isolation precautions for identified infection/condition  Outcome: Progressing     Problem: SAFETY ADULT  Goal: Patient will remain free of falls  Description: INTERVENTIONS:  - Educate patient/family on patient safety including physical limitations  - Instruct patient to call for assistance with activity   - Consult OT/PT to assist with strengthening/mobility   - Keep Call bell within reach  - Keep bed low and locked with side rails adjusted as appropriate  - Keep care items and personal belongings within reach  - Initiate and maintain comfort rounds  - Make Fall Risk Sign visible to staff  - Offer Toileting every 2 Hours, in advance of need  - Apply yellow socks and bracelet for high fall risk patients  - Consider moving patient to room near nurses  station  Outcome: Progressing     Problem: CARDIOVASCULAR - ADULT  Goal: Maintains optimal cardiac output and hemodynamic stability  Description: INTERVENTIONS:  - Monitor I/O, vital signs and rhythm  - Monitor for S/S and trends of decreased cardiac output  - Administer and titrate ordered vasoactive medications to optimize hemodynamic stability  - Assess quality of pulses, skin color and temperature  - Assess for signs of decreased coronary artery perfusion  - Instruct patient to report change in severity of symptoms  Outcome: Progressing     Problem: RESPIRATORY - ADULT  Goal: Achieves optimal ventilation and oxygenation  Description: INTERVENTIONS:  - Assess for changes in respiratory status  - Assess for changes in mentation and behavior  - Position to facilitate oxygenation and minimize respiratory effort  - Oxygen administered by appropriate delivery if ordered  - Initiate smoking cessation education as indicated  - Encourage broncho-pulmonary hygiene including cough, deep breathe, Incentive Spirometry  - Assess the need for suctioning and aspirate as needed  - Assess and instruct to report SOB or any respiratory difficulty  - Respiratory Therapy support as indicated  Outcome: Progressing

## 2024-02-28 NOTE — RESPIRATORY THERAPY NOTE
RT Protocol Note  Kelsi Cabrera 63 y.o. female MRN: [id number]  Unit/Bed#: ICU 09 Encounter: [id number]    Assessment    Principal Problem:    Aspirin allergy      Home Pulmonary Medications:  Symbicort, Xop neb       Past Medical History:   Diagnosis Date    Arthritis     Asthma     Avascular necrosis of bone of hip, right (HCC)     Avascular necrosis of hip, right (HCC) 10/3/2017    Chronic obstructive pulmonary disease (HCC) 2/12/2019    COPD (chronic obstructive pulmonary disease) (HCC)     GERD (gastroesophageal reflux disease)     Hypoglycemia     Infectious viral hepatitis     Lumbar radicular pain     Lyme disease     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HCC)      Social History     Socioeconomic History    Marital status: /Civil Union     Spouse name: Not on file    Number of children: Not on file    Years of education: Not on file    Highest education level: Not on file   Occupational History    Not on file   Tobacco Use    Smoking status: Former     Types: Cigarettes    Smokeless tobacco: Never    Tobacco comments:     started smoking, quit 1 month ago   Vaping Use    Vaping status: Never Used   Substance and Sexual Activity    Alcohol use: Never    Drug use: Never    Sexual activity: Not Currently     Partners: Male   Other Topics Concern    Not on file   Social History Narrative    Not on file     Social Determinants of Health     Financial Resource Strain: Not on file   Food Insecurity: Not on file   Transportation Needs: Not on file   Physical Activity: Not on file   Stress: Not on file   Social Connections: Not on file   Intimate Partner Violence: Not on file   Housing Stability: Not on file       Subjective         Objective    Physical Exam:   General Appearance: Awake, Alert  Respiratory Pattern: Normal  Chest Assessment: Chest expansion symmetrical  Bilateral Breath Sounds: Clear, Diminished  Cough: None  O2 Device: r/a    Vitals:  Blood pressure 146/67, pulse 66, temperature 97.7 °F (36.5 °C),  "temperature source Oral, resp. rate (!) 24, height 5' 3\" (1.6 m), weight 71.2 kg (156 lb 15.5 oz), SpO2 96%.          Imaging and other studies:   O2 Device: r/a     Plan    Respiratory Plan: No distress/Pulmonary history        Resp Comments: pt requesting PRN, hx of asthma with daily inhaler use and PRN nebs, resting quietly with even non labored respirations, talking easily with visitors, no distress at this time, continue as ordered per home regimen  "

## 2024-02-28 NOTE — ASSESSMENT & PLAN NOTE
Patient was seen in the outpatient setting by Dr. Sauceda for evaluation of of dyspnea on exertion and chest pain   She had an outpatient LIBRA that showed EF of 65% and no regional wall motion abnormalities  She had a pharmacologic stress test that was notable for a small reversible defect of the basal to mid anterior wall suggestive of ischemia.  She has an elective cardiac catheterization scheduled tomorrow, 2/28   She presented to the hospital for ASA desensitization prior to the cardiac catheterization should intervention be needed   She is also noted to have an allergy to contrast dye    Plan:  Will complete ASA desensitization overnight tonight  Contrast pre-treatment ordered with benadryl and steroids for contrast exposure tomorrow  NPO after midnight

## 2024-02-28 NOTE — DISCHARGE SUMMARY
[facility]  Discharge- Kelsi Cabrera 1960, 63 y.o. female MRN: [id number]  Unit/Bed#: ICU 09 Encounter: [id number]  Primary Care Provider: ALETHEA Scanlon   Date and time admitted to hospital: 2/27/2024  3:41 PM    * Aspirin allergy  Assessment & Plan  Patient has a documented allergy to aspirin.  Response is noted to be anaphylaxis.  Patient described hives.  However, she is unsure  Patient has a scheduled outpatient cardiac catheterization for 2/28  She presented to the hospital for ASA desensitization prior to her cardiac catheterization     Plan:   Completed ASA desensitization protocol  With negative cardiac cath, no need to continue ASA per cardiology  Follow up with PCP    Abnormal stress test  Assessment & Plan  Patient was seen in the outpatient setting by Dr. Sauceda for evaluation of of dyspnea on exertion and chest pain   She had an outpatient LIBRA that showed EF of 65% and no regional wall motion abnormalities  She had a pharmacologic stress test that was notable for a small reversible defect of the basal to mid anterior wall suggestive of ischemia.  She has an elective cardiac catheterization scheduled tomorrow, 2/28   She presented to the hospital for ASA desensitization prior to the cardiac catheterization should intervention be needed   She is also noted to have an allergy to contrast dye    Plan:  Completed ASA desensitization   Cardiac cath negative  No need for continued ASA per cardiology    Tobacco abuse  Assessment & Plan  Encourage cessation   Nicotine patch while inpatient  Ongoing cessation conversations on discharge    Essential hypertension  Assessment & Plan  Continue home dose antihypertensives on discharge    COPD (chronic obstructive pulmonary disease) (HCC)  Assessment & Plan  Not in exacerbation  Continue home dose inhalers  Cessation conversations    Gastro-esophageal reflux disease without esophagitis  Assessment & Plan  PPI while completing  "ASA desensitization   Given lack of symptoms, no need to continue as outpatient      Medical Problems       Resolved Problems  Date Reviewed: 2/28/2024   None         Admission Date:   Admission Orders (From admission, onward)       Ordered        02/28/24 1039  Inpatient Admission  Once            02/27/24 1726  Place in Observation  Once                          Admitting Diagnosis: Aspirin allergy    HPI per ALETHEA Hinds: \"Kelsi Cabrera is a 63 y.o. who presents with a past medical history of COPD, HTN, HLD (intolerant of statins), and tobacco abuse.  She presents to the hospital today for ASA desensitization prior to her elective cardiac catheterization tomorrow (2/28).  She was evaluated in the outpatient setting by Dr. Sauceda for dyspnea on exertion and chest pain.  She underwent a pharmaceutical stress test which was notable for a small reversible defect of the basal to mid anterior wall suggestive of ischemia.  She was scheduled for elective cardiac catheterization.  Of note, she also has a documented allergy to contrast and will require pre-treatment with benadryl and steroids prior to the procedure.  She is referred to the step down unit for monitoring during the ASA desensitization\"    Procedures Performed:   Orders Placed This Encounter   Procedures    Cardiac catheterization     Summary of Hospital Course: Admitted to ICU for aspirin  desensitization therapy in setting of anticipated cardiac cath for exertional dyspnea with positive stress test. Completed at 2230 on 2/27 without incidence. With premedication for allergy to contrast needed for cardiac cath, amenable to IV pepcid, benadryl and steroids.    Significant Findings, Care, Treatment and Services Provided: Cardiac cath without acute disease. ASA not needed on discharge per cardiology. Desensitization therapy and intervention without issue.    Complications: None    Condition at Discharge: good     Discharge instructions/Information " to patient and family:   See after visit summary for information provided to patient and family.      Provisions for Follow-Up Care:  See after visit summary for information related to follow-up care and any pertinent home health orders.      PCP: ALETHEA Scanlon    Disposition: Home    Planned Readmission: No    Discharge Statement   I spent 25 minutes discharging the patient. This time was spent on the day of discharge. I had direct contact with the patient on the day of discharge. Additional documentation is required if more than 30 minutes were spent on discharge.     Discharge Medications:  See after visit summary for reconciled discharge medications provided to patient and family.      ALETHEA Lopez

## 2024-02-28 NOTE — ASSESSMENT & PLAN NOTE
Patient has a documented allergy to aspirin.  Response is noted to be anaphylaxis.  Patient described hives.  However, she is unsure  Patient has a scheduled outpatient cardiac catheterization for 2/28  She presented to the hospital for ASA desensitization prior to her cardiac catheterization     Plan:   Completed ASA desensitization protocol overnight

## 2024-02-28 NOTE — PROGRESS NOTES
[facility]  Progress Note  Name: Kelsi Cabrera I  MRN: [id number]  Unit/Bed#: ICU 09 I Date of Admission: 2/27/2024   Date of Service: 2/28/2024 I Hospital Day: 1    Assessment/Plan   * Aspirin allergy  Assessment & Plan  Patient has a documented allergy to aspirin.  Response is noted to be anaphylaxis.  Patient described hives.  However, she is unsure  Patient has a scheduled outpatient cardiac catheterization for 2/28  She presented to the hospital for ASA desensitization prior to her cardiac catheterization     Plan:   Completed ASA desensitization protocol overnight          Abnormal stress test  Assessment & Plan  Patient was seen in the outpatient setting by Dr. Sauceda for evaluation of of dyspnea on exertion and chest pain   She had an outpatient LIBRA that showed EF of 65% and no regional wall motion abnormalities  She had a pharmacologic stress test that was notable for a small reversible defect of the basal to mid anterior wall suggestive of ischemia.  She has an elective cardiac catheterization scheduled tomorrow, 2/28   She presented to the hospital for ASA desensitization prior to the cardiac catheterization should intervention be needed   She is also noted to have an allergy to contrast dye    Plan:  Completed ASA desensitization overnight without adverse reaction   Contrast pre-treatment ordered with benadryl and steroids for contrast exposure today   Has been NPO since midnight       Tobacco abuse  Assessment & Plan  Encourage cessation   Nicotine patch     Essential hypertension  Assessment & Plan  Continue home dose antihypertensives    COPD (chronic obstructive pulmonary disease) ([facility])  Assessment & Plan  Not in exacerbation  Continue home dose inhalers    Gastro-esophageal reflux disease without esophagitis  Assessment & Plan  Will utilize PPI while completing ASA desensitization              Disposition: Stepdown Level 1    ICU Core Measures     A: Assess, Prevent,  and Manage Pain Has pain been assessed? Yes  Need for changes to pain regimen? No   B: Both SAT/SAT  N/A   C: Choice of Sedation RASS Goal: 0 Alert and Calm  Need for changes to sedation or analgesia regimen? No   D: Delirium CAM-ICU: Negative   E: Early Mobility  Plan for early mobility? Yes   F: Family Engagement Plan for family engagement today? Yes         Prophylaxis:  VTE Contraindicated secondary to: refuses porcine products for Anab[employer]ism reasons    Stress Ulcer  covered byFamotidine (PF) (PEPCID) injection 20 mg [[id number]], pantoprazole (PROTONIX) injection 40 mg [[id number]]         Significant 24hr Events     24hr events: Admitted yesterday for ASA desensitization protocol.  Tolerated titration up to full dose ASA without adverse reaction.  No acute events.       Subjective   Review of Systems   Objective                            Vitals I/O      Most Recent Min/Max in 24hrs   Temp 97.8 °F (36.6 °C) Temp  Min: 97.7 °F (36.5 °C)  Max: 99.1 °F (37.3 °C)   Pulse 75 Pulse  Min: 66  Max: 75   Resp (!) 40 Resp  Min: 16  Max: 40   /70 BP  Min: 125/70  Max: 146/67   O2 Sat 96 % SpO2  Min: 94 %  Max: 97 %      Intake/Output Summary (Last 24 hours) at 2/28/2024 0039  Last data filed at 2/27/2024 2201  Gross per 24 hour   Intake 240 ml   Output --   Net 240 ml       Diet NPO; Sips with meds    Invasive Monitoring           Physical Exam   Physical Exam  Eyes:      General: Lids are normal.      Extraocular Movements: Extraocular movements intact.      Conjunctiva/sclera: Conjunctivae normal.   Skin:     General: Skin is warm and dry.   HENT:      Head: Normocephalic and atraumatic.   Neck:      Trachea: Trachea normal.   Cardiovascular:      Rate and Rhythm: Normal rate.      Pulses: Normal pulses.   Musculoskeletal:      Cervical back: Normal range of motion.      Right lower leg: No edema.      Left lower leg: No edema.   Abdominal: General: Abdomen is flat.      Palpations: Abdomen is soft.    Constitutional:       General: She is awake.      Appearance: She is well-developed and well-groomed.   Pulmonary:      Effort: Pulmonary effort is normal.      Breath sounds: Normal breath sounds.   Psychiatric:         Mood and Affect: Mood is anxious.         Behavior: Behavior is cooperative.   Neurological:      General: No focal deficit present.      Mental Status: She is alert.      GCS: GCS eye subscore is 4. GCS verbal subscore is 5. GCS motor subscore is 6.      Sensory: Sensation is intact.            Diagnostic Studies      EKG: NSR   Imaging:   No orders to display         Medications:  Scheduled PRN   budesonide-formoterol, 2 puff, BID  chlorhexidine, 15 mL, Q12H MISSY  cholecalciferol, 1,000 Units, Daily  cloNIDine, 0.1 mg, BID  diphenhydrAMINE, 50 mg, 60 Min Pre-Op  fluticasone, 1 spray, Daily  hydroxychloroquine, 200 mg, BID With Meals  loratadine, 10 mg, Daily  magnesium Oxide, 400 mg, Daily  methylPREDNISolone, 32 mg, Q10H  nicotine, 14 mg, Daily  pantoprazole, 40 mg, Q24H MISSY      acetaminophen, 650 mg, Q6H PRN  ALPRAZolam, 0.25 mg, HS PRN  diphenhydrAMINE, 25 mg, Q6H PRN  EPINEPHrine PF, 0.3 mg, Once PRN  famotidine, 20 mg, Once PRN  hydrocortisone sodium succinate, 100 mg, Once PRN  levalbuterol, 1.25 mg, Q6H PRN  nitroglycerin, 0.4 mg, Q5 Min PRN  oxyCODONE, 10 mg, Q6H PRN       Continuous    sodium chloride, 75 mL/hr         Labs:    CBC    No recent results  BMP    No recent results    Coags    No recent results     Additional Electrolytes  No recent results       Blood Gas    No recent results  No recent results LFTs  No recent results    Infectious  No recent results  Glucose  No recent results          No critical care time spent     ALETHEA Hinds

## 2024-02-28 NOTE — ASSESSMENT & PLAN NOTE
Patient was seen in the outpatient setting by Dr. Sauceda for evaluation of of dyspnea on exertion and chest pain   She had an outpatient LIBRA that showed EF of 65% and no regional wall motion abnormalities  She had a pharmacologic stress test that was notable for a small reversible defect of the basal to mid anterior wall suggestive of ischemia.  She has an elective cardiac catheterization scheduled tomorrow, 2/28   She presented to the hospital for ASA desensitization prior to the cardiac catheterization should intervention be needed   She is also noted to have an allergy to contrast dye    Plan:  Completed ASA desensitization overnight without adverse reaction   Contrast pre-treatment ordered with benadryl and steroids for contrast exposure today   Has been NPO since midnight

## 2024-02-28 NOTE — ASSESSMENT & PLAN NOTE
Encourage cessation   Nicotine patch while inpatient  Ongoing cessation conversations on discharge

## 2024-02-28 NOTE — H&P
[facility]  H&P  Name: Kelsi Cabrera 63 y.o. female I MRN: [id number]  Unit/Bed#: ICU 09 I Date of Admission: 2/27/2024   Date of Service: 2/27/2024 I Hospital Day: 1      Assessment/Plan   * Aspirin allergy  Assessment & Plan  Patient has a documented allergy to aspirin.  Response is noted to be anaphylaxis.  Patient described hives.  However, she is unsure  Patient has a scheduled outpatient cardiac catheterization for 2/28  She presented to the hospital for ASA desensitization prior to her cardiac catheterization     Plan:   Initiate ASA desensitization protocol   Monitor patient closely in SD level of care for allergic reaction        Abnormal stress test  Assessment & Plan  Patient was seen in the outpatient setting by Dr. Sauceda for evaluation of of dyspnea on exertion and chest pain   She had an outpatient LIBRA that showed EF of 65% and no regional wall motion abnormalities  She had a pharmacologic stress test that was notable for a small reversible defect of the basal to mid anterior wall suggestive of ischemia.  She has an elective cardiac catheterization scheduled tomorrow, 2/28   She presented to the hospital for ASA desensitization prior to the cardiac catheterization should intervention be needed   She is also noted to have an allergy to contrast dye    Plan:  Will complete ASA desensitization overnight tonight  Contrast pre-treatment ordered with benadryl and steroids for contrast exposure tomorrow  NPO after midnight       Tobacco abuse  Assessment & Plan  Encourage cessation   Nicotine patch     Essential hypertension  Assessment & Plan  Continue home dose antihypertensives    COPD (chronic obstructive pulmonary disease) ([facility])  Assessment & Plan  Not in exacerbation  Continue home dose inhalers    Gastro-esophageal reflux disease without esophagitis  Assessment & Plan  Will utilize PPI while completing ASA desensitization            History of Present Illness     HPI:  Kelsi Cabrera is a 63 y.o. who presents with a past medical history of COPD, HTN, HLD (intolerant of statins), and tobacco abuse.  She presents to the hospital today for ASA desensitization prior to her elective cardiac catheterization tomorrow (2/28).  She was evaluated in the outpatient setting by Dr. Sauceda for dyspnea on exertion and chest pain.  She underwent a pharmaceutical stress test which was notable for a small reversible defect of the basal to mid anterior wall suggestive of ischemia.  She was scheduled for elective cardiac catheterization.  Of note, she also has a documented allergy to contrast and will require pre-treatment with benadryl and steroids prior to the procedure.  She is referred to the step down unit for monitoring during the ASA desensitization.     History obtained from chart review and the patient.  Review of Systems   Constitutional: Negative.    HENT: Negative.     Eyes: Negative.    Respiratory: Negative.     Cardiovascular: Negative.    Gastrointestinal: Negative.    Endocrine: Negative.    Genitourinary: Negative.    Musculoskeletal: Negative.    Skin: Negative.    Allergic/Immunologic: Negative.    Neurological: Negative.    Hematological: Negative.    Psychiatric/Behavioral: Negative.       Disposition: Stepdown Level 1  Historical Information   Past Medical History:  No date: Arthritis  No date: Asthma  No date: Avascular necrosis of bone of hip, right (HCC)  10/3/2017: Avascular necrosis of hip, right (HCC)  2/12/2019: Chronic obstructive pulmonary disease (HCC)  No date: COPD (chronic obstructive pulmonary disease) (HCC)  No date: GERD (gastroesophageal reflux disease)  No date: Hypoglycemia  No date: Infectious viral hepatitis  No date: Lumbar radicular pain  No date: Lyme disease  No date: 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[facility]) Past Surgical History:  No date: APPENDECTOMY  No date: CARPAL TUNNEL RELEASE  No date: CHOLECYSTECTOMY  No date: SINUS SURGERY   Current Outpatient  Medications   Medication Instructions    ALPRAZolam (XANAX) 0.5 mg tablet No dose, route, or frequency recorded.    Azelastine HCl 137 MCG/SPRAY SOLN SPRAY 1-2 SPRAYS INTO EACH NOSTRIL TWICE A DAY AS DIRECTED    benzonatate (TESSALON) 200 MG capsule No dose, route, or frequency recorded.    budesonide-formoterol (SYMBICORT) 160-4.5 mcg/act inhaler 2 puffs, Inhalation, 2 times daily    Choline Fenofibrate (Fenofibric Acid) 135 MG CPDR 1 cap daily    cloNIDine (CATAPRES) 0.1 mg, Oral, 2 times daily    fexofenadine (ALLEGRA) 180 mg, Oral, Daily    fluticasone (FLONASE) 50 mcg/act nasal spray 1 spray, Daily    hydroxychloroquine (PLAQUENIL) 200 mg, Oral, 2 times daily with meals    levalbuterol (XOPENEX HFA) 45 mcg/act inhaler     levalbuterol (XOPENEX) 1.25 mg/3 mL nebulizer solution No dose, route, or frequency recorded.    levofloxacin (LEVAQUIN) 500 mg tablet No dose, route, or frequency recorded.    magnesium oxide (MAG-OX) 400 mg tablet 1 tablet, Oral, Daily    nitroglycerin (NITROSTAT) 0.4 mg, Sublingual, Every 5 minutes PRN    oxyCODONE-acetaminophen (PERCOCET)  mg per tablet No dose, route, or frequency recorded.    promethazine-dextromethorphan (PHENERGAN-DM) 6.25-15 mg/5 mL oral syrup No dose, route, or frequency recorded.    Vitamin D3 5,000 Units, Oral, Daily    Allergies   Allergen Reactions    Aspirin Anaphylaxis    Iodine - Food Allergy Anaphylaxis    Penicillins Anaphylaxis    Pork Allergy - Food Allergy Other (See Comments)     Does not eat pork for [employer]ian reasons    Soybean Oil - Food Allergy Other (See Comments)    Statins Myalgia      Social History     Tobacco Use    Smoking status: Former     Types: Cigarettes    Smokeless tobacco: Never    Tobacco comments:     started smoking, quit 1 month ago   Vaping Use    Vaping status: Never Used   Substance Use Topics    Alcohol use: Never    Drug use: Never    Family History   Problem Relation Age of Onset    Bronchiolitis Mother            Objective                            Vitals I/O      Most Recent Min/Max in 24hrs   Temp 97.7 °F (36.5 °C) Temp  Min: 97.7 °F (36.5 °C)  Max: 99.1 °F (37.3 °C)   Pulse 66 Pulse  Min: 66  Max: 75   Resp (!) 24 Resp  Min: 16  Max: 24   /67 BP  Min: 131/60  Max: 146/67   O2 Sat 96 % SpO2  Min: 94 %  Max: 97 %    No intake or output data in the 24 hours ending 02/27/24 2049    Diet NPO; Sips of clear liquids  Diet NPO; Sips with meds  Diet Cardiovascular; Cardiac Cath    Invasive Monitoring           Physical Exam   Physical Exam  Eyes:      General: Lids are normal.      Extraocular Movements: Extraocular movements intact.      Conjunctiva/sclera: Conjunctivae normal.   Skin:     General: Skin is warm and dry.   HENT:      Head: Normocephalic and atraumatic.   Neck:      Trachea: Trachea normal.   Cardiovascular:      Rate and Rhythm: Normal rate.      Pulses: Normal pulses.   Musculoskeletal:      Cervical back: Normal range of motion.      Right lower leg: No edema.      Left lower leg: No edema.   Abdominal: General: Abdomen is flat.      Palpations: Abdomen is soft.      Tenderness: There is no abdominal tenderness.   Constitutional:       General: She is awake.      Appearance: She is well-developed and well-groomed.   Pulmonary:      Effort: Pulmonary effort is normal.      Breath sounds: Normal breath sounds.   Psychiatric:         Mood and Affect: Mood is anxious.   Neurological:      General: No focal deficit present.      Mental Status: She is alert.      GCS: GCS eye subscore is 4. GCS verbal subscore is 5. GCS motor subscore is 6.      Sensory: Sensation is intact.      Coordination: Coordination is intact.            Diagnostic Studies      EKG: NSR   Imaging:  No orders to display         Medications:  Scheduled PRN   aspirin, 81 mg, Q20 Min  budesonide-formoterol, 2 puff, BID  chlorhexidine, 15 mL, Q12H MISSY  [START ON 2/28/2024] cholecalciferol, 1,000 Units, Daily  cloNIDine, 0.1 mg,  BID  diphenhydrAMINE, 25 mg, Q6H  [START ON 2/28/2024] diphenhydrAMINE, 50 mg, 60 Min Pre-Op  [START ON 2/28/2024] fluticasone, 1 spray, Daily  hydroxychloroquine, 200 mg, BID With Meals  [START ON 2/28/2024] loratadine, 10 mg, Daily  magnesium Oxide, 400 mg, Daily  methylPREDNISolone, 32 mg, Q10H  [START ON 2/28/2024] nicotine, 14 mg, Daily  [START ON 2/28/2024] pantoprazole, 40 mg, Q24H MISSY      acetaminophen, 650 mg, Q6H PRN  ALPRAZolam, 0.25 mg, HS PRN  diphenhydrAMINE, 25 mg, Q6H PRN  EPINEPHrine PF, 0.3 mg, Once PRN  famotidine, 20 mg, Once PRN  hydrocortisone sodium succinate, 100 mg, Once PRN  levalbuterol, 1.25 mg, Q6H PRN  nitroglycerin, 0.4 mg, Q5 Min PRN  oxyCODONE, 10 mg, Q6H PRN       Continuous    [START ON 2/28/2024] sodium chloride, 75 mL/hr         Labs:    CBC    No recent results  BMP    No recent results    Coags    No recent results     Additional Electrolytes  No recent results       Blood Gas    No recent results  No recent results LFTs  No recent results    Infectious  No recent results  Glucose  No recent results          Anticipated Length of Stay is > 2 midnights  ALETHEA Hinds

## 2024-02-28 NOTE — OCCUPATIONAL THERAPY NOTE
Occupational Therapy Evaluation     Patient Name: Kelsi Cabrera  Today's Date: 2/28/2024  Problem List  Principal Problem:    Aspirin allergy  Active Problems:    Gastro-esophageal reflux disease without esophagitis    COPD (chronic obstructive pulmonary disease) (HCC)    Essential hypertension    Abnormal stress test    Tobacco abuse    Past Medical History  Past Medical History:   Diagnosis Date    Arthritis     Asthma     Avascular necrosis of bone of hip, right (HCC)     Avascular necrosis of hip, right (HCC) 10/3/2017    Chronic obstructive pulmonary disease (HCC) 2/12/2019    COPD (chronic obstructive pulmonary disease) (HCC)     GERD (gastroesophageal reflux disease)     Hypoglycemia     Infectious viral hepatitis     Lumbar radicular pain     Lyme disease     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HCC)      Past Surgical History  Past Surgical History:   Procedure Laterality Date    APPENDECTOMY      CARPAL TUNNEL RELEASE      CHOLECYSTECTOMY      SINUS SURGERY           02/28/24 0755   OT Last Visit   OT Visit Date 02/28/24   Note Type   Note type Evaluation   Pain Assessment   Pain Assessment Tool 0-10   Pain Score No Pain   Restrictions/Precautions   Weight Bearing Precautions Per Order No   Braces or Orthoses   (none)   Other Precautions Multiple lines;Telemetry   Home Living   Type of Home House   Home Layout Two level;Laundry in basement;Performs ADLs on one level;Able to live on main level with bedroom/bathroom  (2 NIKKI vs no NIKKI)   Bathroom Shower/Tub Walk-in shower   Bathroom Toilet Standard   Bathroom Equipment Shower chair   Bathroom Accessibility Accessible   Prior Function   Level of Wise Independent with ADLs;Independent with functional mobility;Independent with IADLS  (no AD used at baseline)   Lives With Spouse;Family  (son and family, dtr)   Receives Help From Family   IADLs Family/Friend/Other provides meals;Family/Friend/Other provides transportation;Independent with medication  management   Falls in the last 6 months 0   Vocational Retired   General   Family/Caregiver Present No   Subjective   Subjective pt agreeable to therapy session   ADL   Where Assessed Other (Comment)   UB Dressing Assistance 6  Modified independent   LB Dressing Assistance 6  Modified independent   LB Dressing Deficit Don/doff R shoe;Don/doff L shoe   Toileting Assistance  6  Modified independent   Additional Comments   (vital at start of session HR 67, /65 supine)   Bed Mobility   Supine to Sit 6  Modified independent   Additional items HOB elevated;Bedrails;Increased time required   Sit to Supine 6  Modified independent   Additional items HOB elevated;Increased time required   Transfers   Sit to Stand 6  Modified independent   Additional items Increased time required   Stand to Sit 6  Modified independent   Additional items Increased time required   Stand pivot 6  Modified independent   Additional items Increased time required   Additional Comments pt pushing IV pole   Functional Mobility   Functional Mobility 6  Modified independent   Additional items   (no AD)   Activity Tolerance   Activity Tolerance Patient tolerated treatment well   Nurse Made Aware Caitlin HERNÁNDEZ   RUE Assessment   RUE Assessment WFL   LUE Assessment   LUE Assessment WFL   Hand Function   Gross Motor Coordination Functional   Fine Motor Coordination Functional   Vision-Basic Assessment   Current Vision No visual deficits   Psychosocial   Psychosocial (WDL) X   Patient Behaviors/Mood Anxious;Cooperative;Pleasant   Cognition   Overall Cognitive Status WFL   Arousal/Participation Alert;Cooperative   Attention Within functional limits   Orientation Level Oriented X4   Memory Within functional limits  (some mild memory deficits per chart review)   Following Commands Follows all commands and directions without difficulty   Assessment   Limitation Decreased endurance   Assessment Pt is a 63 y.o. female seen for OT evaluation s/p admit to [facility] on  2/27/2024 w/ Aspirin allergy.  Comorbidities affecting pt's functional performance at time of assessment include: HTN, obesity, previous surgery, COPD, and anxiety . Personal factors affecting pt at time of IE include:steps to enter environment and health management . Prior to admission, pt was independent with all ADLs and functional mobility without AD. Upon evaluation: Pt is modified independent to independent with basic self care 2* the following deficits impacting occupational performance: decreased tolerance and decreased coping skills. Pt reports she is close to her functional baseline, and has no further acute OT needs at this time. Will complete orders. From OT standpoint, recommendation at time of d/c would be no further OT needs.   Plan   OT Frequency Eval only   Discharge Recommendation   Rehab Resource Intensity Level, OT No post-acute rehabilitation needs   AM-PAC Daily Activity Inpatient   Lower Body Dressing 4   Bathing 4   Toileting 4   Upper Body Dressing 4   Grooming 4   Eating 4   Daily Activity Raw Score 24   Daily Activity Standardized Score (Calc for Raw Score >=11) 57.54   AM-PAC Applied Cognition Inpatient   Following a Speech/Presentation 4   Understanding Ordinary Conversation 4   Taking Medications 4   Remembering Where Things Are Placed or Put Away 4   Remembering List of 4-5 Errands 4   Taking Care of Complicated Tasks 4   Applied Cognition Raw Score 24   Applied Cognition Standardized Score 62.21       Georgie Smith OTR/L

## 2024-02-28 NOTE — ASSESSMENT & PLAN NOTE
PPI while completing ASA desensitization   Given lack of symptoms, no need to continue as outpatient

## 2024-02-28 NOTE — TELEPHONE ENCOUNTER
----- Message from Tiffanie Cohen PA-C sent at 2/28/2024  2:00 PM EST -----  Please schedule patient for follow-up with Dr. GRAY in one month.  Thank you!

## 2024-02-29 ENCOUNTER — TRANSITIONAL CARE MANAGEMENT (OUTPATIENT)
Dept: INTERNAL MEDICINE CLINIC | Facility: CLINIC | Age: 64
End: 2024-02-29

## 2024-02-29 NOTE — UTILIZATION REVIEW
NOTIFICATION OF INPATIENT ADMISSION   AUTHORIZATION REQUEST   SERVICING FACILITY:   [place] [address]  Tax ID: [id number]  NPI: [id number] ATTENDING PROVIDER:  Attending Name and NPI#: Palmira Rene Md [[id number]]  Address: [address] [place] [address]  Phone: [phone]     ADMISSION INFORMATION:  Place of Service: Inpatient [facility]  Place of Service Code: 21  Inpatient Admission Date/Time: 2/27/24  3:41 PM  Discharge Date/Time: 2/28/2024  2:52 PM  Admitting Diagnosis Code/Description:  Aspirin allergy     UTILIZATION REVIEW CONTACT:  Charlee Santiago Utilization   Network Utilization Review Department  Phone: [phone]  Fax [phone]  Email: [email or web address]  Contact for approvals/pending authorizations, clinical reviews, and discharge.     PHYSICIAN ADVISORY SERVICES:  Medical Necessity Denial & Czxh-aa-Vfsw Review  Phone: [phone]  Fax: [phone]  Email: [email or web address]     DISCHARGE SUPPORT TEAM:  For Patients Discharge Needs & Updates  Phone: [phone] opt. 2 Fax: [phone]  Email: [email or web address]

## 2024-03-01 ENCOUNTER — OFFICE VISIT (OUTPATIENT)
Dept: CARDIOLOGY CLINIC | Facility: CLINIC | Age: 64
End: 2024-03-01
Payer: COMMERCIAL

## 2024-03-01 VITALS
SYSTOLIC BLOOD PRESSURE: 154 MMHG | HEIGHT: 63 IN | RESPIRATION RATE: 16 BRPM | WEIGHT: 154 LBS | BODY MASS INDEX: 27.29 KG/M2 | OXYGEN SATURATION: 97 % | HEART RATE: 85 BPM | DIASTOLIC BLOOD PRESSURE: 78 MMHG

## 2024-03-01 DIAGNOSIS — I10 ESSENTIAL HYPERTENSION: ICD-10-CM

## 2024-03-01 DIAGNOSIS — E78.2 MIXED HYPERLIPIDEMIA: ICD-10-CM

## 2024-03-01 DIAGNOSIS — R06.02 SHORTNESS OF BREATH: Primary | ICD-10-CM

## 2024-03-01 PROCEDURE — 99213 OFFICE O/P EST LOW 20 MIN: CPT | Performed by: INTERNAL MEDICINE

## 2024-03-01 NOTE — PROGRESS NOTES
[facility] [place]  [address]  [place] [address]  Cardiology Follow Up    Kelsi Cabrera  1960  [id number]      1. Shortness of breath        2. Mixed hyperlipidemia        3. Essential hypertension            Chief Complaint   Patient presents with    Follow-up       Interval History: This patient presents for follow-up visit.  Patient does have history of hypertension, COPD as well as hyperlipidemia.  Patient had chest pain and abnormal stress test.  Patient also had aspirin anaphylaxis allergy.  She was admitted 1 day prior for aspiration desensitization.  Patient subsequently had cardiac catheterization by Dr. Adorno which showed no significant CAD.  Patient has quit smoking and states that she will stay quit.  She has intolerance to statins.    Patient Active Problem List   Diagnosis    Lumbar radiculopathy    Gastro-esophageal reflux disease without esophagitis    COPD (chronic obstructive pulmonary disease) (HCC)    Right hip pain    Essential hypertension    Other hyperlipidemia    Lumbar spondylosis    Moderate persistent asthma with acute exacerbation    Rheumatoid arthritis involving multiple sites (HCC)    Allergic rhinitis due to allergen    Primary osteoarthritis of left hip    Chronic tension-type headache, not intractable    Cervical spondylosis    Cervical radiculopathy    Migraine    Neuropathy    Cervical spinal stenosis    Memory impairment    Bilateral occipital neuralgia    Chronic pain syndrome    Situational anxiety    Aspirin allergy    Abnormal stress test    Tobacco abuse     Past Medical History:   Diagnosis Date    Arthritis     Asthma     Avascular necrosis of bone of hip, right (HCC)     Avascular necrosis of hip, right (HCC) 10/3/2017    Chronic obstructive pulmonary disease (HCC) 2/12/2019    COPD (chronic obstructive pulmonary disease) (HCC)     GERD (gastroesophageal reflux disease)     Hypoglycemia     Infectious viral hepatitis      Lumbar radicular pain     Lyme disease     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HCC)      Social History     Socioeconomic History    Marital status: /Civil Union     Spouse name: Not on file    Number of children: Not on file    Years of education: Not on file    Highest education level: Not on file   Occupational History    Not on file   Tobacco Use    Smoking status: Former     Types: Cigarettes    Smokeless tobacco: Never    Tobacco comments:     started smoking, quit 1 month ago   Vaping Use    Vaping status: Never Used   Substance and Sexual Activity    Alcohol use: Never    Drug use: Never    Sexual activity: Not Currently     Partners: Male   Other Topics Concern    Not on file   Social History Narrative    Not on file     Social Determinants of Health     Financial Resource Strain: Not on file   Food Insecurity: No Food Insecurity (2/28/2024)    Hunger Vital Sign     Worried About Running Out of Food in the Last Year: Never true     Ran Out of Food in the Last Year: Never true   Transportation Needs: No Transportation Needs (2/28/2024)    PRAPARE - Transportation     Lack of Transportation (Medical): No     Lack of Transportation (Non-Medical): No   Physical Activity: Not on file   Stress: Not on file   Social Connections: Not on file   Intimate Partner Violence: Not on file   Housing Stability: Low Risk  (2/28/2024)    Housing Stability Vital Sign     Unable to Pay for Housing in the Last Year: No     Number of Places Lived in the Last Year: 1     Unstable Housing in the Last Year: No      Family History   Problem Relation Age of Onset    Bronchiolitis Mother      Past Surgical History:   Procedure Laterality Date    APPENDECTOMY      CARDIAC CATHETERIZATION Left 2/28/2024    Procedure: Cardiac Left Heart Cath;  Surgeon: Lizz Adorno MD;  Location: [facility];  Service: Cardiology    CARDIAC CATHETERIZATION N/A 2/28/2024    Procedure: Cardiac Coronary Angiogram;  Surgeon: Lizz Adorno MD;   Location: [facility];  Service: Cardiology    CARDIAC CATHETERIZATION  2/28/2024    Procedure: Cardiac catheterization;  Surgeon: Lizz Adorno MD;  Location: [facility];  Service: Cardiology    CARPAL TUNNEL RELEASE      CHOLECYSTECTOMY      SINUS SURGERY         Current Outpatient Medications:     acetaminophen (TYLENOL) 325 mg tablet, Take 2 tablets (650 mg total) by mouth every 6 (six) hours as needed for mild pain or fever, Disp: , Rfl:     ALPRAZolam (XANAX) 0.5 mg tablet, , Disp: , Rfl:     Azelastine HCl 137 MCG/SPRAY SOLN, , Disp: , Rfl:     budesonide-formoterol (SYMBICORT) 160-4.5 mcg/act inhaler, Inhale 2 puffs 2 (two) times a day, Disp: , Rfl:     Cholecalciferol (VITAMIN D3) 2000 units CHEW, Chew 5,000 Units daily, Disp: , Rfl:     Choline Fenofibrate (Fenofibric Acid) 135 MG CPDR, 1 cap daily, Disp: 90 capsule, Rfl: 3    cloNIDine (CATAPRES) 0.1 mg tablet, Take 1 tablet (0.1 mg total) by mouth 2 (two) times a day, Disp: 180 tablet, Rfl: 0    fexofenadine (ALLEGRA) 180 MG tablet, Take 180 mg by mouth daily, Disp: , Rfl:     fluticasone (FLONASE) 50 mcg/act nasal spray, INSTILL 1 SPRAY INTO EACH NOSTRIL ONCE DAILY, Disp: 48 g, Rfl: 1    hydroxychloroquine (PLAQUENIL) 200 mg tablet, Take 1 tablet (200 mg total) by mouth 2 (two) times a day with meals, Disp: 60 tablet, Rfl: 1    levalbuterol (XOPENEX) 1.25 mg/3 mL nebulizer solution, , Disp: , Rfl:     magnesium oxide (MAG-OX) 400 mg tablet, Take 1 tablet by mouth in the morning, Disp: , Rfl:     nitroglycerin (NITROSTAT) 0.4 mg SL tablet, Place 1 tablet (0.4 mg total) under the tongue every 5 (five) minutes as needed for chest pain, Disp: 30 tablet, Rfl: 3    oxyCODONE-acetaminophen (PERCOCET)  mg per tablet, , Disp: , Rfl:   Allergies   Allergen Reactions    Aspirin Anaphylaxis    Iodine - Food Allergy Anaphylaxis    Penicillins Anaphylaxis    Pork Allergy - Food Allergy Other (See Comments)     Does not eat pork for Buddhist  reasons    Soybean Oil - Food Allergy Other (See Comments)    Statins Myalgia       Labs:  Admission on 02/27/2024, Discharged on 02/28/2024   Component Date Value    Ventricular Rate 02/27/2024 73     Atrial Rate 02/27/2024 73     MI Interval 02/27/2024 132     QRSD Interval 02/27/2024 80     QT Interval 02/27/2024 376     QTC Interval 02/27/2024 414     P Axis 02/27/2024 59     QRS Axis 02/27/2024 66     T Wave [place] 02/27/2024 50     Ventricular Rate 02/27/2024 68     Atrial Rate 02/27/2024 68     MI Interval 02/27/2024 132     QRSD Interval 02/27/2024 82     QT Interval 02/27/2024 406     QTC Interval 02/27/2024 431     P Axis 02/27/2024 51     QRS Axis 02/27/2024 72     T Wave Axis 02/27/2024 40     Sodium 02/28/2024 141     Potassium 02/28/2024 4.0     Chloride 02/28/2024 110 (H)     CO2 02/28/2024 22     ANION GAP 02/28/2024 9     BUN 02/28/2024 10     Creatinine 02/28/2024 0.65     Glucose 02/28/2024 146 (H)     Glucose, Fasting 02/28/2024 146 (H)     Calcium 02/28/2024 9.9     eGFR 02/28/2024 93     WBC 02/28/2024 5.43     RBC 02/28/2024 4.79     Hemoglobin 02/28/2024 14.4     Hematocrit 02/28/2024 42.7     MCV 02/28/2024 89     MCH 02/28/2024 30.1     MCHC 02/28/2024 33.7     RDW 02/28/2024 12.6     Platelets 02/28/2024 294     MPV 02/28/2024 10.6     Ventricular Rate 02/28/2024 72     Atrial Rate 02/28/2024 72     MI Interval 02/28/2024 134     QRSD Interval 02/28/2024 82     QT Interval 02/28/2024 414     QTC Interval 02/28/2024 453     P Axis 02/28/2024 56     QRS [place] 02/28/2024 59     T Wave [place] 02/28/2024 45    Appointment on 02/17/2024   Component Date Value    Sodium 02/17/2024 140     Potassium 02/17/2024 4.0     Chloride 02/17/2024 105     CO2 02/17/2024 27     ANION GAP 02/17/2024 8     BUN 02/17/2024 10     Creatinine 02/17/2024 0.77     Glucose, Fasting 02/17/2024 85     Calcium 02/17/2024 10.2     eGFR 02/17/2024 81    Office Visit on 02/15/2024   Component Date Value    WBC 02/17/2024 5.40      RBC 02/17/2024 5.06     Hemoglobin 02/17/2024 14.9     Hematocrit 02/17/2024 45.3     MCV 02/17/2024 90     MCH 02/17/2024 29.4     MCHC 02/17/2024 32.9     RDW 02/17/2024 13.1     MPV 02/17/2024 11.3     Platelets 02/17/2024 299     nRBC 02/17/2024 0     Neutrophils Relative 02/17/2024 47     Immat GRANS % 02/17/2024 0     Lymphocytes Relative 02/17/2024 39     Monocytes Relative 02/17/2024 10     Eosinophils Relative 02/17/2024 3     Basophils Relative 02/17/2024 1     Neutrophils Absolute 02/17/2024 2.51     Immature Grans Absolute 02/17/2024 0.01     Lymphocytes Absolute 02/17/2024 2.11     Monocytes Absolute 02/17/2024 0.53     Eosinophils Absolute 02/17/2024 0.18     Basophils Absolute 02/17/2024 0.06    Hospital Outpatient Visit on 02/07/2024   Component Date Value    Rest Nuclear Isotope Dose 02/07/2024 10.39     Stress Nuclear Isotope D* 02/07/2024 31.40     Baseline HR 02/07/2024 62     Baseline BP 02/07/2024 138/78     O2 sat rest 02/07/2024 99     Stress peak HR 02/07/2024 117     Post peak BP 02/07/2024 146     O2 sat peak 02/07/2024 99     Recovery HR 02/07/2024 89     Recovery BP 02/07/2024 148/86     O2 sat recovery 02/07/2024 99     Rate Pressure Product 02/07/2024 17,082.0     Angina Index 02/07/2024 0     Stress/rest perfusion ra* 02/07/2024 1.22     End diastolic index (mL/* 02/07/2024 51.0     EF (%) 02/07/2024 83     End systolic index (mL/m* 02/07/2024 9.0     Protocol Name 02/07/2024 [facility]     Exercise duration (min) 02/07/2024 3     Exercise duration (sec) 02/07/2024 0     Post Peak Systolic BP 02/07/2024 148     Max Diastolic Bp 02/07/2024 86     Peak HR 02/07/2024 117     Max Predicted Heart Rate 02/07/2024 155     Reason for Termination 02/07/2024 Protocol Complete     Test Indication 02/07/2024                      Value:ANGINAL EQUIVALENT  SOB      Target Hr Formular 02/07/2024 (220 - Age)*85%     Chest Pain Statement 02/07/2024 none    Hospital Outpatient Visit on 02/07/2024    Component Date Value    RAA A4C 02/07/2024 11.6     LA Volume Index (BP) 02/07/2024 20.0     MV Peak A Jay 02/07/2024 0.9     MV stenosis pressure 1/2* 02/07/2024 69     MV Peak E Jay 02/07/2024 65     E wave deceleration time 02/07/2024 238     E/A ratio 02/07/2024 0.72     MV valve area p 1/2 meth* 02/07/2024 3.19     RVID d 02/07/2024 2.7     A4C EF 02/07/2024 75     Left ventricular stroke * 02/07/2024 47.00     IVSd 02/07/2024 1.00     Tricuspid annular plane * 02/07/2024 1.80     Ao root 02/07/2024 3.00     LVPWd 02/07/2024 1.10     LA size 02/07/2024 3.7     Asc Ao 02/07/2024 2.8     LA volume (BP) 02/07/2024 35     FS 02/07/2024 34     LVIDS 02/07/2024 2.70     IVS 02/07/2024 1     LVIDd 02/07/2024 4.10     LA length (A2C) 02/07/2024 4.70     LEFT VENTRICLE SYSTOLIC * 02/07/2024 27     LV DIASTOLIC VOLUME (MOD* 02/07/2024 74     Left Atrium Area-systoli* 02/07/2024 15.1     Left Atrium Area-systoli* 02/07/2024 13.7     MV E' Tissue Velocity Se* 02/07/2024 9     LVSV, 2D 02/07/2024 47     LV EF 02/07/2024 65      Imaging: Cardiac catheterization    Result Date: 2/28/2024  Narrative:   No significant epicardial coronary artery disease   Normal LV filling pressures   Right radial access. Hemostasis achieved with radial band No significant epicardial coronary artery disease.     Stress strip    Result Date: 2/11/2024  Narrative: Confirmed by SHAWN FAYE ([id number]),  Palmira Calderon ([id number]) on 2/11/2024 11:51:37 AM    NM myocardial perfusion spect (rx stress and/or rest)    Result Date: 2/7/2024  Narrative:   Stress ECG: The ECG was not diagnostic due to pharmacological (vasodilator) stress.   Perfusion Defect Conclusion: The stress/rest perfusion ratio is 1.22. There is no evidence of transient ischemic dilation (TID).   Perfusion: There is a left ventricular perfusion defect that is small in size present in the mid to basal anterior location(s) that is reversible.   Stress Function: Left ventricular  "function post-stress is normal. Stress ejection fraction is 83%.   Stress Combined Conclusion: Left ventricular perfusion is abnormal. Abnormal study after pharmacologic vasodilation. There was a small reversible defect of the basal to mid anterior wall suggestive of ischemia. Left ventricular function was preserved.     Echo complete w/ contrast if indicated    Result Date: 2/7/2024  Narrative:   Left Ventricle: Left ventricular cavity size is normal. Wall thickness is normal. The left ventricular ejection fraction is 65%. Systolic function is normal. Wall motion is normal. Diastolic function is normal for age.       Review of Systems:  Review of Systems  REVIEW OF SYSTEMS:  Constitutional:  Denies fever or chills   Eyes:  Denies change in visual acuity   HENT:  Denies nasal congestion or sore throat   Cardiovascular:  Denies chest pain or edema   GI:  Denies abdominal pain, nausea, vomiting, bloody stools or diarrhea   :  Denies dysuria, frequency, difficulty in micturition and nocturia  Musculoskeletal:  Denies back pain or joint pain   Neurologic:  Denies headache, focal weakness or sensory changes   Endocrine:  Denies polyuria or polydipsia   Lymphatic:  Denies swollen glands       Physical Exam:    /78 (BP Location: Left arm, Patient Position: Sitting, Cuff Size: Standard)   Pulse 85   Resp 16   Ht 5' 3\" (1.6 m)   Wt 69.9 kg (154 lb)   SpO2 97%   BMI 27.28 kg/m²     Physical Exam  PHYSICAL EXAM:  General:  Patient is not in acute distress   Head: Normocephalic, Atraumatic.  HEENT:  Both pupils normal-size atraumatic, normocephalic, nonicteric  Neck:  JVP not raised. Trachea central. No carotid bruit  Respiratory:  normal breath sounds no crackles. no rhonchi  Cardiovascular:  Regular rate and rhythm no S3 no murmurs  GI:  Abdomen soft nontender. No organomegaly.   Lymphatic:  No cervical or inguinal lymphadenopathy  Neurologic:  Patient is awake alert, oriented . Grossly nonfocal  Extremities no " edema.  Right radial cath site looks fine.      Discussion/Summary:    Patient with multiple medical problems who seems to be doing reasonably well from cardiac standpoint. Previous studies reviewed with patient. Medications reviewed and possible side effects discussed. concepts of cardiovascular disease , signs and symptoms of heart disease. Dietary and risk factor modification reinforced. All questions answered.  Safety measures reviewed. Patient advised to report any problems prompting medical attention.    Results of cardiac catheterization reviewed with patient with films.  No significant CAD noted.  Continue dietary and risk factor modification.  Continue fenofibrate.  Patient is intolerant to statins.  Patient strongly counseled to continue to stay away from smoking to prevent future cardiovascular events.  Discussed with patient and family.  Follow-up with primary care physician as well as pulmonary.  Follow-up in 6 months or earlier as needed.  Patient is agreeable with the plan of care.

## 2024-03-03 LAB
ATRIAL RATE: 68 BPM
ATRIAL RATE: 72 BPM
ATRIAL RATE: 73 BPM
P AXIS: 51 DEGREES
P AXIS: 56 DEGREES
P AXIS: 59 DEGREES
PR INTERVAL: 132 MS
PR INTERVAL: 132 MS
PR INTERVAL: 134 MS
QRS AXIS: 59 DEGREES
QRS AXIS: 66 DEGREES
QRS AXIS: 72 DEGREES
QRSD INTERVAL: 80 MS
QRSD INTERVAL: 82 MS
QRSD INTERVAL: 82 MS
QT INTERVAL: 376 MS
QT INTERVAL: 406 MS
QT INTERVAL: 414 MS
QTC INTERVAL: 414 MS
QTC INTERVAL: 431 MS
QTC INTERVAL: 453 MS
T WAVE AXIS: 40 DEGREES
T WAVE AXIS: 45 DEGREES
T WAVE AXIS: 50 DEGREES
VENTRICULAR RATE: 68 BPM
VENTRICULAR RATE: 72 BPM
VENTRICULAR RATE: 73 BPM

## 2024-03-11 ENCOUNTER — TELEMEDICINE (OUTPATIENT)
Dept: INTERNAL MEDICINE CLINIC | Facility: CLINIC | Age: 64
End: 2024-03-11
Payer: COMMERCIAL

## 2024-03-11 DIAGNOSIS — E78.49 OTHER HYPERLIPIDEMIA: ICD-10-CM

## 2024-03-11 DIAGNOSIS — R73.01 IFG (IMPAIRED FASTING GLUCOSE): ICD-10-CM

## 2024-03-11 DIAGNOSIS — Z78.0 POST-MENOPAUSAL: ICD-10-CM

## 2024-03-11 DIAGNOSIS — I10 ESSENTIAL HYPERTENSION: ICD-10-CM

## 2024-03-11 DIAGNOSIS — Z72.0 TOBACCO ABUSE: ICD-10-CM

## 2024-03-11 DIAGNOSIS — R94.39 ABNORMAL STRESS TEST: Primary | ICD-10-CM

## 2024-03-11 PROCEDURE — 99495 TRANSJ CARE MGMT MOD F2F 14D: CPT | Performed by: NURSE PRACTITIONER

## 2024-03-11 NOTE — PROGRESS NOTES
Virtual TCM Visit:    Verification of patient location:    Patient is located at Home in the following state in which I hold an active license [place]    Assessment/Plan:        Problem List Items Addressed This Visit       Essential hypertension     Continue home medication regimen          Relevant Orders    TSH, 3rd generation with Free T4 reflex    Other hyperlipidemia     Intolerant of statins.   On fenofibrate         Relevant Orders    Lipid Panel with Direct LDL reflex    Abnormal stress test - Primary     LIBRA showed EF 65%  Recent cardiac cath showed no significant CAD.   Sees cardiology          Tobacco abuse     Recommend continued cessation.           Other Visit Diagnoses       Post-menopausal        Relevant Orders    DXA bone density spine hip and pelvis    IFG (impaired fasting glucose)        Relevant Orders    Hemoglobin A1C             Reason for visit is TCM    Encounter provider ALETHEA Scanlon     Provider located at [facility]  [address]  [place] [address]    Recent Visits  No visits were found meeting these conditions.  Showing recent visits within past 7 days and meeting all other requirements  Today's Visits  Date Type Provider Dept   03/11/24 Telemedicine ALETHEA Scanlon [facility]   Showing today's visits and meeting all other requirements  Future Appointments  No visits were found meeting these conditions.  Showing future appointments within next 150 days and meeting all other requirements       After connecting through Systems Integration, the patient was identified by name and date of birth. Kelsi Cabrera was informed that this is a telemedicine visit and that the visit is being conducted through the [employer] platform. She agrees to proceed..  My office door was closed. No one else was in the room.  She acknowledged consent and understanding of privacy and security of the video platform. The  patient has agreed to participate and understands they can discontinue the visit at any time.    Patient is aware this is a billable service.    Transitional Care Management Review:  Kelsi Cabrera is a 63 y.o. female here for TCM follow up.   She was hospitalized on from 2/27-2/28 for aspirin desensitization prior to her cardiac cath. She had a cardiac cath due to an abnormal stress test. Cath did not show any significant CAD. LIBRA EF 65%.   She has continue smoking cessation.    She is seeing orthopedics for chronic neck pain. She thought it was her heart initially because the pain radiated to her chest but now that she was cleared, she will see ortho.     Breathing has been stable.     During the TCM phone call patient stated:    TCM Call       Date and time call was made  2/29/2024  1:46 PM    Hospital care reviewed  Records reviewed    Patient was hospitialized at  [facility]    Date of Admission  02/27/24    Date of discharge  02/28/24    Diagnosis  Aspirin allergy    Disposition  Home    Were the patients medications reviewed and updated  Yes    Current Symptoms  None          TCM Call       Post hospital issues  None    Should patient be enrolled in anticoag monitoring?  No    Scheduled for follow up?  Yes    Did you obtain your prescribed medications  Yes    Do you need help managing your prescriptions or medications  No    Is transportation to your appointment needed  No    I have advised the patient to call PCP with any new or worsening symptoms  JAYME          Subjective:     Patient ID: Kelsi Cabrera is a 63 y.o. female.    HPI  Review of Systems   Constitutional:  Negative for activity change, appetite change and fatigue.   Respiratory:  Negative for cough and shortness of breath.    Cardiovascular:  Negative for chest pain, palpitations and leg swelling.   Gastrointestinal:  Negative for abdominal pain.   Musculoskeletal:  Positive for arthralgias and neck pain.   Neurological:  Negative for  dizziness, light-headedness and headaches.         Objective:    There were no vitals filed for this visit.    Physical Exam  Constitutional:       Appearance: She is well-developed.   HENT:      Head: Normocephalic.   Eyes:      Conjunctiva/sclera: Conjunctivae normal.   Pulmonary:      Effort: Pulmonary effort is normal.   Neurological:      Mental Status: She is alert and oriented to person, place, and time.   Psychiatric:         Behavior: Behavior normal.         Medications have been reviewed by provider in current encounter    I spent 12 minutes with the patient during this visit.    ALETHEA Scanlon      VIRTUAL VISIT DISCLAIMER    Kelsi Cabrera verbally agrees to participate in Virtual Care Services. Pt is aware that Virtual Care Services could be limited without vital signs or the ability to perform a full hands-on physical exam. Kelsi Cabrera understands she or the provider may request at any time to terminate the video visit and request the patient to seek care or treatment in person.

## 2024-03-13 ENCOUNTER — TELEPHONE (OUTPATIENT)
Dept: OBGYN CLINIC | Facility: CLINIC | Age: 64
End: 2024-03-13

## 2024-03-13 NOTE — TELEPHONE ENCOUNTER
Spoke to patient - per dr dubon patient needs to be seen by sports med prior to seeing him. I called patient explained and placed her in with Dr Vargas Friday 3/22 at 8. Patient was okay with this time.

## 2024-03-15 ENCOUNTER — OFFICE VISIT (OUTPATIENT)
Dept: GASTROENTEROLOGY | Facility: CLINIC | Age: 64
End: 2024-03-15
Payer: COMMERCIAL

## 2024-03-15 VITALS
SYSTOLIC BLOOD PRESSURE: 138 MMHG | BODY MASS INDEX: 27.68 KG/M2 | HEIGHT: 63 IN | TEMPERATURE: 97.8 F | WEIGHT: 156.2 LBS | HEART RATE: 75 BPM | OXYGEN SATURATION: 97 % | DIASTOLIC BLOOD PRESSURE: 80 MMHG

## 2024-03-15 DIAGNOSIS — K21.9 GASTRO-ESOPHAGEAL REFLUX DISEASE WITHOUT ESOPHAGITIS: Primary | ICD-10-CM

## 2024-03-15 PROCEDURE — 99213 OFFICE O/P EST LOW 20 MIN: CPT | Performed by: PHYSICIAN ASSISTANT

## 2024-03-15 RX ORDER — DEXTROMETHORPHAN HYDROBROMIDE AND PROMETHAZINE HYDROCHLORIDE 15; 6.25 MG/5ML; MG/5ML
SYRUP ORAL
COMMUNITY
Start: 2024-03-13

## 2024-03-15 NOTE — PROGRESS NOTES
[facility] - Outpatient Follow-up Note  Kelsi Cabrera 63 y.o. female MRN: [id number]  Encounter: [id number]          ASSESSMENT AND PLAN:      1. Gastro-esophageal reflux disease without esophagitis  Patient is doing great off PPI therapy.    Continue GERD lifestyle and dietary modifications.    Patient is due for recall colonoscopy next year but patient is currently refusing.  ______________________________________________________________________    SUBJECTIVE:   63-year-old female with a an extensive past medical history presents to the GI clinic today to follow-up in regards to her acid reflux.  Patient reports that she is doing great.  She is off all PPI therapy.  She reports that ever since she made significant dietary modifications her symptoms have improved significantly.  She reports daily bowel movements.  She reports that she is still having occasional reflux but this is very minimal.  She denies any chest pain or shortness of breath.  She denies any dysphagia.  She denies any melena or rectal bleeding.  She is utilizing digestive enzymes as well as senna as needed.  Patient is due for colonoscopy next year although she reports she will not be having this done.      REVIEW OF SYSTEMS IS OTHERWISE NEGATIVE.      Historical Information   Past Medical History:   Diagnosis Date    Arthritis     Asthma     Avascular necrosis of bone of hip, right (HCC)     Avascular necrosis of hip, right (HCC) 10/3/2017    Chronic obstructive pulmonary disease (HCC) 2/12/2019    COPD (chronic obstructive pulmonary disease) (HCC)     GERD (gastroesophageal reflux disease)     Hypoglycemia     Infectious viral hepatitis     Lumbar radicular pain     Lyme disease     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HCC)      Past Surgical History:   Procedure Laterality Date    APPENDECTOMY      CARDIAC CATHETERIZATION Left 2/28/2024    Procedure: Cardiac Left Heart Cath;  Surgeon: Lizz Adorno MD;  Location: [facility]  CARDIAC CATH LAB;  Service: Cardiology    CARDIAC CATHETERIZATION N/A 2/28/2024    Procedure: Cardiac Coronary Angiogram;  Surgeon: Lizz Adorno MD;  Location: [facility] CARDI[facility] CATH [facility];  Service: Cardiology    CARDIAC CATHETERIZATION  2/28/2024    Procedure: Cardiac catheterization;  Surgeon: Lizz Adorno MD;  Location: [facility] CARDIAC CATH [facility];  Service: Cardiology    CARPAL TUNNEL RELEASE      CHOLECYSTECTOMY      SINUS SURGERY       Social History   Social History     Substance and Sexual Activity   Alcohol Use Never     Social History     Substance and Sexual Activity   Drug Use Never     Social History     Tobacco Use   Smoking Status Former    Types: Cigarettes    Passive exposure: Past   Smokeless Tobacco Never   Tobacco Comments    started smoking, quit 1 month ago     Family History   Problem Relation Age of Onset    Bronchiolitis Mother        Meds/Allergies       Current Outpatient Medications:     acetaminophen (TYLENOL) 325 mg tablet    ALPRAZolam (XANAX) 0.5 mg tablet    Azelastine HCl 137 MCG/SPRAY SOLN    budesonide-formoterol (SYMBICORT) 160-4.5 mcg/act inhaler    Cholecalciferol (VITAMIN D3) 2000 units CHEW    Choline Fenofibrate (Fenofibric Acid) 135 MG CPDR    cloNIDine (CATAPRES) 0.1 mg tablet    fexofenadine (ALLEGRA) 180 MG tablet    fluticasone (FLONASE) 50 mcg/act nasal spray    levalbuterol (XOPENEX) 1.25 mg/3 mL nebulizer solution    magnesium oxide (MAG-OX) 400 mg tablet    nitroglycerin (NITROSTAT) 0.4 mg SL tablet    oxyCODONE-acetaminophen (PERCOCET)  mg per tablet    promethazine-dextromethorphan (PHENERGAN-DM) 6.25-15 mg/5 mL oral syrup    hydroxychloroquine (PLAQUENIL) 200 mg tablet    Allergies   Allergen Reactions    Aspirin Anaphylaxis    Iodine - Food Allergy Anaphylaxis    Penicillins Anaphylaxis    Pork Allergy - Food Allergy Other (See Comments)     Does not eat pork for [employer] reasons    Soybean Oil - Food Allergy Other (See Comments)    Statins Myalgia  "          Objective     Blood pressure 138/80, pulse 75, temperature 97.8 °F (36.6 °C), height 5' 3\" (1.6 m), weight 70.9 kg (156 lb 3.2 oz), SpO2 97%. Body mass index is 27.67 kg/m².      PHYSICAL EXAM:      General Appearance:   Alert, cooperative, no distress   HEENT:   Normocephalic, atraumatic, anicteric.     Neck:  Supple, symmetrical, trachea midline   Lungs:   Clear to auscultation bilaterally; no rales, rhonchi or wheezing; respirations unlabored    Heart::   Regular rate and rhythm; no murmur, rub, or gallop.   Abdomen:   Soft, non-tender, non-distended; normal bowel sounds; no masses, no organomegaly    Genitalia:   Deferred    Rectal:   Deferred    Extremities:  No cyanosis, clubbing or edema    Pulses:  2+ and symmetric    Skin:  No jaundice, rashes, or lesions    Lymph nodes:  No palpable cervical lymphadenopathy        Lab Results:   No visits with results within 1 Day(s) from this visit.   Latest known visit with results is:   Admission on 02/27/2024, Discharged on 02/28/2024   Component Date Value    Ventricular Rate 02/27/2024 73     Atrial Rate 02/27/2024 73     [place] Interval 02/27/2024 132     QRSD Interval 02/27/2024 80     QT Interval 02/27/2024 376     QTC Interval 02/27/2024 414     P Axis 02/27/2024 59     QRS Axis 02/27/2024 66     T Wave [place] 02/27/2024 50     Ventricular Rate 02/27/2024 68     Atrial Rate 02/27/2024 68     [place] Interval 02/27/2024 132     QRSD Interval 02/27/2024 82     QT Interval 02/27/2024 406     QTC Interval 02/27/2024 431     P Axis 02/27/2024 51     QRS Axis 02/27/2024 72     T Wave [place] 02/27/2024 40     Sodium 02/28/2024 141     Potassium 02/28/2024 4.0     Chloride 02/28/2024 110 (H)     CO2 02/28/2024 22     ANION GAP 02/28/2024 9     BUN 02/28/2024 10     Creatinine 02/28/2024 0.65     Glucose 02/28/2024 146 (H)     Glucose, Fasting 02/28/2024 146 (H)     Calcium 02/28/2024 9.9     eGFR 02/28/2024 93     WBC 02/28/2024 5.43     RBC 02/28/2024 4.79     Hemoglobin " 02/28/2024 14.4     Hematocrit 02/28/2024 42.7     MCV 02/28/2024 89     MCH 02/28/2024 30.1     MCHC 02/28/2024 33.7     RDW 02/28/2024 12.6     Platelets 02/28/2024 294     MPV 02/28/2024 10.6     Ventricular Rate 02/28/2024 72     Atrial Rate 02/28/2024 72     SD Interval 02/28/2024 134     QRSD Interval 02/28/2024 82     QT Interval 02/28/2024 414     QTC Interval 02/28/2024 453     P Axis 02/28/2024 56     QRS [place] 02/28/2024 59     T Wave [place] 02/28/2024 45          Radiology Results:   Cardiac catheterization    Result Date: 2/28/2024  Narrative:   No significant epicardial coronary artery disease   Normal LV filling pressures   Right radial access. Hemostasis achieved with radial band No significant epicardial coronary artery disease.

## 2024-03-22 ENCOUNTER — OFFICE VISIT (OUTPATIENT)
Dept: OBGYN CLINIC | Facility: CLINIC | Age: 64
End: 2024-03-22
Payer: COMMERCIAL

## 2024-03-22 ENCOUNTER — TELEPHONE (OUTPATIENT)
Dept: OBGYN CLINIC | Facility: CLINIC | Age: 64
End: 2024-03-22

## 2024-03-22 ENCOUNTER — APPOINTMENT (OUTPATIENT)
Dept: RADIOLOGY | Facility: CLINIC | Age: 64
End: 2024-03-22
Payer: COMMERCIAL

## 2024-03-22 VITALS
SYSTOLIC BLOOD PRESSURE: 130 MMHG | HEIGHT: 63 IN | DIASTOLIC BLOOD PRESSURE: 79 MMHG | HEART RATE: 76 BPM | WEIGHT: 153 LBS | BODY MASS INDEX: 27.11 KG/M2

## 2024-03-22 DIAGNOSIS — M54.12 RADICULOPATHY, CERVICAL REGION: Primary | ICD-10-CM

## 2024-03-22 DIAGNOSIS — M54.12 RADICULOPATHY, CERVICAL REGION: ICD-10-CM

## 2024-03-22 DIAGNOSIS — M47.812 FACET ARTHROPATHY, CERVICAL: ICD-10-CM

## 2024-03-22 DIAGNOSIS — M62.838 CERVICAL PARASPINAL MUSCLE SPASM: ICD-10-CM

## 2024-03-22 DIAGNOSIS — M50.30 DEGENERATIVE DISC DISEASE, CERVICAL: ICD-10-CM

## 2024-03-22 DIAGNOSIS — M62.838 TRAPEZIUS MUSCLE SPASM: ICD-10-CM

## 2024-03-22 PROCEDURE — 99204 OFFICE O/P NEW MOD 45 MIN: CPT | Performed by: FAMILY MEDICINE

## 2024-03-22 PROCEDURE — 72050 X-RAY EXAM NECK SPINE 4/5VWS: CPT

## 2024-03-22 RX ORDER — PREDNISONE 20 MG/1
20 TABLET ORAL 2 TIMES DAILY WITH MEALS
Qty: 10 TABLET | Refills: 0 | Status: SHIPPED | OUTPATIENT
Start: 2024-03-22 | End: 2024-03-27

## 2024-03-22 RX ORDER — TIZANIDINE 4 MG/1
4 TABLET ORAL 2 TIMES DAILY PRN
Qty: 60 TABLET | Refills: 0 | Status: SHIPPED | OUTPATIENT
Start: 2024-03-22

## 2024-03-22 NOTE — TELEPHONE ENCOUNTER
GRACE on  w/Dr Vargas's msg in detail.  Requests that pt return call to confirm that she has received this msg.  Await CB.

## 2024-03-22 NOTE — PROGRESS NOTES
Assessment/Plan:  Assessment/Plan   Diagnoses and all orders for this visit:    Radiculopathy, cervical region  -     XR spine cervical complete 4 or 5 vw non injury; Future  -     Ambulatory Referral to Physical Therapy; Future  -     predniSONE 20 mg tablet; Take 1 tablet (20 mg total) by mouth 2 (two) times a day with meals for 5 days    Facet arthropathy, cervical  -     Ambulatory Referral to Physical Therapy; Future    Degenerative disc disease, cervical  -     Ambulatory Referral to Physical Therapy; Future    Cervical paraspinal muscle spasm  -     Ambulatory Referral to Physical Therapy; Future  -     tiZANidine (ZANAFLEX) 4 mg tablet; Take 1 tablet (4 mg total) by mouth 2 (two) times a day as needed for muscle spasms    Trapezius muscle spasm  -     Ambulatory Referral to Physical Therapy; Future          63-year-old right-hand-dominant female with chronic neck pain more than few years duration worsening over the past 3 months.  Discussed with patient physical exam, radiographs, impression, and plan.  X-rays cervical spine noted for multilevel degenerative changes with pronounced disc space narrowing C5-C6.  Physical exam cervical spine noted for midline tenderness C3-C7 and left paraspinal tenderness. She has limited range of motion rotating and sidebending to the left.  There is positive tenderness upper trapezius on the left.  Left shoulder has range of motion forward flexion to 170 degrees, abduction 160 degrees.  There is positive axial load and negative Spurling's.  She has normal strength, sensation, bicep reflex, radial pulse both upper extremities.  Clinical impression is that she has symptoms from aggravated degenerative changes cervical spine contributing to radiculopathy.  I discussed treatment regimen of anti-inflammatory, muscle laxer, supplements, and formal therapy.  She is to take prednisone 20 mg twice daily with food for 5 days.  She may take tizanidine 4 mg twice daily as needed but not  before driving.  She is to take turmeric vitamin at least 1000 mg daily, tart cherry vitamin at least 1000 mg daily. She is to start physical therapy as soon as possible and do home exercises as directed.  She will follow-up if no improvement after 5 to 6 weeks of formal therapy.      Subjective:   Patient ID: Kelsi Cabrera is a 63 y.o. female.  Chief Complaint   Patient presents with    Neck - Pain        63-year-old right-hand-dominant female with history of chronic neck pain presents for worsening neck pain over the past 3 months.  She denies any trauma or inciting event.  Pain described as localized to the left side of neck, radiating to the left shoulder and left upper arm and the left shoulder blade, constant, achy and burning, worse with activity, and improved with resting.  She has been taking oxycodone to help with symptoms however takes it sparingly.  She last received epidural injection 4/19/2022.        Neck Pain  This is a chronic problem. The current episode started more than 1 year ago. The problem occurs daily. The problem has been gradually worsening. Associated symptoms include arthralgias and neck pain. Pertinent negatives include no numbness or weakness. Exacerbated by: Head turning, physical activity. She has tried rest and oral narcotics for the symptoms.         The following portions of the patient's history were reviewed and updated as appropriate: She  has a past medical history of Arthritis, Asthma, Avascular necrosis of bone of hip, right (HCC), Avascular necrosis of hip, right ([facility]) (10/3/2017), Chronic obstructive pulmonary disease ([facility]) (2/12/2019), COPD (chronic obstructive pulmonary disease) ([facility]), GERD (gastroesophageal reflux disease), Hypoglycemia, Infectious viral hepatitis, Lumbar radicular pain, Lyme disease, and 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[facility]).  She is allergic to aspirin, iodine - food allergy, penicillins, pork allergy - food allergy, soybean oil - food allergy, and  "statins..    Review of Systems   Musculoskeletal:  Positive for arthralgias and neck pain.   Neurological:  Negative for weakness and numbness.       Objective:  Vitals:    03/22/24 0756   BP: 130/79   Pulse: 76   Weight: 69.4 kg (153 lb)   Height: 5' 3\" (1.6 m)      Back Exam     Reflexes   Biceps:  normal    Comments:    Cervical spine  - Midline tenderness C2-C7, left paraspinal tenderness  - Limited range of motion rotating and sidebending to the left  - Positive axial load  - Negative Spurling's      Right Hand Exam     Muscle Strength   Wrist extension: 5/5   Wrist flexion: 5/5   : 5/5     Other   Sensation: normal  Pulse: present      Left Hand Exam     Muscle Strength   Wrist extension: 5/5   Wrist flexion: 5/5   :  5/5     Other   Sensation: normal  Pulse: present      Right Elbow Exam     Muscle Strength   Right elbow normal strength: 5/5 flexion and extension.    Other   Sensation: normal      Left Elbow Exam     Muscle Strength   Left elbow normal strength: 5/5 flexion and extension.    Other   Sensation: normal      Right Shoulder Exam     Other   Sensation: normal      Left Shoulder Exam     Tenderness   Left shoulder tenderness location: Trapezius.    Range of Motion   Active abduction:  160   Forward flexion:  170     Muscle Strength   Abduction: 5/5   Internal rotation: 5/5   External rotation: 5/5   Supraspinatus: 5/5     Other   Sensation: normal     Comments:  Negative empty can          Strength/Myotome Testing     Left Wrist/Hand   Wrist extension: 5  Wrist flexion: 5    Right Wrist/Hand   Wrist extension: 5  Wrist flexion: 5      Physical Exam  Vitals and nursing note reviewed.   Constitutional:       Appearance: Normal appearance. She is well-developed. She is not ill-appearing or diaphoretic.   HENT:      Head: Normocephalic and atraumatic.      Right Ear: External ear normal.      Left Ear: External ear normal.   Eyes:      Conjunctiva/sclera: Conjunctivae normal.   Neck:      " Trachea: No tracheal deviation.   Cardiovascular:      Rate and Rhythm: Normal rate.   Pulmonary:      Effort: Pulmonary effort is normal. No respiratory distress.   Abdominal:      General: There is no distension.   Musculoskeletal:         General: Tenderness present. No swelling, deformity or signs of injury.   Skin:     General: Skin is warm and dry.      Coloration: Skin is not jaundiced or pale.   Neurological:      Mental Status: She is alert and oriented to person, place, and time.   Psychiatric:         Mood and Affect: Mood normal.         Behavior: Behavior normal.         Thought Content: Thought content normal.         Judgment: Judgment normal.         I have personally reviewed pertinent films in PACS and my interpretation is  .  X-rays cervical spine noted for multilevel degenerative changes with pronounced disc space narrowing C5-C6

## 2024-04-05 ENCOUNTER — EVALUATION (OUTPATIENT)
Dept: PHYSICAL THERAPY | Facility: CLINIC | Age: 64
End: 2024-04-05
Payer: COMMERCIAL

## 2024-04-05 DIAGNOSIS — M47.812 FACET ARTHROPATHY, CERVICAL: ICD-10-CM

## 2024-04-05 DIAGNOSIS — M62.838 TRAPEZIUS MUSCLE SPASM: ICD-10-CM

## 2024-04-05 DIAGNOSIS — M54.12 RADICULOPATHY, CERVICAL REGION: Primary | ICD-10-CM

## 2024-04-05 DIAGNOSIS — M62.838 CERVICAL PARASPINAL MUSCLE SPASM: ICD-10-CM

## 2024-04-05 DIAGNOSIS — M50.30 DEGENERATIVE DISC DISEASE, CERVICAL: ICD-10-CM

## 2024-04-05 PROCEDURE — 97110 THERAPEUTIC EXERCISES: CPT

## 2024-04-05 PROCEDURE — 97162 PT EVAL MOD COMPLEX 30 MIN: CPT

## 2024-04-05 NOTE — PROGRESS NOTES
PT Evaluation     Today's date: 2024  Patient name: Kelsi Cabrera  : 1960  MRN: [id number]  Referring provider: Alejandra Vargas*  Dx:   Encounter Diagnosis     ICD-10-CM    1. Radiculopathy, cervical region  M54.12 Ambulatory Referral to Physical Therapy      2. Facet arthropathy, cervical  M47.812 Ambulatory Referral to Physical Therapy      3. Degenerative disc disease, cervical  M50.30 Ambulatory Referral to Physical Therapy      4. Cervical paraspinal muscle spasm  M62.838 Ambulatory Referral to Physical Therapy      5. Trapezius muscle spasm  M62.838 Ambulatory Referral to Physical Therapy                     Assessment  Assessment details: Kelsi Cabrera is a 63 y.o. female presenting to physical therapy for an initial evaluation with a MD referral of cervical radiculopathy, cervical facet arthropathy, cervical degenerative disc disease, cervical paraspinal muscle spasm, trapezius muscle spasm. The patient demonstrates significantly limited and painful cervical ROM, decreased upper extremity and  strength L>R, as well as increased TTP and hypertonicity of the left cervical paraspinals and upper trapezius muscle. She reports increased frequency of HA's occurring 2x per week, however does have a history of migraines so unable to determine if her symptoms are cervicogenic or migraines at this time. She demonstrated centralization of her pain to the left UT, decreased intensity of symptoms, as well as improved  strength in the left hand following seated repeated cervical retractions. No reproduction of dizziness with cervical ROM or special testing, however will continue to assess. These deficits have limited the patient's ability to complete ADLs, disturbed sleep quality,  avocational responsibilities, and recreational activities. This patient would benefit from OP PT services to address their impairments and functional limitations to maximize functional mobility. The patient was  "educated on importance of postural awareness and repeated movement exercises. She was encouraged to complete her HEP only if her symptoms continue to centralize and to stop if symptoms peripheralize. She demonstrated/verbalized understanding all education.   Impairments: abnormal or restricted ROM, activity intolerance, impaired physical strength, lacks appropriate home exercise program, pain with function and poor posture     Symptom irritability: moderateUnderstanding of Dx/Px/POC: excellent   Prognosis: good    Goals  STG to be achieved in 4 weeks:   The patient will report a decrease in cervical \"at worst\" subjective pain rating score to at least 5/10 to allow for improved tolerance for functional activities.   The patient will increase cervical rotational AROM to at least 45 degrees to allow for improved functional mobility.       LTG to be achieved by DC:   The patient will be independent in comprehensive HEP.   The patient will report no pain with usual activities.   The patient will improve all cervical AROM to WFL to allow for improved functional mobility.   The patient will increase left UE MMT score to WFL to allow for improved functional mobility.   The patient will be able to complete all ADLs and household chores without pain or difficulty to allow for return to PLOF.    Plan  Patient would benefit from: skilled physical therapy  Planned modality interventions: thermotherapy: hydrocollator packs, TENS and cryotherapy  Planned therapy interventions: manual therapy, joint mobilization, neuromuscular re-education, patient education, flexibility, strengthening, stretching, therapeutic activities, therapeutic exercise, home exercise program, IASTM and postural training  Frequency: 1-2x per week.  Duration in weeks: 8  Plan of Care beginning date: 4/5/2024  Plan of Care expiration date: 5/31/2024  Treatment plan discussed with: patient        Subjective Evaluation    History of Present Illness  Mechanism of " "injury: Kelsi presents with chief complaints of left sided neck pain ongoing for 3 months. She reports that she does have a history of chronic neck pain, however the pain has recently worsened and radiates into her left upper arm and shoulder blade. She denies any ALEC that may have caused the worsened pain. She says that the pain was extremely intense both in her neck and chest and she saw her cardiologist who performed a cardiac catherization which per patient \"came back clean\". She then saw orthopedics on 3/22/24 where Xrays were performed. Xrays revealed \"No fracture. Minimal anterolisthesis of C3 on 4 and minimal retrolisthesis of C5 on 6. Multilevel moderate discogenic degenerative disease, worst at C5-6\". She was prescribed steroids, muscle relaxer, and referred to OP PT.       She reports that turning her head to the left her pain is very intense and she experiences pins and needles sensation which can radiate down to her left elbow. She says that on occasions it can radiate into all of her fingers. When her neck is extremely painful and she turns her head to the right she gets dizzy. She reports that she is experiencing headaches approximately 2-3x per week. She says that she also has HTN and does get migraines so she isn't sure if the headaches are related to that or her neck. She denies dysphagia, dysarthria, ataxia, diplopia, blurry vision, nausea/vomiting.     Patient Goals  Patient goals for therapy: decreased pain and independence with ADLs/IADLs    Pain  Current pain ratin  At best pain ratin  At worst pain rating: 10  Location: left side of neck  Quality: sharp  Relieving factors: change in position and medications  Exacerbated by: turning head, sleeping, household chores.  Progression: worsening    Social Support  Lives with: spouse and adult children    Employment status: not working    Diagnostic Tests  X-ray: abnormal  Treatments  Previous treatment: medication  Current treatment: " physical therapy        Objective     Concurrent Complaints  Positive for disturbed sleep, dizziness and headaches. Negative for nausea/motion sickness, tinnitus, trouble swallowing, difficulty breathing, shortness of breath and visual change    Postural Observations  Seated posture: poor  Correction of posture: makes symptoms better    Additional Postural Observation Details  Forward head, rounded shoulders    Palpation   Left   Muscle spasm in the levator scapulae and upper trapezius.   Tenderness of the cervical paraspinals, levator scapulae, suboccipitals and upper trapezius.     Right   No palpable tenderness to the cervical paraspinals, levator scapulae, suboccipitals and upper trapezius.     Neurological Testing     Sensation   Cervical/Thoracic   Left   Diminished: light touch    Right   Intact: light touch    Comments   Left light touch: diminished in left hand.     Reflexes   Left   Biceps (C5/C6): trace (1+)  Brachioradialis (C6): trace (1+)  Triceps (C7): trace (1+)  Ayala's reflex: negative    Right   Biceps (C5/C6): trace (1+)  Brachioradialis (C6): trace (1+)  Triceps (C7): trace (1+)  Ayala's reflex: negative    Active Range of Motion   Cervical/Thoracic Spine       Cervical    Flexion: 30 degrees  with pain  Extension: 30 degrees     with pain  Left lateral flexion: 25 degrees     with pain  Right lateral flexion: 20 degrees     with pain  Left rotation: 25 degrees with pain  Right rotation: 25 degrees    with pain    Joint Play     Pain: C2, C3, C4, C5, C6 and C7   Mechanical Assessment    Cervical    Seated retraction: repeated movements   Pain location: centralized  Pain intensity: better  Pain level: decreased    Thoracic      Lumbar      Strength/Myotome Testing   Cervical Spine     Left   Interossei strength (t1): 4    Left Shoulder     Planes of Motion   Flexion: 4-   Abduction: 4-   External rotation at 0°: 4-   Internal rotation at 0°: 4-     Right Shoulder     Planes of Motion    Extension: 5   Abduction: 5   External rotation at 0°: 5   Internal rotation at 0°: 5     Left Elbow   Flexion: 4-  Extension: 4-    Right Elbow   Flexion: 5  Extension: 5    Left Wrist/Hand   Wrist extension: 4  Wrist flexion: 4     (2nd hand position)     Trial 1: 20    Right Wrist/Hand   Wrist extension: 5  Wrist flexion: 5     (2nd hand position)     Trial 1: 50    Tests   Cervical   Negative alar ligament test, Sharp-Marjan test and VBI.     Left   Positive Spurling's Test A.     Right   Positive Spurling's Test A.   Neuro Exam:     Headaches   Patient reports headaches: Yes.              Precautions: COPD, HTN, Rheumatoid osteoperiostitis   Access Code: [email or web address]    POC expires Unit limit Auth  expiration date PT/OT + Visit Limit?   5/31/24 N/A Pending 60 per cly primary      Secondary BOMN           Visit/Unit Tracking  AUTH Status:  Date 4/5              Pending Used 1               Remaining                     Manuals 4/5            Repeated cervical retractions seated with OP             Gentle STM to cervical paraspinals and SOR                                       Neuro Re-Ed             TB rows             TB pulldowns             TB B/L shoulder ER with scap retractions             Scapular retractions HEP            Cervical isometrics- all directions                                       Ther Ex             UBE fwd and retro for postural muscle endurance             Repeated cervical retractions with/without self OP HEP            L UT and levator stretches                                       Pt education PT POC, HEP, repeated movements, centralization vs peripheralization                                      Ther Activity                                       Gait Training                                       Modalities

## 2024-04-08 ENCOUNTER — PROCEDURE VISIT (OUTPATIENT)
Dept: NEUROLOGY | Facility: CLINIC | Age: 64
End: 2024-04-08

## 2024-04-08 DIAGNOSIS — G62.9 NEUROPATHY: ICD-10-CM

## 2024-04-11 ENCOUNTER — OFFICE VISIT (OUTPATIENT)
Dept: PHYSICAL THERAPY | Facility: CLINIC | Age: 64
End: 2024-04-11
Payer: COMMERCIAL

## 2024-04-11 DIAGNOSIS — M54.12 RADICULOPATHY, CERVICAL REGION: Primary | ICD-10-CM

## 2024-04-11 DIAGNOSIS — M62.838 CERVICAL PARASPINAL MUSCLE SPASM: ICD-10-CM

## 2024-04-11 DIAGNOSIS — M47.812 FACET ARTHROPATHY, CERVICAL: ICD-10-CM

## 2024-04-11 DIAGNOSIS — M50.30 DEGENERATIVE DISC DISEASE, CERVICAL: ICD-10-CM

## 2024-04-11 DIAGNOSIS — M62.838 TRAPEZIUS MUSCLE SPASM: ICD-10-CM

## 2024-04-11 PROCEDURE — 97010 HOT OR COLD PACKS THERAPY: CPT | Performed by: PHYSICAL THERAPIST

## 2024-04-11 PROCEDURE — 97112 NEUROMUSCULAR REEDUCATION: CPT | Performed by: PHYSICAL THERAPIST

## 2024-04-11 PROCEDURE — 97110 THERAPEUTIC EXERCISES: CPT | Performed by: PHYSICAL THERAPIST

## 2024-04-11 NOTE — PROGRESS NOTES
"Daily Note     Today's date: 2024  Patient name: Kelsi Cabrera  : 1960  MRN: [id number]  Referring provider: Alejandra Vargas*  Dx:   Encounter Diagnosis     ICD-10-CM    1. Radiculopathy, cervical region  M54.12       2. Facet arthropathy, cervical  M47.812       3. Degenerative disc disease, cervical  M50.30       4. Cervical paraspinal muscle spasm  M62.838       5. Trapezius muscle spasm  M62.838           Start Time: 1701  Stop Time: 1745  Total time in clinic (min): 44 minutes    Subjective: Patient reports that she went for EMG testing but declined. Patient continues to have diffuse, severe pain and tingling/numbness. Patient has been performing her HEP without change to overall status.      Objective: See treatment diary below      Assessment: Tolerated treatment fair. Patient demonstrated fatigue post treatment and would benefit from continued PT. Patient apprehensive towards exercise. Patient reported increased cervical tension with light UT and levator stretches.      Plan: Continue per plan of care.  Progress treatment as tolerated.       Precautions: COPD, HTN, Rheumatoid osteoperiostitis   Access Code: [email or web address]    POC expires Unit limit Auth  expiration date PT/OT + Visit Limit?   24 N/A Pending 60 per cly primary      Secondary BOMN           Visit/Unit Tracking  AUTH Status:  Date              Pending Used 1 2              Remaining                     Manuals            Repeated cervical retractions seated with OP             Gentle STM to cervical paraspinals and SOR                                       Neuro Re-Ed             TB rows  RTB 2x10 3\"           TB pulldowns             TB B/L shoulder ER with scap retractions             Scapular retractions HEP            Cervical isometrics- all directions                                       Ther Ex             UBE fwd and retro for postural muscle endurance  Pt. declined           Repeated cervical " "retractions with/without self OP HEP            L UT and levator stretches  2x20\" ea.                                     Pt education PT POC, HEP, repeated movements, centralization vs peripheralization Pathophysiology, differential diagnosis, diagnostic imaging    GR                                     Ther Activity                                       Gait Training                                       Modalities                                            "

## 2024-04-12 ENCOUNTER — APPOINTMENT (OUTPATIENT)
Dept: PHYSICAL THERAPY | Facility: CLINIC | Age: 64
End: 2024-04-12
Payer: COMMERCIAL

## 2024-04-18 ENCOUNTER — TELEPHONE (OUTPATIENT)
Age: 64
End: 2024-04-18

## 2024-04-18 NOTE — TELEPHONE ENCOUNTER
Called and spoke with the patient, and relayed Yobany raman.     Pt wants to be seen prior to her appt on 4/30/24. Can the office please assist with scheduling this patient as Dr Nye is booked with only 1 open slot available before 4/30/24 if it is necessary for her to be seen prior to 4/30/24. Pt states she is in a lot of pain and can wait until 4/30/24

## 2024-04-18 NOTE — TELEPHONE ENCOUNTER
Caller: Patient     Doctor: Parris    Reason for call: Patient is calling stating she's in a lot of pain even her oxycodone isnt helping. She has an appt on 4/30 but states she cannot wait that long and would like to know if there is anything she can do or if she can be seen sooner.    Call back#: [phone]

## 2024-04-18 NOTE — TELEPHONE ENCOUNTER
Called and spoke with the patient, pt last seen 7/25/23 and was seen for her right hip. Pt states her hip is worsening. Pt states her pain is a 9/10 and she cannot go to the bathroom and she cannot sleep. Pt states it feels like a stabbing pain. I did ask the patient if she is walking on her right lower extremity and she said she is but it is a struggle. I asked if patient if patient has swelling, redness or bruising and pt states she does not think so but patient states its tender to touch. Pt has pain from the hip and curves to the lower back and down in the knee. Pt has been taking oxycodone and she is getting is prescribed by pain management from [facility]. Pt is not icing but she is using heat and the heat works better than the ice.

## 2024-04-19 ENCOUNTER — OFFICE VISIT (OUTPATIENT)
Dept: PHYSICAL THERAPY | Facility: CLINIC | Age: 64
End: 2024-04-19
Payer: COMMERCIAL

## 2024-04-19 DIAGNOSIS — M47.812 FACET ARTHROPATHY, CERVICAL: ICD-10-CM

## 2024-04-19 DIAGNOSIS — M62.838 CERVICAL PARASPINAL MUSCLE SPASM: ICD-10-CM

## 2024-04-19 DIAGNOSIS — M54.12 RADICULOPATHY, CERVICAL REGION: Primary | ICD-10-CM

## 2024-04-19 DIAGNOSIS — M62.838 TRAPEZIUS MUSCLE SPASM: ICD-10-CM

## 2024-04-19 DIAGNOSIS — M50.30 DEGENERATIVE DISC DISEASE, CERVICAL: ICD-10-CM

## 2024-04-19 PROCEDURE — 97110 THERAPEUTIC EXERCISES: CPT

## 2024-04-19 PROCEDURE — 97112 NEUROMUSCULAR REEDUCATION: CPT

## 2024-04-19 NOTE — PROGRESS NOTES
"Daily Note     Today's date: 2024  Patient name: Kelsi Cabrera  : 1960  MRN: [id number]  Referring provider: Alejandra Vargas*  Dx:   Encounter Diagnosis     ICD-10-CM    1. Radiculopathy, cervical region  M54.12       2. Facet arthropathy, cervical  M47.812       3. Degenerative disc disease, cervical  M50.30       4. Cervical paraspinal muscle spasm  M62.838       5. Trapezius muscle spasm  M62.838                      Subjective: She says that she is experiencing much less pain radiating into her chest, left shoulder and hand. She says that the numbness in her hand is not constant like it was and is more intermittent. She says that the numbness is less intense. She does still have pain and swelling in the left side of her neck especially and headaches. She says that separate from her neck, she might be getting a hip replacement on her right side.       Objective: See treatment diary below      Assessment: Tolerated treatment well. Much improved tolerance to exercises today compared to previous session. She does have significant TTP in left UT, cervical paraspinal, and suboccipital musculature. Patient demonstrated fatigue post treatment, exhibited good technique with therapeutic exercises, and would benefit from continued PT      Plan: Continue per plan of care.      Precautions: COPD, HTN, Rheumatoid osteoperiostitis, per patient allergy to hand    Access Code: [email or web address]    POC expires Unit limit Auth  expiration date PT/OT + Visit Limit?   24 N/A 6/3/24 60 per cly primary      Secondary BOMN           Visit/Unit Tracking  AUTH Status:  Date             Approved 8 Used 1 2 3             Remaining  7 6 5                 Manuals           Repeated cervical retractions seated with OP             Gentle STM to cervical paraspinals and SOR   BE   + L UT                                     Neuro Re-Ed             TB rows  RTB 2x10 3\" RTB 3\" 2x10          TB " "pulldowns   RTB 3\" 2x10          TB B/L shoulder ER with scap retractions             Scapular retractions HEP            Cervical isometrics- all directions                                       Ther Ex             UBE fwd and retro for postural muscle endurance  Pt. declined           Repeated cervical retractions with/without self OP HEP  2x10   No OP  1x10 with OP           L UT and levator stretches  2x20\" ea. 3x30\" B/L                                     Pt education PT POC, HEP, repeated movements, centralization vs peripheralization Pathophysiology, differential diagnosis, diagnostic imaging    GR                                     Ther Activity                                       Gait Training                                       Modalities                                              "

## 2024-04-22 NOTE — TELEPHONE ENCOUNTER
Scheduled patient for tomorrow she is going to check with her ride to confirm if this appointment will work or if she is going to keep appointment on 4/30

## 2024-04-23 ENCOUNTER — HOSPITAL ENCOUNTER (OUTPATIENT)
Dept: RADIOLOGY | Facility: HOSPITAL | Age: 64
Discharge: HOME | End: 2024-04-23
Attending: ORTHOPAEDIC SURGERY
Payer: COMMERCIAL

## 2024-04-23 ENCOUNTER — OFFICE VISIT (OUTPATIENT)
Dept: OBGYN CLINIC | Facility: CLINIC | Age: 64
End: 2024-04-23
Payer: COMMERCIAL

## 2024-04-23 VITALS — BODY MASS INDEX: 28 KG/M2 | OXYGEN SATURATION: 97 % | HEART RATE: 73 BPM | HEIGHT: 63 IN | WEIGHT: 158 LBS

## 2024-04-23 DIAGNOSIS — M25.551 PAIN IN RIGHT HIP: ICD-10-CM

## 2024-04-23 DIAGNOSIS — Z01.818 PREOPERATIVE TESTING: ICD-10-CM

## 2024-04-23 DIAGNOSIS — M87.00 AVN (AVASCULAR NECROSIS OF BONE) (HCC): Primary | ICD-10-CM

## 2024-04-23 DIAGNOSIS — M06.9 RHEUMATOID ARTHRITIS INVOLVING MULTIPLE SITES, UNSPECIFIED WHETHER RHEUMATOID FACTOR PRESENT (HCC): ICD-10-CM

## 2024-04-23 DIAGNOSIS — M16.11 PRIMARY OSTEOARTHRITIS OF ONE HIP, RIGHT: ICD-10-CM

## 2024-04-23 PROCEDURE — 99214 OFFICE O/P EST MOD 30 MIN: CPT | Performed by: ORTHOPAEDIC SURGERY

## 2024-04-23 PROCEDURE — 73502 X-RAY EXAM HIP UNI 2-3 VIEWS: CPT

## 2024-04-23 RX ORDER — MULTIVIT-MIN/IRON FUM/FOLIC AC 7.5 MG-4
1 TABLET ORAL DAILY
Qty: 30 TABLET | Refills: 0 | Status: SHIPPED | OUTPATIENT
Start: 2024-04-23

## 2024-04-23 RX ORDER — FERROUS SULFATE 324(65)MG
324 TABLET, DELAYED RELEASE (ENTERIC COATED) ORAL
Qty: 30 TABLET | Refills: 0 | Status: SHIPPED | OUTPATIENT
Start: 2024-04-23

## 2024-04-23 RX ORDER — FOLIC ACID 1 MG/1
1 TABLET ORAL DAILY
Qty: 30 TABLET | Refills: 0 | Status: SHIPPED | OUTPATIENT
Start: 2024-04-23

## 2024-04-23 RX ORDER — ACETAMINOPHEN 325 MG/1
975 TABLET ORAL ONCE
OUTPATIENT
Start: 2024-04-23 | End: 2024-04-23

## 2024-04-23 RX ORDER — CHLORHEXIDINE GLUCONATE ORAL RINSE 1.2 MG/ML
15 SOLUTION DENTAL ONCE
OUTPATIENT
Start: 2024-04-23 | End: 2024-04-23

## 2024-04-23 RX ORDER — ASCORBIC ACID 500 MG
500 TABLET ORAL DAILY
Qty: 30 TABLET | Refills: 0 | Status: SHIPPED | OUTPATIENT
Start: 2024-04-23

## 2024-04-23 RX ORDER — SODIUM CHLORIDE, SODIUM LACTATE, POTASSIUM CHLORIDE, CALCIUM CHLORIDE 600; 310; 30; 20 MG/100ML; MG/100ML; MG/100ML; MG/100ML
125 INJECTION, SOLUTION INTRAVENOUS CONTINUOUS
OUTPATIENT
Start: 2024-04-23

## 2024-04-23 NOTE — PROGRESS NOTES
Assessment:  1. AVN (avascular necrosis of bone) (HCC)        2. Pain in right hip  XR hip/pelv 2-3 vws right if performed      3. Preoperative testing        4. Primary osteoarthritis of one hip, right            Plan:    Patient with right hip pain due osteoarthritis and avascular necrosis   Weightbearing as tolerated   Discuss with patient in detail surgery for right anterior total hip arthroplasty patient agrees with plan and like to proceed forward scheduling for surgery  The benefits and purpose of the operation and or procedure have been explained to me in language I understand by Dr. Nye as well the risks, alternatives and complications pertaining to the above procedure/surgery which include but is not limited to : infections, deep vein thrombosis, blood clots to the lungs, wound healing problems, complications, for extensive blood loss, including shock, possible death, leg length discrepancy, limp hip dislocations, fracture, loss of limb, persistent pain, failure of hardware/implant, damage of blood vessels and or nerves, heart attack, stroke and potential need for further surgery/revision surgery.    Same-day surgery  Patient would need clearance by her PCP, cardiology and pulmonology prior to surgery  Patient will be on aspirin 81 mg twice daily for DVT prophylaxis postop when discharged from the hospital  Patient will be on vitamins 30 days prior to surgery  Will repeat x-ray of the right hip at next office visit  Patient is to follow-up postop 3 weeks right anterior total hip arthroplasty        The above stated was discussed in layman's terms and the patient expressed understanding.  All questions were answered to the patient's satisfaction.         Subjective:   Kelsi Cabrera is a 63 y.o. female who presents today follow-up for chronic right hip pain due to greater trochanteric bursitis and avascular necrosis which is stable at this time and right hip osteoarthritis.  She states she is having  constant pain over the lateral aspect of the right hip rating down to the knee.  States she has trouble with prolonged walking.She has had right IA hip steroid injections in the past, last injection on 1/23/23 with few days of relief.  Patient has been taking oxycodone 10/325 prescribed by her PCP for pain relief.  Denies any numbness or tingling down her leg.      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[facility])        Past Surgical History:   Procedure Laterality Date    APPENDECTOMY      CARDIAC CATHETERIZATION Left 2/28/2024    Procedure: Cardiac Left Heart Cath;  Surgeon: Lizz Adorno MD;  Location: [facility];  Service: Cardiology    CARDIAC CATHETERIZATION N/A 2/28/2024    Procedure: Cardiac Coronary Angiogram;  Surgeon: Lizz Adorno MD;  Location: [facility];  Service: Cardiology    CARDIAC CATHETERIZATION  2/28/2024    Procedure: Cardiac catheterization;  Surgeon: Lizz Adorno MD;  Location: [facility];  Service: Cardiology    CARPAL TUNNEL RELEASE      CHOLECYSTECTOMY      SINUS SURGERY         Family History   Problem Relation Age of Onset    Bronchiolitis Mother        Social History     Occupational History    Not on file   Tobacco Use    Smoking status: Former     Types: Cigarettes     Passive exposure: Past    Smokeless tobacco: Never    Tobacco comments:     started smoking, quit 1 month ago   Vaping Use    Vaping status: Never Used   Substance and Sexual Activity    Alcohol use: Never    Drug use: Never     Sexual activity: Not Currently     Partners: Male         Current Outpatient Medications:     acetaminophen (TYLENOL) 325 mg tablet, Take 2 tablets (650 mg total) by mouth every 6 (six) hours as needed for mild pain or fever, Disp: , Rfl:     ALPRAZolam (XANAX) 0.5 mg tablet, , Disp: , Rfl:     Azelastine HCl 137 MCG/SPRAY SOLN, , Disp: , Rfl:     budesonide-formoterol (SYMBICORT) 160-4.5 mcg/act inhaler, Inhale 2 puffs 2 (two) times a day, Disp: , Rfl:     Cholecalciferol (VITAMIN D3) 2000 units CHEW, Chew 5,000 Units daily, Disp: , Rfl:     Choline Fenofibrate (Fenofibric Acid) 135 MG CPDR, 1 cap daily, Disp: 90 capsule, Rfl: 3    cloNIDine (CATAPRES) 0.1 mg tablet, Take 1 tablet (0.1 mg total) by mouth 2 (two) times a day, Disp: 180 tablet, Rfl: 0    fexofenadine (ALLEGRA) 180 MG tablet, Take 180 mg by mouth daily, Disp: , Rfl:     fluticasone (FLONASE) 50 mcg/act nasal spray, INSTILL 1 SPRAY INTO EACH NOSTRIL ONCE DAILY, Disp: 48 g, Rfl: 1    hydroxychloroquine (PLAQUENIL) 200 mg tablet, Take 1 tablet (200 mg total) by mouth 2 (two) times a day with meals, Disp: 60 tablet, Rfl: 1    levalbuterol (XOPENEX) 1.25 mg/3 mL nebulizer solution, , Disp: , Rfl:     magnesium oxide (MAG-OX) 400 mg tablet, Take 1 tablet by mouth in the morning, Disp: , Rfl:     nitroglycerin (NITROSTAT) 0.4 mg SL tablet, Place 1 tablet (0.4 mg total) under the tongue every 5 (five) minutes as needed for chest pain, Disp: 30 tablet, Rfl: 3    oxyCODONE-acetaminophen (PERCOCET)  mg per tablet, , Disp: , Rfl:     promethazine-dextromethorphan (PHENERGAN-DM) 6.25-15 mg/5 mL oral syrup, TAKE 5 MILLILITERS BY MOUTH 4 TIMES A DAY AS NEEDED FOR COUGH, Disp: , Rfl:     tiZANidine (ZANAFLEX) 4 mg tablet, Take 1 tablet (4 mg total) by mouth 2 (two) times a day as needed for muscle spasms, Disp: 60 tablet, Rfl: 0    Allergies   Allergen Reactions    Aspirin Anaphylaxis    Iodine - Food Allergy Anaphylaxis    Penicillins Anaphylaxis    Pork  Allergy - Food Allergy Other (See Comments)     Does not eat pork for Orthodox reasons    Soybean Oil - Food Allergy Other (See Comments)    Statins Myalgia            Vitals:    04/23/24 1352   Pulse: 73   SpO2: 97%     Body mass index is 27.99 kg/m².  Wt Readings from Last 3 Encounters:   04/23/24 71.7 kg (158 lb)   03/22/24 69.4 kg (153 lb)   03/15/24 70.9 kg (156 lb 3.2 oz)       Objective:            Physical Exam  Physical Exam:      General Appearance:    Alert, cooperative, no distress, appears stated age   Head:    Normocephalic, without obvious abnormality, atraumatic   Eyes:    conjunctiva/corneas clear, both eyes         Nose:   Nares normal, septum midline, no drainage    Throat:   Lips normal; teeth and gums normal   Neck:    symmetrical, trachea midline, ;     thyroid:  no enlargement/   Back:     Symmetric, no curvature, ROM normal   Lungs:   No audible wheezing or labored breathing   Chest Wall:    No tenderness or deformity    Heart:    Regular rate and rhythm                         Pulses:   2+ and symmetric all extremities   Skin:   Skin color, texture, turgor normal, no rashes or lesions   Neurologic:   normal strength, sensation and reflexes     throughout                       Ortho Exam  Right hip  Skin intact  No erythema or open wounds  + Stinchfield  Negative AYAN test  +FADDIR test   No tenderness to palpation over the greater trochanteric region  Neurovascularly Intact Distally        Diagnostics, reviewed and taken today if performed as documented:  The attending physician has personally reviewed the pertinent films in PACS and interpretation is as follows: XR right hip demonstrates joint space narrowing with subchondral sclerosis  in the superior pole femoral head       Procedures, if performed today:    Procedures    None performed      Scribe Attestation      I,:  Espinoza Floyd am acting as a scribe while in the presence of the attending physician.:       I,:  Kathya  "MD Parris personally performed the services described in this documentation    as scribed in my presence.:               Portions of the record may have been created with voice recognition software.  Occasional wrong word or \"sound a like\" substitutions may have occurred due to the inherent limitations of voice recognition software.  Read the chart carefully and recognize, using context, where substitutions have occurred.  "

## 2024-04-24 ENCOUNTER — TELEPHONE (OUTPATIENT)
Dept: CARDIOLOGY CLINIC | Facility: CLINIC | Age: 64
End: 2024-04-24

## 2024-04-24 NOTE — TELEPHONE ENCOUNTER
Rec'd a call from Devika at Ortho. Pt is having a right hip arthroplasty on 5/23. Pt was seen by Dr GRAY on 3/1. Can the pt be cleared based off this last visit? If so, please addend the note or add a clearance letter to the chart.     Thank you.

## 2024-04-24 NOTE — TELEPHONE ENCOUNTER
Please call the patient.  Letter in the chart dated today.  Patient has no specific contraindication.  Low to moderate cardiac risk for orthopedic surgery.

## 2024-04-25 NOTE — TELEPHONE ENCOUNTER
Spoke with pt and Dr. GRAY message was relayed that a clearance letter was written and it is in her chart. Pt will notify her surgeon.

## 2024-04-25 NOTE — PROGRESS NOTES
"Daily Note     Today's date: 2024  Patient name: Kelsi Cabrera  : 1960  MRN: [id number]  Referring provider: Alejandra Vargas*  Dx:   Encounter Diagnosis     ICD-10-CM    1. Radiculopathy, cervical region  M54.12       2. Facet arthropathy, cervical  M47.812       3. Degenerative disc disease, cervical  M50.30       4. Cervical paraspinal muscle spasm  M62.838       5. Trapezius muscle spasm  M62.838                      Subjective: Patient reports that she is scheduled to get surgery on her hip at the end of May and so she would like to be DC from formal PT for her neck today due to a lot of upcoming appts for this. She does think that she made some improvements in regards to her neck pain, however it does still really bother her.       Objective: See treatment diary below      Assessment: Tolerated treatment well. Patient able to complete exercises as outlined below with muscular fatigue present. Due to upcoming procedure patient would like to be DC to a HEP and was provided with an updated print out at end of session. She demonstrated/verbalized understanding it's completion. Patient demonstrated fatigue post treatment and exhibited good technique with therapeutic exercises      Plan: DC from formal PT services to HEP.      Precautions: COPD, HTN, Rheumatoid osteoperiostitis, per patient allergy to hand    Access Code: [email or web address]    POC expires Unit limit Auth  expiration date PT/OT + Visit Limit?   24 N/A 6/3/24 60 per cly primary      Secondary BOMN           Visit/Unit Tracking  AUTH Status:  Date            Approved 8 Used 1 2 3 4            Remaining  7 6 5 4                Manuals          Repeated cervical retractions seated with OP             Gentle STM to cervical paraspinals and SOR   BE   + L UT                                     Neuro Re-Ed             TB rows  RTB 2x10 3\" RTB 3\" 2x10 RTB 3\" 2x10         TB pulldowns   RTB 3\" 2x10 " "RTB 3\" 2x10         TB B/L shoulder ER with scap retractions    RTB 5\" 1x10  1x5         Scapular retractions HEP            Cervical isometrics- all directions                                       Ther Ex             UBE fwd and retro for postural muscle endurance  Pt. declined  3'/3'         Repeated cervical retractions with/without self OP HEP  2x10   No OP  1x10 with OP           L UT and levator stretches  2x20\" ea. 3x30\" B/L  3x30\" B/L                                   Pt education PT POC, HEP, repeated movements, centralization vs peripheralization Pathophysiology, differential diagnosis, diagnostic imaging    GR  Updated HEP                                   Ther Activity                                       Gait Training                                       Modalities                                                "

## 2024-04-26 ENCOUNTER — OFFICE VISIT (OUTPATIENT)
Dept: PHYSICAL THERAPY | Facility: CLINIC | Age: 64
End: 2024-04-26
Payer: COMMERCIAL

## 2024-04-26 ENCOUNTER — TELEPHONE (OUTPATIENT)
Dept: OBGYN CLINIC | Facility: HOSPITAL | Age: 64
End: 2024-04-26

## 2024-04-26 ENCOUNTER — TELEPHONE (OUTPATIENT)
Dept: PAIN MEDICINE | Facility: CLINIC | Age: 64
End: 2024-04-26

## 2024-04-26 DIAGNOSIS — M54.12 RADICULOPATHY, CERVICAL REGION: Primary | ICD-10-CM

## 2024-04-26 DIAGNOSIS — M62.838 CERVICAL PARASPINAL MUSCLE SPASM: ICD-10-CM

## 2024-04-26 DIAGNOSIS — M62.838 TRAPEZIUS MUSCLE SPASM: ICD-10-CM

## 2024-04-26 DIAGNOSIS — M50.30 DEGENERATIVE DISC DISEASE, CERVICAL: ICD-10-CM

## 2024-04-26 DIAGNOSIS — M47.812 FACET ARTHROPATHY, CERVICAL: ICD-10-CM

## 2024-04-26 PROCEDURE — 97112 NEUROMUSCULAR REEDUCATION: CPT

## 2024-04-26 PROCEDURE — 97110 THERAPEUTIC EXERCISES: CPT

## 2024-04-26 NOTE — TELEPHONE ENCOUNTER
Pt has some questions regarding vitamins. She drinks juice from a fresh squeezed lemon or orange daily and Is wondering would she still need to take vitamin c. She also has bad acid reflux and is afraid to take the folic acid as well.

## 2024-04-26 NOTE — TELEPHONE ENCOUNTER
This call was not originally routed to the NN team.     I called and discussed with patient. I recommended she take the Vitamin C over juicing oranges due to acid reflux. I informed her that ira and oranges are highly acidic and is more than likely contributing. I recommended for her to take folic acid and remaining vitamins and if she does feel she is having side affects, to discontinue. Patient agreed with plan at this time.

## 2024-05-01 NOTE — TELEPHONE ENCOUNTER
Preoperative Elective Admission Assessment- spoke to patient.     Living Situation:    Who does pt live with: spouse and children.  What kind of home: multi-level  How do they enter the home: front  How many levels in home: 2 level home, but resides first floor    # of steps to enter home: 1 to enter   # of steps to second floor: N/A   Sleeping arrangement: first/entry floor  Where is Bathroom: Walk in shower on first floor with seat.     First Floor Setup:   Is there a bathroom: Yes  Where would pt sleep: bed     DME:  Has [employer], ordering RW.   Instructed to bring DOS.   We discussed clearing pathways in the home and making sure there is accessibly to use the walker, for example, removing throw rugs.      Patient's Current Level of Function: Ambulates: Independently and ADLs: Independent    Post-op Caregiver: spouse  Currently receive any HHC/aides/community supports: No     Post-op Transport: spouse  To/from hospital: spouse  To/from PT 2-3x/week: spouse  Uses community transport now: No     Outpatient Physical Therapy Site:  Site: [facility]   pre and post-op appts scheduled? Yes     Medication Management: self  Preferred Pharmacy for Post-op Medications: [id number] - [place], [place] - [address] [[id number]]   Blood Management Vitamin Regimen: Pt stopped them yesterday, due to stomach pain and bloating. I advised to stop the Iron and continue the others at this time.   Post-op anticoagulant: to be determined by surgical team postoperatively     DC Plan: Pt plans to be discharged home    Barriers to DC identified preoperatively: none identified    BMI: 27.99    Patient Education:  Pt educated on post-op pain, early mobilization (POD0), LOS goals, OP PT goals, and preoperative bathing. Patient educated that our goal is to appropriately discharge patient based off their post-op function while striving to maintain maximal independence. The goal is to discharge patient to home and for them to attend outpatient  physical therapy.    Assigned to care team? Yes

## 2024-05-03 ENCOUNTER — APPOINTMENT (OUTPATIENT)
Dept: PHYSICAL THERAPY | Facility: CLINIC | Age: 64
End: 2024-05-03
Payer: COMMERCIAL

## 2024-05-03 LAB
DME PARACHUTE DELIVERY DATE ACTUAL: NORMAL
DME PARACHUTE DELIVERY DATE REQUESTED: NORMAL
DME PARACHUTE ITEM DESCRIPTION: NORMAL
DME PARACHUTE ORDER STATUS: NORMAL
DME PARACHUTE SUPPLIER NAME: NORMAL
DME PARACHUTE SUPPLIER PHONE: NORMAL

## 2024-05-06 NOTE — PRE-PROCEDURE INSTRUCTIONS
Pre-Surgery Instructions:   Medication Instructions    ALPRAZolam (XANAX) 0.5 mg tablet Uses PRN- OK to take day of surgery    ascorbic acid (VITAMIN C) 500 MG tablet Hold day of surgery.    budesonide-formoterol (SYMBICORT) 160-4.5 mcg/act inhaler Take day of surgery.    Cholecalciferol (VITAMIN D3) 2000 units CHEW Stop taking 7 days prior to surgery.    Choline Fenofibrate (Fenofibric Acid) 135 MG CPDR Take night before surgery    cloNIDine (CATAPRES) 0.1 mg tablet Take day of surgery.    fexofenadine (ALLEGRA) 180 MG tablet Hold day of surgery.    fluticasone (FLONASE) 50 mcg/act nasal spray Hold day of surgery.    hydroxychloroquine (PLAQUENIL) 200 mg tablet Hold day of surgery.    levalbuterol (XOPENEX) 1.25 mg/3 mL nebulizer solution Uses PRN- OK to take day of surgery    magnesium oxide (MAG-OX) 400 mg tablet Stop taking 7 days prior to surgery.    Multiple Vitamins-Minerals (multivitamin with minerals) tablet Hold day of surgery.    nitroglycerin (NITROSTAT) 0.4 mg SL tablet Uses PRN- OK to take day of surgery    oxyCODONE-acetaminophen (PERCOCET)  mg per tablet Uses PRN- OK to take day of surgery    promethazine-dextromethorphan (PHENERGAN-DM) 6.25-15 mg/5 mL oral syrup Uses PRN- DO NOT take day of surgery    Medication instructions for day surgery reviewed. Please use only a sip of water to take your instructed medications. Avoid all over the counter vitamins, supplements and NSAIDS for one week prior to surgery per anesthesia guidelines. Tylenol is ok to take as needed.     You will receive a call one business day prior to surgery with an arrival time and hospital directions. If your surgery is scheduled on a Monday, the hospital will be calling you on the Friday prior to your surgery. If you have not heard from anyone by 8pm, please call the hospital supervisor through the hospital  at [phone]. ([place] [phone] or [place] [phone]).    Do not eat or drink anything after  midnight the night before your surgery, including candy, mints, lifesavers, or chewing gum. Do not drink alcohol 24hrs before your surgery. Try not to smoke at least 24hrs before your surgery.       Follow the pre surgery showering instructions as listed in the “My Surgical Experience Booklet” or otherwise provided by your surgeon's office. Do not use a blade to shave the surgical area 1 week before surgery. It is okay to use a clean electric clippers up to 24 hours before surgery. Do not apply any lotions, creams, including makeup, cologne, deodorant, or perfumes after showering on the day of your surgery. Do not use dry shampoo, hair spray, hair gel, or any type of hair products.     No contact lenses, eye make-up, or artificial eyelashes. Remove nail polish, including gel polish, and any artificial, gel, or acrylic nails if possible. Remove all jewelry including rings and body piercing jewelry.     Wear causal clothing that is easy to take on and off. Consider your type of surgery.    Keep any valuables, jewelry, piercings at home. Please bring any specially ordered equipment (sling, braces) if indicated.    Arrange for a responsible person to drive you to and from the hospital on the day of your surgery. Please confirm the visitor policy for the day of your procedure when you receive your phone call with an arrival time.     Call the surgeon's office with any new illnesses, exposures, or additional questions prior to surgery.    Please reference your “My Surgical Experience Booklet” for additional information to prepare for your upcoming surgery.    Instructions reviewed with pt. X 2, verbalized understanding.

## 2024-05-09 ENCOUNTER — APPOINTMENT (OUTPATIENT)
Dept: LAB | Facility: CLINIC | Age: 64
End: 2024-05-09
Payer: COMMERCIAL

## 2024-05-09 ENCOUNTER — CONSULT (OUTPATIENT)
Dept: INTERNAL MEDICINE CLINIC | Facility: CLINIC | Age: 64
End: 2024-05-09
Payer: COMMERCIAL

## 2024-05-09 VITALS
OXYGEN SATURATION: 96 % | DIASTOLIC BLOOD PRESSURE: 76 MMHG | WEIGHT: 154 LBS | HEART RATE: 86 BPM | HEIGHT: 63 IN | SYSTOLIC BLOOD PRESSURE: 120 MMHG | BODY MASS INDEX: 27.29 KG/M2 | TEMPERATURE: 98.3 F

## 2024-05-09 DIAGNOSIS — Z01.818 PREOPERATIVE TESTING: ICD-10-CM

## 2024-05-09 DIAGNOSIS — M25.551 PAIN IN RIGHT HIP: ICD-10-CM

## 2024-05-09 DIAGNOSIS — E78.49 OTHER HYPERLIPIDEMIA: ICD-10-CM

## 2024-05-09 DIAGNOSIS — I10 ESSENTIAL HYPERTENSION: ICD-10-CM

## 2024-05-09 DIAGNOSIS — M87.00 AVN (AVASCULAR NECROSIS OF BONE) (HCC): ICD-10-CM

## 2024-05-09 DIAGNOSIS — M87.00 AVN (AVASCULAR NECROSIS OF BONE) (HCC): Primary | ICD-10-CM

## 2024-05-09 DIAGNOSIS — F41.8 SITUATIONAL ANXIETY: ICD-10-CM

## 2024-05-09 DIAGNOSIS — M16.11 PRIMARY OSTEOARTHRITIS OF ONE HIP, RIGHT: ICD-10-CM

## 2024-05-09 DIAGNOSIS — R73.01 IFG (IMPAIRED FASTING GLUCOSE): ICD-10-CM

## 2024-05-09 DIAGNOSIS — K21.9 GASTRO-ESOPHAGEAL REFLUX DISEASE WITHOUT ESOPHAGITIS: ICD-10-CM

## 2024-05-09 DIAGNOSIS — J43.8 OTHER EMPHYSEMA (HCC): ICD-10-CM

## 2024-05-09 LAB
ALBUMIN SERPL BCP-MCNC: 4.7 G/DL (ref 3.5–5)
ALP SERPL-CCNC: 39 U/L (ref 34–104)
ALT SERPL W P-5'-P-CCNC: 27 U/L (ref 7–52)
ANION GAP SERPL CALCULATED.3IONS-SCNC: 7 MMOL/L (ref 4–13)
APTT PPP: 28 SECONDS (ref 23–37)
AST SERPL W P-5'-P-CCNC: 20 U/L (ref 13–39)
ATRIAL RATE: 76 BPM
BASOPHILS # BLD AUTO: 0.03 THOUSANDS/ÂΜL (ref 0–0.1)
BASOPHILS NFR BLD AUTO: 0 % (ref 0–1)
BILIRUB SERPL-MCNC: 0.69 MG/DL (ref 0.2–1)
BUN SERPL-MCNC: 16 MG/DL (ref 5–25)
CALCIUM SERPL-MCNC: 10.2 MG/DL (ref 8.4–10.2)
CHLORIDE SERPL-SCNC: 106 MMOL/L (ref 96–108)
CHOLEST SERPL-MCNC: 177 MG/DL
CO2 SERPL-SCNC: 27 MMOL/L (ref 21–32)
CREAT SERPL-MCNC: 0.78 MG/DL (ref 0.6–1.3)
EOSINOPHIL # BLD AUTO: 0.32 THOUSAND/ÂΜL (ref 0–0.61)
EOSINOPHIL NFR BLD AUTO: 3 % (ref 0–6)
ERYTHROCYTE [DISTWIDTH] IN BLOOD BY AUTOMATED COUNT: 13.1 % (ref 11.6–15.1)
EST. AVERAGE GLUCOSE BLD GHB EST-MCNC: 117 MG/DL
GFR SERPL CREATININE-BSD FRML MDRD: 79 ML/MIN/1.73SQ M
GLUCOSE P FAST SERPL-MCNC: 95 MG/DL (ref 65–99)
HBA1C MFR BLD: 5.7 %
HCT VFR BLD AUTO: 47.5 % (ref 34.8–46.1)
HDLC SERPL-MCNC: 70 MG/DL
HGB BLD-MCNC: 15.4 G/DL (ref 11.5–15.4)
IMM GRANULOCYTES # BLD AUTO: 0.03 THOUSAND/UL (ref 0–0.2)
IMM GRANULOCYTES NFR BLD AUTO: 0 % (ref 0–2)
INR PPP: 0.98 (ref 0.84–1.19)
LDLC SERPL CALC-MCNC: 91 MG/DL (ref 0–100)
LYMPHOCYTES # BLD AUTO: 2.44 THOUSANDS/ÂΜL (ref 0.6–4.47)
LYMPHOCYTES NFR BLD AUTO: 25 % (ref 14–44)
MCH RBC QN AUTO: 29.8 PG (ref 26.8–34.3)
MCHC RBC AUTO-ENTMCNC: 32.4 G/DL (ref 31.4–37.4)
MCV RBC AUTO: 92 FL (ref 82–98)
MONOCYTES # BLD AUTO: 0.68 THOUSAND/ÂΜL (ref 0.17–1.22)
MONOCYTES NFR BLD AUTO: 7 % (ref 4–12)
NEUTROPHILS # BLD AUTO: 6.46 THOUSANDS/ÂΜL (ref 1.85–7.62)
NEUTS SEG NFR BLD AUTO: 65 % (ref 43–75)
NRBC BLD AUTO-RTO: 0 /100 WBCS
P AXIS: 70 DEGREES
PLATELET # BLD AUTO: 268 THOUSANDS/UL (ref 149–390)
PMV BLD AUTO: 10.7 FL (ref 8.9–12.7)
POTASSIUM SERPL-SCNC: 4.1 MMOL/L (ref 3.5–5.3)
PR INTERVAL: 128 MS
PROT SERPL-MCNC: 7.5 G/DL (ref 6.4–8.4)
PROTHROMBIN TIME: 13.5 SECONDS (ref 11.6–14.5)
QRS AXIS: 79 DEGREES
QRSD INTERVAL: 86 MS
QT INTERVAL: 392 MS
QTC INTERVAL: 441 MS
RBC # BLD AUTO: 5.16 MILLION/UL (ref 3.81–5.12)
SODIUM SERPL-SCNC: 140 MMOL/L (ref 135–147)
T WAVE AXIS: 42 DEGREES
TRIGL SERPL-MCNC: 80 MG/DL
TSH SERPL DL<=0.05 MIU/L-ACNC: 3.27 UIU/ML (ref 0.45–4.5)
VENTRICULAR RATE: 76 BPM
WBC # BLD AUTO: 9.96 THOUSAND/UL (ref 4.31–10.16)

## 2024-05-09 PROCEDURE — 80053 COMPREHEN METABOLIC PANEL: CPT

## 2024-05-09 PROCEDURE — 84443 ASSAY THYROID STIM HORMONE: CPT

## 2024-05-09 PROCEDURE — 86850 RBC ANTIBODY SCREEN: CPT | Performed by: ORTHOPAEDIC SURGERY

## 2024-05-09 PROCEDURE — 83036 HEMOGLOBIN GLYCOSYLATED A1C: CPT

## 2024-05-09 PROCEDURE — 85610 PROTHROMBIN TIME: CPT

## 2024-05-09 PROCEDURE — 93005 ELECTROCARDIOGRAM TRACING: CPT

## 2024-05-09 PROCEDURE — 86900 BLOOD TYPING SEROLOGIC ABO: CPT | Performed by: ORTHOPAEDIC SURGERY

## 2024-05-09 PROCEDURE — 85025 COMPLETE CBC W/AUTO DIFF WBC: CPT

## 2024-05-09 PROCEDURE — 99214 OFFICE O/P EST MOD 30 MIN: CPT | Performed by: NURSE PRACTITIONER

## 2024-05-09 PROCEDURE — 80061 LIPID PANEL: CPT

## 2024-05-09 PROCEDURE — 85730 THROMBOPLASTIN TIME PARTIAL: CPT

## 2024-05-09 PROCEDURE — 93010 ELECTROCARDIOGRAM REPORT: CPT | Performed by: INTERNAL MEDICINE

## 2024-05-09 PROCEDURE — 36415 COLL VENOUS BLD VENIPUNCTURE: CPT

## 2024-05-09 PROCEDURE — 86901 BLOOD TYPING SEROLOGIC RH(D): CPT | Performed by: ORTHOPAEDIC SURGERY

## 2024-05-09 RX ORDER — OXYCODONE HYDROCHLORIDE 10 MG/1
TABLET ORAL
COMMUNITY
Start: 2024-04-24

## 2024-05-09 RX ORDER — LEVALBUTEROL TARTRATE 45 UG/1
AEROSOL, METERED ORAL
COMMUNITY
Start: 2024-04-25

## 2024-05-09 RX ORDER — MULTIVITAMIN WITH FOLIC ACID 400 MCG
1 TABLET ORAL DAILY
COMMUNITY
Start: 2024-04-23

## 2024-05-09 NOTE — PROGRESS NOTES
Name: Kelsi Cabrera      : 1960      MRN: [id number]  Encounter Provider: ALETHEA Scanlon  Encounter Date: 2024   Encounter department: [facility] INTERNAL MEDICINE    Assessment & Plan     1. AVN (avascular necrosis of bone) ([facility])  -     Ambulatory referral to surgical optimization    2. Primary osteoarthritis of one hip, right  Assessment & Plan:  She already received cardiac clearance.   Pre op labs reviewed- stable.  She is at an acceptable risk for surgery.     Orders:  -     Ambulatory referral to surgical optimization    3. Essential hypertension  Assessment & Plan:  Stable  Continue current medication regimen       4. Other emphysema (HCC)  Assessment & Plan:  Mild wheezing on exam. She has been using her nebulizer once daily.   On symbicort  Sees pulmonary       5. Gastro-esophageal reflux disease without esophagitis  Assessment & Plan:  Off PPI, denies symptoms.       6. Situational anxiety  Assessment & Plan:  Stable  Takes xanax as needed, prescribed by her pulmonologist       7. Other hyperlipidemia  Assessment & Plan:  On fenofibrate.              Jacquelin Dloan is scheduled for right total hip arthroplasty on   She denies any issues with anesthesia in the past  She denies any chest pain.  She has been using her nebulizer once a day over the last week due to allergies. Slight wheezing at night. No shortness of breath.         Review of Systems   Constitutional:  Negative for activity change, appetite change, fatigue and unexpected weight change.   Eyes:  Negative for visual disturbance.   Respiratory:  Positive for wheezing. Negative for cough, chest tightness and shortness of breath.    Cardiovascular:  Negative for chest pain, palpitations and leg swelling.   Gastrointestinal:  Negative for abdominal pain.   Genitourinary:  Negative for difficulty urinating.   Musculoskeletal:  Positive for arthralgias.   Neurological:  Negative for dizziness,  light-headedness and headaches.   Psychiatric/Behavioral:  Negative for sleep disturbance.        Current Outpatient Medications on File Prior to Visit   Medication Sig    levalbuterol (XOPENEX HFA) 45 mcg/act inhaler     Multiple Vitamin (Daily-Nba Multivitamin) TABS Take 1 tablet by mouth daily    oxyCODONE (ROXICODONE) 10 MG TABS TAKE 1 TABLET (10 MG TOTAL) BY MOUTH EVERY 12 (TWELVE) HOURS. MAX DAILY AMOUNT: 20 MG    ALPRAZolam (XANAX) 0.5 mg tablet As needed    ascorbic acid (VITAMIN C) 500 MG tablet Take 1 tablet (500 mg total) by mouth daily    Azelastine HCl 137 MCG/SPRAY SOLN     budesonide-formoterol (SYMBICORT) 160-4.5 mcg/act inhaler Inhale 2 puffs 2 (two) times a day    Cholecalciferol (VITAMIN D3) 2000 units CHEW Chew 5,000 Units daily    Choline Fenofibrate (Fenofibric Acid) 135 MG CPDR 1 cap daily    cloNIDine (CATAPRES) 0.1 mg tablet Take 1 tablet (0.1 mg total) by mouth 2 (two) times a day    ferrous sulfate 324 (65 Fe) mg Take 1 tablet (324 mg total) by mouth daily before breakfast    fexofenadine (ALLEGRA) 180 MG tablet Take 180 mg by mouth daily    fluticasone (FLONASE) 50 mcg/act nasal spray INSTILL 1 SPRAY INTO EACH NOSTRIL ONCE DAILY    folic acid (FOLVITE) 1 mg tablet Take 1 tablet (1 mg total) by mouth daily    hydroxychloroquine (PLAQUENIL) 200 mg tablet Take 1 tablet (200 mg total) by mouth 2 (two) times a day with meals    levalbuterol (XOPENEX) 1.25 mg/3 mL nebulizer solution As needed    magnesium oxide (MAG-OX) 400 mg tablet Take 1 tablet by mouth in the morning    Multiple Vitamins-Minerals (multivitamin with minerals) tablet Take 1 tablet by mouth daily    nitroglycerin (NITROSTAT) 0.4 mg SL tablet Place 1 tablet (0.4 mg total) under the tongue every 5 (five) minutes as needed for chest pain    oxyCODONE-acetaminophen (PERCOCET)  mg per tablet     promethazine-dextromethorphan (PHENERGAN-DM) 6.25-15 mg/5 mL oral syrup TAKE 5 MILLILITERS BY MOUTH 4 TIMES A DAY AS NEEDED FOR  "COUGH       Objective     /76   Pulse 86   Temp 98.3 °F (36.8 °C)   Ht 5' 3\" (1.6 m)   Wt 69.9 kg (154 lb)   SpO2 96%   BMI 27.28 kg/m²     Physical Exam  Vitals reviewed.   Constitutional:       Appearance: Normal appearance. She is well-developed.   HENT:      Head: Normocephalic and atraumatic.   Eyes:      Conjunctiva/sclera: Conjunctivae normal.   Neck:      Thyroid: No thyromegaly.   Cardiovascular:      Rate and Rhythm: Normal rate and regular rhythm.      Heart sounds: Normal heart sounds.   Pulmonary:      [place]: Pulmonary effort is normal.      Breath sounds: Examination of the right-upper field reveals wheezing. Examination of the left-upper field reveals wheezing. Wheezing present.   Abdominal:      General: Bowel sounds are normal.      Palpations: Abdomen is soft.   Musculoskeletal:         General: Normal range of motion.      Right lower leg: No edema.      Left lower leg: No edema.   Skin:     General: Skin is warm and dry.   Neurological:      Mental Status: She is alert and oriented to person, place, and time.   Psychiatric:         Mood and Affect: Mood normal.         Behavior: Behavior normal.       ALETHEA Scanlon    "

## 2024-05-09 NOTE — ASSESSMENT & PLAN NOTE
She already received cardiac clearance.   Pre op labs reviewed- stable.  She is at an acceptable risk for surgery.

## 2024-05-09 NOTE — H&P (VIEW-ONLY)
Name: Kelsi Cabrera      : 1960      MRN: [id number]  Encounter Provider: ALETHEA Scanlon  Encounter Date: 2024   Encounter department: [facility] INTERNAL MEDICINE    Assessment & Plan     1. AVN (avascular necrosis of bone) ([facility])  -     Ambulatory referral to surgical optimization    2. Primary osteoarthritis of one hip, right  Assessment & Plan:  She already received cardiac clearance.   Pre op labs reviewed- stable.  She is at an acceptable risk for surgery.     Orders:  -     Ambulatory referral to surgical optimization    3. Essential hypertension  Assessment & Plan:  Stable  Continue current medication regimen       4. Other emphysema (HCC)  Assessment & Plan:  Mild wheezing on exam. She has been using her nebulizer once daily.   On symbicort  Sees pulmonary       5. Gastro-esophageal reflux disease without esophagitis  Assessment & Plan:  Off PPI, denies symptoms.       6. Situational anxiety  Assessment & Plan:  Stable  Takes xanax as needed, prescribed by her pulmonologist       7. Other hyperlipidemia  Assessment & Plan:  On fenofibrate.              Jacquelin Dolan is scheduled for right total hip arthroplasty on   She denies any issues with anesthesia in the past  She denies any chest pain.  She has been using her nebulizer once a day over the last week due to allergies. Slight wheezing at night. No shortness of breath.         Review of Systems   Constitutional:  Negative for activity change, appetite change, fatigue and unexpected weight change.   Eyes:  Negative for visual disturbance.   Respiratory:  Positive for wheezing. Negative for cough, chest tightness and shortness of breath.    Cardiovascular:  Negative for chest pain, palpitations and leg swelling.   Gastrointestinal:  Negative for abdominal pain.   Genitourinary:  Negative for difficulty urinating.   Musculoskeletal:  Positive for arthralgias.   Neurological:  Negative for dizziness,  light-headedness and headaches.   Psychiatric/Behavioral:  Negative for sleep disturbance.        Current Outpatient Medications on File Prior to Visit   Medication Sig    levalbuterol (XOPENEX HFA) 45 mcg/act inhaler     Multiple Vitamin (Daily-Nba Multivitamin) TABS Take 1 tablet by mouth daily    oxyCODONE (ROXICODONE) 10 MG TABS TAKE 1 TABLET (10 MG TOTAL) BY MOUTH EVERY 12 (TWELVE) HOURS. MAX DAILY AMOUNT: 20 MG    ALPRAZolam (XANAX) 0.5 mg tablet As needed    ascorbic acid (VITAMIN C) 500 MG tablet Take 1 tablet (500 mg total) by mouth daily    Azelastine HCl 137 MCG/SPRAY SOLN     budesonide-formoterol (SYMBICORT) 160-4.5 mcg/act inhaler Inhale 2 puffs 2 (two) times a day    Cholecalciferol (VITAMIN D3) 2000 units CHEW Chew 5,000 Units daily    Choline Fenofibrate (Fenofibric Acid) 135 MG CPDR 1 cap daily    cloNIDine (CATAPRES) 0.1 mg tablet Take 1 tablet (0.1 mg total) by mouth 2 (two) times a day    ferrous sulfate 324 (65 Fe) mg Take 1 tablet (324 mg total) by mouth daily before breakfast    fexofenadine (ALLEGRA) 180 MG tablet Take 180 mg by mouth daily    fluticasone (FLONASE) 50 mcg/act nasal spray INSTILL 1 SPRAY INTO EACH NOSTRIL ONCE DAILY    folic acid (FOLVITE) 1 mg tablet Take 1 tablet (1 mg total) by mouth daily    hydroxychloroquine (PLAQUENIL) 200 mg tablet Take 1 tablet (200 mg total) by mouth 2 (two) times a day with meals    levalbuterol (XOPENEX) 1.25 mg/3 mL nebulizer solution As needed    magnesium oxide (MAG-OX) 400 mg tablet Take 1 tablet by mouth in the morning    Multiple Vitamins-Minerals (multivitamin with minerals) tablet Take 1 tablet by mouth daily    nitroglycerin (NITROSTAT) 0.4 mg SL tablet Place 1 tablet (0.4 mg total) under the tongue every 5 (five) minutes as needed for chest pain    oxyCODONE-acetaminophen (PERCOCET)  mg per tablet     promethazine-dextromethorphan (PHENERGAN-DM) 6.25-15 mg/5 mL oral syrup TAKE 5 MILLILITERS BY MOUTH 4 TIMES A DAY AS NEEDED FOR  "COUGH       Objective     /76   Pulse 86   Temp 98.3 °F (36.8 °C)   Ht 5' 3\" (1.6 m)   Wt 69.9 kg (154 lb)   SpO2 96%   BMI 27.28 kg/m²     Physical Exam  Vitals reviewed.   Constitutional:       Appearance: Normal appearance. She is well-developed.   HENT:      Head: Normocephalic and atraumatic.   Eyes:      Conjunctiva/sclera: Conjunctivae normal.   Neck:      Thyroid: No thyromegaly.   Cardiovascular:      Rate and Rhythm: Normal rate and regular rhythm.      Heart sounds: Normal heart sounds.   Pulmonary:      Effort: Pulmonary effort is normal.      Breath sounds: Examination of the right-upper field reveals wheezing. Examination of the left-upper field reveals wheezing. Wheezing present.   Abdominal:      General: Bowel sounds are normal.      Palpations: Abdomen is soft.   Musculoskeletal:         General: Normal range of motion.      Right lower leg: No edema.      Left lower leg: No edema.   Skin:     General: Skin is warm and dry.   Neurological:      Mental Status: She is alert and oriented to person, place, and time.   Psychiatric:         Mood and Affect: Mood normal.         Behavior: Behavior normal.       ALETHEA Scanlon    "

## 2024-05-09 NOTE — ASSESSMENT & PLAN NOTE
Mild wheezing on exam. She has been using her nebulizer once daily.   On symbicort  Sees pulmonary

## 2024-05-10 ENCOUNTER — ANESTHESIA EVENT (OUTPATIENT)
Dept: PERIOP | Facility: HOSPITAL | Age: 64
End: 2024-05-10
Payer: COMMERCIAL

## 2024-05-10 ENCOUNTER — APPOINTMENT (OUTPATIENT)
Dept: PHYSICAL THERAPY | Facility: CLINIC | Age: 64
End: 2024-05-10
Payer: COMMERCIAL

## 2024-05-10 ENCOUNTER — LAB REQUISITION (OUTPATIENT)
Dept: LAB | Facility: HOSPITAL | Age: 64
End: 2024-05-10
Payer: COMMERCIAL

## 2024-05-10 DIAGNOSIS — M16.11 UNILATERAL PRIMARY OSTEOARTHRITIS, RIGHT HIP: ICD-10-CM

## 2024-05-10 LAB
ABO GROUP BLD: NORMAL
BLD GP AB SCN SERPL QL: NEGATIVE
RH BLD: POSITIVE
SPECIMEN EXPIRATION DATE: NORMAL

## 2024-05-16 ENCOUNTER — TELEMEDICINE (OUTPATIENT)
Dept: RHEUMATOLOGY | Facility: CLINIC | Age: 64
End: 2024-05-16
Payer: COMMERCIAL

## 2024-05-16 DIAGNOSIS — M06.9 RHEUMATOID ARTHRITIS INVOLVING MULTIPLE SITES, UNSPECIFIED WHETHER RHEUMATOID FACTOR PRESENT (HCC): Primary | ICD-10-CM

## 2024-05-16 DIAGNOSIS — Z79.899 HIGH RISK MEDICATION USE: ICD-10-CM

## 2024-05-16 PROCEDURE — 99204 OFFICE O/P NEW MOD 45 MIN: CPT | Performed by: INTERNAL MEDICINE

## 2024-05-16 RX ORDER — HYDROXYCHLOROQUINE SULFATE 200 MG/1
300 TABLET, FILM COATED ORAL DAILY
Qty: 45 TABLET | Refills: 6 | Status: SHIPPED | OUTPATIENT
Start: 2024-05-16 | End: 2024-05-30

## 2024-05-16 NOTE — PATIENT INSTRUCTIONS
Decrease hydroxychloroquine to one and a half tabs daily  Schedule DEXA scan    Return to clinic 12/26/24 at 10am at [place]

## 2024-05-16 NOTE — PROGRESS NOTES
RHEUMATOLOGY NEW PATIENT NOTE      Virtual Regular Visit    Verification of patient location:    Patient is located at Home in the following state in which I hold an active license [place]      Assessment/Plan:  63yo female presents to establish care for seropositive nonerosive RA, which has been stable and controlled on hydroxychloroquine. Was seeing Dr. Chanel for 18 years, last saw him in 6/2022.    Decrease hydroxychloroquine to one and a half tabs daily based on current weight  Schedule DEXA scan    Problem List Items Addressed This Visit       Rheumatoid arthritis involving multiple sites (HCC) - Primary     Other Visit Diagnoses       High risk medication use              High risk medication use - Benefits and risks of hydroxychloroquine, including but not limited to retinal toxicity, corneal deposits, gastrointestinal side effects, and headaches were discussed with the patient. The need for a regular eye exam to monitor for ocular toxicity while on this medication was also explained to the patient.     Return to clinic 12/26/24 at 10am at [place]        Reason for visit is establish care for seropositive RA   Chief Complaint   Patient presents with    Virtual Regular Visit          Encounter provider Ministerio Rangel MD      Recent Visits  No visits were found meeting these conditions.  Showing recent visits within past 7 days and meeting all other requirements  Future Appointments  No visits were found meeting these conditions.  Showing future appointments within next 150 days and meeting all other requirements       The patient was identified by name and date of birth. Kelsi Cabrera was informed that this is a telemedicine visit and that the visit is being conducted through the Epic Embedded platform. She agrees to proceed..  My office door was closed. No one else was in the room.  She acknowledged consent and understanding of privacy and security of the video platform. The patient has agreed to participate  and understands they can discontinue the visit at any time.    Patient is aware this is a billable service.     Subjective/HPI  Kelsi Cabrera is a 64 y.o. female with seropositive nonerosive RA presents to [facility] care. She was seeing Dr. Chanel for 18 years, last visit was 6/9/22. Will be getting right hip replacement next week due to avascular necrosis of the hip; is in a lot of pain therefore unable to come in-person for appt today. RA has been controlled on hydroxychloroquine.      Past Medical History:   Diagnosis Date    Anxiety     Arthritis     Asthma     Avascular necrosis of bone of hip, right (HCC)     Avascular necrosis of hip, right (HCC) 10/03/2017    Chronic obstructive pulmonary disease (HCC) 02/12/2019    COPD (chronic obstructive pulmonary disease) (HCC)     GERD (gastroesophageal reflux disease)     Hyperlipidemia     Hypertension     Hypoglycemia     Infectious viral hepatitis     Lumbar radicular pain     Lyme disease     Pneumonia     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HCC)        Past Surgical History:   Procedure Laterality Date    APPENDECTOMY      CARDIAC CATHETERIZATION Left 02/28/2024    Procedure: Cardiac Left Heart Cath;  Surgeon: Lizz Adorno MD;  Location: [facility];  Service: Cardiology    CARDIAC CATHETERIZATION N/A 02/28/2024    Procedure: Cardiac Coronary Angiogram;  Surgeon: Lizz Adorno MD;  Location: [facility];  Service: Cardiology    CARDIAC CATHETERIZATION  02/28/2024    Procedure: Cardiac catheterization;  Surgeon: Lizz Adorno MD;  Location: [facility];  Service: Cardiology    CARPAL TUNNEL RELEASE      CHOLECYSTECTOMY      COLONOSCOPY      LA ARTHRP ACETBLR/PROX FEM PROSTC AGRFT/ALGRFT Right 5/30/2024    Procedure: ARTHROPLASTY HIP TOTAL ANTERIOR;  Surgeon: Kathya Nye MD;  Location:  MAIN OR;  Service: Orthopedics    SINUS SURGERY         Current Outpatient Medications   Medication Sig Dispense Refill    acetaminophen  (TYLENOL) 650 mg CR tablet Take 1 tablet (650 mg total) by mouth every 8 (eight) hours as needed for mild pain 30 tablet 0    ALPRAZolam (XANAX) 0.5 mg tablet As needed      apixaban (Eliquis) 2.5 mg Take 1 tablet (2.5 mg total) by mouth 2 (two) times a day for 28 days Do not start taking medication until after total joint arthroplasty 56 tablet 0    ascorbic acid (VITAMIN C) 500 MG tablet Take 1 tablet (500 mg total) by mouth daily 30 tablet 0    Azelastine HCl 137 MCG/SPRAY SOLN       budesonide-formoterol (SYMBICORT) 160-4.5 mcg/act inhaler Inhale 2 puffs 2 (two) times a day      Cholecalciferol (VITAMIN D3) 2000 units CHEW Chew 5,000 Units daily      Choline Fenofibrate (Fenofibric Acid) 135 MG CPDR 1 cap daily 90 capsule 3    cloNIDine (CATAPRES) 0.1 mg tablet Take 1 tablet (0.1 mg total) by mouth 2 (two) times a day 180 tablet 0    docusate sodium (COLACE) 100 mg capsule Take 1 capsule (100 mg total) by mouth 2 (two) times a day 10 capsule 0    ferrous sulfate 324 (65 Fe) mg Take 1 tablet (324 mg total) by mouth daily before breakfast 30 tablet 0    fexofenadine (ALLEGRA) 180 MG tablet Take 180 mg by mouth daily      fluticasone (FLONASE) 50 mcg/act nasal spray INSTILL 1 SPRAY INTO EACH NOSTRIL ONCE DAILY 48 g 1    folic acid (FOLVITE) 1 mg tablet TAKE 1 TABLET BY MOUTH EVERY DAY 90 tablet 1    gabapentin (Neurontin) 100 mg capsule Take 1 capsule (100 mg total) by mouth 3 (three) times a day 90 capsule 0    levalbuterol (XOPENEX HFA) 45 mcg/act inhaler       levalbuterol (XOPENEX) 1.25 mg/3 mL nebulizer solution As needed      magnesium oxide (MAG-OX) 400 mg tablet Take 1 tablet by mouth in the morning      Multiple Vitamin (Daily-Nba Multivitamin) TABS Take 1 tablet by mouth daily      Multiple Vitamins-Minerals (multivitamin with minerals) tablet Take 1 tablet by mouth daily 30 tablet 0    nitroglycerin (NITROSTAT) 0.4 mg SL tablet Place 1 tablet (0.4 mg total) under the tongue every 5 (five) minutes as  needed for chest pain 30 tablet 3    ondansetron (ZOFRAN) 4 mg tablet Take 1 tablet (4 mg total) by mouth every 8 (eight) hours as needed for nausea or vomiting 5 tablet 0    oxyCODONE (ROXICODONE) 5 immediate release tablet Take 1 tablets every 6 hrs as needed for pain control 30 tablet 0    promethazine-dextromethorphan (PHENERGAN-DM) 6.25-15 mg/5 mL oral syrup TAKE 5 MILLILITERS BY MOUTH 4 TIMES A DAY AS NEEDED FOR COUGH       No current facility-administered medications for this visit.        Allergies   Allergen Reactions    Aspirin Anaphylaxis    Iodine - Food Allergy Anaphylaxis    Penicillins Anaphylaxis    Pork Allergy - Food Allergy Other (See Comments)     Does not eat pork for Taoist reasons    Soybean Oil - Food Allergy Other (See Comments)    Statins Myalgia       Review of Systems   Musculoskeletal:  Positive for arthralgias. Negative for joint swelling.   Skin:  Negative for rash.       Video Exam    There were no vitals filed for this visit.    Physical Exam  Constitutional:       General: She is not in acute distress.  HENT:      Head: Normocephalic and atraumatic.   Eyes:      Conjunctiva/sclera: Conjunctivae normal.   Pulmonary:      Effort: Pulmonary effort is normal. No respiratory distress.   Musculoskeletal:      Cervical back: Neck supple.   Skin:     Coloration: Skin is not pale.   Neurological:      Mental Status: She is alert. Mental status is at baseline.   Psychiatric:         Mood and Affect: Mood normal.         Behavior: Behavior normal.          Visit Time  Total Visit Duration: 21 minutes

## 2024-05-17 ENCOUNTER — EVALUATION (OUTPATIENT)
Dept: PHYSICAL THERAPY | Facility: CLINIC | Age: 64
End: 2024-05-17
Payer: COMMERCIAL

## 2024-05-17 DIAGNOSIS — M16.11 PRIMARY OSTEOARTHRITIS OF ONE HIP, RIGHT: ICD-10-CM

## 2024-05-17 DIAGNOSIS — M87.00 AVN (AVASCULAR NECROSIS OF BONE) (HCC): ICD-10-CM

## 2024-05-17 DIAGNOSIS — Z01.818 PREOPERATIVE TESTING: ICD-10-CM

## 2024-05-17 DIAGNOSIS — M25.551 PAIN IN RIGHT HIP: Primary | ICD-10-CM

## 2024-05-17 PROCEDURE — 97161 PT EVAL LOW COMPLEX 20 MIN: CPT

## 2024-05-17 PROCEDURE — 97110 THERAPEUTIC EXERCISES: CPT

## 2024-05-17 NOTE — PROGRESS NOTES
"PT Evaluation     Today's date: 2024  Patient name: Kelsi Cabrera  : 1960  MRN: [id number]  Referring provider: Kathya Nye MD  Dx:   Encounter Diagnosis     ICD-10-CM    1. Pain in right hip  M25.551 Ambulatory referral to Physical Therapy      2. AVN (avascular necrosis of bone) ([facility])  M87.00 Ambulatory referral to Physical Therapy      3. Preoperative testing  Z01.818 Ambulatory referral to Physical Therapy      4. Primary osteoarthritis of one hip, right  M16.11 Ambulatory referral to Physical Therapy                     Assessment  Impairments: abnormal gait, abnormal or restricted ROM, activity intolerance, impaired balance, impaired physical strength, lacks appropriate home exercise program, pain with function and weight-bearing intolerance  Symptom irritability: moderate    Assessment details: Kelsi Cabrera is a 64 y.o. female presenting to physical therapy for a pre-operative initial evaluation as she will be undergoing a right RICKI 24. The patient demonstrates decreased and painful right hip ROM, decreased hip strength, and limited tolerance to all weightbearing activities due to pain. These deficits have limited the patient's ability to complete ADLs, avocational responsibilities, and recreational activities. This patient would benefit from OP PT services to address their impairments and functional limitations to maximize functional mobility. The patient was educated extensively on post operative status and expectations for her mobility, home set up and modifications, pain/swelling management with ice, elevation, and medications as prescribed. She was  provided a HEP and demonstrated/verbalized understanding all education.    Understanding of Dx/Px/POC: excellent     Prognosis: good    Goals  STG to be achieved in 4 weeks:   The patient will report a decrease in right hip \"at worst\" subjective pain rating score to at least 5/10 to allow for improved tolerance for weightbearing " activities.   The patient will increase right hip flexion AROM to at least 90 degrees to allow for improved functional mobility.   The patient will increase right hip flexion MMT score to at least 3+/5 to allow for improved functional mobility.     LTG to be achieved by DC:   The patient will be independent in comprehensive HEP.   The patient will report no pain with usual activities.   The patient will improve all right hip AROM to WFL to allow for improved functional mobility.   The patient will increase all right hip MMT score to WFL to allow for improved functional mobility.   The patient will be able to ambulate at least one mile without AD, pain, or difficulty to allow completion of recreational outdoor walks.  The patient will be able to complete one full flight of stairs without AD, pain, or difficulty to allow access to home.  The patient will be able to complete her gardening without AD, pain, or difficulty.     Plan  Patient would benefit from: skilled physical therapy  Planned modality interventions: thermotherapy: hydrocollator packs, TENS and cryotherapy    Planned therapy interventions: manual therapy, joint mobilization, balance/weight bearing training, neuromuscular re-education, patient education, flexibility, strengthening, stretching, therapeutic activities, therapeutic exercise, gait training and home exercise program    Frequency: 2x week  Duration in weeks: 12  Plan of Care beginning date: 5/17/2024  Plan of Care expiration date: 8/9/2024  Treatment plan discussed with: patient      Subjective Evaluation    History of Present Illness  Mechanism of injury: Kelsi presents for a pre-operative IE as she will be undergoing a R RICKI on 5/23/24. She reports that her hip is very painful to stand/walk for any period of time. She says that she finds herself grabbing/holding onto furniture when she walks to stabilize herself. She says that she cannot find a comfortable position to sleep in at night  either.     She lives at home with her , daughter, son, daughter in law, and their children. Her son works from home every day, her daughter works from home 2 days a week, and her  works 1 day a week from home so there will always be someone with her at home. She lives in multiple level home, however her bedroom and bathroom are on the first floor of her home. She has a walk in shower with a small ledge to step into it and has a built in bench to sit on. She has one step to get into her home, without any hand railings. She does have a RW at home and is very sad to think about her walking with it after surgery.     Patient Goals  Patient goals for therapy: decreased pain, increased strength, independence with ADLs/IADLs and increased motion  Patient goal: to be able to walk a lot again and take care of garden  Pain  Current pain ratin  At best pain ratin  At worst pain rating: 10  Location: R hip  Quality: sharp and dull ache  Relieving factors: change in position and rest  Aggravating factors: standing, walking and stair climbing  Progression: worsening    Social Support  Steps to enter house: yes  Stairs in house: yes   Lives in: multiple-level home  Lives with: spouse, adult children and young children    Employment status: not working  Treatments  Previous treatment: medication  Current treatment: physical therapy      Objective     Active Range of Motion   Left Hip   Flexion: 98 degrees   Abduction: 20 degrees   External rotation (90/90): 15 degrees   Internal rotation (90/90): 15 degrees     Right Hip   Flexion: 90 degrees with pain  Abduction: 10 degrees with pain  External rotation (90/90): 10 degrees with pain  Internal rotation (90/90): 10 degrees with pain    Passive Range of Motion     Right Hip   Flexion: 90 degrees with pain  Abduction: 10 degrees with pain  External rotation (90/90): 10 degrees   Internal rotation (90/90): 10 degrees with pain    Strength/Myotome Testing     Left  Hip   Planes of Motion   Flexion: 4  Abduction: 4  External rotation: 4  Internal rotation: 4    Right Hip   Planes of Motion   Flexion: 2+  Abduction: 2+  External rotation: 3  Internal rotation: 3    Left Knee   Flexion: 5  Extension: 5    Right Knee   Flexion: 4  Extension: 5    Left Ankle/Foot   Dorsiflexion: 5  Plantar flexion: 5    Right Ankle/Foot   Dorsiflexion: 5  Plantar flexion: 5    Ambulation     Ambulation: Level Surfaces   Ambulation without assistive device: independent    Observational Gait   Gait: antalgic   Decreased right stance time.              Precautions: R Anterior RICKI 5/23/24, COPD, HTN,  Access Code: [email or web address]  POC expires Unit limit Auth  expiration date PT/OT + Visit Limit?   8/9/24 N/A Pending 60 per cly primary- 4 used                 Visit/Unit Tracking  AUTH Status:  Date 5/17              Pending Used 1               Remaining                   Manuals 5/17            R hip PROM   *Anterior Precautions             R hamstring, adductor stretches                                       Neuro Re-Ed             Glute sets             Hip abd with TB and glute set             Hip add iso with bolster                                                                 Ther Ex             Nustep with UE for hip ROM and LE strengthening             Heel slides with strap HEP            Supine hip abd slides HEP            LAQ HEP            Standing hamstring curls             Standing marches             Standing hip abd             Ankle pumps HEP            Seated hamstring and calf stretches  HEP                         Pt education PT POC, HEP, post op status,            Ther Activity             TG squats             Fwd step ups             Gait Training                                       Modalities

## 2024-05-22 NOTE — ANESTHESIA PREPROCEDURE EVALUATION
Procedure:  ARTHROPLASTY HIP TOTAL ANTERIOR (Right: Hip)    Relevant Problems   CARDIO   (+) Essential hypertension   (+) Migraine   (+) Other hyperlipidemia      GI/HEPATIC   (+) Gastro-esophageal reflux disease without esophagitis      MUSCULOSKELETAL   (+) Cervical spondylosis   (+) Lumbar spondylosis   (+) Primary osteoarthritis of left hip   (+) Primary osteoarthritis of one hip, right   (+) Rheumatoid arthritis involving multiple sites (HCC)      NEURO/PSYCH   (+) Bilateral occipital neuralgia   (+) Chronic pain syndrome   (+) Chronic tension-type headache, not intractable   (+) Migraine   (+) Situational anxiety      PULMONARY   (+) COPD (chronic obstructive pulmonary disease) (HCC)   (+) Moderate persistent asthma with acute exacerbation      MRI-Lumbar (03/2019):  FINDINGS:     VERTEBRAL BODIES:  Minor straightening of normal lumbar lordosis.  Very minor left convex L2-3 apex scoliosis.  Leftward translation of L3 and L4.  Normal marrow signal is identified within the visualized bony structures.  No discrete marrow lesion.     SACRUM:  Normal signal within the sacrum. No evidence of insufficiency or stress fracture.     DISTAL CORD AND CONUS:  Normal size and signal within the distal cord and conus.  Conus terminates at L2.     PARASPINAL SOFT TISSUES:  Paraspinal soft tissues are unremarkable.     LOWER THORACIC DISC SPACES:  Normal disc height and signal.  No disc herniation, canal stenosis or foraminal narrowing.     LUMBAR DISC SPACES:     L1-L2:  Circumferential bulge, right greater than left facet arthrosis     L2-L3:  Circumferential bulge, marginal osteophytes, mild bilateral facet arthrosis     L3-L4:  Circumferential bulge, marginal osteophytes, minor facet arthrosis     L4-L5:  Moderate bilateral facet arthrosis, circumferential bulge.     L5-S1:  Moderate bilateral facet arthrosis, circumferential bulge.     IMPRESSION:     Relatively stable multilevel degenerative changes which do not result in  nerve root compression.     Lab Results   Component Value Date    WBC 9.96 05/09/2024    HGB 15.4 05/09/2024    HCT 47.5 (H) 05/09/2024    MCV 92 05/09/2024     05/09/2024         Physical Exam    Airway    Mallampati score: I  TM Distance: >3 FB  Neck ROM: full     Dental    upper dentures and lower dentures    Cardiovascular  Rhythm: regular, Rate: normal    Pulmonary   Breath sounds clear to auscultation    Other Findings  Intercisor Distance > 3cm    post-pubertal.      Anesthesia Plan  ASA Score- 3     Anesthesia Type- spinal with ASA Monitors.         Additional Monitors:     Airway Plan:     Comment: Discussed benefits/risks of neuraxial anesthesia including possibility of discomfort at site of injection, post-dural puncture headache, block failure, and more rare complications such as bleeding, infection, and permanent nerve damage. If block fails or there is difficulty placing neuraxial block, general anesthesia was discussed as back-up plan. Patient understands and wishes to proceed. All questions answered.   .       Plan Factors-Exercise tolerance (METS): >4 METS.    Chart reviewed. EKG reviewed.  Existing labs reviewed. Patient summary reviewed.    Patient is a current smoker.  Patient instructed to abstain from smoking on day of procedure. Patient smoked on day of surgery.    There is medical exclusion for perioperative obstructive sleep apnea risk education.        Induction- intravenous.    Postoperative Plan- Plan for postoperative opioid use.         Informed Consent- Anesthetic plan and risks discussed with patient.  I personally reviewed this patient with the CRNA. Discussed and agreed on the Anesthesia Plan with the CRNA..

## 2024-05-23 ENCOUNTER — ANESTHESIA (OUTPATIENT)
Dept: PERIOP | Facility: HOSPITAL | Age: 64
End: 2024-05-23
Payer: COMMERCIAL

## 2024-05-30 ENCOUNTER — HOSPITAL ENCOUNTER (OUTPATIENT)
Facility: HOSPITAL | Age: 64
Setting detail: OUTPATIENT SURGERY
Discharge: HOME | End: 2024-05-30
Attending: ORTHOPAEDIC SURGERY | Admitting: ORTHOPAEDIC SURGERY
Payer: COMMERCIAL

## 2024-05-30 ENCOUNTER — APPOINTMENT (OUTPATIENT)
Dept: RADIOLOGY | Facility: HOSPITAL | Age: 64
End: 2024-05-30
Payer: COMMERCIAL

## 2024-05-30 VITALS
TEMPERATURE: 97.6 F | HEART RATE: 69 BPM | WEIGHT: 154 LBS | DIASTOLIC BLOOD PRESSURE: 63 MMHG | BODY MASS INDEX: 27.29 KG/M2 | RESPIRATION RATE: 14 BRPM | SYSTOLIC BLOOD PRESSURE: 145 MMHG | OXYGEN SATURATION: 97 % | HEIGHT: 63 IN

## 2024-05-30 DIAGNOSIS — Z96.641 STATUS POST TOTAL HIP REPLACEMENT, RIGHT: ICD-10-CM

## 2024-05-30 DIAGNOSIS — M87.00 AVN (AVASCULAR NECROSIS OF BONE) (HCC): ICD-10-CM

## 2024-05-30 DIAGNOSIS — M25.551 PAIN IN RIGHT HIP: ICD-10-CM

## 2024-05-30 DIAGNOSIS — Z96.641 S/P TOTAL RIGHT HIP ARTHROPLASTY: Primary | ICD-10-CM

## 2024-05-30 DIAGNOSIS — Z01.818 PREOPERATIVE TESTING: ICD-10-CM

## 2024-05-30 DIAGNOSIS — M16.11 PRIMARY OSTEOARTHRITIS OF ONE HIP, RIGHT: ICD-10-CM

## 2024-05-30 LAB
ABO GROUP BLD: NORMAL
GLUCOSE SERPL-MCNC: 90 MG/DL (ref 65–140)
RH BLD: POSITIVE

## 2024-05-30 PROCEDURE — 0054T BONE SRGRY CMPTR FLUOR IMAGE: CPT | Performed by: ORTHOPAEDIC SURGERY

## 2024-05-30 PROCEDURE — C1776 JOINT DEVICE (IMPLANTABLE): HCPCS | Performed by: ORTHOPAEDIC SURGERY

## 2024-05-30 PROCEDURE — 97530 THERAPEUTIC ACTIVITIES: CPT

## 2024-05-30 PROCEDURE — 97535 SELF CARE MNGMENT TRAINING: CPT

## 2024-05-30 PROCEDURE — 97167 OT EVAL HIGH COMPLEX 60 MIN: CPT

## 2024-05-30 PROCEDURE — 97110 THERAPEUTIC EXERCISES: CPT

## 2024-05-30 PROCEDURE — 27130 TOTAL HIP ARTHROPLASTY: CPT | Performed by: PHYSICIAN ASSISTANT

## 2024-05-30 PROCEDURE — C1789 PROSTHESIS, BREAST, IMP: HCPCS | Performed by: ORTHOPAEDIC SURGERY

## 2024-05-30 PROCEDURE — 97163 PT EVAL HIGH COMPLEX 45 MIN: CPT

## 2024-05-30 PROCEDURE — 27130 TOTAL HIP ARTHROPLASTY: CPT | Performed by: ORTHOPAEDIC SURGERY

## 2024-05-30 PROCEDURE — 82948 REAGENT STRIP/BLOOD GLUCOSE: CPT

## 2024-05-30 PROCEDURE — 97116 GAIT TRAINING THERAPY: CPT

## 2024-05-30 PROCEDURE — 73501 X-RAY EXAM HIP UNI 1 VIEW: CPT

## 2024-05-30 DEVICE — PINNACLE GRIPTION ACETABULAR SHELL SECTOR 48MM OD
Type: IMPLANTABLE DEVICE | Site: HIP | Status: FUNCTIONAL
Brand: PINNACLE GRIPTION

## 2024-05-30 DEVICE — PINNACLE HIP SOLUTIONS ALTRX POLYETHYLENE ACETABULAR LINER NEUTRAL 32MM ID 48MM OD
Type: IMPLANTABLE DEVICE | Site: HIP | Status: FUNCTIONAL
Brand: PINNACLE ALTRX

## 2024-05-30 DEVICE — ACTIS DUOFIX HIP PROSTHESIS (FEMORAL STEM 12/14 TAPER CEMENTLESS SIZE 2 STD COLLAR)  CE
Type: IMPLANTABLE DEVICE | Site: HIP | Status: FUNCTIONAL
Brand: ACTIS

## 2024-05-30 DEVICE — BIOLOX DELTA CERAMIC FEMORAL HEAD 32MM DIA +1 12/14 TAPER
Type: IMPLANTABLE DEVICE | Site: HIP | Status: FUNCTIONAL
Brand: BIOLOX DELTA

## 2024-05-30 RX ORDER — PROPOFOL 10 MG/ML
INJECTION, EMULSION INTRAVENOUS AS NEEDED
Status: DISCONTINUED | OUTPATIENT
Start: 2024-05-30 | End: 2024-05-30

## 2024-05-30 RX ORDER — TRANEXAMIC ACID 10 MG/ML
1000 INJECTION, SOLUTION INTRAVENOUS ONCE
Status: COMPLETED | OUTPATIENT
Start: 2024-05-30 | End: 2024-05-30

## 2024-05-30 RX ORDER — LEVALBUTEROL INHALATION SOLUTION 1.25 MG/3ML
1.25 SOLUTION RESPIRATORY (INHALATION) EVERY 8 HOURS PRN
Status: CANCELLED | OUTPATIENT
Start: 2024-05-30

## 2024-05-30 RX ORDER — SODIUM CHLORIDE, SODIUM LACTATE, POTASSIUM CHLORIDE, CALCIUM CHLORIDE 600; 310; 30; 20 MG/100ML; MG/100ML; MG/100ML; MG/100ML
125 INJECTION, SOLUTION INTRAVENOUS CONTINUOUS
Status: DISCONTINUED | OUTPATIENT
Start: 2024-05-30 | End: 2024-05-30 | Stop reason: HOSPADM

## 2024-05-30 RX ORDER — BUPIVACAINE HYDROCHLORIDE 7.5 MG/ML
INJECTION, SOLUTION INTRASPINAL AS NEEDED
Status: DISCONTINUED | OUTPATIENT
Start: 2024-05-30 | End: 2024-05-30

## 2024-05-30 RX ORDER — LIDOCAINE HYDROCHLORIDE AND EPINEPHRINE 10; 10 MG/ML; UG/ML
INJECTION, SOLUTION INFILTRATION; PERINEURAL AS NEEDED
Status: DISCONTINUED | OUTPATIENT
Start: 2024-05-30 | End: 2024-05-30 | Stop reason: HOSPADM

## 2024-05-30 RX ORDER — FENTANYL CITRATE 50 UG/ML
INJECTION, SOLUTION INTRAMUSCULAR; INTRAVENOUS AS NEEDED
Status: DISCONTINUED | OUTPATIENT
Start: 2024-05-30 | End: 2024-05-30

## 2024-05-30 RX ORDER — ONDANSETRON 2 MG/ML
4 INJECTION INTRAMUSCULAR; INTRAVENOUS EVERY 6 HOURS PRN
Status: CANCELLED | OUTPATIENT
Start: 2024-05-30

## 2024-05-30 RX ORDER — ACETAMINOPHEN 325 MG/1
975 TABLET ORAL ONCE
Status: COMPLETED | OUTPATIENT
Start: 2024-05-30 | End: 2024-05-30

## 2024-05-30 RX ORDER — CLONIDINE HYDROCHLORIDE 0.1 MG/1
0.1 TABLET ORAL 2 TIMES DAILY
Status: CANCELLED | OUTPATIENT
Start: 2024-05-30

## 2024-05-30 RX ORDER — CEFAZOLIN SODIUM 2 G/50ML
2000 SOLUTION INTRAVENOUS ONCE
Status: COMPLETED | OUTPATIENT
Start: 2024-05-30 | End: 2024-05-30

## 2024-05-30 RX ORDER — BUDESONIDE AND FORMOTEROL FUMARATE DIHYDRATE 160; 4.5 UG/1; UG/1
2 AEROSOL RESPIRATORY (INHALATION) 2 TIMES DAILY
Status: CANCELLED | OUTPATIENT
Start: 2024-05-30

## 2024-05-30 RX ORDER — MIDAZOLAM HYDROCHLORIDE 2 MG/2ML
INJECTION, SOLUTION INTRAMUSCULAR; INTRAVENOUS AS NEEDED
Status: DISCONTINUED | OUTPATIENT
Start: 2024-05-30 | End: 2024-05-30

## 2024-05-30 RX ORDER — FLUTICASONE PROPIONATE 50 MCG
1 SPRAY, SUSPENSION (ML) NASAL DAILY
Status: CANCELLED | OUTPATIENT
Start: 2024-05-30

## 2024-05-30 RX ORDER — OXYCODONE HYDROCHLORIDE 5 MG/1
5 TABLET ORAL EVERY 4 HOURS PRN
Status: DISCONTINUED | OUTPATIENT
Start: 2024-05-30 | End: 2024-05-30 | Stop reason: HOSPADM

## 2024-05-30 RX ORDER — FEXOFENADINE HCL 180 MG/1
180 TABLET ORAL DAILY
Status: CANCELLED | OUTPATIENT
Start: 2024-05-30

## 2024-05-30 RX ORDER — ONDANSETRON 2 MG/ML
INJECTION INTRAMUSCULAR; INTRAVENOUS AS NEEDED
Status: DISCONTINUED | OUTPATIENT
Start: 2024-05-30 | End: 2024-05-30

## 2024-05-30 RX ORDER — ALPRAZOLAM 0.5 MG/1
0.5 TABLET ORAL
Status: CANCELLED | OUTPATIENT
Start: 2024-05-30

## 2024-05-30 RX ORDER — SENNOSIDES 8.6 MG
650 CAPSULE ORAL EVERY 8 HOURS PRN
Qty: 30 TABLET | Refills: 0 | Status: SHIPPED | OUTPATIENT
Start: 2024-05-30 | End: 2024-06-29

## 2024-05-30 RX ORDER — OXYCODONE HYDROCHLORIDE 5 MG/1
TABLET ORAL
Qty: 30 TABLET | Refills: 0 | Status: SHIPPED | OUTPATIENT
Start: 2024-05-30 | End: 2024-06-03 | Stop reason: SDUPTHER

## 2024-05-30 RX ORDER — ONDANSETRON 4 MG/1
4 TABLET, FILM COATED ORAL EVERY 8 HOURS PRN
Qty: 5 TABLET | Refills: 0 | Status: SHIPPED | OUTPATIENT
Start: 2024-05-30 | End: 2024-06-03 | Stop reason: SDUPTHER

## 2024-05-30 RX ORDER — DEXAMETHASONE SODIUM PHOSPHATE 10 MG/ML
INJECTION, SOLUTION INTRAMUSCULAR; INTRAVENOUS AS NEEDED
Status: DISCONTINUED | OUTPATIENT
Start: 2024-05-30 | End: 2024-05-30

## 2024-05-30 RX ORDER — MAGNESIUM HYDROXIDE 1200 MG/15ML
LIQUID ORAL AS NEEDED
Status: DISCONTINUED | OUTPATIENT
Start: 2024-05-30 | End: 2024-05-30 | Stop reason: HOSPADM

## 2024-05-30 RX ORDER — HYDROMORPHONE HCL/PF 1 MG/ML
0.5 SYRINGE (ML) INJECTION EVERY 2 HOUR PRN
Status: CANCELLED | OUTPATIENT
Start: 2024-05-30 | End: 2024-06-01

## 2024-05-30 RX ORDER — CHLORHEXIDINE GLUCONATE ORAL RINSE 1.2 MG/ML
15 SOLUTION DENTAL ONCE
Status: COMPLETED | OUTPATIENT
Start: 2024-05-30 | End: 2024-05-30

## 2024-05-30 RX ORDER — DEXTROMETHORPHAN HYDROBROMIDE AND PROMETHAZINE HYDROCHLORIDE 15; 6.25 MG/5ML; MG/5ML
5 SYRUP ORAL EVERY 6 HOURS PRN
Status: CANCELLED | OUTPATIENT
Start: 2024-05-30

## 2024-05-30 RX ORDER — OXYCODONE HYDROCHLORIDE 5 MG/1
10 TABLET ORAL EVERY 4 HOURS PRN
Status: DISCONTINUED | OUTPATIENT
Start: 2024-05-30 | End: 2024-05-30 | Stop reason: HOSPADM

## 2024-05-30 RX ORDER — ENOXAPARIN SODIUM 100 MG/ML
40 INJECTION SUBCUTANEOUS DAILY
Status: CANCELLED | OUTPATIENT
Start: 2024-05-30

## 2024-05-30 RX ORDER — SODIUM CHLORIDE, SODIUM LACTATE, POTASSIUM CHLORIDE, CALCIUM CHLORIDE 600; 310; 30; 20 MG/100ML; MG/100ML; MG/100ML; MG/100ML
75 INJECTION, SOLUTION INTRAVENOUS CONTINUOUS
Status: CANCELLED | OUTPATIENT
Start: 2024-05-30

## 2024-05-30 RX ORDER — METOCLOPRAMIDE HYDROCHLORIDE 5 MG/ML
10 INJECTION INTRAMUSCULAR; INTRAVENOUS ONCE AS NEEDED
Status: DISCONTINUED | OUTPATIENT
Start: 2024-05-30 | End: 2024-05-30 | Stop reason: HOSPADM

## 2024-05-30 RX ORDER — NITROGLYCERIN 0.4 MG/1
0.4 TABLET SUBLINGUAL
Status: CANCELLED | OUTPATIENT
Start: 2024-05-30

## 2024-05-30 RX ORDER — DOCUSATE SODIUM 100 MG/1
100 CAPSULE, LIQUID FILLED ORAL 2 TIMES DAILY
Qty: 10 CAPSULE | Refills: 0 | Status: SHIPPED | OUTPATIENT
Start: 2024-05-30 | End: 2024-06-06 | Stop reason: SDUPTHER

## 2024-05-30 RX ORDER — CELECOXIB 200 MG/1
200 CAPSULE ORAL ONCE
Status: COMPLETED | OUTPATIENT
Start: 2024-05-30 | End: 2024-05-30

## 2024-05-30 RX ORDER — FENTANYL CITRATE/PF 50 MCG/ML
50 SYRINGE (ML) INJECTION
Status: DISCONTINUED | OUTPATIENT
Start: 2024-05-30 | End: 2024-05-30 | Stop reason: HOSPADM

## 2024-05-30 RX ORDER — DOCUSATE SODIUM 100 MG/1
100 CAPSULE, LIQUID FILLED ORAL 2 TIMES DAILY
Status: CANCELLED | OUTPATIENT
Start: 2024-05-30

## 2024-05-30 RX ORDER — HYDROMORPHONE HCL/PF 1 MG/ML
0.5 SYRINGE (ML) INJECTION
Status: DISCONTINUED | OUTPATIENT
Start: 2024-05-30 | End: 2024-05-30 | Stop reason: HOSPADM

## 2024-05-30 RX ORDER — LORAZEPAM 2 MG/ML
1 INJECTION INTRAMUSCULAR
Status: DISCONTINUED | OUTPATIENT
Start: 2024-05-30 | End: 2024-05-30 | Stop reason: HOSPADM

## 2024-05-30 RX ORDER — CALCIUM CARBONATE 500 MG/1
1000 TABLET, CHEWABLE ORAL DAILY PRN
Status: CANCELLED | OUTPATIENT
Start: 2024-05-30

## 2024-05-30 RX ORDER — PROPOFOL 10 MG/ML
INJECTION, EMULSION INTRAVENOUS CONTINUOUS PRN
Status: DISCONTINUED | OUTPATIENT
Start: 2024-05-30 | End: 2024-05-30

## 2024-05-30 RX ORDER — LIDOCAINE HYDROCHLORIDE 10 MG/ML
INJECTION, SOLUTION EPIDURAL; INFILTRATION; INTRACAUDAL; PERINEURAL AS NEEDED
Status: DISCONTINUED | OUTPATIENT
Start: 2024-05-30 | End: 2024-05-30

## 2024-05-30 RX ORDER — CEFAZOLIN SODIUM 1 G/50ML
1000 SOLUTION INTRAVENOUS EVERY 8 HOURS
Status: CANCELLED | OUTPATIENT
Start: 2024-05-30 | End: 2024-05-31

## 2024-05-30 RX ORDER — ACETAMINOPHEN 325 MG/1
975 TABLET ORAL EVERY 8 HOURS
Status: CANCELLED | OUTPATIENT
Start: 2024-05-30

## 2024-05-30 RX ADMIN — PROPOFOL 80 MCG/KG/MIN: 10 INJECTION, EMULSION INTRAVENOUS at 07:51

## 2024-05-30 RX ADMIN — HYDROMORPHONE HYDROCHLORIDE 0.5 MG: 1 INJECTION, SOLUTION INTRAMUSCULAR; INTRAVENOUS; SUBCUTANEOUS at 09:40

## 2024-05-30 RX ADMIN — CELECOXIB 200 MG: 200 CAPSULE ORAL at 07:21

## 2024-05-30 RX ADMIN — FENTANYL CITRATE 50 MCG: 50 INJECTION INTRAMUSCULAR; INTRAVENOUS at 09:35

## 2024-05-30 RX ADMIN — BUPIVACAINE HYDROCHLORIDE IN DEXTROSE 1.4 ML: 7.5 INJECTION, SOLUTION SUBARACHNOID at 07:48

## 2024-05-30 RX ADMIN — CEFAZOLIN SODIUM 2000 MG: 2 SOLUTION INTRAVENOUS at 07:31

## 2024-05-30 RX ADMIN — MIDAZOLAM 2 MG: 1 INJECTION INTRAMUSCULAR; INTRAVENOUS at 07:32

## 2024-05-30 RX ADMIN — OXYCODONE 10 MG: 5 TABLET ORAL at 13:13

## 2024-05-30 RX ADMIN — TRANEXAMIC ACID 1000 MG: 10 INJECTION, SOLUTION INTRAVENOUS at 07:51

## 2024-05-30 RX ADMIN — PHENYLEPHRINE HYDROCHLORIDE 10 MCG/MIN: 10 INJECTION INTRAVENOUS at 08:07

## 2024-05-30 RX ADMIN — SODIUM CHLORIDE, SODIUM LACTATE, POTASSIUM CHLORIDE, AND CALCIUM CHLORIDE 125 ML/HR: .6; .31; .03; .02 INJECTION, SOLUTION INTRAVENOUS at 07:17

## 2024-05-30 RX ADMIN — FENTANYL CITRATE 100 MCG: 50 INJECTION INTRAMUSCULAR; INTRAVENOUS at 07:45

## 2024-05-30 RX ADMIN — HYDROMORPHONE HYDROCHLORIDE 0.5 MG: 1 INJECTION, SOLUTION INTRAMUSCULAR; INTRAVENOUS; SUBCUTANEOUS at 09:48

## 2024-05-30 RX ADMIN — DEXAMETHASONE SODIUM PHOSPHATE 8 MG: 10 INJECTION, SOLUTION INTRAMUSCULAR; INTRAVENOUS at 07:59

## 2024-05-30 RX ADMIN — ACETAMINOPHEN 975 MG: 325 TABLET, FILM COATED ORAL at 07:16

## 2024-05-30 RX ADMIN — LIDOCAINE HYDROCHLORIDE 5 ML: 10 INJECTION, SOLUTION EPIDURAL; INFILTRATION; INTRACAUDAL at 07:50

## 2024-05-30 RX ADMIN — LORAZEPAM 1 MG: 2 INJECTION INTRAMUSCULAR; INTRAVENOUS at 10:01

## 2024-05-30 RX ADMIN — CHLORHEXIDINE GLUCONATE 0.12% ORAL RINSE 15 ML: 1.2 LIQUID ORAL at 07:16

## 2024-05-30 RX ADMIN — PROPOFOL 40 MG: 10 INJECTION, EMULSION INTRAVENOUS at 07:51

## 2024-05-30 RX ADMIN — FENTANYL CITRATE 50 MCG: 50 INJECTION INTRAMUSCULAR; INTRAVENOUS at 09:29

## 2024-05-30 RX ADMIN — ONDANSETRON 4 MG: 2 INJECTION INTRAMUSCULAR; INTRAVENOUS at 07:59

## 2024-05-30 NOTE — PHYSICAL THERAPY NOTE
PHYSICAL THERAPY Evaluation and Treatment  DATE: 05/30/24  TIME: 6552-4810    NAME:  Kelsi Cabrera  AGE:   64 y.o.  Mrn:   [id number]  Length Of Stay: 0    ADMIT DX:  Pain in right hip [M25.551]  AVN (avascular necrosis of bone) (HCC) [M87.00]  Preoperative testing [Z01.818]  Primary osteoarthritis of one hip, right [M16.11]    Past Medical History:   Diagnosis Date    Anxiety     Arthritis     Asthma     Avascular necrosis of bone of hip, right (HCC)     Avascular necrosis of hip, right (HCC) 10/03/2017    Chronic obstructive pulmonary disease (HCC) 02/12/2019    COPD (chronic obstructive pulmonary disease) (HCC)     GERD (gastroesophageal reflux disease)     Hyperlipidemia     Hypertension     Hypoglycemia     Infectious viral hepatitis     Lumbar radicular pain     Lyme disease     Pneumonia     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HCC)      Past Surgical History:   Procedure Laterality Date    APPENDECTOMY      CARDIAC CATHETERIZATION Left 02/28/2024    Procedure: Cardiac Left Heart Cath;  Surgeon: Lizz Adorno MD;  Location: [facility];  Service: Cardiology    CARDIAC CATHETERIZATION N/A 02/28/2024    Procedure: Cardiac Coronary Angiogram;  Surgeon: Lizz Adorno MD;  Location: [facility];  Service: Cardiology    CARDIAC CATHETERIZATION  02/28/2024    Procedure: Cardiac catheterization;  Surgeon: Lizz Adorno MD;  Location: [facility];  Service: Cardiology    CARPAL TUNNEL RELEASE      CHOLECYSTECTOMY      COLONOSCOPY      SINUS SURGERY          05/30/24 1336   PT Last Visit   PT Visit Date 05/30/24   Note Type   Note type Evaluation   Additional Comments 65 y/o presents for elective right anterior RICKI.   Pain Assessment   Pain Assessment Tool 0-10   Pain Score 8   Pain Location/Orientation Orientation: Right;Location: Hip   Hospital Pain Intervention(s) Ambulation/increased activity;Repositioned   Restrictions/Precautions   Weight Bearing Precautions Per Order Yes   RLE Weight  Bearing Per Order WBAT  (anterior hip precautions)   Other Precautions Pain;Fall Risk;THR;WBS   Home Living   Additional Comments Pt lives with  in 2-level house, 1 step to enter.  Pt intends to stay on main floor.  flat bed.  DME: RW   Prior Function   Comments Prior to admission: independent with ADL, ambulates with SPC, independent with IADLs.  drive (-).  Hobbies: gardening   General   Additional Pertinent History Vitals:  Supine - 145/63. 70 bpm. 97%.  Sitting - 149/67.  Standing - 147/65   Family/Caregiver Present Yes   Cognition   Overall Cognitive Status WFL   Arousal/Participation Alert   Orientation Level Oriented X4   Subjective   Subjective pt reports she has fear of pain.  Slight dizziness upon sitting and standing, but symptoms resolved.   RUE Assessment   RUE Assessment WFL   LUE Assessment   LUE Assessment WFL   RLE Assessment   RLE Assessment   (hip ROM limited due to pain.  grossly 3-/5.)   LLE Assessment   LLE Assessment WFL   Coordination   Movements are Fluid and Coordinated 1   Sensation WFL   Bed Mobility   Supine to Sit 5  Supervision   Additional items Assist x 1;Increased time required;Verbal cues;Impulsive;HOB elevated;Bedrails   Transfers   Sit to Stand 4  Minimal assistance  (progressed to sup A)   Additional items Assist x 1;Increased time required;Impulsive;Verbal cues   Stand to Sit 5  Supervision   Additional items Assist x 1;Increased time required;Verbal cues  (cues for positioning and UE use.)   Ambulation/Elevation   Gait Assistance 4  Minimal assist  (progressed to sup A)   Additional items Assist x 1;Verbal cues;Tactile cues   Assistive Device Rolling walker   Distance 30'+50'+5'   Stair Management Assistance 4  Minimal assist  (progressed to sup A)   Additional items Assist x 1;Verbal cues;Tactile cues   Stair Management Technique With walker   Number of Stairs 2   Ambulation/Elevation Additional Comments Pt quickly progressing to step-though gait pattern, however,  displays flexed posturing difficulty advancing RLE, and heavy use of UEs due to hip pain.  pt able to make corrections briefly with cues, but unable to tolerate for extended periods.  Pt displaying improve stability and confidence with wbing/advancement of steps with practice.  Pt ascend/descend 1 curb step x2 with RW.  Pt exhibits good carry over with cues/instruction, and displays good safety.  Pt's  educated and instructed on guarding technique.   Balance   Static Sitting Fair +   Dynamic Sitting Fair +   Static Standing Fair   Ambulatory Fair  (RW)   Activity Tolerance   Activity Tolerance Patient limited by pain;Patient limited by fatigue   Medical Staff Made Aware collaborated with OT   Nurse Made Aware Nursing present and aware of patient performance   Assessment   Prognosis Good   Problem List Decreased strength;Decreased range of motion;Impaired balance;Decreased mobility;Pain;Orthopedic restrictions;Decreased safety awareness;Impaired judgement   Assessment Orders for PT eval and treat received. Pt's PMHx: OA, COPD, lumbar radicular pain, lyme dx, Rheumatoid osteoperiostitis, carpal tunnel release, right hip avascular necrosis. Cervical spondylosis, occipital neuralgia, tension HA, chronic pain syndrome.  Pt exhibits physical deficits noted in problem list above.  Deficits listed contribute to functional limitations that are significant from the patient PLOF and include: difficulty with bed mobility, impaired standing balance, inability to perform safe transfers, tolerance for OOB functional activities, unsafe/inefficient ambulation, fall risk, and unable to safely manage stairs/steps/ramp    Additionally, patient is limited by restrictions/precautions/DME: RLE WBAT, anterior hip precaution.     During today's session, formal PT evaluation performed.  Initiated HEP in supine and progressed to standing.  Educated and instructed pt on bed mobility, transfer training, proper fit and utilization of  walker, gait and stair training.  Pt displayed good progress with education and practice.  Pt's  educated and instructed on guarding and caregiver training.  Pt displayed appropriate ability to manage basic mobility and ADLs, and is appropriate to discharge home when medically stable.      The [facility] & Barthel Index outcome tools were used to assist in determining pt safety w/ mobility/self care & appropriate d/c recommendations, see above for scores. Patient's clinical presentation is evolving due to significant acute change in functional independence, uncontrolled pain, significant need for care assistance compared to baseline, and recent surgery/procedure.     Considering the patient's PLOF, co-morbidities, acute functional limitations, functional outcome measures, and/or goal to progress functional independence; this patient would benefit from skilled Physical Therapy intervention in the acute care setting.   Barriers to Discharge None   Goals   Patient Goals return to [facility] Expiration Date 06/09/24   Short Term Goal #1 1: Pt will perform supine<>sit transfer on flat bed, mod I.  2: Pt will perform stand pivot transfer with RW, mod I.  3: Pt will ambulate 150' with reciprocal gait and appropriate pace using RW, mod I. 4: Pt will perform written HEP, mod I. 5: Pt will ascend/descend a curb step and/or stairs require to mobilize around the the patient's home with RW/rails, Sup A.   Plan   Treatment/Interventions Functional transfer training;LE strengthening/ROM;Elevations;Therapeutic exercise;Equipment eval/education;Patient/family training;Bed mobility;Gait training   PT Frequency Twice a day   Discharge Recommendation   Rehab Resource Intensity Level, PT III (Minimum Resource Intensity)   AM-PAC Basic Mobility Inpatient   Turning in Flat Bed Without Bedrails 4   Lying on Back to Sitting on Edge of Flat Bed Without Bedrails 3   Moving Bed to Chair 3   Standing Up From Chair Using Arms 3   Walk in  Room 3   Climb 3-5 Stairs With Railing 3   Basic Mobility Inpatient Raw Score 19   Basic Mobility Standardized Score 42.48   [facility] Highest Level Of Mobility   -HLM Goal 6: Walk 10 steps or more   -HLM Achieved 7: Walk 25 feet or more   Additional Treatment Session   Start Time 1346   End Time 1426   Treatment Assessment Discussed at length about pain management strategies, postural corrections, proper use of RW, gait/stair training corrections, importance of gaining AROM, benefit of frequent tolerable physical activity/exercise, and fall risk precautions.  Provided caregiver training and tips as appropriate and needed. Pt responded well to exercises, displaying improve ROM, strength, and tolerance.  Pt progressed transfers, gait, and stairs appropriately, although required reinforcement with cues due to pain causing deviations.  Pt's  displayed appropriate carry over with guarding during stair training.   Exercises   Heelslides Supine;10 reps;20 reps;AAROM;AROM;Right  (10 with AROM, 20 AAROM)   Hip Abduction Supine;10 reps;AAROM;Right   Hip Adduction Supine;10 reps;AAROM;Right   Marching Standing;10 reps;AROM;Right;Left  (RW)   Balance training  Standing at RW: lateral weight shifting 10x, heel raises 10x   End of Consult   Patient Position at End of Consult Seated edge of bed;All needs within reach  (OT present to take over pt care.)   The patient's AM-PAC Basic Mobility Inpatient Short Form Raw Score is 19. A Raw score of greater than or equal to 16 suggests the patient may benefit from discharge to home. Please also refer to the recommendation of the Physical Therapist for safe discharge planning.    Jareth Parra, PT, DPT  [place] Licensure #[id number]

## 2024-05-30 NOTE — INTERVAL H&P NOTE
H&P reviewed. After examining the patient I find no changes in the patients condition since the H&P had been written.    There were no vitals filed for this visit.    Patient seen and examined  General: No apparent distress  CV: S1-S2  Abdomen: Soft  Chest: No audible wheezing  Right lower extremity: No abrasions or open wounds; pain with logrolling; neurologically vascular intact distally  To the operating room for right anterior total hip arthroplasty  Pros and cons of operative and nonoperative intervention discussed with patient  We will follow-up postoperatively

## 2024-05-30 NOTE — PLAN OF CARE
Problem: PHYSICAL THERAPY ADULT  Goal: Performs mobility at highest level of function for planned discharge setting.  See evaluation for individualized goals.  Description: Treatment/Interventions: Functional transfer training, LE strengthening/ROM, Elevations, Therapeutic exercise, Equipment eval/education, Patient/family training, Bed mobility, Gait training          See flowsheet documentation for full assessment, interventions and recommendations.  Outcome: Progressing  Note: Prognosis: Good  Problem List: Decreased strength, Decreased range of motion, Impaired balance, Decreased mobility, Pain, Orthopedic restrictions, Decreased safety awareness, Impaired judgement  Assessment: Orders for PT eval and treat received. Pt's PMHx: OA, COPD, lumbar radicular pain, lyme dx, Rheumatoid osteoperiostitis, carpal tunnel release, right hip avascular necrosis. Cervical spondylosis, occipital neuralgia, tension HA, chronic pain syndrome.  Pt exhibits physical deficits noted in problem list above.  Deficits listed contribute to functional limitations that are significant from the patient PLOF and include: difficulty with bed mobility, impaired standing balance, inability to perform safe transfers, tolerance for OOB functional activities, unsafe/inefficient ambulation, fall risk, and unable to safely manage stairs/steps/ramp    Additionally, patient is limited by restrictions/precautions/DME: RLE WBAT, anterior hip precaution.     During today's session, formal PT evaluation performed.  Initiated HEP in supine and progressed to standing.  Educated and instructed pt on bed mobility, transfer training, proper fit and utilization of walker, gait and stair training.  Pt displayed good progress with education and practice.  Pt's  educated and instructed on guarding and caregiver training.  Pt displayed appropriate ability to manage basic mobility and ADLs, and is appropriate to discharge home when medically stable.       The AM-PAC & Barthel Index outcome tools were used to assist in determining pt safety w/ mobility/self care & appropriate d/c recommendations, see above for scores. Patient's clinical presentation is evolving due to significant acute change in functional independence, uncontrolled pain, significant need for care assistance compared to baseline, and recent surgery/procedure.     Considering the patient's PLOF, co-morbidities, acute functional limitations, functional outcome measures, and/or goal to progress functional independence; this patient would benefit from skilled Physical Therapy intervention in the acute care setting.  Barriers to Discharge: None     Rehab Resource Intensity Level, PT: III (Minimum Resource Intensity)    See flowsheet documentation for full assessment.

## 2024-05-30 NOTE — OCCUPATIONAL THERAPY NOTE
Occupational Therapy Evaluation & Treat     Patient Name: Kelsi Cabrera  Today's Date: 5/30/2024  Problem List  Principal Problem:    Status post total hip replacement, right    Past Medical History  Past Medical History:   Diagnosis Date    Anxiety     Arthritis     Asthma     Avascular necrosis of bone of hip, right (HCC)     Avascular necrosis of hip, right (HCC) 10/03/2017    Chronic obstructive pulmonary disease (HCC) 02/12/2019    COPD (chronic obstructive pulmonary disease) (HCC)     GERD (gastroesophageal reflux disease)     Hyperlipidemia     Hypertension     Hypoglycemia     Infectious viral hepatitis     Lumbar radicular pain     Lyme disease     Pneumonia     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HCC)      Past Surgical History  Past Surgical History:   Procedure Laterality Date    APPENDECTOMY      CARDIAC CATHETERIZATION Left 02/28/2024    Procedure: Cardiac Left Heart Cath;  Surgeon: Lizz Adorno MD;  Location: [facility];  Service: Cardiology    CARDIAC CATHETERIZATION N/A 02/28/2024    Procedure: Cardiac Coronary Angiogram;  Surgeon: Lizz Adorno MD;  Location: [facility];  Service: Cardiology    CARDIAC CATHETERIZATION  02/28/2024    Procedure: Cardiac catheterization;  Surgeon: Lizz Adorno MD;  Location: [facility];  Service: Cardiology    CARPAL TUNNEL RELEASE      CHOLECYSTECTOMY      COLONOSCOPY      SINUS SURGERY           05/30/24 1430   OT Last Visit   OT Visit Date 05/30/24   Note Type   Note type Evaluation   Additional Comments Pt is a 63 y/o F s/p R RICKI anterior approach POD #0.   Pain Assessment   Pain Assessment Tool 0-10   Pain Score 8   Pain Location/Orientation Orientation: Right;Location: Hip   Hospital Pain Intervention(s) Repositioned;Rest   Restrictions/Precautions   Weight Bearing Precautions Per Order Yes   RLE Weight Bearing Per Order WBAT   Other Precautions Fall Risk;Pain  (Anterior precautions)   Home Living   Type of Home House   Home  "Layout Two level;Stairs to enter with rails;Performs ADLs on one level;Able to live on main level with bedroom/bathroom   Bathroom Shower/Tub Walk-in shower   Bathroom Toilet Raised   Bathroom Equipment Toilet raiser;Built-in shower seat   Bathroom Accessibility Accessible   Home Equipment Walker;Cane  (Pt used cane PRN PTA)   Prior Function   Level of [place] Independent with ADLs;Independent with functional mobility;Independent with IADLS   Lives With Spouse   IADLs Independent with meal prep;Independent with medication management;Family/Friend/Other provides transportation   Vocational Retired   Subjective   Subjective Pt states \"I want to be able to do things on own\"\   ADL   Eating Assistance 7  Independent   Grooming Assistance 7  Independent   UB Bathing Assistance 5  Supervision/Setup   LB Bathing Assistance 5  Supervision/Setup   UB Dressing Assistance 5  Supervision/Setup   LB Dressing Assistance 5  Supervision/Setup   Toileting Assistance  5  Supervision/Setup   Bed Mobility   Supine to Sit 5  Supervision   Additional items Assist x 1;Verbal cues   Transfers   Sit to Stand 4  Minimal assistance   Additional items Assist x 1;Verbal cues;Increased time required  (Progressed to supervision. See treatment note below.)   Stand to Sit 5  Supervision   Additional items Assist x 1;Verbal cues;Increased time required   Functional Mobility   Functional Mobility 5  Supervision   Additional Comments RW   Balance   Static Sitting Fair +   Dynamic Sitting Fair   Static Standing Fair   Dynamic Standing Fair   Activity Tolerance   Activity Tolerance Patient limited by pain. VSS t/o encounter.   Medical Staff Made Aware PT Randall   Nurse Made Aware EDGAR Benavides/ Susie SÁNCHEZ Assessment   RUE Assessment WFL   LUE Assessment   LUE Assessment WFL   Psychosocial   Psychosocial (WDL) X   Patient Behaviors/Mood Anxious;Pleasant   Cognition   Overall Cognitive Status WFL   Arousal/Participation Alert   Attention Within " functional limits   Orientation Level Oriented X4   Memory Within functional limits   Following Commands Follows multistep commands with increased time or repetition   Assessment   Assessment Pt is a 64 y.o. female seen for OT evaluation at [facility], admitted 5/30/2024 w/ Status post total hip replacement, right.  Comorbidities affecting pt's functional performance at time of assessment include: COPD, RA, neuropathy, situational anxiety, memory impairment, and tobacco use.  Prior to admission, pt was living with family in a house with 1 step to manage.  Pt was I w/  ADLS and IADLS,  & required cane PRN PTA. PT did not drive. Upon evaluation, pt appears to be performing below baseline functional status. Pt currently requires sup for bed mobility, min A with progression to supervision for functional mobility/transfers, sup for UB ADLs and sup for LB ADLS 2* the following deficits impacting occupational performance: weakness, decreased strength, decreased balance, increased pain, and decreased coping skills. Full objective findings from OT assessment regarding body systems outlined above. Personal factors affecting pt at time of IE include:steps to enter environment. These impairments, as well as pt's decreased caregiver support and risk for falls  limit pt's ability to safely engage in all baseline areas of occupation and mobility. Pt educated this encounter re: ADL strategies, transfer strategies and appropriate use of DME  to maximize safety. See treatment note below. This evaluation required an extensive review of medical and/or therapy records and additional review of physical, cognitive and psychosocial history related to functional performance. Based upon functional performance deficits and assessments, this evaluation has been identified as a high complexity evaluation.  At this time, OT recommendations at time of discharge are home with no OT needs.   Goals   Patient Goals Pt wishes to be able  to return togarden   Plan   OT Frequency Eval only  (Pt extensively educated on ADL and mobility strategies.)   Discharge Recommendation   Rehab Resource Intensity Level, OT No post-acute rehabilitation needs  (Recommend level III PT services.)   Additional Comments  The patient's raw score on the AM-PAC Daily Activity inpatient short form is 24, standardized score is 57.54, greater than 39.4. Patients at this level are likely to benefit from DC to home. However please refer to therapist recommendation for discharge planning given other factors that may influence destination.   AM-PAC Daily Activity Inpatient   Lower Body Dressing 4   Bathing 4   Toileting 4   Upper Body Dressing 4   Grooming 4   Eating 4   Daily Activity Raw Score 24   Daily Activity Standardized Score (Calc for Raw Score >=11) 57.54   AM-PAC Applied Cognition Inpatient   Following a Speech/Presentation 4   Understanding Ordinary Conversation 4   Taking Medications 4   Remembering Where Things Are Placed or Put Away 4   Remembering List of 4-5 Errands 4   Taking Care of Complicated Tasks 4   Applied Cognition Raw Score 24   Applied Cognition Standardized Score 62.21   Additional Treatment Session   Start Time 1400   End Time 1430   Treatment Assessment Pt performed functional mobility into bathroom initially with min A x 1 with RW. Pt progressed to supervision with RW. Pt performed toilet transfer (with commode overlay) with supervision. Required VC for hand placement during transfer. Performed additional mobility x 100 feet with supervision with RW. Simulated car transfer with use of transport pily with supervision. VC required to manage body mechanics and hand placement. Returned to seated position EOB with supervision. Pt educated on ADL strategies. Perforemd LB dressing with supervision. Performed UB dressing with set-up. Reviewed appropriate use of DME to maximize safety in bathroom. Following tx session, pt demonstrates adequate functional  independence and safety for POD#0 D/C. Pt/spouse endorsed no other concerns at this time.  Pt remained seated EOB. NAD. Call bell in reach.   End of Consult   Education Provided Yes;Family or social support of family present for education by provider   Patient Position at End of Consult Seated edge of bed;All needs within reach   Nurse Communication Nurse aware of consult       Iliana Jones, OTR/L

## 2024-05-30 NOTE — ANESTHESIA POSTPROCEDURE EVALUATION
Post-Op Assessment Note    CV Status:  Stable  Pain Score: 8    Pain management: adequate       Mental Status:  Alert and awake   Hydration Status:  Euvolemic   PONV Controlled:  Controlled   Airway Patency:  Patent     Post Op Vitals Reviewed: Yes    No anethesia notable event occurred.    Staff: CRNA               BP   134/63   Temp 97.8   Pulse 65   Resp 14   SpO2 98 RA

## 2024-05-30 NOTE — DISCHARGE INSTR - AVS FIRST PAGE
Discharge Instructions - Orthopedics  Kelsi Cabrera 64 y.o. female MRN: [id number]  Unit/Bed#: EA OR MAIN    Weight Bearing Status:                                           Weight Bearing as tolerated to the right lower extremity.  Anterior total hip precautions    DVT prophylaxis:  Complete course of eliquis as directed    Pain:  Start oxycodone 5 mg every 6 hrs after last dose taken after surgery for 1 day  Transition to oxycodone 5 mg every 6 hours as needed    Showering Instructions:   May shower 5 days from date of surgery with operative dressing intact  Do not soak your incision(Tubs, Jacuzzi's, pools, etc)    Dressing Instructions:   Keep dressing/incision clean and intact until follow up appointment.    Do not apply any creams or ointments/lotions to your incisions including antibiotic ointment, peroxide    Driving Instructions:  No driving until cleared by Orthopaedic Surgery.    PT/OT:  Continue PT/OT on outpatient basis as directed    Appt Instructions:   If you do not have your appointment, please call the clinic at [phone]  Otherwise followup as scheduled    Contact the office sooner if you experience any increased numbness/tingling in the extremities.

## 2024-05-30 NOTE — OP NOTE
[id number]                                                                                                        Kelsi Cabrera                                                                                                         OR MAIN    PreOp Dx: right hip AVN    Postop Dx: right hip AVN    Procedure: right anterior total hip arthroplasty    Surgeon: Kathya Nye MD    Assistants: Yobany To PA-C    No qualified resident was available for this case.  This an assistant was needed for all portions of this case including prepping and patient the patient as well as prepping and final implantation of the femoral and acetabular components.    Fluids:     EBL:     Drains: none    Specimens: none    Complications: none    Condition: stable,Transferred to postanesthesia care unit.    Implants: Depuy      Acetabulum size 48    Acetabular poly 48/32 neutral    Femur Actis, size 2    Femoral head size 32/+1 ceramic    64 y.o. female , presents at this, time, secondary to failed conservative treatment of right hip AVN for right anterior total hip arthroplasty    The patient was told of all the pros and cons of operative and nonoperative intervention. Some of the complications of operative intervention include infection, bleeding, scarring, nerve injury, vascular injury,leg length discrepancy, hip dislocation, continued pain, decreased range of motion, DVT, PE death, loss of limb, fracture, need for further surgery, not obtain an results. The patient wished. Patient did consent for operative intervention for this pathology.  Patient was brought to the operating room. Patient was anesthetized as anesthesia team. Patient was prepped and draped in normal sterile fashion. After this was done, we did conduct a time out to make sure correct. Patient was in the room, prepped marked and draped. Implants were in the room, Rep. For the implants were present, DVT prophylaxis and antibiotics were dressed.  An anterior  approach was used. Incision was made 2 cm lateral and 1 cm distal to the ASIS and extended this incision distally. After going through skin, fatty tissue, fascia gianluca was identified and incised. The fascial layer Was incised going towards the ASIS And extending it distally to incorporate our entire incision. We were able to go through the internervous planes until we were able to identify the anterior perforating vessels. Once this was done with able to obtain hemostasis. Able to excise the pre-capsular, fatty tissue. After this was done, we were able to do all releases, which included, the inferior portion of the femoral neck going towards the lesser trochanteric region, the iliocapsularis, and also, the piriformis recess.  We then were able to make our femoral neck cut. Femoral head was removed. Labrum was excised. Fatty tissue within the acetabulum was also excised. At this, time. We did remain to be appropriate theta angle and anteversion. We did place a trial acetabular component, and with the aid of radiographic imaging. It was noted that all was appropriate. At this, time, we were able to place, our true acetabular component. Our theta angle and anteversion were noted to be appropriate. At this time, we are able to place, our true acetabular poly-ethylene.  At this, time. We did release on the medial aspect of the greater trochanteric region. We externally rotated. The femur as well as extending at the hip and adducting the femur to obtain appropriate visualization of the proximal femur. We were able to use our box osteotome, canal finder, and rasp in order to make sure there, we avoided any varus, placement of our implant. We did broach to appropriate size, making sure we had correct version. Calcar planer was then used. Trial femoral neck and head were placed. Patient was placed through provocative ranges of motion to make sure that stability was obtained. Radiographic imaging confirmed appropriate leg  lengths. At this time. We did remove a trial femoral neck and head. After the hip was dislocated. All broach handle was placed within a trial femoral component to make sure that the implant was still stable. Once this was confirmed, we did remove the trial femoral component.  Copious irrigation was used at the operative site. True femoral implant was placed. Trial femoral head was placed once more in order to confirm appropriate leg lengths and appropriate stability. Once as noted to be appropriate, we placed a true femoral head. We did check for stability, leg lengths and final radiographs were taken. Irrigation was used once more at the operative site. Fascial layer was reapproximated with barbed sutures. 2-0 Vicryl sutures were used for the subcutaneous layer.  Monocryl sutures were utilized for the subcuticular closure.  The wounds were cleaned and dried. Exofin sealant was used to augment the closure. Mepilex dressing was placed. Patient was subsequently awakened noted to be in stable condition and transferred to postanesthesia care unit.    The [employer] system was utilized in this case for:  Presurgical planning x-ray  Intraoperative use of the navigation system  Intraoperative use of fluoroscopy and saved images

## 2024-05-31 ENCOUNTER — TELEPHONE (OUTPATIENT)
Dept: OBGYN CLINIC | Facility: HOSPITAL | Age: 64
End: 2024-05-31

## 2024-05-31 NOTE — TELEPHONE ENCOUNTER
Caller: Patient    Doctor: Parris    Reason for call: Patient had total rt hip done yesterday and is having extreme pain today. She said the pain medicine is just not helping every 6 hours. She is wondering if she could do every 4 hours or if there is something else she can take as well to help with the pain. Please call patient. Thank you.    Call back#: [phone]

## 2024-05-31 NOTE — TELEPHONE ENCOUNTER
Spoke to patient. Discussed increasing tylenol from 650mg TID to 1000mg TID, as also recommended this AM by JOHN Grimm RN. She is agreeable to try. We discussed updating us with that increase.     Will also notify the surgeon.

## 2024-05-31 NOTE — TELEPHONE ENCOUNTER
"Patient contacted for a postoperative follow up assessment. Patient reports doing  \"ok, pain now\" Pt states current pain level of a  8/10  when sitting and 10/10 when walking with RW. Patient reports slight swelling and dressing is clean, dry and intact. Patient is icing the site regularly. Encouraged patient to ice every 1-2 hours for 20-30 mins and elevate when sitting.     We reviewed patients AVS medication list. Patient is taking Tylenol 650mg every 8 hours (I recommended up to 1000mg q8 to help with pain) oxycodone 5mg PRN, eliquis 2.5mg BID, Colace 100mg BID. Patient has not yet had a BM but is passing gas. I recommended adding miralax to regimen if needed and unable to have a BM.       Patient denies nausea, vomiting, abdominal pain, chest pain, shortness of breath, fever, dizziness and calf pain. Patient states when coming home from the hospital, she had a slight episode of wheezing and CP, which has since subsided. I had informed the patient to call us immediately if she develops this again. Patient confirmed post-op appointment with PT 06/06 and surgeon on 6/19  .Patient does not have any other questions or concerns at this time. Pt was encouraged to call with any questions, concerns or issues.    "

## 2024-06-03 ENCOUNTER — NURSE TRIAGE (OUTPATIENT)
Dept: OTHER | Facility: OTHER | Age: 64
End: 2024-06-03

## 2024-06-03 DIAGNOSIS — Z01.818 PREOPERATIVE TESTING: ICD-10-CM

## 2024-06-03 DIAGNOSIS — M87.00 AVN (AVASCULAR NECROSIS OF BONE) (HCC): ICD-10-CM

## 2024-06-03 DIAGNOSIS — Z96.641 STATUS POST TOTAL HIP REPLACEMENT, RIGHT: ICD-10-CM

## 2024-06-03 DIAGNOSIS — M16.11 PRIMARY OSTEOARTHRITIS OF ONE HIP, RIGHT: ICD-10-CM

## 2024-06-03 DIAGNOSIS — M25.551 PAIN IN RIGHT HIP: ICD-10-CM

## 2024-06-03 RX ORDER — ONDANSETRON 4 MG/1
4 TABLET, FILM COATED ORAL EVERY 8 HOURS PRN
Qty: 5 TABLET | Refills: 0 | Status: SHIPPED | OUTPATIENT
Start: 2024-06-03

## 2024-06-03 NOTE — TELEPHONE ENCOUNTER
" She had a R hip replacement on 5/30. She was prescribed zofran at that time, and was given 5 pills. She would like to know if it can be refilled again? Can I refill it for her?  Reason for Disposition  • [1] Caller requesting a prescription renewal (no refills left), no triage required, AND [2] triager able to renew prescription per department policy    Answer Assessment - Initial Assessment Questions  1. DRUG NAME: \"What medicine do you need to have refilled?\"      zofran  2. REFILLS REMAINING: \"How many refills are remaining?\" (Note: The label on the medicine or pill bottle will show how many refills are remaining. If there are no refills remaining, then a renewal may be needed.)      0    4. PRESCRIBING HCP: \"Who prescribed it?\" Reason: If prescribed by specialist, call should be referred to that group.      [place]  5. SYMPTOMS: \"Do you have any symptoms?\"      nausea    Protocols used: Medication Refill and Renewal Call-ADULT-    "

## 2024-06-03 NOTE — TELEPHONE ENCOUNTER
Caller: libia noland daughter     Doctor: yonis    Reason for call: would like to know if she's able to take tizanidine medication? Patient is still in pain increased the tylenol to 1000 mg, would like to know is she able to take more than 5 mg of oxycodone?    Call back#: [phone]

## 2024-06-03 NOTE — TELEPHONE ENCOUNTER
Medication:  PDMP     Refill must be reviewed and completed by the office or provider. The refill is unable to be approved or denied by the medication management team.

## 2024-06-03 NOTE — TELEPHONE ENCOUNTER
Reason for call:   [x] Refill   [] Prior Auth  [] Other:     Office:   [x] PCP/Provider -   [] Specialty/Provider -     Medication: oxyCODONE (ROXICODONE) 5 immediate release tablet Take 1 tablets every 6 hrs as needed for pain contro       Pharmacy: [facility]/pharmacy #[id number] - [place], [place] - [address]      Does the patient have enough for 3 days?   [] Yes   [x] No - Send as HP to POD

## 2024-06-03 NOTE — TELEPHONE ENCOUNTER
Regarding: Med refill - Zofran  ----- Message from Monika STEPHENS sent at 6/3/2024  5:56 PM EDT -----  Medication Refill Request     Name Zofran   Dose/Frequency 4 mg, Take 1 tablet (4 mg total) by mouth every 8 (eight) hours as needed for nausea or vomiting  Quantity 5  Verified pharmacy   [[facility]/pharmacy #[id number] - [place], [place] - [address] ]  Verified ordering Provider   [X ]  Does patient have enough for the next 3 days? Yes [ ] No [ X]

## 2024-06-03 NOTE — TELEPHONE ENCOUNTER
Medication Refill Request     Name zofran   Dose/Frequency 4mg as needed  Quantity 5  Verified pharmacy   [x]  Verified ordering Provider   [x]  Does patient have enough for the next 3 days? Yes [] No [x]

## 2024-06-04 ENCOUNTER — TELEPHONE (OUTPATIENT)
Age: 64
End: 2024-06-04

## 2024-06-04 RX ORDER — OXYCODONE HYDROCHLORIDE 5 MG/1
TABLET ORAL
Qty: 30 TABLET | Refills: 0 | Status: SHIPPED | OUTPATIENT
Start: 2024-06-04

## 2024-06-04 RX ORDER — FOLIC ACID 1 MG/1
1000 TABLET ORAL DAILY
Qty: 90 TABLET | Refills: 1 | Status: SHIPPED | OUTPATIENT
Start: 2024-06-04

## 2024-06-04 NOTE — TELEPHONE ENCOUNTER
PA for OXY 5MG    Submitted via    []CMM-KEY   [x][employer]-Case ID #  Case ID: [id number]     []Faxed to plan   []Other website   []Phone call Case ID #     Office notes sent, clinical questions answered. Awaiting determination    Turnaround time for your insurance to make a decision on your Prior Authorization can take 7-21 business days.

## 2024-06-06 ENCOUNTER — OFFICE VISIT (OUTPATIENT)
Dept: PHYSICAL THERAPY | Facility: CLINIC | Age: 64
End: 2024-06-06
Payer: COMMERCIAL

## 2024-06-06 DIAGNOSIS — M16.11 PRIMARY OSTEOARTHRITIS OF ONE HIP, RIGHT: ICD-10-CM

## 2024-06-06 DIAGNOSIS — M87.00 AVN (AVASCULAR NECROSIS OF BONE) (HCC): ICD-10-CM

## 2024-06-06 DIAGNOSIS — Z96.641 STATUS POST TOTAL HIP REPLACEMENT, RIGHT: ICD-10-CM

## 2024-06-06 DIAGNOSIS — M25.551 PAIN IN RIGHT HIP: Primary | ICD-10-CM

## 2024-06-06 PROCEDURE — 97164 PT RE-EVAL EST PLAN CARE: CPT

## 2024-06-06 PROCEDURE — 97110 THERAPEUTIC EXERCISES: CPT

## 2024-06-06 NOTE — TELEPHONE ENCOUNTER
Patients daughter contacted Medication Refill Line requesting refill be sent to pharmacy on file (confirmed) of:    COLACE 100 mg capsule  ZOFRAN 4 mg tablet

## 2024-06-06 NOTE — PROGRESS NOTES
"PT Re-Evaluation     Today's date: 2024  Patient name: Kelsi Cabrera  : 1960  MRN: [id number]  Referring provider: Kathya Nye MD  Dx:   Encounter Diagnosis     ICD-10-CM    1. Pain in right hip  M25.551       2. AVN (avascular necrosis of bone) ([facility])  M87.00       3. Primary osteoarthritis of one hip, right  M16.11                        Assessment  Impairments: abnormal gait, abnormal or restricted ROM, activity intolerance, impaired balance, impaired physical strength, lacks appropriate home exercise program, pain with function and weight-bearing intolerance  Symptom irritability: moderate    Assessment details: Kelsi Cabrera is a 64 y.o. female presenting to physical therapy for a pre-operative initial evaluation as she will be underwent a right anterior RICKI on 24. The patient demonstrates decreased and painful right hip ROM, decreased hip strength, and limited tolerance to all weightbearing activities due to pain. These deficits have limited the patient's ability to complete ADLs, avocational responsibilities, and recreational activities. This patient would benefit from OP PT services to address their impairments and functional limitations to maximize functional mobility. The patient was educated extensively on her post operative status and continued use of medications/ice/elevation for pain relief. Her HEP was reviewed with her and good understanding was demonstrated. Her daughter was advised on showering and leaving post operative dressing on until follow up with surgeon.   Understanding of Dx/Px/POC: excellent     Prognosis: good    Goals  STG to be achieved in 4 weeks:   The patient will report a decrease in right hip \"at worst\" subjective pain rating score to at least 5/10 to allow for improved tolerance for weightbearing activities.   The patient will increase right hip flexion AROM to at least 90 degrees to allow for improved functional mobility.   The patient will increase " right hip flexion MMT score to at least 3+/5 to allow for improved functional mobility.     LTG to be achieved by DC:   The patient will be independent in comprehensive HEP.   The patient will report no pain with usual activities.   The patient will improve all right hip AROM to WFL to allow for improved functional mobility.   The patient will increase all right hip MMT score to WFL to allow for improved functional mobility.   The patient will be able to ambulate at least one mile without AD, pain, or difficulty to allow completion of recreational outdoor walks.  The patient will be able to complete one full flight of stairs without AD, pain, or difficulty to allow access to home.  The patient will be able to complete her gardening without AD, pain, or difficulty.     Plan  Patient would benefit from: skilled physical therapy  Planned modality interventions: thermotherapy: hydrocollator packs, TENS and cryotherapy    Planned therapy interventions: manual therapy, joint mobilization, balance/weight bearing training, neuromuscular re-education, patient education, flexibility, strengthening, stretching, therapeutic activities, therapeutic exercise, gait training and home exercise program    Frequency: 2x week  Duration in weeks: 12  Plan of Care beginning date: 5/17/2024  Plan of Care expiration date: 8/9/2024  Treatment plan discussed with: patient      Subjective Evaluation    History of Present Illness  Mechanism of injury: Kelsi presents for a post-operative IE as she underwent a R anterior RICKI on 5/30/24. She reports that following the surgery she was DC home the same day. She says that she has not been sleeping too well due to pain. Her daughter has been staying with her in her room to help her at night as needed. She says that the pain is really bad and the oxycodone is only helping for an hour or two and then after this it doesn't help. Her daughter called the surgeon and they increased her tylenol to help  with the pain in the interm period. She has been walking with her RW at home and feels stable when doing this. She has been walking around her house several times a day and does her exercises.     From Pre-Operative IE:   The lives at home with her , daughter, son, daughter in law, and their children. Her son works from home every day, her daughter works from home 2 days a week, and her  works 1 day a week from home so there will always be someone with her at home. She lives in multiple level home, however her bedroom and bathroom are on the first floor of her home. She has a walk in shower with a small ledge to step into it and has a built in bench to sit on. She has one step to get into her home, without any hand railings. She does have a RW at home and is very sad to think about her walking with it after surgery.     Patient Goals  Patient goals for therapy: decreased pain, increased strength, independence with ADLs/IADLs and increased motion  Patient goal: to be able to walk a lot again and take care of garden  Pain  Current pain ratin  At best pain ratin  At worst pain rating: 10  Location: R hip  Quality: sharp and dull ache  Relieving factors: change in position and rest  Aggravating factors: standing, walking and stair climbing  Progression: improved    Social Support  Steps to enter house: yes  Stairs in house: yes   Lives in: multiple-level home  Lives with: spouse, adult children and young children    Employment status: not working  Treatments  Previous treatment: medication  Current treatment: physical therapy      Objective     Active Range of Motion   Left Hip   Flexion: 98 degrees   Abduction: 20 degrees   External rotation (90/90): 15 degrees   Internal rotation (90/90): 15 degrees     Right Hip   Flexion: 72 degrees with pain  Abduction: 20 degrees with pain  External rotation (90/90): 20 degrees with pain  Internal rotation (90/90): 25 degrees with pain    Passive Range of  Motion     Right Hip   Flexion: 80 degrees with pain  Abduction: 20 degrees with pain  External rotation (90/90): 25 degrees   Internal rotation (90/90): 25 degrees with pain    Strength/Myotome Testing     Left Hip   Planes of Motion   Flexion: 4  Abduction: 4  External rotation: 4  Internal rotation: 4    Right Hip   Planes of Motion   Flexion: 3  Abduction: 3  External rotation: 3  Internal rotation: 3    Left Knee   Flexion: 5  Extension: 5    Right Knee   Flexion: 4  Extension: 5    Left Ankle/Foot   Dorsiflexion: 5  Plantar flexion: 5    Right Ankle/Foot   Dorsiflexion: 5  Plantar flexion: 5    Ambulation   Weight-Bearing Status   Weight-Bearing Status (Right): weight-bearing as tolerated    Assistive device used: two-wheeled walker    Ambulation: Level Surfaces   Ambulation with assistive device: independent    Observational Gait   Gait: antalgic   Decreased right stance time.     Functional Assessment        Comments  Post Operative IE 6/6/24:   TUG- 19.81 seconds mild forward trunk lean to RW, step through gait pattern with good weight shift to right side  5 STS- 16.10 seconds UE usage for push off and eccentric control to chair, standing to RW             Precautions: R Anterior RICKI 5/23/24, COPD, HTN,  Access Code: [email or web address]  POC expires Unit limit Auth  expiration date PT/OT + Visit Limit?   8/9/24 N/A 7/15/24 60 per cly primary- 4 used                 Visit/Unit Tracking  AUTH Status:  Date 5/17 6/6             Approved 5 Used 1 2              Remaining  4 3                Manuals 5/17 6/6           R hip PROM   *Anterior Precautions             R hamstring, adductor stretches                          Re-evaluation  BE           Neuro Re-Ed             Glute sets  Reviewed           Hip abd with TB and glute set             Hip add iso with bolster                                                                 Ther Ex             Nustep with UE for hip ROM and LE strengthening  Seat 7  UE 9  L1 10'            Heel slides with strap HEP reviewed           Supine hip abd slides HEP reviewed           LAQ HEP reviewed           Standing hamstring curls             Standing marches             Standing hip abd             Ankle pumps HEP reviewed           Seated hamstring and calf stretches  HEP reviewed                        Pt education PT POC, HEP, post op status, DVT, sweling, pain, HEP review           Ther Activity             TG squats             Fwd step ups             Gait Training                                       Modalities

## 2024-06-07 NOTE — TELEPHONE ENCOUNTER
Refill must be reviewed and completed by the office or provider. The refill is unable to be approved or denied by the medication management team.      Patient daughter requesting refill. Last ordered by another provider. Please review

## 2024-06-10 ENCOUNTER — OFFICE VISIT (OUTPATIENT)
Dept: PHYSICAL THERAPY | Facility: CLINIC | Age: 64
End: 2024-06-10
Payer: COMMERCIAL

## 2024-06-10 DIAGNOSIS — M87.00 AVN (AVASCULAR NECROSIS OF BONE) (HCC): ICD-10-CM

## 2024-06-10 DIAGNOSIS — Z01.818 PREOPERATIVE TESTING: ICD-10-CM

## 2024-06-10 DIAGNOSIS — M25.551 PAIN IN RIGHT HIP: Primary | ICD-10-CM

## 2024-06-10 DIAGNOSIS — M16.11 PRIMARY OSTEOARTHRITIS OF ONE HIP, RIGHT: ICD-10-CM

## 2024-06-10 DIAGNOSIS — Z96.641 STATUS POST TOTAL HIP REPLACEMENT, RIGHT: ICD-10-CM

## 2024-06-10 DIAGNOSIS — M87.00 AVN (AVASCULAR NECROSIS OF BONE) (HCC): Primary | ICD-10-CM

## 2024-06-10 PROCEDURE — 97110 THERAPEUTIC EXERCISES: CPT

## 2024-06-10 PROCEDURE — 97112 NEUROMUSCULAR REEDUCATION: CPT

## 2024-06-10 PROCEDURE — 97530 THERAPEUTIC ACTIVITIES: CPT

## 2024-06-10 RX ORDER — DOCUSATE SODIUM 100 MG/1
100 CAPSULE, LIQUID FILLED ORAL 2 TIMES DAILY
Qty: 10 CAPSULE | Refills: 0 | Status: SHIPPED | OUTPATIENT
Start: 2024-06-10

## 2024-06-10 RX ORDER — GABAPENTIN 100 MG/1
100 CAPSULE ORAL 3 TIMES DAILY
Qty: 90 CAPSULE | Refills: 0 | Status: SHIPPED | OUTPATIENT
Start: 2024-06-10 | End: 2024-07-10

## 2024-06-10 RX ORDER — ONDANSETRON 4 MG/1
4 TABLET, FILM COATED ORAL EVERY 8 HOURS PRN
Qty: 5 TABLET | Refills: 0 | Status: SHIPPED | OUTPATIENT
Start: 2024-06-10 | End: 2024-06-19 | Stop reason: SDUPTHER

## 2024-06-10 NOTE — PROGRESS NOTES
Daily Note     Today's date: 6/10/2024  Patient name: Kelsi Cabrera  : 1960  MRN: [id number]  Referring provider: Kathya Nye MD  Dx:   Encounter Diagnosis     ICD-10-CM    1. Pain in right hip  M25.551       2. AVN (avascular necrosis of bone) ([facility])  M87.00       3. Primary osteoarthritis of one hip, right  M16.11       4. Preoperative testing  Z01.818           Start Time: 161  Stop Time: 1653  Total time in clinic (min): 38 minutes    Subjective: pt noted yesterday she had a good day and was able to do a lot w/o p!. Pt noted upon arrival for treatment session having a increase in pain 9/10 in R hip w/ swelling. Pt as well as her daughter noted she did take her pain medication earlier today before treatment session.       Objective: See treatment diary below      Assessment:  Continued with treatment session s/p R RICKI  on 24. Tolerated treatment well. Patient exhibited good technique with therapeutic exercises and would benefit from continued PT noted significant challenge with HR standing as well as step ups.   New printout of HEP received as of this date. Advised patient as well as daughter she does not have to complete all exercises in one sitting and can be performed t/o the day on an every to every other day basis.   Received hip precautions at the conclusion of this visit.   DOMS may be noted after treatment session with 24 to 48 hours of muscle soreness may be reported.     Plan: Continue per plan of care.      Precautions: R Anterior RICKI 24, COPD, HTN,  Access Code: [email or web address] - updated on 6/10/24.   POC expires Unit limit Auth  expiration date PT/OT + Visit Limit?   24 N/A 7/15/24 60 per cly primary- 4 used                 Visit/Unit Tracking  AUTH Status:  Date 5/17 6/6 6/10            Approved 5 Used 1 2 3             Remaining  4 3 2               Manuals 5/17 6/6 6/10          R hip PROM   *Anterior Precautions   NV          R hamstring, adductor stretches   NV              "          Re-evaluation  BE           Neuro Re-Ed             Glute sets  Reviewed 5\" 2x 10  seated          Hip abd with TB and glute set   Seated 10x           Hip add iso with bolster   Seated  5\" 10x                                                               Ther Ex             Nustep with UE for hip ROM and LE strengthening  Seat 7  UE 9  L1 10' Declined.           Heel slides with strap HEP reviewed Reviewed for HEP          Supine hip abd slides HEP reviewed           LAQ HEP reviewed 2x 10           Standing hamstring curls   15x           Standing marches   Mini 2x 10           Standing hip abd   20x  (R)          Ankle pumps HEP reviewed Heel raises standing. 2x10           Seated hamstring and calf stretches  HEP reviewed 30\"x 3                        Pt education PT POC, HEP, post op status, DVT, sweling, pain, HEP review           Ther Activity             TG squats             Fwd step ups   4\" 2x 10                                                  Gait Training             FWW ambulation    1 laps cool down                        Modalities                                            "

## 2024-06-12 ENCOUNTER — OFFICE VISIT (OUTPATIENT)
Dept: PHYSICAL THERAPY | Facility: CLINIC | Age: 64
End: 2024-06-12
Payer: COMMERCIAL

## 2024-06-12 DIAGNOSIS — Z96.641 STATUS POST TOTAL HIP REPLACEMENT, RIGHT: ICD-10-CM

## 2024-06-12 DIAGNOSIS — M16.11 PRIMARY OSTEOARTHRITIS OF ONE HIP, RIGHT: ICD-10-CM

## 2024-06-12 DIAGNOSIS — M25.551 PAIN IN RIGHT HIP: Primary | ICD-10-CM

## 2024-06-12 DIAGNOSIS — M87.00 AVN (AVASCULAR NECROSIS OF BONE) (HCC): ICD-10-CM

## 2024-06-12 PROCEDURE — 97110 THERAPEUTIC EXERCISES: CPT

## 2024-06-12 PROCEDURE — 97112 NEUROMUSCULAR REEDUCATION: CPT

## 2024-06-12 PROCEDURE — 97140 MANUAL THERAPY 1/> REGIONS: CPT

## 2024-06-12 RX ORDER — OXYCODONE HYDROCHLORIDE 5 MG/1
TABLET ORAL
Qty: 30 TABLET | Refills: 0 | Status: SHIPPED | OUTPATIENT
Start: 2024-06-12 | End: 2024-06-21 | Stop reason: SDUPTHER

## 2024-06-12 NOTE — TELEPHONE ENCOUNTER
Patient called to request a refill for their Oxycodone advised a refill was requested on 6/12/24 and is pending approval. Patient verbalized understanding and is in agreement.

## 2024-06-12 NOTE — PROGRESS NOTES
"Daily Note     Today's date: 2024  Patient name: Kelsi Cabrera  : 1960  MRN: [id number]  Referring provider: Kathya Nye MD  Dx:   Encounter Diagnosis     ICD-10-CM    1. Pain in right hip  M25.551       2. AVN (avascular necrosis of bone) ([facility])  M87.00       3. Primary osteoarthritis of one hip, right  M16.11       4. Status post total hip replacement, right  Z96.641                      Subjective: pt noted that she felt good yesterday and was able to do all the exercises. 8/10 p! In groin with numbness of R hip  laterally.   She notes that next week she does go back to see ortho.       Objective: See treatment diary below      Assessment:  minimal progressions noted as of this visit within anterior hip precautions. Pt is sp R RICKI completed on 24. Tolerated treatment well. Patient exhibited good technique with therapeutic exercises and would benefit from continued PT to continue strength, ROM and decrease pain toward PT goals s/p surgery.       Plan: Continue per plan of care.      Precautions: R Anterior RICKI 24, COPD, HTN,  Access Code: [email or web address] - updated on 6/10/24.   POC expires Unit limit Auth  expiration date PT/OT + Visit Limit?   24 N/A 7/15/24 60 per cly primary- 4 used                 Visit/Unit Tracking  AUTH Status:  Date 5/17 6/6 6/10 6/12           Approved 5 Used 1 2 3 4            Remaining  4 3 2 1              Manuals 5/17 6/6 6/10 6/12         R hip PROM   *Anterior Precautions   NV Gentle SC         R hamstring, adductor stretches   NV Gentle  SC                       Re-evaluation  BE           Neuro Re-Ed             Glute sets  Reviewed 5\" 2x 10  seated 5\" 2x 10  hoklying          Hip abd with TB and glute set   Seated 10x  YTB 5\" 2x 10 hooklying          Hip add iso with bolster   Seated  5\" 10x                                                               Ther Ex             Nustep with UE for hip ROM and LE strengthening  Seat 7  UE 9  L1 10' Declined.  " "Decline.          Heel slides with strap HEP reviewed Reviewed for HEP          Supine hip abd slides HEP reviewed           LAQ HEP reviewed 2x 10  2x 10          Standing hamstring curls   15x           Standing marches   Mini 2x 10           Standing hip abd   20x  (R) 2x 10 (R)         Ankle pumps HEP reviewed Heel raises standing. 2x10           Seated hamstring and calf stretches  HEP reviewed 30\"x 3  30\"x 3                      Pt education PT POC, HEP, post op status, DVT, sweling, pain, HEP review           Ther Activity             TG squats             Fwd step ups   4\" 2x 10  4\" 10x                                                 Gait Training             FWW ambulation    1 laps cool down                        Modalities                                              "

## 2024-06-14 NOTE — PROGRESS NOTES
Daily Note     Today's date: 2024  Patient name: Kelsi Cabrera  : 1960  MRN: [id number]  Referring provider: Kathya Nye MD  Dx:   Encounter Diagnosis     ICD-10-CM    1. Pain in right hip  M25.551       2. AVN (avascular necrosis of bone) ([facility])  M87.00       3. Primary osteoarthritis of one hip, right  M16.11       4. Status post total hip replacement, right  Z96.641                      Subjective: Patient expresses frustrations that her hip continues to be very swollen and painful. She says that there are times where her pain is minimal and she is able to walk a lot around her home with her RW, however there are others where the pain is so bad she is in tears. She continues to use ice on the hip daily for pain relief and swelling management.      Objective: See treatment diary below      Assessment: Tolerated treatment well. Despite experiencing pain in the hip, she was able to complete all standing exercises with good weightbearing on the R LE, UE usage required however. She was educated on normal healing processes following surgery, importance of activity modifications as needed for pain, and continued use of ice for pain relief as well. Patient demonstrated fatigue post treatment, exhibited good technique with therapeutic exercises, and would benefit from continued PT      Plan: Continue per plan of care.      Precautions: R Anterior RICKI 24, COPD, HTN,  Access Code: [email or web address] - updated on 6/10/24  POC expires Unit limit Auth  expiration date PT/OT + Visit Limit?   24 N/A 7/15/24 60 per cly primary- 4 used                 Visit/Unit Tracking  AUTH Status:  Date 5/17 6/6 6/10 6/12 6/17          Approved 5 Used 1 2 3 4 5           Remaining  4 3 2 1 0             Manuals 5/17 6/6 6/10 6/12 6/17        R hip PROM   *Anterior Precautions   NV Gentle SC BE        R hamstring, adductor stretches   NV Gentle  SC  BE adductor only                     Re-evaluation  BE           Neuro Re-Ed   "           Glute sets  Reviewed 5\" 2x 10  seated 5\" 2x 10  hoklying          Hip abd with TB and glute set   Seated 10x  YTB 5\" 2x 10 hooklying  YTB 5\" 2x 10 hooklying         Hip add iso with bolster   Seated  5\" 10x                                                               Ther Ex             Nustep with UE for hip ROM and LE strengthening  Seat 7  UE 9  L1 10' Declined.  Decline.  L1 6'         Heel slides with strap HEP reviewed Reviewed for HEP  5\"x15        Supine hip abd slides HEP reviewed   5\"x15        LAQ HEP reviewed 2x 10  2x 10          Standing hamstring curls   15x           Standing marches   Mini 2x 10   2x10 B/L        Standing hip abd   20x  (R) 2x 10 (R) 2x10 B/L         Ankle pumps HEP reviewed Heel raises standing. 2x10           Seated hamstring and calf stretches  HEP reviewed 30\"x 3  30\"x 3                      Pt education PT POC, HEP, post op status, DVT, sweling, pain, HEP review           Ther Activity             TG squats             Fwd step ups   4\" 2x 10  4\" 10x  4\"x10                                                Gait Training             FWW ambulation    1 laps cool down                        Modalities                                                "

## 2024-06-17 ENCOUNTER — OFFICE VISIT (OUTPATIENT)
Dept: PHYSICAL THERAPY | Facility: CLINIC | Age: 64
End: 2024-06-17
Payer: COMMERCIAL

## 2024-06-17 DIAGNOSIS — M16.11 PRIMARY OSTEOARTHRITIS OF ONE HIP, RIGHT: ICD-10-CM

## 2024-06-17 DIAGNOSIS — M25.551 PAIN IN RIGHT HIP: Primary | ICD-10-CM

## 2024-06-17 DIAGNOSIS — M87.00 AVN (AVASCULAR NECROSIS OF BONE) (HCC): ICD-10-CM

## 2024-06-17 DIAGNOSIS — Z96.641 STATUS POST TOTAL HIP REPLACEMENT, RIGHT: ICD-10-CM

## 2024-06-17 PROCEDURE — 97140 MANUAL THERAPY 1/> REGIONS: CPT

## 2024-06-17 PROCEDURE — 97110 THERAPEUTIC EXERCISES: CPT

## 2024-06-19 ENCOUNTER — OFFICE VISIT (OUTPATIENT)
Dept: OBGYN CLINIC | Facility: CLINIC | Age: 64
End: 2024-06-19

## 2024-06-19 ENCOUNTER — HOSPITAL ENCOUNTER (OUTPATIENT)
Dept: RADIOLOGY | Facility: HOSPITAL | Age: 64
Discharge: HOME | End: 2024-06-19
Attending: ORTHOPAEDIC SURGERY
Payer: COMMERCIAL

## 2024-06-19 DIAGNOSIS — Z96.641 STATUS POST TOTAL HIP REPLACEMENT, RIGHT: Primary | ICD-10-CM

## 2024-06-19 DIAGNOSIS — Z96.641 STATUS POST TOTAL HIP REPLACEMENT, RIGHT: ICD-10-CM

## 2024-06-19 PROCEDURE — 99024 POSTOP FOLLOW-UP VISIT: CPT | Performed by: ORTHOPAEDIC SURGERY

## 2024-06-19 PROCEDURE — 73502 X-RAY EXAM HIP UNI 2-3 VIEWS: CPT

## 2024-06-19 RX ORDER — ONDANSETRON 4 MG/1
4 TABLET, FILM COATED ORAL EVERY 8 HOURS PRN
Qty: 20 TABLET | Refills: 0 | Status: SHIPPED | OUTPATIENT
Start: 2024-06-19

## 2024-06-19 NOTE — PROGRESS NOTES
64 y.o.female presents for 3 weeks postoperative visit status post Right Anterior RICKI. Patient denies any chest pain, shortness or breath or calf pain .  Patient is taking eliquis for DVT prophylaxis.  Pain is well controlled with medication.  Patient states having some nausea mostly in the morning.    Arthroplasty Hip Total Anterior - Right  5/30/2024    Review of Systems  Review of systems negative unless otherwise specified in HPI    Past Medical History  Past Medical History:  No date: Anxiety  No date: Arthritis  No date: Asthma  No date: Avascular necrosis of bone of hip, right ([facility])  10/03/2017: Avascular necrosis of hip, right ([facility])  02/12/2019: Chronic obstructive pulmonary disease ([facility])  No date: COPD (chronic obstructive pulmonary disease) ([facility])  No date: GERD (gastroesophageal reflux disease)  No date: Hyperlipidemia  No date: Hypertension  No date: Hypoglycemia  No date: Infectious viral hepatitis  No date: Lumbar radicular pain  No date: Lyme disease  No date: Pneumonia  No date: 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[facility])    Past Surgical History  Past Surgical History:  No date: APPENDECTOMY  02/28/2024: CARDIAC CATHETERIZATION; Left      Comment:  Procedure: Cardiac Left Heart Cath;  Surgeon: Lizz Adorno MD;  Location: [facility];  Service:                Cardiology  02/28/2024: CARDIAC CATHETERIZATION; N/A      Comment:  Procedure: Cardiac Coronary Angiogram;  Surgeon:                Lizz Adorno MD;  Location: [facility];                 Service: Cardiology  02/28/2024: CARDIAC CATHETERIZATION      Comment:  Procedure: Cardiac catheterization;  Surgeon: Lizz Adorno MD;  Location: [facility];  Service:                Cardiology  No date: CARPAL TUNNEL RELEASE  No date: CHOLECYSTECTOMY  No date: COLONOSCOPY  5/30/2024: MD ARTHRP ACETBLR/PROX FEM PROSTC AGRFT/ALGRFT; Right      Comment:  Procedure: ARTHROPLASTY HIP TOTAL ANTERIOR;  Surgeon:                 Kathya Nye MD;  Location:  MAIN OR;  Service:                Orthopedics  No date: SINUS SURGERY    Current Medications  Current Outpatient Medications on File Prior to Visit:  acetaminophen (TYLENOL) 650 mg CR tablet, Take 1 tablet (650 mg total) by mouth every 8 (eight) hours as needed for mild pain, Disp: 30 tablet, Rfl: 0  ALPRAZolam (XANAX) 0.5 mg tablet, As needed, Disp: , Rfl:   apixaban (Eliquis) 2.5 mg, Take 1 tablet (2.5 mg total) by mouth 2 (two) times a day for 28 days Do not start taking medication until after total joint arthroplasty, Disp: 56 tablet, Rfl: 0  Azelastine HCl 137 MCG/SPRAY SOLN, , Disp: , Rfl:   budesonide-formoterol (SYMBICORT) 160-4.5 mcg/act inhaler, Inhale 2 puffs 2 (two) times a day, Disp: , Rfl:   Cholecalciferol (VITAMIN D3) 2000 units CHEW, Chew 5,000 Units daily, Disp: , Rfl:   Choline Fenofibrate (Fenofibric Acid) 135 MG CPDR, 1 cap daily, Disp: 90 capsule, Rfl: 3  cloNIDine (CATAPRES) 0.1 mg tablet, Take 1 tablet (0.1 mg total) by mouth 2 (two) times a day, Disp: 180 tablet, Rfl: 0  docusate sodium (COLACE) 100 mg capsule, Take 1 capsule (100 mg total) by mouth 2 (two) times a day, Disp: 10 capsule, Rfl: 0  fexofenadine (ALLEGRA) 180 MG tablet, Take 180 mg by mouth daily, Disp: , Rfl:   fluticasone (FLONASE) 50 mcg/act nasal spray, INSTILL 1 SPRAY INTO EACH NOSTRIL ONCE DAILY, Disp: 48 g, Rfl: 1  folic acid (FOLVITE) 1 mg tablet, TAKE 1 TABLET BY MOUTH EVERY DAY, Disp: 90 tablet, Rfl: 1  gabapentin (Neurontin) 100 mg capsule, Take 1 capsule (100 mg total) by mouth 3 (three) times a day, Disp: 90 capsule, Rfl: 0  levalbuterol (XOPENEX HFA) 45 mcg/act inhaler, , Disp: , Rfl:   levalbuterol (XOPENEX) 1.25 mg/3 mL nebulizer solution, As needed, Disp: , Rfl:   magnesium oxide (MAG-OX) 400 mg tablet, Take 1 tablet by mouth in the morning, Disp: , Rfl:   Multiple Vitamin (Daily-Nba Multivitamin) TABS, Take 1 tablet by mouth daily, Disp: , Rfl:   Multiple  Vitamins-Minerals (multivitamin with minerals) tablet, Take 1 tablet by mouth daily, Disp: 30 tablet, Rfl: 0  nitroglycerin (NITROSTAT) 0.4 mg SL tablet, Place 1 tablet (0.4 mg total) under the tongue every 5 (five) minutes as needed for chest pain, Disp: 30 tablet, Rfl: 3  oxyCODONE (ROXICODONE) 5 immediate release tablet, Take 1 tablets every 6 hrs as needed for pain control, Disp: 30 tablet, Rfl: 0  promethazine-dextromethorphan (PHENERGAN-DM) 6.25-15 mg/5 mL oral syrup, TAKE 5 MILLILITERS BY MOUTH 4 TIMES A DAY AS NEEDED FOR COUGH, Disp: , Rfl:   [DISCONTINUED] ondansetron (ZOFRAN) 4 mg tablet, Take 1 tablet (4 mg total) by mouth every 8 (eight) hours as needed for nausea or vomiting, Disp: 5 tablet, Rfl: 0  ascorbic acid (VITAMIN C) 500 MG tablet, Take 1 tablet (500 mg total) by mouth daily (Patient not taking: Reported on 6/19/2024), Disp: 30 tablet, Rfl: 0  ferrous sulfate 324 (65 Fe) mg, Take 1 tablet (324 mg total) by mouth daily before breakfast (Patient not taking: Reported on 6/19/2024), Disp: 30 tablet, Rfl: 0    No current facility-administered medications on file prior to visit.      Recent Labs (HCT,HGB,PT,INR,ESR,CRP,GLU,HgA1C)  0       Lab                      Value               Date/Time                  HCT                      47.5 (H)            05/09/2024 0854            HGB                      15.4                05/09/2024 0854            WBC                      9.96                05/09/2024 0854            INR                      0.98                05/09/2024 0854            ESR                      3                   04/14/2021 1152            CRP                      <3.0                10/25/2022 1636            HGBA1C                   5.7 (H)             05/09/2024 0854               There is no height or weight on file to calculate BMI.Wt Readings from Last 3 Encounters:  05/30/24 : 69.9 kg (154 lb)  05/09/24 : 69.9 kg (154 lb)  04/23/24 : 71.7 kg (158 lb)      Physical  exam   General: Awake, Alert, Oriented   Eyes: Pupils equal, round and reactive to light    Heart: regular rate and rhythm   Lungs: No audible wheezing   Abdomen: soft  Right Lower extremity      Incision well approximated without erythema no tenderness to palpation   Active hip flexion to 90 degrees   Full knee extension   Active ankle dorsal and plantarflexion without pain, calf nontender palpation   Sensation lateral femoral cutaneous nerve distribution mildly diminished, sensation intact   Distal pulses present      Imaging  X rays of the Right hip reviewed.  X rays reveal well located, excellently aligned right total hip arthroplasty without evidence of loosening or osseous abnormality.    Assessment:  Status post 3 weeks Right RICKI anterior approach    Plan:  Weight bearing as tolerated right Lower extremity  Physical therapy  Dental antibiotic prophylaxis recommended  Pain medication as needed  Zofran sent to pharmacy for patient's nausea  Follow-up in 2 months and repeat x-rays right hip

## 2024-06-20 ENCOUNTER — EVALUATION (OUTPATIENT)
Dept: PHYSICAL THERAPY | Facility: CLINIC | Age: 64
End: 2024-06-20
Payer: COMMERCIAL

## 2024-06-20 DIAGNOSIS — M87.00 AVN (AVASCULAR NECROSIS OF BONE) (HCC): ICD-10-CM

## 2024-06-20 DIAGNOSIS — M16.11 PRIMARY OSTEOARTHRITIS OF ONE HIP, RIGHT: ICD-10-CM

## 2024-06-20 DIAGNOSIS — M25.551 PAIN IN RIGHT HIP: Primary | ICD-10-CM

## 2024-06-20 DIAGNOSIS — Z96.641 STATUS POST TOTAL HIP REPLACEMENT, RIGHT: ICD-10-CM

## 2024-06-20 PROCEDURE — 97140 MANUAL THERAPY 1/> REGIONS: CPT

## 2024-06-20 PROCEDURE — 97116 GAIT TRAINING THERAPY: CPT

## 2024-06-20 PROCEDURE — 97110 THERAPEUTIC EXERCISES: CPT

## 2024-06-20 NOTE — PROGRESS NOTES
"Daily Note     Today's date: 2024  Patient name: Kelsi Cabrera  : 1960  MRN: [id number]  Referring provider: Kathya Nye MD  Dx:   Encounter Diagnosis     ICD-10-CM    1. Pain in right hip  M25.551       2. AVN (avascular necrosis of bone) ([facility])  M87.00       3. Primary osteoarthritis of one hip, right  M16.11       4. Status post total hip replacement, right  Z96.641                      Subjective: ***      Objective: See treatment diary below      Assessment: Tolerated treatment {Tolerated treatment :4920698286}. Patient {assessment:8416572184}      Plan: {PLAN:7842851607}     Precautions: R Anterior RICKI 24, COPD, HTN,  Access Code: [email or web address] - updated on 6/10/24  POC expires Unit limit Auth  expiration date PT/OT + Visit Limit?   24 N/A 7/15/24 60 per cly primary- 4 used                 Visit/Unit Tracking  AUTH Status:  Date 5/17 6/6 6/10 6/12 6/17          Approved 5 Used 1 2 3 4 5           Remaining  4 3 2 1 0             Manuals 5/17 6/6 6/10 6/12 6/17        R hip PROM   *Anterior Precautions   NV Gentle SC BE        R hamstring, adductor stretches   NV Gentle  SC  BE adductor only                     Re-evaluation  BE           Neuro Re-Ed             Glute sets  Reviewed 5\" 2x 10  seated 5\" 2x 10  hoklying          Hip abd with TB and glute set   Seated 10x  YTB 5\" 2x 10 hooklying  YTB 5\" 2x 10 hooklying         Hip add iso with bolster   Seated  5\" 10x                                                               Ther Ex             Nustep with UE for hip ROM and LE strengthening  Seat 7  UE 9  L1 10' Declined.  Decline.  L1 6'         Heel slides with strap HEP reviewed Reviewed for HEP  5\"x15        Supine hip abd slides HEP reviewed   5\"x15        LAQ HEP reviewed 2x 10  2x 10          Standing hamstring curls   15x           Standing marches   Mini 2x 10   2x10 B/L        Standing hip abd   20x  (R) 2x 10 (R) 2x10 B/L         Ankle pumps HEP reviewed Heel raises " "standing. 2x10           Seated hamstring and calf stretches  HEP reviewed 30\"x 3  30\"x 3                      Pt education PT POC, HEP, post op status, DVT, sweling, pain, HEP review           Ther Activity             TG squats             Fwd step ups   4\" 2x 10  4\" 10x  4\"x10                                                Gait Training             FWW ambulation    1 laps cool down                        Modalities                                                  "

## 2024-06-20 NOTE — PROGRESS NOTES
"PT Re-Evaluation     Today's date: 2024  Patient name: Kelsi Cabrera  : 1960  MRN: [id number]  Referring provider: Kathya Nye MD  Dx:   Encounter Diagnosis     ICD-10-CM    1. Pain in right hip  M25.551       2. AVN (avascular necrosis of bone) ([facility])  M87.00       3. Primary osteoarthritis of one hip, right  M16.11       4. Status post total hip replacement, right  Z96.641                        Assessment  Impairments: abnormal gait, abnormal or restricted ROM, activity intolerance, impaired balance, impaired physical strength, lacks appropriate home exercise program, pain with function and weight-bearing intolerance  Symptom irritability: moderate    Assessment details: Kelsi Cabrera is a 64 y.o. female presenting to physical therapy for a reevaluation s/p right anterior RICKI on 24 Since their initial evaluation, she reports 60% improvement in her hip. The patient has demonstrated improved hip active ROM, hip strength, and reports greater ease of mobility with her RW. She has  improved times on the TUG and 5 STS functional assessments demonstrating her improved functional mobility. Despite her improvements, she continues with significantly elevated pain levels, decreased hip flexion AROM, and limitations in hip strength. She continues to be ambulatory with use of a RW at this time. Trialed ambulation with a SPC, however pain levels hindered her ability for increased weightbearing on the R LE. Due to her deficits, she continues to have limitations in her ability to complete ADLs, avocational responsibilities, and recreational activities. This patient would benefit from continued PT services to address their impairments and functional limitations to maximize functional outcome.    Understanding of Dx/Px/POC: excellent     Prognosis: good    Goals  STG to be achieved in 4 weeks:   The patient will report a decrease in right hip \"at worst\" subjective pain rating score to at least 5/10 to " allow for improved tolerance for weightbearing activities. NOT MET  The patient will increase right hip flexion AROM to at least 90 degrees to allow for improved functional mobility. NOT MET  The patient will increase right hip flexion MMT score to at least 3+/5 to allow for improved functional mobility. MET    LTG to be achieved by DC: ALL NOT MET-PROGRESSING  The patient will be independent in comprehensive HEP.   The patient will report no pain with usual activities.   The patient will improve all right hip AROM to WFL to allow for improved functional mobility.   The patient will increase all right hip MMT score to WFL to allow for improved functional mobility.   The patient will be able to ambulate at least one mile without AD, pain, or difficulty to allow completion of recreational outdoor walks.  The patient will be able to complete one full flight of stairs without AD, pain, or difficulty to allow access to home.  The patient will be able to complete her gardening without AD, pain, or difficulty.     Plan  Patient would benefit from: skilled physical therapy  Planned modality interventions: thermotherapy: hydrocollator packs, TENS and cryotherapy    Planned therapy interventions: manual therapy, joint mobilization, balance/weight bearing training, neuromuscular re-education, patient education, flexibility, strengthening, stretching, therapeutic activities, therapeutic exercise, gait training and home exercise program    Frequency: 2x week  Duration in weeks: 12  Plan of Care beginning date: 5/17/2024  Plan of Care expiration date: 8/9/2024  Treatment plan discussed with: patient      Subjective Evaluation    History of Present Illness  Mechanism of injury: Kelsi reports 60% improvement since beginning PT services. She notes improvement in her mobility since initial evaluation. She says that she is able to do more walking around her home and stand for periods of time without the walker at her sink to wash  dishes. She doesn't feel that she needs the support of the walker at this point, however is concerned of falling at home. She has never used a SPC when walking so she is unsure how to do this. She also reports improvements in her ROM. She is able to move her hip independently in bed or getting in/out of the car. She reports that she does still get fatigued quite quickly with her walking even when inside of her home. She has not attempted to go into the upstairs of her home at this point. She is able to complete the 2 stairs to enter her home, non-reciprocally. She says that yesterday into today was the first day that she was able to go without needing the prescription pain medications. She did take one prior to therapy because she had increased pain following some walking she did in her home. She had a follow up with her surgeon yesterday at which time the post operative bandage was removed. She had Xrays completed which revealed well aligned RICKI. She was advised on continued use of medications for pain and OP PT.     From Pre-Operative IE:   The lives at home with her , daughter, son, daughter in law, and their children. Her son works from home every day, her daughter works from home 2 days a week, and her  works 1 day a week from home so there will always be someone with her at home. She lives in multiple level home, however her bedroom and bathroom are on the first floor of her home. She has a walk in shower with a small ledge to step into it and has a built in bench to sit on. She has one step to get into her home, without any hand railings. She does have a RW at home and is very sad to think about her walking with it after surgery.         Patient Goals  Patient goals for therapy: decreased pain, increased strength, independence with ADLs/IADLs and increased motion  Patient goal: to be able to walk a lot again and take care of garden  Pain  Current pain ratin  At best pain ratin  At worst  pain ratin  Location: R hip  Quality: sharp and dull ache  Relieving factors: change in position and rest  Aggravating factors: standing, walking and stair climbing  Progression: improved    Social Support  Steps to enter house: yes  Stairs in house: yes   Lives in: multiple-level home  Lives with: spouse, adult children and young children    Employment status: not working  Treatments  Previous treatment: medication  Current treatment: physical therapy      Objective     Active Range of Motion   Left Hip   Flexion: 98 degrees   Abduction: 20 degrees   External rotation (90/90): 15 degrees   Internal rotation (90/90): 15 degrees     Right Hip   Flexion: 75 degrees with pain  Abduction: 23 degrees with pain  External rotation (90/90): 20 degrees with pain  Internal rotation (90/90): 25 degrees with pain    Passive Range of Motion     Right Hip   Flexion: 80 degrees with pain  Abduction: 25 degrees with pain  External rotation (90/90): 25 degrees   Internal rotation (90/90): 25 degrees with pain    Strength/Myotome Testing     Left Hip   Planes of Motion   Flexion: 4  Abduction: 4  External rotation: 4  Internal rotation: 4    Right Hip   Planes of Motion   Flexion: 3+ (Pain)  Abduction: 3 (Pain)  External rotation: 3 (Pain)  Internal rotation: 3 (Pain)    Left Knee   Flexion: 5  Extension: 5    Right Knee   Flexion: 4  Extension: 5    Left Ankle/Foot   Dorsiflexion: 5  Plantar flexion: 5    Right Ankle/Foot   Dorsiflexion: 5  Plantar flexion: 5    Ambulation   Weight-Bearing Status   Weight-Bearing Status (Right): weight-bearing as tolerated    Assistive device used: two-wheeled walker    Ambulation: Level Surfaces   Ambulation with assistive device: independent    Observational Gait   Gait: antalgic   Stride length, left stance time and right stance time within functional limits.     Functional Assessment        Comments  Post Operative IE 24:   TUG- 19.81 seconds mild forward trunk lean to RW, step through  "gait pattern with good weight shift to right side  5 STS- 16.10 seconds UE usage for push off and eccentric control to chair, standing to RW    Re-evaluation 6/20/24:  TUG- 16.72 seconds use of RW, upright trunk position, step through gait pattern with even weight shifting  5 STS- 15.78 seconds mild UE usage for push off to stand, standing to RW, even foot placement              Precautions: R Anterior RICKI 5/23/24, COPD, HTN,  Access Code: [email or web address] - updated on 6/10/24  POC expires Unit limit Auth  expiration date PT/OT + Visit Limit?   8/9/24 N/A 7/17/24 60 per cly primary- 4 used                 Visit/Unit Tracking  AUTH Status:  Date 5/17 6/6 6/10 6/12 6/17 6/20         Approved 2 Used 1 2 3 4 5 1          Remaining  4 3 2 1 0 1            Manuals 5/17 6/6 6/10 6/12 6/17 6/20       R hip PROM   *Anterior Precautions   NV Gentle SC BE BE       R hamstring, adductor stretches   NV Gentle  SC  BE adductor only BE                    Re-evaluation  BE    BE       Neuro Re-Ed             Glute sets  Reviewed 5\" 2x 10  seated 5\" 2x 10  hoklying          Hip abd with TB and glute set   Seated 10x  YTB 5\" 2x 10 hooklying  YTB 5\" 2x 10 hooklying         Hip add iso with bolster   Seated  5\" 10x                                                               Ther Ex             Nustep with UE for hip ROM and LE strengthening  Seat 7  UE 9  L1 10' Declined.  Decline.  L1 6'  L1 10'       Heel slides with strap HEP reviewed Reviewed for HEP  5\"x15        Supine hip abd slides HEP reviewed   5\"x15        LAQ HEP reviewed 2x 10  2x 10          Standing hamstring curls   15x           Standing marches   Mini 2x 10   2x10 B/L        Standing hip abd   20x  (R) 2x 10 (R) 2x10 B/L         Ankle pumps HEP reviewed Heel raises standing. 2x10           Seated hamstring and calf stretches  HEP reviewed 30\"x 3  30\"x 3                      Pt education PT POC, HEP, post op status, DVT, sweling, pain, HEP review    PT POC, HEP       Ther " "Activity             TG squats             Fwd step ups   4\" 2x 10  4\" 10x  4\"x10                                                Gait Training             FWW ambulation    1 laps cool down           Ambulation with SPC      25 ft   CGA, VC's sequencing       Modalities                                 "

## 2024-06-21 DIAGNOSIS — Z96.641 STATUS POST TOTAL HIP REPLACEMENT, RIGHT: ICD-10-CM

## 2024-06-21 RX ORDER — OXYCODONE HYDROCHLORIDE 5 MG/1
TABLET ORAL
Qty: 20 TABLET | Refills: 0 | Status: SHIPPED | OUTPATIENT
Start: 2024-06-21

## 2024-06-24 ENCOUNTER — OFFICE VISIT (OUTPATIENT)
Dept: PHYSICAL THERAPY | Facility: CLINIC | Age: 64
End: 2024-06-24
Payer: COMMERCIAL

## 2024-06-24 DIAGNOSIS — M87.00 AVN (AVASCULAR NECROSIS OF BONE) (HCC): ICD-10-CM

## 2024-06-24 DIAGNOSIS — M25.551 PAIN IN RIGHT HIP: Primary | ICD-10-CM

## 2024-06-24 DIAGNOSIS — Z96.641 STATUS POST TOTAL HIP REPLACEMENT, RIGHT: ICD-10-CM

## 2024-06-24 DIAGNOSIS — M16.11 PRIMARY OSTEOARTHRITIS OF ONE HIP, RIGHT: ICD-10-CM

## 2024-06-24 PROCEDURE — 97140 MANUAL THERAPY 1/> REGIONS: CPT

## 2024-06-24 PROCEDURE — 97530 THERAPEUTIC ACTIVITIES: CPT

## 2024-06-24 PROCEDURE — 97110 THERAPEUTIC EXERCISES: CPT

## 2024-06-24 NOTE — PROGRESS NOTES
"Daily Note     Today's date: 2024  Patient name: Kelsi Cabrera  : 1960  MRN: [id number]  Referring provider: Kathya Nye MD  Dx:   Encounter Diagnosis     ICD-10-CM    1. Pain in right hip  M25.551       2. AVN (avascular necrosis of bone) ([facility])  M87.00       3. Primary osteoarthritis of one hip, right  M16.11       4. Status post total hip replacement, right  Z96.641                      Subjective: Patient reports that she had a terrible weekend of pain and believes that this was due to use of AC because it was so hot. She says that cold air from AC or even rainy damp weather hurts all of her joints.      Objective: See treatment diary below      Assessment: Tolerated treatment well. Able to initiate gait training with SPC with much improved technique, mechanics, and weight shifting onto R LE compared to the previous session. Performed TG squats and step ups on higher step to promote improved functional strength of the hip. She was advised on SPC usage at home, on flat surfaces, with guarding from family members while she continues to get more comfortable and confident. Patient demonstrated fatigue post treatment, exhibited good technique with therapeutic exercises, and would benefit from continued PT      Plan: Continue per plan of care.      Precautions: R Anterior RICKI 24, COPD, HTN,  Access Code: [email or web address] - updated on 6/10/24  POC expires Unit limit Auth  expiration date PT/OT + Visit Limit?   24 N/A 24 60 per cly primary- 4 used                 Visit/Unit Tracking  AUTH Status:  Date 5/17 6/6 6/10 6/12 6/17 6/20 6/24        Approved 2 Used 1 2 3 4 5 1 2         Remaining  4 3 2 1 0 1 0           Manuals 5/17 6/6 6/10 6/12 6/17 6/20 6/24      R hip PROM   *Anterior Precautions   NV Gentle SC BE BE BE      R hamstring, adductor stretches   NV Gentle  SC  BE adductor only BE BE                   Re-evaluation  BE    BE       Neuro Re-Ed             Glute sets  Reviewed 5\" 2x 10  " "seated 5\" 2x 10  hoklying          Hip abd with TB and glute set   Seated 10x  YTB 5\" 2x 10 hooklying  YTB 5\" 2x 10 hooklying         Hip add iso with bolster   Seated  5\" 10x                                                               Ther Ex             Nustep with UE for hip ROM and LE strengthening  Seat 7  UE 9  L1 10' Declined.  Decline.  L1 6'  L1 10'       Heel slides with strap HEP reviewed Reviewed for HEP  5\"x15  No strap 5\"x20       Supine hip abd slides HEP reviewed   5\"x15  20x      LAQ HEP reviewed 2x 10  2x 10          Standing hamstring curls   15x           Standing marches   Mini 2x 10   2x10 B/L 2x10 B/L  2x10 B/L      Standing hip abd   20x  (R) 2x 10 (R) 2x10 B/L  2x10 B/L 2x10 B/L      Ankle pumps HEP reviewed Heel raises standing. 2x10           Seated hamstring and calf stretches  HEP reviewed 30\"x 3  30\"x 3         Sidelying hip abduction       10x      Hooklying hip flexion isometric with pball       2x5      Pt education PT POC, HEP, post op status, DVT, sweling, pain, HEP review    PT POC, HEP       Ther Activity             TG squats       L20 2x10       Fwd step ups   4\" 2x 10  4\" 10x  4\"x10   6\" 2x10                                             Gait Training             FWW ambulation    1 laps cool down           Ambulation with SPC      25 ft   CGA, VC's sequencing 1 laps x2  around gym       Modalities                                 "

## 2024-06-27 ENCOUNTER — OFFICE VISIT (OUTPATIENT)
Dept: PHYSICAL THERAPY | Facility: CLINIC | Age: 64
End: 2024-06-27
Payer: COMMERCIAL

## 2024-06-27 DIAGNOSIS — M87.00 AVN (AVASCULAR NECROSIS OF BONE) (HCC): ICD-10-CM

## 2024-06-27 DIAGNOSIS — M16.11 PRIMARY OSTEOARTHRITIS OF ONE HIP, RIGHT: ICD-10-CM

## 2024-06-27 DIAGNOSIS — Z96.641 STATUS POST TOTAL HIP REPLACEMENT, RIGHT: ICD-10-CM

## 2024-06-27 DIAGNOSIS — M25.551 PAIN IN RIGHT HIP: Primary | ICD-10-CM

## 2024-06-27 PROCEDURE — 97110 THERAPEUTIC EXERCISES: CPT

## 2024-06-27 PROCEDURE — 97530 THERAPEUTIC ACTIVITIES: CPT

## 2024-06-27 NOTE — PROGRESS NOTES
"Daily Note     Today's date: 2024  Patient name: Kelsi Cabrera  : 1960  MRN: [id number]  Referring provider: Kathya Nye MD  Dx:   Encounter Diagnosis     ICD-10-CM    1. Pain in right hip  M25.551       2. AVN (avascular necrosis of bone) ([facility])  M87.00       3. Primary osteoarthritis of one hip, right  M16.11       4. Status post total hip replacement, right  Z96.641                      Subjective: Patient reports that she has been walking well with her SPC at home. She is having some ankle and foot pain which she has experienced in the past and had PT for.       Objective: See treatment diary below      Assessment: Tolerated treatment well. Patient able to complete lateral step ups this session with good tolerance to promote improved hip strengthening functionally. She was able to complete supine hip flexion marches from hooklying position demonstrating improving hip flexor muscle strength. Patient demonstrated fatigue post treatment, exhibited good technique with therapeutic exercises, and would benefit from continued PT      Plan: Continue per plan of care.      Precautions: R Anterior RICKI 24, COPD, HTN,  Access Code: [email or web address] - updated on 6/10/24  POC expires Unit limit Auth  expiration date PT/OT + Visit Limit?   24 N/A 24 60 per cly primary- 4 used                 Visit/Unit Tracking  AUTH Status:  Date 5/17 6/6 6/10 6/12 6/17 6/20 6/24 6/27       Approved 4 Used 1 2 3 4 5 1 2 1        Remaining  4 3 2 1 0 1 0 3          Manuals 5/17 6/6 6/10 6/12 6/17 6/20 6/24 6/27     R hip PROM   *Anterior Precautions   NV Gentle SC BE BE BE BE     R hamstring, adductor stretches   NV Gentle  SC  BE adductor only BE BE BE                  Re-evaluation  BE    BE       Neuro Re-Ed             Glute sets  Reviewed 5\" 2x 10  seated 5\" 2x 10  hoklying          Hip abd with TB and glute set   Seated 10x  YTB 5\" 2x 10 hooklying  YTB 5\" 2x 10 hooklying         Hip add iso with bolster   Seated " " 5\" 10x                                                               Ther Ex             Nustep with UE for hip ROM and LE strengthening  Seat 7  UE 9  L1 10' Declined.  Decline.  L1 6'  L1 10'       Heel slides with strap HEP reviewed Reviewed for HEP  5\"x15  No strap 5\"x20       Supine hip abd slides HEP reviewed   5\"x15  20x      LAQ HEP reviewed 2x 10  2x 10     2# x20      Standing hamstring curls   15x      DC     Standing marches   Mini 2x 10   2x10 B/L 2x10 B/L  2x10 B/L 3x10 B/L     Standing hip abd   20x  (R) 2x 10 (R) 2x10 B/L  2x10 B/L 2x10 B/L 3x10 B/L     Ankle pumps HEP reviewed Heel raises standing. 2x10           Seated hamstring and calf stretches  HEP reviewed 30\"x 3  30\"x 3         Sidelying hip abduction       10x 10x      Hooklying hip flexion isometric with pball       2x5 Supine march  10x     Seated hamstring curls with TB        GTB 2x10      Pt education PT POC, HEP, post op status, DVT, sweling, pain, HEP review    PT POC, HEP       Ther Activity             TG squats       L20 2x10  L18 2x10     Fwd step ups   4\" 2x 10  4\" 10x  4\"x10   6\" 2x10 6\" x20     Lat step ups         6\" x10                                Gait Training             FWW ambulation    1 laps cool down           Ambulation with SPC      25 ft   CGA, VC's sequencing 1 laps x2  around gym       Modalities                                   "

## 2024-06-29 DIAGNOSIS — Z96.641 STATUS POST TOTAL HIP REPLACEMENT, RIGHT: ICD-10-CM

## 2024-06-30 RX ORDER — APIXABAN 2.5 MG/1
TABLET, FILM COATED ORAL
Qty: 60 TABLET | Refills: 1 | Status: SHIPPED | OUTPATIENT
Start: 2024-06-30

## 2024-07-01 ENCOUNTER — OFFICE VISIT (OUTPATIENT)
Dept: PHYSICAL THERAPY | Facility: CLINIC | Age: 64
End: 2024-07-01
Payer: COMMERCIAL

## 2024-07-01 DIAGNOSIS — Z96.641 STATUS POST TOTAL HIP REPLACEMENT, RIGHT: ICD-10-CM

## 2024-07-01 DIAGNOSIS — M16.11 PRIMARY OSTEOARTHRITIS OF ONE HIP, RIGHT: ICD-10-CM

## 2024-07-01 DIAGNOSIS — M87.00 AVN (AVASCULAR NECROSIS OF BONE) (HCC): ICD-10-CM

## 2024-07-01 DIAGNOSIS — M25.551 PAIN IN RIGHT HIP: Primary | ICD-10-CM

## 2024-07-01 PROCEDURE — 97140 MANUAL THERAPY 1/> REGIONS: CPT

## 2024-07-01 PROCEDURE — 97112 NEUROMUSCULAR REEDUCATION: CPT

## 2024-07-01 NOTE — PROGRESS NOTES
Daily Note     Today's date: 2024  Patient name: Kelsi Cabrera  : 1960  MRN: [id number]  Referring provider: Kathya Nye MD  Dx:   Encounter Diagnosis     ICD-10-CM    1. Pain in right hip  M25.551       2. AVN (avascular necrosis of bone) ([facility])  M87.00       3. Primary osteoarthritis of one hip, right  M16.11       4. Status post total hip replacement, right  Z96.641           Start Time: 1713  Stop Time: 1753  Total time in clinic (min): 40 minutes    Subjective: pt noted that she slept on her belly and woke up with her legs crossed ad had a lot of pain and needed to take 2 pain meds today to help with the pain.       Objective: See treatment diary below      Assessment:  Continued with treatment session s/p R anterior RICKI completed on 24. Tolerated treatment fairly well with form of all exercises noted some increase in challenge with exercises  with stairs L>F. Noted more tenderness in hip flexor with exercises. Patient exhibited good technique with therapeutic exercises and would benefit from continued PT. S/p treatment session noted no immediate changes.     Was advised by evaluating therapist as well as therapist assistant  if she is having immediate concerns of her R hip that she can go to urgent care or contact her drs office especially if not seeing improvement over the next few days.     Plan: Continue per plan of care.  Progress as able.      Precautions: R Anterior RICKI 24, COPD, HTN,  Access Code: [email or web address] - updated on 6/10/24  POC expires Unit limit Auth  expiration date PT/OT + Visit Limit?   24 N/A 24 60 per cly primary- 4 used                 Visit/Unit Tracking  AUTH Status:  Date 5/17 6/6 6/10 6/12 6/17 6/20 6/24 6/27 7/      Approved 4 Used 1 2 3 4 5 1 2 1 2       Remaining  4 3 2 1 0 1 0 3 2         Manuals 5/17 6/6 6/10 6/12 6/17 6/20 6/24 6/27 7/    R hip PROM   *Anterior Precautions   NV Gentle SC BE BE BE BE SC gentle     R hamstring, adductor  "stretches   NV Gentle  SC  BE adductor only BE BE BE SC gentle                  Re-evaluation  BE    BE       Neuro Re-Ed             Glute sets  Reviewed 5\" 2x 10  seated 5\" 2x 10  hoklying          Hip abd with TB and glute set   Seated 10x  YTB 5\" 2x 10 hooklying  YTB 5\" 2x 10 hooklying     YTB 5\" 2x 10 hooklying     Hip add iso with bolster   Seated  5\" 10x                                                               Ther Ex             Nustep with UE for hip ROM and LE strengthening  Seat 7  UE 9  L1 10' Declined.  Decline.  L1 6'  L1 10'   D/C    Heel slides with strap HEP reviewed Reviewed for HEP  5\"x15  No strap 5\"x20       Supine hip abd slides HEP reviewed   5\"x15  20x  30x     LAQ HEP reviewed 2x 10  2x 10     2# x20  20x     Standing hamstring curls   15x      DC     Standing marches   Mini 2x 10   2x10 B/L 2x10 B/L  2x10 B/L 3x10 B/L 3x 10 b/l     Standing hip abd   20x  (R) 2x 10 (R) 2x10 B/L  2x10 B/L 2x10 B/L 3x10 B/L 3x 10 b/l     Ankle pumps HEP reviewed Heel raises standing. 2x10           Seated hamstring and calf stretches  HEP reviewed 30\"x 3  30\"x 3         Sidelying hip abduction       10x 10x      Hooklying hip flexion isometric with pball       2x5 Supine march  10x Supine march 2x 10     Seated hamstring curls with TB        GTB 2x10  RTB 10x     Pt education PT POC, HEP, post op status, DVT, sweling, pain, HEP review    PT POC, HEP       Ther Activity             TG squats       L20 2x10  L18 2x10 Declined.     Fwd step ups   4\" 2x 10  4\" 10x  4\"x10   6\" 2x10 6\" x20 6\" 2x 10     Lat step ups         6\" x10  6\"5x                               Gait Training             FWW ambulation    1 laps cool down           Ambulation with SPC      25 ft   CGA, VC's sequencing 1 laps x2  around gym       Modalities                                      1 on 1 time for 24 minutes on 7/1/24.   "

## 2024-07-03 ENCOUNTER — OFFICE VISIT (OUTPATIENT)
Dept: PHYSICAL THERAPY | Facility: CLINIC | Age: 64
End: 2024-07-03
Payer: COMMERCIAL

## 2024-07-03 DIAGNOSIS — Z96.641 STATUS POST TOTAL HIP REPLACEMENT, RIGHT: ICD-10-CM

## 2024-07-03 DIAGNOSIS — M87.00 AVN (AVASCULAR NECROSIS OF BONE) (HCC): ICD-10-CM

## 2024-07-03 DIAGNOSIS — M16.11 PRIMARY OSTEOARTHRITIS OF ONE HIP, RIGHT: ICD-10-CM

## 2024-07-03 DIAGNOSIS — M25.551 PAIN IN RIGHT HIP: Primary | ICD-10-CM

## 2024-07-03 PROCEDURE — 97140 MANUAL THERAPY 1/> REGIONS: CPT

## 2024-07-03 PROCEDURE — 97530 THERAPEUTIC ACTIVITIES: CPT

## 2024-07-03 PROCEDURE — 97110 THERAPEUTIC EXERCISES: CPT

## 2024-07-03 NOTE — PROGRESS NOTES
Daily Note     Today's date: 7/3/2024  Patient name: Kelsi Cabrera  : 1960  MRN: [id number]  Referring provider: Kathya Nye MD  Dx:   Encounter Diagnosis     ICD-10-CM    1. Pain in right hip  M25.551       2. AVN (avascular necrosis of bone) ([facility])  M87.00       3. Primary osteoarthritis of one hip, right  M16.11       4. Status post total hip replacement, right  Z96.641           Start Time: 1730  Stop Time: 1808  Total time in clinic (min): 38 minutes    Subjective: pt noted that she is still taking pain mediation prior to coming to PT but noted that she is having less pain today than last treatment session.   Noted that she is ambulating at times with no AD t/o her home.     Objective: See treatment diary below  Observed incision near distal end where scabs have fell off looks a little increase in pink in color - looks like a normal healing process.     Assessment: Continued with treatment session for  R Anterior RICKI 24. Currently is 5 weeks OOS. Tolerated treatment well. Proper technique demonstrated at this time.  Patient demonstrated fatigue post treatment and would still benefit from continued PT at this time.   Advised for light retrograde massage near incision to help with swelling.   Due to progressions may have some increase in DOMS.   Advised pt if every concerns about her incision or swelling to let therapist and dr know of any concern.   Plan: Continue per plan of care.      Precautions: R Anterior RICKI 24, COPD, HTN,  Access Code: [email or web address] - updated on 6/10/24  POC expires Unit limit Auth  expiration date PT/OT + Visit Limit?   24 N/A 24 60 per cly primary- 4 used                 Visit/Unit Tracking  AUTH Status:  Date 5/17 6/6 6/10 6/12 6/17 6/20 6/24 6/27 7/ 7/3     Approved 4 Used 1 2 3 4 5 1 2 1 2 3      Remaining  4 3 2 1 0 1 0 3 2 1        Manuals 5/17 6/6 6/10 6/12 6/17 6/20 6/24 6/27 7/ 7/3   R hip PROM   *Anterior Precautions   NV Gentle SC BE BE BE BE SC  "gentle  SC   R hamstring, adductor stretches   NV Gentle  SC  BE adductor only BE BE BE SC gentle  SC                Re-evaluation  BE    BE       Neuro Re-Ed             Glute sets  Reviewed 5\" 2x 10  seated 5\" 2x 10  hoklying          Hip abd with TB and glute set   Seated 10x  YTB 5\" 2x 10 hooklying  YTB 5\" 2x 10 hooklying     YTB 5\" 2x 10 hooklying  RTB 5\" 2x 10 hooklying    Hip add iso with bolster   Seated  5\" 10x                                                               Ther Ex             Nustep with UE for hip ROM and LE strengthening  Seat 7  UE 9  L1 10' Declined.  Decline.  L1 6'  L1 10'   D/C    Heel slides with strap HEP reviewed Reviewed for HEP  5\"x15  No strap 5\"x20       Supine hip abd slides HEP reviewed   5\"x15  20x  30x     LAQ HEP reviewed 2x 10  2x 10     2# x20  20x  Reume NV    Standing hamstring curls   15x      DC     Standing marches   Mini 2x 10   2x10 B/L 2x10 B/L  2x10 B/L 3x10 B/L 3x 10 b/l  3x 10 b/l    Standing hip abd   20x  (R) 2x 10 (R) 2x10 B/L  2x10 B/L 2x10 B/L 3x10 B/L 3x 10 b/l  3x 10 b/l    Ankle pumps HEP reviewed Heel raises standing. 2x10           Seated hamstring and calf stretches  HEP reviewed 30\"x 3  30\"x 3         Sidelying hip abduction       10x 10x      Hooklying hip flexion isometric with pball       2x5 Supine march  10x Supine march 2x 10     Seated hamstring curls with TB        GTB 2x10  RTB 10x  Recume NV    Pt education PT POC, HEP, post op status, DVT, sweling, pain, HEP review    PT POC, HEP       Ther Activity             TG squats       L20 2x10  L18 2x10 Declined.  Hold today    Fwd step ups   4\" 2x 10  4\" 10x  4\"x10   6\" 2x10 6\" x20 6\" 2x 10  6\" 3x10    Lat step ups         6\" x10  6\"5x  6\" 3x 10    Sit to stands           10x  no UE                 Gait Training             FWW ambulation    1 laps cool down           Ambulation with SPC      25 ft   CGA, VC's sequencing 1 laps x2  around gym    Ambulation with no AD.    Modalities              "

## 2024-07-05 NOTE — PROGRESS NOTES
PT Re-Evaluation     Today's date: 2024  Patient name: Kelsi Cabrera  : 1960  MRN: [id number]  Referring provider: Kathya Nye MD  Dx:   Encounter Diagnosis     ICD-10-CM    1. Pain in right hip  M25.551       2. AVN (avascular necrosis of bone) ([facility])  M87.00       3. Primary osteoarthritis of one hip, right  M16.11       4. Status post total hip replacement, right  Z96.641                        Assessment  Impairments: abnormal gait, activity intolerance, impaired balance, impaired physical strength, lacks appropriate home exercise program, pain with function and weight-bearing intolerance  Symptom irritability: moderate    Assessment details: Kelsi Cabrera is a 64 y.o. female presenting to physical therapy for a reevaluation s/p right anterior RICKI on 24 Since their initial evaluation, she reports 70% improvement in her hip. The patient has demonstrated improved hip active and passive ROM in all directions as well as improved hip strength. She was able to complete a SLR for the first time during her examination demonstrating her improved hip strength. She demonstrates much improved scores on her TUG and 5 STS, and is no longer a fall risk indicating her improvements in her functional mobility. She is able to ambulate at times without an AD, however does report pain, increased fatigue, and balance deficits following 10-15 minutes of household ambulation. She remains limited to household ambulation and is not yet ambulating community level distances due to pain and fears of falling. She describes increased pain with sitting on the couch or riding in the car for longer than 3-40 minutes and reports difficulties ascending and descending stairs due to strength deficits. She continues with decreased hip strength, balance deficits, and endurance deficits leading to difficulties completing ADLs, avocational responsibilities, and recreational activities. This patient would benefit from continued  "PT services to address their impairments and functional limitations to maximize functional outcome.    Understanding of Dx/Px/POC: excellent     Prognosis: good    Goals  STG to be achieved in 4 weeks:   The patient will report a decrease in right hip \"at worst\" subjective pain rating score to at least 5/10 to allow for improved tolerance for weightbearing activities. NOT MET  The patient will increase right hip flexion AROM to at least 90 degrees to allow for improved functional mobility. MET  The patient will increase right hip flexion MMT score to at least 3+/5 to allow for improved functional mobility. MET    LTG to be achieved by DC:   The patient will be independent in comprehensive HEP. MET  The patient will report no pain with usual activities. NOT MET  The patient will improve all right hip AROM to WFL to allow for improved functional mobility. MET  The patient will increase all right hip MMT score to WFL to allow for improved functional mobility. NOT MET  The patient will be able to ambulate at least one mile without AD, pain, or difficulty to allow completion of recreational outdoor walks.NOT MET  The patient will be able to complete one full flight of stairs without AD, pain, or difficulty to allow access to home.NOT MET  The patient will be able to complete her gardening without AD, pain, or difficulty. NOT MET    Plan  Patient would benefit from: skilled physical therapy  Planned modality interventions: thermotherapy: hydrocollator packs, TENS and cryotherapy    Planned therapy interventions: manual therapy, joint mobilization, balance/weight bearing training, neuromuscular re-education, patient education, flexibility, strengthening, stretching, therapeutic activities, therapeutic exercise, gait training and home exercise program    Frequency: 2x week  Duration in weeks: 8  Plan of Care beginning date: 7/8/2024  Plan of Care expiration date: 9/2/2024  Treatment plan discussed with: " patient      Subjective Evaluation    History of Present Illness  Mechanism of injury: Kelsi reports 70% improvement since beginning PT services. She reports that she has been walking without her RW sometimes at home and says that this has been going fairly well for her. She does describe instabilities when walking without an AD. She says that without the RW, she gets more tired and tired easier when walking household distances.She says that if she is having a day where she is in more pain, she may need to rely on the RW more. She is having a hard time coordinating using her SPC in the left hand so she doesn't like to use this much at all. She reports that her pain is much improved compare to a month ago, where she is only taking pain medication approximately 1x per day whereas before it was every 4-5 hours. She does report that the humidity affects the swelling in the hip and if it is swollen, the pain is more. She is able to get in and out of her shower independently and says that she feels much more comfortable doing this. She describes continued difficulties with ascending and descending stairs due to weakness in the hip, getting in/out of the car or on lower chair surfaces. Per her son, she isn't able to sit for extended periods of time, such as riding in the car for more than 20-30 minutes or sitting on the couch to watch TV.         From Pre-Operative IE:   The lives at home with her , daughter, son, daughter in law, and their children. Her son works from home every day, her daughter works from home 2 days a week, and her  works 1 day a week from home so there will always be someone with her at home. She lives in multiple level home, however her bedroom and bathroom are on the first floor of her home. She has a walk in shower with a small ledge to step into it and has a built in bench to sit on. She has one step to get into her home, without any hand railings. She does have a RW at home and is  very sad to think about her walking with it after surgery.         Patient Goals  Patient goals for therapy: decreased pain, increased strength, independence with ADLs/IADLs and increased motion  Patient goal: to be able to walk a lot again and take care of garden  Pain  Current pain ratin  At best pain ratin  At worst pain ratin  Location: R hip  Quality: sharp and dull ache  Relieving factors: change in position and rest  Aggravating factors: standing, walking and stair climbing  Progression: improved    Social Support  Steps to enter house: yes  Stairs in house: yes   Lives in: multiple-level home  Lives with: spouse, adult children and young children    Employment status: not working  Treatments  Previous treatment: medication  Current treatment: physical therapy      Objective     Active Range of Motion   Left Hip   Flexion: 98 degrees   Abduction: 20 degrees   External rotation (90/90): 15 degrees   Internal rotation (90/90): 15 degrees     Right Hip   Flexion: 90 degrees   Abduction: 25 degrees   External rotation (90/90): 30 degrees with pain  Internal rotation (90/90): 25 degrees     Passive Range of Motion     Right Hip   Flexion: 102 degrees   Abduction: 25 degrees   External rotation (90/90): 30 degrees   Internal rotation (90/90): 30 degrees     Strength/Myotome Testing     Left Hip   Planes of Motion   Flexion: 4  Abduction: 4  External rotation: 4  Internal rotation: 4    Right Hip   Planes of Motion   Flexion: 4- (Pain)  Abduction: 4- (Pain)  External rotation: 3+ (Pain)  Internal rotation: 3+ (Pain)    Left Knee   Flexion: 5  Extension: 5    Right Knee   Flexion: 4  Extension: 5    Left Ankle/Foot   Dorsiflexion: 5  Plantar flexion: 5    Right Ankle/Foot   Dorsiflexion: 5  Plantar flexion: 5    Ambulation   Weight-Bearing Status   Weight-Bearing Status (Right): weight-bearing as tolerated    Assistive device used: two-wheeled walker and none    Ambulation: Level Surfaces   Ambulation  "with assistive device: independent    Observational Gait   Gait: antalgic   Stride length, left stance time and right stance time within functional limits.     Functional Assessment        Comments  Post Operative IE 6/6/24:   TUG- 19.81 seconds mild forward trunk lean to RW, step through gait pattern with good weight shift to right side  5 STS- 16.10 seconds UE usage for push off and eccentric control to chair, standing to RW    Re-evaluation 6/20/24:  TUG- 16.72 seconds use of RW, upright trunk position, step through gait pattern with even weight shifting  5 STS- 15.78 seconds mild UE usage for push off to stand, standing to RW, even foot placement     Re-evaluation 7/8/24:  TUG- 10.2 seconds  no AD usage, even weight shifting, non antalgic   5 STS- 10.88 seconds mild UE usage for push off to stand, even foot placement                Precautions: R Anterior RICKI 5/23/24, COPD, HTN,  Access Code: [email or web address] - updated on 6/10/24  POC expires Unit limit Auth  expiration date PT/OT + Visit Limit?   9/2/24 N/A 7/25/24 60 per cly primary- 4 used                 Visit/Unit Tracking  AUTH Status:  Date 5/17 6/6 6/10 6/12 6/17 6/20 6/24 6/27 7/1 7/3 7/8    Approved 4 Used 1 2 3 4 5 1 2 1 2 3 4     Remaining  4 3 2 1 0 1 0 3 2 1 0       Manuals 7/8  6/10 6/12 6/17 6/20 6/24 6/27 7/1 7/3   R hip PROM   *Anterior Precautions BE  NV Gentle SC BE BE BE BE SC gentle  SC   R hamstring, adductor stretches   NV Gentle  SC  BE adductor only BE BE BE SC gentle  SC                Re-evaluation BE     BE       Neuro Re-Ed             Glute sets DC  5\" 2x 10  seated 5\" 2x 10  hoklying          Hip abd with TB and glute set DC  Seated 10x  YTB 5\" 2x 10 hooklying  YTB 5\" 2x 10 hooklying     YTB 5\" 2x 10 hooklying  RTB 5\" 2x 10 hooklying    Hip add iso with bolster DC  Seated  5\" 10x           Rockerboard taps fwd/bwd, left/right             Zenaida step overs fwd and lat                                       Ther Ex             Heel slides " "with strap   Reviewed for HEP  5\"x15  No strap 5\"x20       Supine hip abd slides DC    5\"x15  20x  30x     LAQ   2x 10  2x 10     2# x20  20x  Reume NV    Standing marches   Mini 2x 10   2x10 B/L 2x10 B/L  2x10 B/L 3x10 B/L 3x 10 b/l  3x 10 b/l    Standing hip abd   20x  (R) 2x 10 (R) 2x10 B/L  2x10 B/L 2x10 B/L 3x10 B/L 3x 10 b/l  3x 10 b/l    Sidelying hip abduction       10x 10x      Hooklying hip flexion isometric with pball       2x5 Supine march  10x Supine march 2x 10     Seated hamstring curls with TB        GTB 2x10  RTB 10x  Recume NV    Supine SLR             Lateral walks with TB                          Pt education PT POC, HEP review     PT POC, HEP       Ther Activity             TG squats       L20 2x10  L18 2x10 Declined.  Hold today    Fwd step ups   4\" 2x 10  4\" 10x  4\"x10   6\" 2x10 6\" x20 6\" 2x 10  6\" 3x10    Lat step ups         6\" x10  6\"5x  6\" 3x 10    Sit to stands           10x  no UE                 Gait Training             FWW ambulation    1 laps cool down           Ambulation with SPC      25 ft   CGA, VC's sequencing 1 laps x2  around gym    Ambulation with no AD.    Modalities                                   "

## 2024-07-08 ENCOUNTER — EVALUATION (OUTPATIENT)
Dept: PHYSICAL THERAPY | Facility: CLINIC | Age: 64
End: 2024-07-08
Payer: COMMERCIAL

## 2024-07-08 DIAGNOSIS — M87.00 AVN (AVASCULAR NECROSIS OF BONE) (HCC): ICD-10-CM

## 2024-07-08 DIAGNOSIS — M25.551 PAIN IN RIGHT HIP: Primary | ICD-10-CM

## 2024-07-08 DIAGNOSIS — M16.11 PRIMARY OSTEOARTHRITIS OF ONE HIP, RIGHT: ICD-10-CM

## 2024-07-08 DIAGNOSIS — Z96.641 STATUS POST TOTAL HIP REPLACEMENT, RIGHT: ICD-10-CM

## 2024-07-08 PROCEDURE — 97110 THERAPEUTIC EXERCISES: CPT

## 2024-07-08 PROCEDURE — 97140 MANUAL THERAPY 1/> REGIONS: CPT

## 2024-07-11 ENCOUNTER — APPOINTMENT (OUTPATIENT)
Dept: PHYSICAL THERAPY | Facility: CLINIC | Age: 64
End: 2024-07-11
Payer: COMMERCIAL

## 2024-07-11 DIAGNOSIS — M16.11 PRIMARY OSTEOARTHRITIS OF ONE HIP, RIGHT: ICD-10-CM

## 2024-07-11 DIAGNOSIS — M25.551 PAIN IN RIGHT HIP: Primary | ICD-10-CM

## 2024-07-11 DIAGNOSIS — Z96.641 STATUS POST TOTAL HIP REPLACEMENT, RIGHT: ICD-10-CM

## 2024-07-11 DIAGNOSIS — M87.00 AVN (AVASCULAR NECROSIS OF BONE) (HCC): ICD-10-CM

## 2024-07-11 NOTE — PROGRESS NOTES
"Daily Note     Today's date: 2024  Patient name: Kelsi Cabrera  : 1960  MRN: [id number]  Referring provider: Kathya Nye MD  Dx:   Encounter Diagnosis     ICD-10-CM    1. Pain in right hip  M25.551       2. AVN (avascular necrosis of bone) ([facility])  M87.00       3. Primary osteoarthritis of one hip, right  M16.11       4. Status post total hip replacement, right  Z96.641                      Subjective: ***      Objective: See treatment diary below      Assessment: Tolerated treatment {Tolerated treatment :1665062131}. Patient {assessment:0363661242}      Plan: {PLAN:8085157919}     Precautions: R Anterior RICKI 24, COPD, HTN,  Access Code: [email or web address] - updated on 6/10/24  POC expires Unit limit Auth  expiration date PT/OT + Visit Limit?   24 N/A 24 60 per cly primary- 4 used                 Visit/Unit Tracking  AUTH Status:  Date 5/17 6/6 6/10 6/12 6/17 6/20 6/24 6/27 7/1 7/3 7/8 7/11   Approved 6 Used 1 2 3 4 5 1 2 1 2 3 4 1    Remaining  4 3 2 1 0 1 0 3 2 1 0 5      Manuals 7/8  6/10 6/12 6/17 6/20 6/24 6/27 7/1 7/3   R hip PROM   *Anterior Precautions BE  NV Gentle SC BE BE BE BE SC gentle  SC   R hamstring, adductor stretches   NV Gentle  SC  BE adductor only BE BE BE SC gentle  SC                Re-evaluation BE     BE       Neuro Re-Ed             Rockerboard taps fwd/bwd, left/right             Zenaida step overs fwd and lat                                       Ther Ex             Heel slides with strap   Reviewed for HEP  5\"x15  No strap 5\"x20       LAQ   2x 10  2x 10     2# x20  20x  Reume NV    Standing marches   Mini 2x 10   2x10 B/L 2x10 B/L  2x10 B/L 3x10 B/L 3x 10 b/l  3x 10 b/l    Standing hip abd   20x  (R) 2x 10 (R) 2x10 B/L  2x10 B/L 2x10 B/L 3x10 B/L 3x 10 b/l  3x 10 b/l    Sidelying hip abduction       10x 10x      Hooklying hip flexion isometric with pball       2x5 Supine march  10x Supine march 2x 10     Seated hamstring curls with TB        GTB 2x10  RTB 10x  " "Recume NV    Supine SLR             Lateral walks with TB                          Pt education PT POC, HEP review     PT POC, HEP       Ther Activity             TG squats       L20 2x10  L18 2x10 Declined.  Hold today    Fwd step ups   4\" 2x 10  4\" 10x  4\"x10   6\" 2x10 6\" x20 6\" 2x 10  6\" 3x10    Lat step ups         6\" x10  6\"5x  6\" 3x 10    Sit to stands           10x  no UE                 Gait Training             FWW ambulation    1 laps cool down           Ambulation with SPC      25 ft   CGA, VC's sequencing 1 laps x2  around gym    Ambulation with no AD.    Modalities                                   "

## 2024-07-15 ENCOUNTER — APPOINTMENT (OUTPATIENT)
Dept: PHYSICAL THERAPY | Facility: CLINIC | Age: 64
End: 2024-07-15
Payer: COMMERCIAL

## 2024-07-17 ENCOUNTER — APPOINTMENT (OUTPATIENT)
Dept: PHYSICAL THERAPY | Facility: CLINIC | Age: 64
End: 2024-07-17
Payer: COMMERCIAL

## 2024-07-18 ENCOUNTER — OFFICE VISIT (OUTPATIENT)
Dept: PHYSICAL THERAPY | Facility: CLINIC | Age: 64
End: 2024-07-18
Payer: COMMERCIAL

## 2024-07-18 DIAGNOSIS — M25.551 PAIN IN RIGHT HIP: Primary | ICD-10-CM

## 2024-07-18 DIAGNOSIS — M87.00 AVN (AVASCULAR NECROSIS OF BONE) (HCC): ICD-10-CM

## 2024-07-18 DIAGNOSIS — Z96.641 STATUS POST TOTAL HIP REPLACEMENT, RIGHT: ICD-10-CM

## 2024-07-18 DIAGNOSIS — M16.11 PRIMARY OSTEOARTHRITIS OF ONE HIP, RIGHT: ICD-10-CM

## 2024-07-18 PROCEDURE — 97110 THERAPEUTIC EXERCISES: CPT

## 2024-07-18 PROCEDURE — 97140 MANUAL THERAPY 1/> REGIONS: CPT

## 2024-07-18 PROCEDURE — 97112 NEUROMUSCULAR REEDUCATION: CPT

## 2024-07-18 NOTE — PROGRESS NOTES
Daily Note     Today's date: 2024  Patient name: Kelsi Cabrera  : 1960  MRN: [id number]  Referring provider: Kathya Nye MD  Dx:   Encounter Diagnosis     ICD-10-CM    1. Pain in right hip  M25.551       2. AVN (avascular necrosis of bone) ([facility])  M87.00       3. Primary osteoarthritis of one hip, right  M16.11       4. Status post total hip replacement, right  Z96.641                      Subjective: Patient reports that she has been walking around her house without any AD. She says that her asthma was really bad earlier this week and last week which is why she had to cancel her appts.       Objective: See treatment diary below      Assessment: Tolerated treatment well. Able to incorporate kunal step overs and rockerboard taps to facilitate improved hip balance and stability. Also added TB to standing hip abduction strengthening exercises to promote improved hip strength. Patient demonstrated fatigue post treatment, exhibited good technique with therapeutic exercises, and would benefit from continued PT    Plan: Continue per plan of care.      Precautions: R Anterior RICKI 24, COPD, HTN,  Access Code: [email or web address] - updated on 6/10/24  POC expires Unit limit Auth  expiration date PT/OT + Visit Limit?   24 N/A 24 60 per cly primary- 4 used                 Visit/Unit Tracking  AUTH Status:  Date  6/6 6/10 6/12 6/17 6/20 6/24 6/27 7/1 7/3 7/8 7/18   Approved 6 Used  2 3 4 5 1 2 1 2 3 4 1    Remaining   3 2 1 0 1 0 3 2 1 0 5      Manuals 7/8 7/18 6/10 6/12 6/17 6/20 6/24 6/27 7/1 7/3   R hip PROM   *Anterior Precautions BE BE NV Gentle SC BE BE BE BE SC gentle  SC   R hamstring, adductor stretches   NV Gentle  SC  BE adductor only BE BE BE SC gentle  SC                Re-evaluation BE     BE       Neuro Re-Ed             Rockerboard taps fwd/bwd, left/right  15x           Kunal step overs fwd and lat  2 laps ea                                     Ther Ex             LAQ  2# x30 2x 10  2x 10  "    2# x20  20x  Reume NV    Standing marches   Mini 2x 10   2x10 B/L 2x10 B/L  2x10 B/L 3x10 B/L 3x 10 b/l  3x 10 b/l    Standing hip abd  YTB 10x B/L 20x  (R) 2x 10 (R) 2x10 B/L  2x10 B/L 2x10 B/L 3x10 B/L 3x 10 b/l  3x 10 b/l    Sidelying hip abduction       10x 10x      Hooklying hip flexion isometric with pball  DC     2x5 Supine march  10x Supine march 2x 10     Seated hamstring curls with TB        GTB 2x10  RTB 10x  Recume NV    Supine SLR  NV           Lateral walks with TB  YTB 2 laps                         Pt education PT POC, HEP review     PT POC, HEP       Ther Activity             Fwd step ups  6\" 3x10  4\" 2x 10  4\" 10x  4\"x10   6\" 2x10 6\" x20 6\" 2x 10  6\" 3x10    Lat step ups   6\" 3x10      6\" x10  6\"5x  6\" 3x 10    Sit to stands           10x  no UE                 Gait Training             FWW ambulation    1 laps cool down           Ambulation with SPC      25 ft   CGA, VC's sequencing 1 laps x2  around gym    Ambulation with no AD.    Modalities                                     "

## 2024-07-22 ENCOUNTER — OFFICE VISIT (OUTPATIENT)
Dept: PHYSICAL THERAPY | Facility: CLINIC | Age: 64
End: 2024-07-22
Payer: COMMERCIAL

## 2024-07-22 DIAGNOSIS — Z96.641 STATUS POST TOTAL HIP REPLACEMENT, RIGHT: ICD-10-CM

## 2024-07-22 DIAGNOSIS — M16.11 PRIMARY OSTEOARTHRITIS OF ONE HIP, RIGHT: ICD-10-CM

## 2024-07-22 DIAGNOSIS — M25.551 PAIN IN RIGHT HIP: Primary | ICD-10-CM

## 2024-07-22 DIAGNOSIS — M87.00 AVN (AVASCULAR NECROSIS OF BONE) (HCC): ICD-10-CM

## 2024-07-22 PROCEDURE — 97110 THERAPEUTIC EXERCISES: CPT

## 2024-07-22 PROCEDURE — 97112 NEUROMUSCULAR REEDUCATION: CPT

## 2024-07-22 NOTE — PROGRESS NOTES
"Daily Note     Today's date: 2024  Patient name: Kelsi Cabrera  : 1960  MRN: [id number]  Referring provider: Kathya Nye MD  Dx:   Encounter Diagnosis     ICD-10-CM    1. Pain in right hip  M25.551       2. AVN (avascular necrosis of bone) ([facility])  M87.00       3. Primary osteoarthritis of one hip, right  M16.11       4. Status post total hip replacement, right  Z96.641                      Subjective: Patient reports that she had \"to drink 5 pain killers today\" because she is in so much pain. She reports that her hip feels swollen and numb.       Objective: See treatment diary below      Assessment: Tolerated treatment well. Due to increased pain, patient requested session to end early today. She was able to complete most standing TE and balance exercises despite additional pain. Patient demonstrated fatigue post treatment, exhibited good technique with therapeutic exercises, and would benefit from continued PT      Plan: Continue per plan of care.      Precautions: R Anterior RICKI 24, COPD, HTN,  Access Code: [email or web address] - updated on 6/10/24  POC expires Unit limit Auth  expiration date PT/OT + Visit Limit?   24 N/A 24 60 per cly primary- 4 used                 Visit/Unit Tracking  AUTH Status:  Date 7/22   6/12 6/17 6/20 6/24 6/27 7/1 7/3 7/8 7/18   Approved 6 Used 2   4 5 1 2 1 2 3 4 1    Remaining  4   1 0 1 0 3 2 1 0 5      Manuals  7 7/3   R hip PROM   *Anterior Precautions BE BE declined Gentle SC BE BE BE BE SC gentle  SC   R hamstring, adductor stretches    Gentle  SC  BE adductor only BE BE BE SC gentle  SC                Re-evaluation BE     BE       Neuro Re-Ed             Rockerboard taps fwd/bwd, left/right  15x 30x ea          Zenaida step overs fwd and lat  2 laps ea 2 laps ea                                    Ther Ex             LAQ  2# x30 2# x30 2x 10     2# x20  20x  Reume NV    Standing marches     2x10 B/L 2x10 B/L  2x10 B/L " "3x10 B/L 3x 10 b/l  3x 10 b/l    Standing hip abd  YTB 10x B/L YTB 2x10 B/L 2x 10 (R) 2x10 B/L  2x10 B/L 2x10 B/L 3x10 B/L 3x 10 b/l  3x 10 b/l    Sidelying hip abduction   10x     10x 10x      Seated hamstring curls with TB        GTB 2x10  RTB 10x  Recume NV    Supine SLR  NV 10x          Lateral walks with TB  YTB 2 laps  YTB 2 laps                        Pt education PT POC, HEP review     PT POC, HEP       Ther Activity             Fwd step ups  6\" 3x10   4\" 10x  4\"x10   6\" 2x10 6\" x20 6\" 2x 10  6\" 3x10    Lat step ups   6\" 3x10      6\" x10  6\"5x  6\" 3x 10    Sit to stands    2x10 no UE       10x  no UE                 Gait Training             FWW ambulation              Ambulation with SPC      25 ft   CGA, VC's sequencing 1 laps x2  around gym    Ambulation with no AD.    Modalities                                       "

## 2024-07-24 ENCOUNTER — APPOINTMENT (OUTPATIENT)
Dept: PHYSICAL THERAPY | Facility: CLINIC | Age: 64
End: 2024-07-24
Payer: COMMERCIAL

## 2024-07-25 ENCOUNTER — APPOINTMENT (OUTPATIENT)
Dept: PHYSICAL THERAPY | Facility: CLINIC | Age: 64
End: 2024-07-25
Payer: COMMERCIAL

## 2024-07-25 DIAGNOSIS — M25.551 PAIN IN RIGHT HIP: Primary | ICD-10-CM

## 2024-07-25 DIAGNOSIS — Z96.641 STATUS POST TOTAL HIP REPLACEMENT, RIGHT: ICD-10-CM

## 2024-07-25 DIAGNOSIS — M87.00 AVN (AVASCULAR NECROSIS OF BONE) (HCC): ICD-10-CM

## 2024-07-25 DIAGNOSIS — M16.11 PRIMARY OSTEOARTHRITIS OF ONE HIP, RIGHT: ICD-10-CM

## 2024-07-25 NOTE — PROGRESS NOTES
Daily Note     Today's date: 2024  Patient name: Kelsi Cabrera  : 1960  MRN: [id number]  Referring provider: Kathya Nye MD  Dx:   Encounter Diagnosis     ICD-10-CM    1. Pain in right hip  M25.551       2. AVN (avascular necrosis of bone) ([facility])  M87.00       3. Primary osteoarthritis of one hip, right  M16.11       4. Status post total hip replacement, right  Z96.641                      Subjective: ***      Objective: See treatment diary below      Assessment: Tolerated treatment {Tolerated treatment :5800314588}. Patient {assessment:8581963053}      Plan: {PLAN:7874352613}     Precautions: R Anterior RICKI 24, COPD, HTN,  Access Code: [email or web address] - updated on 6/10/24  POC expires Unit limit Auth  expiration date PT/OT + Visit Limit?   24 N/A 24 60 per cly primary- 4 used                 Visit/Unit Tracking  AUTH Status:  Date  7/3 7/8 7/18   Approved 6 Used 2   4 5 1 2 1 2 3 4 1    Remaining  4   1 0 1 0 3 2 1 0 5      Manuals  7 7/3   R hip PROM   *Anterior Precautions BE BE declined  BE BE BE BE SC gentle  SC   R hamstring, adductor stretches     BE adductor only BE BE BE SC gentle  SC                Re-evaluation BE     BE       Neuro Re-Ed             Rockerboard taps fwd/bwd, left/right  15x 30x ea          Zenaida step overs fwd and lat  2 laps ea 2 laps ea                                    Ther Ex             LAQ  2# x30 2# x30     2# x20  20x  Reume NV    Standing marches     2x10 B/L 2x10 B/L  2x10 B/L 3x10 B/L 3x 10 b/l  3x 10 b/l    Standing hip abd  YTB 10x B/L YTB 2x10 B/L  2x10 B/L  2x10 B/L 2x10 B/L 3x10 B/L 3x 10 b/l  3x 10 b/l    Sidelying hip abduction   10x     10x 10x      Seated hamstring curls with TB        GTB 2x10  RTB 10x  Recume NV    Supine SLR  NV 10x          Lateral walks with TB  YTB 2 laps  YTB 2 laps                        Pt education PT POC, HEP review     PT POC, HEP       Ther  "Activity             Fwd step ups  6\" 3x10    4\"x10   6\" 2x10 6\" x20 6\" 2x 10  6\" 3x10    Lat step ups   6\" 3x10      6\" x10  6\"5x  6\" 3x 10    Sit to stands    2x10 no UE       10x  no UE                 Gait Training             FWW ambulation              Ambulation with SPC      25 ft   CGA, VC's sequencing 1 laps x2  around gym    Ambulation with no AD.    Modalities                                         "

## 2024-07-26 NOTE — PROGRESS NOTES
Daily Note     Today's date: 2024  Patient name: Kelsi Cabrera  : 1960  MRN: [id number]  Referring provider: Kathya Nye MD  Dx:   Encounter Diagnosis     ICD-10-CM    1. Pain in right hip  M25.551       2. AVN (avascular necrosis of bone) ([facility])  M87.00       3. Primary osteoarthritis of one hip, right  M16.11       4. Status post total hip replacement, right  Z96.641                      Subjective: ***      Objective: See treatment diary below      Assessment: Tolerated treatment {Tolerated treatment :1760709961}. Patient {assessment:0780577308}      Plan: {PLAN:1974824100}     Precautions: R Anterior RICKI 24, COPD, HTN,  Access Code: [email or web address] - updated on 6/10/24  POC expires Unit limit Auth  expiration date PT/OT + Visit Limit?   24 N/A 24 60 per cly primary- 4 used                 Visit/Unit Tracking  AUTH Status:  Date  7/3 7/8 7/18   Approved 6 Used 2   4 5 1 2 1 2 3 4 1    Remaining  4   1 0 1 0 3 2 1 0 5      Manuals  7 7/3   R hip PROM   *Anterior Precautions BE BE declined  BE BE BE BE SC gentle  SC   R hamstring, adductor stretches     BE adductor only BE BE BE SC gentle  SC                Re-evaluation BE     BE       Neuro Re-Ed             Rockerboard taps fwd/bwd, left/right  15x 30x ea          Zenaida step overs fwd and lat  2 laps ea 2 laps ea                                    Ther Ex             LAQ  2# x30 2# x30     2# x20  20x  Reume NV    Standing marches     2x10 B/L 2x10 B/L  2x10 B/L 3x10 B/L 3x 10 b/l  3x 10 b/l    Standing hip abd  YTB 10x B/L YTB 2x10 B/L  2x10 B/L  2x10 B/L 2x10 B/L 3x10 B/L 3x 10 b/l  3x 10 b/l    Sidelying hip abduction   10x     10x 10x      Seated hamstring curls with TB        GTB 2x10  RTB 10x  Recume NV    Supine SLR  NV 10x          Lateral walks with TB  YTB 2 laps  YTB 2 laps                        Pt education PT POC, HEP review     PT POC, HEP       Ther  "Activity             Fwd step ups  6\" 3x10    4\"x10   6\" 2x10 6\" x20 6\" 2x 10  6\" 3x10    Lat step ups   6\" 3x10      6\" x10  6\"5x  6\" 3x 10    Sit to stands    2x10 no UE       10x  no UE                 Gait Training             FWW ambulation              Ambulation with SPC      25 ft   CGA, VC's sequencing 1 laps x2  around gym    Ambulation with no AD.    Modalities                                           "

## 2024-07-29 ENCOUNTER — APPOINTMENT (OUTPATIENT)
Dept: PHYSICAL THERAPY | Facility: CLINIC | Age: 64
End: 2024-07-29
Payer: COMMERCIAL

## 2024-07-29 DIAGNOSIS — Z96.641 STATUS POST TOTAL HIP REPLACEMENT, RIGHT: ICD-10-CM

## 2024-07-29 DIAGNOSIS — M25.551 PAIN IN RIGHT HIP: Primary | ICD-10-CM

## 2024-07-29 DIAGNOSIS — M87.00 AVN (AVASCULAR NECROSIS OF BONE) (HCC): ICD-10-CM

## 2024-07-29 DIAGNOSIS — M16.11 PRIMARY OSTEOARTHRITIS OF ONE HIP, RIGHT: ICD-10-CM

## 2024-07-30 ENCOUNTER — APPOINTMENT (OUTPATIENT)
Dept: PHYSICAL THERAPY | Facility: CLINIC | Age: 64
End: 2024-07-30
Payer: COMMERCIAL

## 2024-07-31 ENCOUNTER — OFFICE VISIT (OUTPATIENT)
Dept: PHYSICAL THERAPY | Facility: CLINIC | Age: 64
End: 2024-07-31
Payer: COMMERCIAL

## 2024-07-31 DIAGNOSIS — M87.00 AVN (AVASCULAR NECROSIS OF BONE) (HCC): ICD-10-CM

## 2024-07-31 DIAGNOSIS — M16.11 PRIMARY OSTEOARTHRITIS OF ONE HIP, RIGHT: ICD-10-CM

## 2024-07-31 DIAGNOSIS — Z96.641 STATUS POST TOTAL HIP REPLACEMENT, RIGHT: ICD-10-CM

## 2024-07-31 DIAGNOSIS — M25.551 PAIN IN RIGHT HIP: Primary | ICD-10-CM

## 2024-07-31 PROCEDURE — 97110 THERAPEUTIC EXERCISES: CPT

## 2024-07-31 PROCEDURE — 97112 NEUROMUSCULAR REEDUCATION: CPT

## 2024-07-31 NOTE — PROGRESS NOTES
Daily Note     Today's date: 2024  Patient name: Kelsi Cabrera  : 1960  MRN: [id number]  Referring provider: Kathya Nye MD  Dx:   Encounter Diagnosis     ICD-10-CM    1. Pain in right hip  M25.551       2. AVN (avascular necrosis of bone) ([facility])  M87.00       3. Primary osteoarthritis of one hip, right  M16.11       4. Status post total hip replacement, right  Z96.641           Start Time: 1715  Stop Time: 1745  Total time in clinic (min): 30 minutes    Subjective: Pt noted that she has been having COPD  flare ups and that why she has been not present for past appointment of PT. Noted that she still gets swelling in her R hip and some pain in her quad after walking on TM at home.       Objective: See treatment diary below      Assessment:  Continued with treatment session, R hip AVN  to R anterior RICKI 24. Overall  was able to re inniated exercises with no increase in pain noted of R hip. Tolerated treatment well. Patient demonstrated fatigue post treatment and exhibited good technique with therapeutic exercises  Advised to continue with HEP at home on a daily to every other day basis.   CP as needed and elevate for increase swelling and p! In R hip if needed.     Plan: Continue per plan of care.      Precautions: R Anterior RICKI 24, COPD, HTN,  Access Code: [email or web address] - updated on 6/10/24  POC expires Unit limit Auth  expiration date PT/OT + Visit Limit?   24 N/A 24 60 per cly primary- 4 used                 Visit/Unit Tracking  AUTH Status:  Date 7/22 7/31  6/12 6/17 6/20 6/24 6/27 7/1 7/3 7/8 7/18   Approved 6 Used 2 3  4 5 1 2 1 2 3 4 1    Remaining  4 3  1 0 1 0 3 2 1 0 5      Manuals  7/3   R hip PROM   *Anterior Precautions BE BE declined   BE BE BE SC gentle  SC   R hamstring, adductor stretches      BE BE BE SC gentle  SC                Re-evaluation BE     BE       Neuro Re-Ed             Rockerboard taps fwd/bwd, left/right  15x 30x  "ea 20x ea.          Zenaida step overs fwd and lat  2 laps ea 2 laps ea 4 laps ea.                                    Ther Ex             LAQ  2# x30 2# x30 2# 30x     2# x20  20x  Reume NV    Standing marches      2x10 B/L  2x10 B/L 3x10 B/L 3x 10 b/l  3x 10 b/l    Standing hip abd  YTB 10x B/L YTB 2x10 B/L YTB  2x 10   2x10 B/L 2x10 B/L 3x10 B/L 3x 10 b/l  3x 10 b/l    Sidelying hip abduction   10x  10x    10x 10x      Seated hamstring curls with TB        GTB 2x10  RTB 10x  Recume NV    Supine SLR  NV 10x          Lateral walks with TB  YTB 2 laps  YTB 2 laps  YTB 2 laps at mirror                       Pt education PT POC, HEP review     PT POC, HEP                                              Ther Activity             Fwd step ups  6\" 3x10   6\" 10x    6\" 2x10 6\" x20 6\" 2x 10  6\" 3x10    Lat step ups   6\" 3x10  6\" 10x     6\" x10  6\"5x  6\" 3x 10    Sit to stands    2x10 no UE 2 x 10 No UE       10x  no UE                 Gait Training             FWW ambulation              Ambulation with SPC      25 ft   CGA, VC's sequencing 1 laps x2  around gym    Ambulation with no AD.    Modalities                                         "

## 2024-08-12 DIAGNOSIS — I10 ESSENTIAL HYPERTENSION: ICD-10-CM

## 2024-08-13 ENCOUNTER — HOSPITAL ENCOUNTER (OUTPATIENT)
Dept: RADIOLOGY | Facility: HOSPITAL | Age: 64
Discharge: HOME | End: 2024-08-13
Attending: ORTHOPAEDIC SURGERY
Payer: COMMERCIAL

## 2024-08-13 ENCOUNTER — OFFICE VISIT (OUTPATIENT)
Dept: OBGYN CLINIC | Facility: CLINIC | Age: 64
End: 2024-08-13

## 2024-08-13 VITALS — HEIGHT: 63 IN | BODY MASS INDEX: 27.29 KG/M2 | WEIGHT: 154 LBS

## 2024-08-13 DIAGNOSIS — Z96.641 STATUS POST TOTAL HIP REPLACEMENT, RIGHT: ICD-10-CM

## 2024-08-13 DIAGNOSIS — Z96.641 STATUS POST TOTAL HIP REPLACEMENT, RIGHT: Primary | ICD-10-CM

## 2024-08-13 PROCEDURE — 99024 POSTOP FOLLOW-UP VISIT: CPT | Performed by: ORTHOPAEDIC SURGERY

## 2024-08-13 PROCEDURE — 73502 X-RAY EXAM HIP UNI 2-3 VIEWS: CPT

## 2024-08-13 RX ORDER — CLONIDINE HYDROCHLORIDE 0.1 MG/1
0.1 TABLET ORAL 2 TIMES DAILY
Qty: 180 TABLET | Refills: 1 | Status: SHIPPED | OUTPATIENT
Start: 2024-08-13

## 2024-08-13 NOTE — PATIENT INSTRUCTIONS
Perform effective stretching exercises:   3 sets of 10 repetitions (rep)  Hold each rep for 20-30 seconds              Perform effective stretching exercises:   3 sets of 10 repetitions (rep)  Hold each rep for 20-30 seconds  \    [facility]                                                                                               Dr. Kathya Nye                                                                                                            ANTIBIOTIC USE FOR JOINT REPLACEMENT PATIENTS  You have had surgery to replace one of your joints with a metal prosthesis. Going forward, you should take oral antibiotics before any dental work, including routine cleanings. These procedures are a potential source of injection. If you develop an infection, it could spread to your operative joint and cause complications.  Please show the following guidelines to your medical doctor and dentist, so he/she can prescribe the appropriate medications.  The following information is recommended by the American [employer], [employer], and Orthopedic Associations:  STANDARD GENERAL PROPHYLAXIS  antibiotic prophylaxis recommended lifetime  Recommend refraining from dental procedures until 3 months post operatively  Amoxicillin for Adults:    500mg - Take 4 capsules (2 grams) orally one hour before the procedure  If allergic to Penicillin  Clindamycin for Adults:   300mg - Take 2 capsules (600 mg) orally one hour before the procedure  Azithromycin for Adults:  250mg - Take 2 capsules (500 mg) orally one hour before the procedure  Cephalexin for Adults:     500mg - Take 4 capsules (2 grams) orally one hour before the procedure  Note: Cephalosporins should not be used if you have ever developed an immediate hypersensitivity reaction (i.e., hives, swelling, severe itching, difficulty breathing) to Penicillin  Please feel free to contact our office at [phone] with questions or concerns.

## 2024-08-13 NOTE — PROGRESS NOTES
64 y.o.female presents for 3 months postoperative visit status post Right Anterior RICKI. Patient denies any chest pain, shortness or breath or calf pain.  Pain is well controlled with medication.  Patient has noticed some numbness in tingling on the lateral aspect of her right thigh which is improved with pain medication.  She is completed physical therapy and back to most all activities without any issues going her right lower extremity otherwise.    Arthroplasty Hip Total Anterior - Right  5/30/2024    Review of Systems  Review of systems negative unless otherwise specified in HPI    Past Medical History  Past Medical History:  No date: Anxiety  No date: Arthritis  No date: Asthma  No date: Avascular necrosis of bone of hip, right ([facility]ly [facility])  10/03/2017: Avascular necrosis of hip, right (Formerly [facility])  02/12/2019: Chronic obstructive pulmonary disease (Formerly [facility])  No date: COPD (chronic obstructive pulmonary disease) (Formerly [facility])  No date: GERD (gastroesophageal reflux disease)  No date: Hyperlipidemia  No date: Hypertension  No date: Hypoglycemia  No date: Infectious viral hepatitis  No date: Lumbar radicular pain  No date: Lyme disease  No date: Pneumonia  No date: 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Formerly [facility])    Past Surgical History  Past Surgical History:  No date: APPENDECTOMY  02/28/2024: CARDIAC CATHETERIZATION; Left      Comment:  Procedure: Cardiac Left Heart Cath;  Surgeon: Lizz Adorno MD;  Location: [facility];  Service:                Cardiology  02/28/2024: CARDIAC CATHETERIZATION; N/A      Comment:  Procedure: Cardiac Coronary Angiogram;  Surgeon:                Lizz Adorno MD;  Location: [facility];                 Service: Cardiology  02/28/2024: CARDIAC CATHETERIZATION      Comment:  Procedure: Cardiac catheterization;  Surgeon: Lizz Adorno MD;  Location: [facility];  Service:                Cardiology  No date: CARPAL TUNNEL RELEASE  No date: CHOLECYSTECTOMY  No  date: COLONOSCOPY  5/30/2024: [place] ARTHRP ACETBLR/PROX FEM PROSTC AGRFT/ALGRFT; Right      Comment:  Procedure: ARTHROPLASTY HIP TOTAL ANTERIOR;  Surgeon:                Kathya Nye MD;  Location:  MAIN OR;  Service:                Orthopedics  No date: SINUS SURGERY    Current Medications  Current Outpatient Medications on File Prior to Visit:  ALPRAZolam (XANAX) 0.5 mg tablet, As needed, Disp: , Rfl:   apixaban (Eliquis) 2.5 mg, TAKE 1 TABLET (2.5 MG TOTAL) BY MOUTH 2 (TWO) TIMES A DAY FOR 28 DAYS DO NOT START TAKING MEDICATION UNTIL AFTER TOTAL JOINT ARTHROPLASTY, Disp: 60 tablet, Rfl: 1  Azelastine HCl 137 MCG/SPRAY SOLN, , Disp: , Rfl:   budesonide-formoterol (SYMBICORT) 160-4.5 mcg/act inhaler, Inhale 2 puffs 2 (two) times a day, Disp: , Rfl:   Cholecalciferol (VITAMIN D3) 2000 units CHEW, Chew 5,000 Units daily, Disp: , Rfl:   Choline Fenofibrate (Fenofibric Acid) 135 MG CPDR, 1 cap daily, Disp: 90 capsule, Rfl: 3  cloNIDine (CATAPRES) 0.1 mg tablet, Take 1 tablet (0.1 mg total) by mouth 2 (two) times a day, Disp: 180 tablet, Rfl: 0  fexofenadine (ALLEGRA) 180 MG tablet, Take 180 mg by mouth daily, Disp: , Rfl:   fluticasone (FLONASE) 50 mcg/act nasal spray, INSTILL 1 SPRAY INTO EACH NOSTRIL ONCE DAILY, Disp: 48 g, Rfl: 1  levalbuterol (XOPENEX HFA) 45 mcg/act inhaler, , Disp: , Rfl:   levalbuterol (XOPENEX) 1.25 mg/3 mL nebulizer solution, As needed, Disp: , Rfl:   magnesium oxide (MAG-OX) 400 mg tablet, Take 1 tablet by mouth in the morning, Disp: , Rfl:   Multiple Vitamin (Daily-Nba Multivitamin) TABS, Take 1 tablet by mouth daily, Disp: , Rfl:   Multiple Vitamins-Minerals (multivitamin with minerals) tablet, Take 1 tablet by mouth daily, Disp: 30 tablet, Rfl: 0  nitroglycerin (NITROSTAT) 0.4 mg SL tablet, Place 1 tablet (0.4 mg total) under the tongue every 5 (five) minutes as needed for chest pain, Disp: 30 tablet, Rfl: 3  oxyCODONE (ROXICODONE) 5 immediate release tablet, Take 1 tablets every 6  hrs as needed for pain control, Disp: 20 tablet, Rfl: 0  promethazine-dextromethorphan (PHENERGAN-DM) 6.25-15 mg/5 mL oral syrup, TAKE 5 MILLILITERS BY MOUTH 4 TIMES A DAY AS NEEDED FOR COUGH, Disp: , Rfl:   ascorbic acid (VITAMIN C) 500 MG tablet, Take 1 tablet (500 mg total) by mouth daily (Patient not taking: Reported on 6/19/2024), Disp: 30 tablet, Rfl: 0  docusate sodium (COLACE) 100 mg capsule, Take 1 capsule (100 mg total) by mouth 2 (two) times a day (Patient not taking: Reported on 8/13/2024), Disp: 10 capsule, Rfl: 0  ferrous sulfate 324 (65 Fe) mg, Take 1 tablet (324 mg total) by mouth daily before breakfast (Patient not taking: Reported on 6/19/2024), Disp: 30 tablet, Rfl: 0  folic acid (FOLVITE) 1 mg tablet, TAKE 1 TABLET BY MOUTH EVERY DAY (Patient not taking: Reported on 8/13/2024), Disp: 90 tablet, Rfl: 1  gabapentin (Neurontin) 100 mg capsule, Take 1 capsule (100 mg total) by mouth 3 (three) times a day, Disp: 90 capsule, Rfl: 0  ondansetron (ZOFRAN) 4 mg tablet, Take 1 tablet (4 mg total) by mouth every 8 (eight) hours as needed for nausea or vomiting (Patient not taking: Reported on 8/13/2024), Disp: 20 tablet, Rfl: 0    No current facility-administered medications on file prior to visit.      Recent Labs (HCT,HGB,PT,INR,ESR,CRP,GLU,HgA1C)  0       Lab                      Value               Date/Time                  HCT                      47.5 (H)            05/09/2024 0854            HGB                      15.4                05/09/2024 0854            WBC                      9.96                05/09/2024 0854            INR                      0.98                05/09/2024 0854            ESR                      3                   04/14/2021 1152            CRP                      <3.0                10/25/2022 1636            HGBA1C                   5.7 (H)             05/09/2024 0854               Body mass index is 27.28 kg/m².Wt Readings from Last 3 Encounters:  08/13/24 :  69.9 kg (154 lb)  05/30/24 : 69.9 kg (154 lb)  05/09/24 : 69.9 kg (154 lb)      Physical exam   General: Awake, Alert, Oriented   Eyes: Pupils equal, round and reactive to light    Heart: regular rate and rhythm   Lungs: No audible wheezing   Abdomen: soft  Right Lower extremity      Incision well approximated without erythema no tenderness to palpation   Active hip flexion to 90 degrees   Full knee extension   Active ankle dorsal and plantarflexion without pain, calf nontender palpation   Sensation lateral femoral cutaneous nerve distribution mildly diminished otherwise sensation intact   Distal pulses present      Imaging  X rays of the Right hip reviewed.  X rays reveal well located, excellently aligned right total hip arthroplasty without evidence of loosening or osseous abnormality.    Assessment:  Status post 12 weeks Right RICKI anterior approach    Plan:  Weight bearing as tolerated right Lower extremity  Physical therapy  Dental antibiotic prophylaxis recommended  Pain medication as needed  Follow-up in 9 months and repeat x-rays right hip

## 2024-08-14 ENCOUNTER — TELEPHONE (OUTPATIENT)
Dept: NEUROLOGY | Facility: CLINIC | Age: 64
End: 2024-08-14

## 2024-08-14 NOTE — TELEPHONE ENCOUNTER
Called to confirm appt 8/21/24 8:00am (7:45am) Dr. Shara Hanna and patient had to cancel, told her I will keep her on the waitlist as there is nothing available in the [place] office. Patient needs as much notice as possible to arrange transportation.

## 2024-08-19 DIAGNOSIS — J45.41 MODERATE PERSISTENT ASTHMA WITH ACUTE EXACERBATION: ICD-10-CM

## 2024-08-20 RX ORDER — FLUTICASONE PROPIONATE 50 MCG
SPRAY, SUSPENSION (ML) NASAL
Qty: 16 ML | Refills: 2 | Status: SHIPPED | OUTPATIENT
Start: 2024-08-20

## 2024-09-12 ENCOUNTER — OFFICE VISIT (OUTPATIENT)
Dept: INTERNAL MEDICINE CLINIC | Facility: CLINIC | Age: 64
End: 2024-09-12
Payer: COMMERCIAL

## 2024-09-12 ENCOUNTER — APPOINTMENT (OUTPATIENT)
Dept: LAB | Facility: CLINIC | Age: 64
End: 2024-09-12
Payer: COMMERCIAL

## 2024-09-12 VITALS
BODY MASS INDEX: 26.97 KG/M2 | OXYGEN SATURATION: 96 % | TEMPERATURE: 96.3 F | HEART RATE: 69 BPM | HEIGHT: 63 IN | WEIGHT: 152.2 LBS | DIASTOLIC BLOOD PRESSURE: 78 MMHG | SYSTOLIC BLOOD PRESSURE: 124 MMHG

## 2024-09-12 DIAGNOSIS — R73.03 PREDIABETES: ICD-10-CM

## 2024-09-12 DIAGNOSIS — R53.83 OTHER FATIGUE: ICD-10-CM

## 2024-09-12 DIAGNOSIS — I10 ESSENTIAL HYPERTENSION: ICD-10-CM

## 2024-09-12 DIAGNOSIS — Z96.641 STATUS POST TOTAL HIP REPLACEMENT, RIGHT: ICD-10-CM

## 2024-09-12 DIAGNOSIS — E78.49 OTHER HYPERLIPIDEMIA: ICD-10-CM

## 2024-09-12 DIAGNOSIS — F41.8 SITUATIONAL ANXIETY: ICD-10-CM

## 2024-09-12 DIAGNOSIS — I10 ESSENTIAL HYPERTENSION: Primary | ICD-10-CM

## 2024-09-12 DIAGNOSIS — M06.9 RHEUMATOID ARTHRITIS INVOLVING MULTIPLE SITES, UNSPECIFIED WHETHER RHEUMATOID FACTOR PRESENT (HCC): ICD-10-CM

## 2024-09-12 DIAGNOSIS — Z72.0 TOBACCO ABUSE: ICD-10-CM

## 2024-09-12 DIAGNOSIS — Z12.31 ENCOUNTER FOR SCREENING MAMMOGRAM FOR MALIGNANT NEOPLASM OF BREAST: ICD-10-CM

## 2024-09-12 DIAGNOSIS — J43.8 OTHER EMPHYSEMA (HCC): ICD-10-CM

## 2024-09-12 LAB
ALBUMIN SERPL BCG-MCNC: 4.5 G/DL (ref 3.5–5)
ALP SERPL-CCNC: 50 U/L (ref 34–104)
ALT SERPL W P-5'-P-CCNC: 19 U/L (ref 7–52)
ANION GAP SERPL CALCULATED.3IONS-SCNC: 6 MMOL/L (ref 4–13)
AST SERPL W P-5'-P-CCNC: 19 U/L (ref 13–39)
B BURGDOR IGG+IGM SER QL IA: NEGATIVE
BASOPHILS # BLD AUTO: 0.07 THOUSANDS/ΜL (ref 0–0.1)
BASOPHILS NFR BLD AUTO: 1 % (ref 0–1)
BILIRUB SERPL-MCNC: 0.44 MG/DL (ref 0.2–1)
BUN SERPL-MCNC: 10 MG/DL (ref 5–25)
CALCIUM SERPL-MCNC: 9.6 MG/DL (ref 8.4–10.2)
CHLORIDE SERPL-SCNC: 106 MMOL/L (ref 96–108)
CO2 SERPL-SCNC: 28 MMOL/L (ref 21–32)
CREAT SERPL-MCNC: 0.71 MG/DL (ref 0.6–1.3)
EOSINOPHIL # BLD AUTO: 0.21 THOUSAND/ΜL (ref 0–0.61)
EOSINOPHIL NFR BLD AUTO: 3 % (ref 0–6)
ERYTHROCYTE [DISTWIDTH] IN BLOOD BY AUTOMATED COUNT: 14.1 % (ref 11.6–15.1)
EST. AVERAGE GLUCOSE BLD GHB EST-MCNC: 114 MG/DL
GFR SERPL CREATININE-BSD FRML MDRD: 89 ML/MIN/1.73SQ M
GLUCOSE SERPL-MCNC: 96 MG/DL (ref 65–140)
HBA1C MFR BLD: 5.6 %
HCT VFR BLD AUTO: 46.4 % (ref 34.8–46.1)
HGB BLD-MCNC: 14.8 G/DL (ref 11.5–15.4)
IMM GRANULOCYTES # BLD AUTO: 0.01 THOUSAND/UL (ref 0–0.2)
IMM GRANULOCYTES NFR BLD AUTO: 0 % (ref 0–2)
LYMPHOCYTES # BLD AUTO: 2.46 THOUSANDS/ΜL (ref 0.6–4.47)
LYMPHOCYTES NFR BLD AUTO: 37 % (ref 14–44)
MCH RBC QN AUTO: 28.4 PG (ref 26.8–34.3)
MCHC RBC AUTO-ENTMCNC: 31.9 G/DL (ref 31.4–37.4)
MCV RBC AUTO: 89 FL (ref 82–98)
MONOCYTES # BLD AUTO: 0.58 THOUSAND/ΜL (ref 0.17–1.22)
MONOCYTES NFR BLD AUTO: 9 % (ref 4–12)
NEUTROPHILS # BLD AUTO: 3.28 THOUSANDS/ΜL (ref 1.85–7.62)
NEUTS SEG NFR BLD AUTO: 50 % (ref 43–75)
NRBC BLD AUTO-RTO: 0 /100 WBCS
PLATELET # BLD AUTO: 304 THOUSANDS/UL (ref 149–390)
PMV BLD AUTO: 10.4 FL (ref 8.9–12.7)
POTASSIUM SERPL-SCNC: 4.1 MMOL/L (ref 3.5–5.3)
PROT SERPL-MCNC: 7.3 G/DL (ref 6.4–8.4)
RBC # BLD AUTO: 5.21 MILLION/UL (ref 3.81–5.12)
SODIUM SERPL-SCNC: 140 MMOL/L (ref 135–147)
TSH SERPL DL<=0.05 MIU/L-ACNC: 1.95 UIU/ML (ref 0.45–4.5)
WBC # BLD AUTO: 6.61 THOUSAND/UL (ref 4.31–10.16)

## 2024-09-12 PROCEDURE — 99214 OFFICE O/P EST MOD 30 MIN: CPT | Performed by: NURSE PRACTITIONER

## 2024-09-12 PROCEDURE — 84443 ASSAY THYROID STIM HORMONE: CPT

## 2024-09-12 PROCEDURE — 86618 LYME DISEASE ANTIBODY: CPT

## 2024-09-12 PROCEDURE — 83036 HEMOGLOBIN GLYCOSYLATED A1C: CPT

## 2024-09-12 PROCEDURE — 85025 COMPLETE CBC W/AUTO DIFF WBC: CPT

## 2024-09-12 PROCEDURE — 80053 COMPREHEN METABOLIC PANEL: CPT

## 2024-09-12 PROCEDURE — 36415 COLL VENOUS BLD VENIPUNCTURE: CPT

## 2024-09-12 RX ORDER — AZITHROMYCIN 500 MG/1
TABLET, FILM COATED ORAL
COMMUNITY
Start: 2024-09-05

## 2024-09-12 RX ORDER — PREDNISONE 10 MG/1
TABLET ORAL
COMMUNITY
Start: 2024-09-05

## 2024-09-12 RX ORDER — HYDROXYCHLOROQUINE SULFATE 200 MG/1
200 TABLET, FILM COATED ORAL 2 TIMES DAILY WITH MEALS
COMMUNITY

## 2024-09-12 NOTE — ASSESSMENT & PLAN NOTE
Recently treated for exacerbation.   On symbicort.   Nebulizer as needed.   Sees pulmonary

## 2024-09-12 NOTE — ASSESSMENT & PLAN NOTE
Doing well overall. Continue home exercise program.  Takes oxycodone as needed for pain- prescribed by her pulmonologist.

## 2024-09-12 NOTE — PROGRESS NOTES
Ambulatory Visit  Name: Kelsi Cabrera      : 1960      MRN: [id number]  Encounter Provider: ALETHEA Scanlon  Encounter Date: 2024   Encounter department: [facility] INTER[facility] MEDICINE    Assessment & Plan  Essential hypertension  Stable  On clonidine         Other emphysema (HCC)  Recently treated for exacerbation.   On symbicort.   Nebulizer as needed.   Sees pulmonary          Rheumatoid arthritis involving multiple sites, unspecified whether rheumatoid factor present (HCC)  On plaquenil.   Sees rheumatology.          Tobacco abuse  Continue cessation.       Situational anxiety  On xanax PRN.   Prescribed by her pulmonologist.          Other hyperlipidemia  Takes a fenofibrate.          Status post total hip replacement, right  Doing well overall. Continue home exercise program.  Takes oxycodone as needed for pain- prescribed by her pulmonologist.            Prediabetes    Orders:    Hemoglobin A1C; Future    Other fatigue    Orders:    Comprehensive metabolic panel; Future    TSH, 3rd generation with Free T4 reflex; Future    Lyme Total AB W Reflex to IGM/IGG; Future    CBC and differential; Future    Encounter for screening mammogram for malignant neoplasm of breast    Orders:    Mammo screening bilateral w 3d and cad; Future       History of Present Illness     Kelsi is here today for follow up  She is doing ok.   She had a right hip replacement back in May. She is doing well. Still with pain. She has been walking more. She is taking oxycodone as needed for pain.    Breathing has been harder, due to allergies. Clears up by the middle of the day. Recently saw pulmonary.     She has been more fatigued over the last 2 weeks. She has a history of lyme disease. Requesting to be tested.                 Review of Systems   Constitutional:  Positive for fatigue. Negative for activity change, appetite change and unexpected weight change.   Eyes:  Negative for visual disturbance.  "  Respiratory:  Positive for shortness of breath. Negative for cough, chest tightness and wheezing.    Cardiovascular:  Negative for chest pain, palpitations and leg swelling.   Gastrointestinal:  Negative for abdominal pain, blood in stool, constipation and diarrhea.   Genitourinary:  Negative for difficulty urinating.   Musculoskeletal:  Positive for arthralgias.   Skin:  Negative for rash.   Neurological:  Negative for dizziness, light-headedness and headaches.   Psychiatric/Behavioral:  Negative for sleep disturbance.            Objective     /78   Pulse 69   Temp (!) 96.3 °F (35.7 °C)   Ht 5' 3\" (1.6 m)   Wt 69 kg (152 lb 3.2 oz)   SpO2 96%   BMI 26.96 kg/m²     Physical Exam  Vitals reviewed.   Constitutional:       Appearance: Normal appearance. She is well-developed.   HENT:      Head: Normocephalic and atraumatic.   Eyes:      Conjunctiva/sclera: Conjunctivae normal.   Neck:      Thyroid: No thyromegaly.   Cardiovascular:      Rate and Rhythm: Normal rate and regular rhythm.      Heart sounds: Normal heart sounds.   Pulmonary:      Effort: Pulmonary effort is normal.      Breath sounds: Normal breath sounds.   Musculoskeletal:         General: Normal range of motion.      Right lower leg: No edema.      Left lower leg: No edema.   Skin:     General: Skin is warm and dry.   Neurological:      Mental Status: She is alert and oriented to person, place, and time.   Psychiatric:         Mood and Affect: Mood normal.         Behavior: Behavior normal.         "

## 2024-09-15 DIAGNOSIS — J45.41 MODERATE PERSISTENT ASTHMA WITH ACUTE EXACERBATION: ICD-10-CM

## 2024-09-17 RX ORDER — FLUTICASONE PROPIONATE 50 MCG
SPRAY, SUSPENSION (ML) NASAL
Qty: 48 ML | Refills: 1 | Status: SHIPPED | OUTPATIENT
Start: 2024-09-17

## 2024-09-23 DIAGNOSIS — E78.2 MIXED HYPERLIPIDEMIA: ICD-10-CM

## 2024-09-24 RX ORDER — FENOFIBRIC ACID 135 MG/1
CAPSULE, DELAYED RELEASE ORAL
Qty: 90 CAPSULE | Refills: 1 | Status: SHIPPED | OUTPATIENT
Start: 2024-09-24

## 2024-12-19 ENCOUNTER — TELEPHONE (OUTPATIENT)
Dept: CARDIOLOGY CLINIC | Facility: CLINIC | Age: 64
End: 2024-12-19

## 2024-12-19 DIAGNOSIS — E78.2 MIXED HYPERLIPIDEMIA: ICD-10-CM

## 2024-12-19 NOTE — TELEPHONE ENCOUNTER
Medication: choline Fenofibrate    Dose/Frequency: 135mg daily    Quantity: 90 R 3    Pharmacy: [facility]    Office:   [] PCP/Provider -   [x] Speciality/Provider - Dr Sauceda    Does the patient have enough for 3 days?   [x] Yes   [] No - Send as HP to POD

## 2024-12-20 RX ORDER — FENOFIBRIC ACID 135 MG/1
CAPSULE, DELAYED RELEASE ORAL
Qty: 30 CAPSULE | Refills: 0 | Status: SHIPPED | OUTPATIENT
Start: 2024-12-20

## 2024-12-26 ENCOUNTER — OFFICE VISIT (OUTPATIENT)
Dept: RHEUMATOLOGY | Facility: CLINIC | Age: 64
End: 2024-12-26
Payer: MEDICARE

## 2024-12-26 VITALS
SYSTOLIC BLOOD PRESSURE: 118 MMHG | HEART RATE: 85 BPM | BODY MASS INDEX: 26.96 KG/M2 | HEIGHT: 63 IN | OXYGEN SATURATION: 97 % | DIASTOLIC BLOOD PRESSURE: 60 MMHG

## 2024-12-26 DIAGNOSIS — Z13.820 OSTEOPOROSIS SCREENING: ICD-10-CM

## 2024-12-26 DIAGNOSIS — M06.9 RHEUMATOID ARTHRITIS INVOLVING MULTIPLE SITES, UNSPECIFIED WHETHER RHEUMATOID FACTOR PRESENT (HCC): Primary | ICD-10-CM

## 2024-12-26 DIAGNOSIS — M81.8 OTHER OSTEOPOROSIS WITHOUT CURRENT PATHOLOGICAL FRACTURE: ICD-10-CM

## 2024-12-26 DIAGNOSIS — M19.90 OSTEOARTHRITIS, UNSPECIFIED OSTEOARTHRITIS TYPE, UNSPECIFIED SITE: ICD-10-CM

## 2024-12-26 DIAGNOSIS — M62.838 TRAPEZIUS MUSCLE SPASM: ICD-10-CM

## 2024-12-26 DIAGNOSIS — Z79.899 HIGH RISK MEDICATION USE: ICD-10-CM

## 2024-12-26 PROBLEM — M05.79 RHEUMATOID ARTHRITIS INVOLVING MULTIPLE SITES WITH POSITIVE RHEUMATOID FACTOR (HCC): Status: ACTIVE | Noted: 2024-12-26

## 2024-12-26 PROCEDURE — 99214 OFFICE O/P EST MOD 30 MIN: CPT | Performed by: INTERNAL MEDICINE

## 2024-12-26 RX ORDER — HYDROXYCHLOROQUINE SULFATE 200 MG/1
300 TABLET, FILM COATED ORAL DAILY
Qty: 135 TABLET | Refills: 1 | Status: SHIPPED | OUTPATIENT
Start: 2024-12-26 | End: 2025-06-24

## 2024-12-26 RX ORDER — OXYCODONE HYDROCHLORIDE 10 MG/1
TABLET ORAL
COMMUNITY
Start: 2024-11-05

## 2024-12-26 RX ORDER — TIZANIDINE 2 MG/1
2 TABLET ORAL EVERY 8 HOURS PRN
Qty: 90 TABLET | Refills: 5 | Status: SHIPPED | OUTPATIENT
Start: 2024-12-26

## 2024-12-26 NOTE — PATIENT INSTRUCTIONS
Try Voltaren gel as needed for joint pain  Take hydroxychloroquine one and a half tabs daily; continue regular eye exams  Can take tizanidine up to 3 times a day as needed for muscle pain  Schedule DEXA scan - [phone]    Return to clinic in 6 months

## 2024-12-26 NOTE — PROGRESS NOTES
Name: Kelsi Cabrera      : 1960      MRN: [id number]  Encounter Provider: Ministerio Rangel MD  Encounter Date: 2024   Encounter department: [facility] [place]  :  Assessment & Plan  Rheumatoid arthritis involving multiple sites, unspecified whether rheumatoid factor present (HCC)  She reports constant pain in her joints, numbness, tingling, and cold sensations, particularly in her right leg and toes. She will continue taking hydroxychloroquine but at weight appropriate 1.5 tablets daily instead of 1 tab daily. She will also try Voltaren gel as needed for joint pain. Regular eye exams will be continued. Has had reactions to various RA medications in the past except for HCQ. Patient's rheumatologic disease(s) threaten long-term function if not appropriately managed.    Orders:    hydroxychloroquine (PLAQUENIL) 200 mg tablet; Take 1.5 tablets (300 mg total) by mouth daily    Osteoarthritis, unspecified osteoarthritis type, unspecified site  She experiences pain and swelling in her right ankle, which is likely secondary to her hip replacement surgery. She will try Voltaren gel as needed for joint pain.       Trapezius muscle spasm  Tizanidine can be taken up to three times a day as needed for muscle pain.    Orders:    tiZANidine (ZANAFLEX) 2 mg tablet; Take 1 tablet (2 mg total) by mouth every 8 (eight) hours as needed for muscle spasms    Osteoporosis screening  She will schedule a DEXA scan to assess her bone density. No history of fractures reported.  Orders:    DXA bone density spine hip and pelvis; Future    Other osteoporosis without current pathological fracture  Has had a lot of exposure to prednisone in the past, which led to her developing avascular necrosis of the right hip, is s/p right hip replacement with some residual pain. She warrants osteoporosis screening with DEXA scan given her history of chronic steroid use, age, and RA.  Orders:    DXA bone density spine hip  and pelvis; Future    High risk medication use  Benefits and risks of hydroxychloroquine, including but not limited to retinal toxicity, corneal deposits, gastrointestinal side effects, and headaches were discussed with the patient. The need for a regular eye exam to monitor for ocular toxicity while on this medication was also explained to the patient.          Try Voltaren gel as needed for joint pain  Take hydroxychloroquine one and a half tabs daily; continue regular eye exams  Can take tizanidine up to 3 times a day as needed for muscle pain  Schedule DEXA scan     Return to clinic in 6 months        Pertinent Medical History        History of Present Illness   Kelsi Cabrera is a 64 y.o. female who presents for follow-up.  History of Present Illness  The patient is a 64-year-old female who presents for a follow-up of her rheumatoid arthritis. She is accompanied by her .    She has been under the care of Dr. Perez for 18 years. She was prescribed hydroxychloroquine 200 mg, which she initially tolerated well without any side effects. However, she discontinued the medication due to severe side effects. She used to take 2 tablets of hydroxychloroquine twice a day but reduced it to 1 tablet a day without any side effects. Her last eye exam was last year, and the ophthalmologist is aware of her hydroxychloroquine medication.    Following her hip surgery, she continues to experience severe rheumatoid arthritis symptoms. She experiences numbness, tingling, and a cold sensation in her fingers, predominantly on the right side, which extends to her toes. Previously, her fingers would be itchy, swollen, and red due to changes in temperature from hot to cold, but this has since resolved. Now, she experiences constant pain in her hands, and her joints are creaking. She reports no warmth in her joints. Her right ankle swells, but it is not currently swollen. Morning stiffness lasts for 20 to 30 minutes. An EMG  "nerve conduction study was scheduled, but she did not undergo it. She has numbness in both legs. She has tried gabapentin, but it caused mental nausea. She takes Tylenol for pain. Prior to her surgery, she had hives. Her foot and ankle pain remained the same after her surgery. She attended physical therapy for her foot for a few months, but her hip pain worsened. She has not received any injections for her foot or ankle. She has received injections in her back and shoulder. She has lost significant weight. She takes oxycodone for pain, which makes sitting difficult. Walking provides some relief. She has not tried Lyrica. Aspirin causes anaphylaxis. She has not tried ibuprofen or Aleve. She tolerates prednisone well. She has tried Enbrel and methotrexate for her rheumatoid arthritis, but they did not work.    She was scheduled for a bone density test, but due to her surgery, she did not have it done.    She has COPD, diabetes, and osteoporosis. She has severe asthma. She used prednisone a lot in the past. She was given tizanidine 4 mg in the past.    SOCIAL HISTORY  She quit smoking 2 to 3 years ago.    ALLERGIES  She is allergic to ASPIRIN and ADVIL.     Review of Systems  See HPI      Objective   /60   Pulse 85   Ht 5' 3\" (1.6 m)   SpO2 97%   BMI 26.96 kg/m²     Physical Exam  Right lateral ankle swelling and tenderness noted. Bilateral MTP squeeze tenderness noted. Right lateral knee shows tenderness over the fibula. Tenderness noted in the left upper trapezius.    Vital Signs  Weight is 152 pounds.  Physical Exam  Constitutional:       General: She is not in acute distress.  HENT:      Head: Normocephalic and atraumatic.   Eyes:      Conjunctiva/sclera: Conjunctivae normal.   Cardiovascular:      Rate and Rhythm: Normal rate and regular rhythm.      Heart sounds: S1 normal and S2 normal.      No friction rub.   Pulmonary:      Effort: Pulmonary effort is normal. No respiratory distress.      Breath " sounds: Normal breath sounds. No wheezing, rhonchi or rales.   Musculoskeletal:         General: Swelling and tenderness present.      Cervical back: Neck supple.   Skin:     Coloration: Skin is not pale.   Neurological:      Mental Status: She is alert. Mental status is at baseline.   Psychiatric:         Mood and Affect: Mood normal.         Behavior: Behavior normal.         Results    Recent labs:  Lab Results   Component Value Date/Time    SODIUM 140 09/12/2024 09:52 AM    SODIUM 143 10/01/2020 10:29 AM    K 4.1 09/12/2024 09:52 AM    K 4.7 10/01/2020 10:29 AM    BUN 10 09/12/2024 09:52 AM    BUN 9 10/01/2020 10:29 AM    CREATININE 0.71 09/12/2024 09:52 AM    CREATININE 0.84 10/01/2020 10:29 AM    GLUC 96 09/12/2024 09:52 AM    GLUC 74 10/01/2020 10:29 AM    CALCIUM 9.6 09/12/2024 09:52 AM    CALCIUM 9.7 10/01/2020 10:29 AM    AST 19 09/12/2024 09:52 AM    AST 20 10/01/2020 10:29 AM    ALT 19 09/12/2024 09:52 AM    ALT 31 10/01/2020 10:29 AM    ALB 4.5 09/12/2024 09:52 AM    ALB 4.3 10/01/2020 10:29 AM    TP 7.3 09/12/2024 09:52 AM    TP 6.7 10/01/2020 10:29 AM    EGFR 89 09/12/2024 09:52 AM    EGFR 75 10/01/2020 10:29 AM     Lab Results   Component Value Date/Time    HGB 14.8 09/12/2024 09:52 AM    WBC 6.61 09/12/2024 09:52 AM     09/12/2024 09:52 AM    INR 0.98 05/09/2024 08:54 AM    PTT 28 05/09/2024 08:54 AM     Lab Results   Component Value Date/Time    XYQ2TPFLSIOB 1.950 09/12/2024 09:52 AM

## 2024-12-26 NOTE — ASSESSMENT & PLAN NOTE
She reports constant pain in her joints, numbness, tingling, and cold sensations, particularly in her right leg and toes. She will continue taking hydroxychloroquine but at weight appropriate 1.5 tablets daily instead of 1 tab daily. She will also try Voltaren gel as needed for joint pain. Regular eye exams will be continued. Has had reactions to various RA medications in the past except for HCQ. Patient's rheumatologic disease(s) threaten long-term function if not appropriately managed.    Orders:    hydroxychloroquine (PLAQUENIL) 200 mg tablet; Take 1.5 tablets (300 mg total) by mouth daily

## 2025-01-03 ENCOUNTER — NURSE TRIAGE (OUTPATIENT)
Age: 65
End: 2025-01-03

## 2025-01-03 DIAGNOSIS — L20.84 INTRINSIC ECZEMA: Primary | ICD-10-CM

## 2025-01-03 RX ORDER — TRIAMCINOLONE ACETONIDE 1 MG/G
OINTMENT TOPICAL 2 TIMES DAILY
Qty: 80 G | Refills: 0 | Status: SHIPPED | OUTPATIENT
Start: 2025-01-03

## 2025-01-03 NOTE — TELEPHONE ENCOUNTER
"Reason for Conversation: Patient has been experiencing chest pain, shortness of breath, fatigue , elevated BP and fluctuating heart rate for the last 3 days.    Patient was requesting an office visit today.  I advised patient to go to ED. She is reluctant but did say she would go to the [facility] ED.    VS/Weight: /60s-80s,  HR is fluctuating from .  She does not have a pulse ox meter at home.  BP and HR are seen on her automatic BP cuff.    Pain: Yes       -Location: center of chest     -Radiation: No    -Duration: Seconds    -Type (tightness, crushing etc.): Sharp    Risk Factors: Hypertension    Recent relevant testing and date of testing: Cardiac Cath 2/28/24, Stress test and echo on 2/7/24      Upcoming Office Visit: Yes 2/12/25    Last Office Visit: 2/15/24         Reason for Disposition   Difficulty breathing    Answer Assessment - Initial Assessment Questions  1. LOCATION: \"Where does it hurt?\"        Center of chest  2. RADIATION: \"Does the pain go anywhere else?\" (e.g., into neck, jaw, arms, back)      No  3. ONSET: \"When did the chest pain begin?\" (Minutes, hours or days)       3 days ago  4. PATTERN: \"Does the pain come and go, or has it been constant since it started?\"  \"Does it get worse with exertion?\"       Comes and goes for the last 3 days  5. DURATION: \"How long does it last\" (e.g., seconds, minutes, hours)      Seconds  6. SEVERITY: \"How bad is the pain?\"  (e.g., Scale 1-10; mild, moderate, or severe)      Sharp pain which patient states is \"very bad\"  7. CARDIAC RISK FACTORS: \"Do you have any history of heart problems or risk factors for heart disease?\" (e.g., angina, prior heart attack; diabetes, high blood pressure, high cholesterol, smoker, or strong family history of heart disease)      Hyperlipidemia, HTN  8. PULMONARY RISK FACTORS: \"Do you have any history of lung disease?\"  (e.g., blood clots in lung, asthma, emphysema, birth control pills)      COPD, Asthma    10. " "OTHER SYMPTOMS: \"Do you have any other symptoms?\" (e.g., dizziness, nausea, vomiting, sweating, fever, difficulty breathing, cough)        Heart rate is fluctuating between  BPM    Protocols used: Chest Pain-Adult-[place]    "

## 2025-01-31 ENCOUNTER — APPOINTMENT (OUTPATIENT)
Dept: LAB | Facility: CLINIC | Age: 65
End: 2025-01-31
Payer: MEDICARE

## 2025-01-31 ENCOUNTER — NURSE TRIAGE (OUTPATIENT)
Age: 65
End: 2025-01-31

## 2025-01-31 ENCOUNTER — OFFICE VISIT (OUTPATIENT)
Dept: INTERNAL MEDICINE CLINIC | Facility: CLINIC | Age: 65
End: 2025-01-31
Payer: MEDICARE

## 2025-01-31 VITALS
HEIGHT: 63 IN | SYSTOLIC BLOOD PRESSURE: 154 MMHG | OXYGEN SATURATION: 97 % | TEMPERATURE: 96 F | DIASTOLIC BLOOD PRESSURE: 86 MMHG | BODY MASS INDEX: 26.96 KG/M2 | HEART RATE: 77 BPM

## 2025-01-31 DIAGNOSIS — I10 ESSENTIAL HYPERTENSION: Primary | ICD-10-CM

## 2025-01-31 DIAGNOSIS — I10 ESSENTIAL HYPERTENSION: ICD-10-CM

## 2025-01-31 LAB
ALBUMIN SERPL BCG-MCNC: 4.7 G/DL (ref 3.5–5)
ALP SERPL-CCNC: 42 U/L (ref 34–104)
ALT SERPL W P-5'-P-CCNC: 21 U/L (ref 7–52)
ANION GAP SERPL CALCULATED.3IONS-SCNC: 6 MMOL/L (ref 4–13)
AST SERPL W P-5'-P-CCNC: 23 U/L (ref 13–39)
BASOPHILS # BLD AUTO: 0.06 THOUSANDS/ΜL (ref 0–0.1)
BASOPHILS NFR BLD AUTO: 1 % (ref 0–1)
BILIRUB SERPL-MCNC: 0.43 MG/DL (ref 0.2–1)
BUN SERPL-MCNC: 8 MG/DL (ref 5–25)
CALCIUM SERPL-MCNC: 9.9 MG/DL (ref 8.4–10.2)
CHLORIDE SERPL-SCNC: 107 MMOL/L (ref 96–108)
CO2 SERPL-SCNC: 28 MMOL/L (ref 21–32)
CREAT SERPL-MCNC: 0.65 MG/DL (ref 0.6–1.3)
EOSINOPHIL # BLD AUTO: 0.18 THOUSAND/ΜL (ref 0–0.61)
EOSINOPHIL NFR BLD AUTO: 3 % (ref 0–6)
ERYTHROCYTE [DISTWIDTH] IN BLOOD BY AUTOMATED COUNT: 13.1 % (ref 11.6–15.1)
GFR SERPL CREATININE-BSD FRML MDRD: 92 ML/MIN/1.73SQ M
GLUCOSE SERPL-MCNC: 91 MG/DL (ref 65–140)
HCT VFR BLD AUTO: 46.4 % (ref 34.8–46.1)
HGB BLD-MCNC: 15.1 G/DL (ref 11.5–15.4)
IMM GRANULOCYTES # BLD AUTO: 0.02 THOUSAND/UL (ref 0–0.2)
IMM GRANULOCYTES NFR BLD AUTO: 0 % (ref 0–2)
LYMPHOCYTES # BLD AUTO: 2.39 THOUSANDS/ΜL (ref 0.6–4.47)
LYMPHOCYTES NFR BLD AUTO: 39 % (ref 14–44)
MCH RBC QN AUTO: 29.8 PG (ref 26.8–34.3)
MCHC RBC AUTO-ENTMCNC: 32.5 G/DL (ref 31.4–37.4)
MCV RBC AUTO: 92 FL (ref 82–98)
MONOCYTES # BLD AUTO: 0.46 THOUSAND/ΜL (ref 0.17–1.22)
MONOCYTES NFR BLD AUTO: 8 % (ref 4–12)
NEUTROPHILS # BLD AUTO: 2.96 THOUSANDS/ΜL (ref 1.85–7.62)
NEUTS SEG NFR BLD AUTO: 49 % (ref 43–75)
NRBC BLD AUTO-RTO: 0 /100 WBCS
PLATELET # BLD AUTO: 331 THOUSANDS/UL (ref 149–390)
PMV BLD AUTO: 10.4 FL (ref 8.9–12.7)
POTASSIUM SERPL-SCNC: 4 MMOL/L (ref 3.5–5.3)
PROT SERPL-MCNC: 7.4 G/DL (ref 6.4–8.4)
RBC # BLD AUTO: 5.06 MILLION/UL (ref 3.81–5.12)
SODIUM SERPL-SCNC: 141 MMOL/L (ref 135–147)
WBC # BLD AUTO: 6.07 THOUSAND/UL (ref 4.31–10.16)

## 2025-01-31 PROCEDURE — 99213 OFFICE O/P EST LOW 20 MIN: CPT | Performed by: NURSE PRACTITIONER

## 2025-01-31 PROCEDURE — 80053 COMPREHEN METABOLIC PANEL: CPT

## 2025-01-31 PROCEDURE — 85025 COMPLETE CBC W/AUTO DIFF WBC: CPT

## 2025-01-31 PROCEDURE — G2211 COMPLEX E/M VISIT ADD ON: HCPCS | Performed by: NURSE PRACTITIONER

## 2025-01-31 PROCEDURE — 36415 COLL VENOUS BLD VENIPUNCTURE: CPT

## 2025-01-31 RX ORDER — CLONIDINE HYDROCHLORIDE 0.2 MG/1
0.2 TABLET ORAL 2 TIMES DAILY
Qty: 60 TABLET | Refills: 0 | Status: SHIPPED | OUTPATIENT
Start: 2025-01-31

## 2025-01-31 NOTE — ASSESSMENT & PLAN NOTE
Increase clonidine to 0.2 mg twice daily.  Check labs.   Continue home blood pressure monitoring and call the office with BP log in 1-2 weeks.   She sees her cardiologist for a follow up in 2 weeks as well.   Orders:    cloNIDine (CATAPRES) 0.2 mg tablet; Take 1 tablet (0.2 mg total) by mouth 2 (two) times a day    Comprehensive metabolic panel; Future    CBC and differential; Future

## 2025-01-31 NOTE — PROGRESS NOTES
"Name: Kelsi Cabrera      : 1960      MRN: [id number]  Encounter Provider: ALETHEA Scanlon  Encounter Date: 2025   Encounter department: [facility] INTERNAL MEDICINE  :  Assessment & Plan  Essential hypertension  Increase clonidine to 0.2 mg twice daily.  Check labs.   Continue home blood pressure monitoring and call the office with BP log in 1-2 weeks.   She sees her cardiologist for a follow up in 2 weeks as well.   Orders:    cloNIDine (CATAPRES) 0.2 mg tablet; Take 1 tablet (0.2 mg total) by mouth 2 (two) times a day    Comprehensive metabolic panel; Future    CBC and differential; Future           History of Present Illness   Kelsi is here today with elevated blood pressure.  About 3 weeks ago she noticed her blood pressure was higher than normal. She then got the flu so she thought it was related to that.   However it has not come down.   BP still high 150-180/80's. She has had intermittent headaches.  No vision changes, chest pain, or worsening shortness of breath.         Review of Systems   Constitutional:  Negative for activity change, appetite change and fatigue.   Eyes:  Negative for visual disturbance.   Respiratory:  Negative for shortness of breath.    Cardiovascular:  Negative for chest pain, palpitations and leg swelling.   Gastrointestinal:  Negative for abdominal pain.   Neurological:  Positive for headaches. Negative for dizziness and light-headedness.   Psychiatric/Behavioral:  The patient is not nervous/anxious.        Objective   /86   Pulse 77   Temp (!) 96 °F (35.6 °C)   Ht 5' 3\" (1.6 m)   SpO2 97%   BMI 26.96 kg/m²      Physical Exam  Vitals reviewed.   Constitutional:       Appearance: Normal appearance.   HENT:      Head: Normocephalic and atraumatic.   Eyes:      Conjunctiva/sclera: Conjunctivae normal.   Cardiovascular:      Rate and Rhythm: Normal rate and regular rhythm.      Heart sounds: Normal heart sounds.   Pulmonary:      Effort: " Pulmonary effort is normal.      Breath sounds: Normal breath sounds.   Neurological:      Mental Status: She is alert and oriented to person, place, and time.   Psychiatric:         Mood and Affect: Mood normal.         Behavior: Behavior normal.

## 2025-01-31 NOTE — TELEPHONE ENCOUNTER
"Patient has been having intermittent headaches \"for a couple of weeks\".  She is consistently getting BP readings with a systolic in the 140-160's range and diastolic 70-90's range.  Highest BP was 192/84, 2 days ago.  She has not missed any medications.  Headaches are intermittent.  Denies visual or gait disturbance, weakness, numbness, tingling, SOB or CP.  Patient requesting to be seen.  Appointment made for this afternoon.        Patient advised of ED protocol, if symptoms worsen.  Encouraged to call back with questions or concerns.     Reason for Disposition   Patient wants to be seen    Answer Assessment - Initial Assessment Questions  1. BLOOD PRESSURE: \"What is the blood pressure?\" \"Did you take at least two measurements 5 minutes apart?\"      167/81, HR 72      192/84  2. ONSET: \"When did you take your blood pressure?\"      A couple of weeks.   3. HOW: \"How did you obtain the blood pressure?\" (e.g., visiting nurse, automatic home BP monitor)      Automatic upper arm cuff   4. HISTORY: \"Do you have a history of high blood pressure?\"      Yes   5. MEDICATIONS: \"Are you taking any medications for blood pressure?\" \"Have you missed any doses recently?\"      Denies   6. OTHER SYMPTOMS: \"Do you have any symptoms?\" (e.g., headache, chest pain, blurred vision, difficulty breathing, weakness)  Headaches,    Protocols used: High Blood Pressure-ADULT-[place]    "

## 2025-02-03 ENCOUNTER — RESULTS FOLLOW-UP (OUTPATIENT)
Dept: INTERNAL MEDICINE CLINIC | Facility: CLINIC | Age: 65
End: 2025-02-03

## 2025-02-04 NOTE — TELEPHONE ENCOUNTER
Patient calling back in regards to headache. States headache is not worse since increasing the clonidine. Patient states she thinks it is actually a sinus issue and is wondering if provider would be willing to prescribe her a Z-pack to try or does she need to be re evaluated. Please reach out to patient with provider response.

## 2025-02-07 NOTE — TELEPHONE ENCOUNTER
Patient called in stating she is using the saline and flonase however still gets a headache about an hour before its time to take medication again. Patient stated when she notices the headache coming back she takes her BP and it is high. Patient stated after taking medication it goes back down. Patient stated since taking higher dose of BP medication she is having mouth dryness and asked what she can do to fix that.     Please advise, thank you

## 2025-02-13 ENCOUNTER — OFFICE VISIT (OUTPATIENT)
Dept: CARDIOLOGY CLINIC | Facility: CLINIC | Age: 65
End: 2025-02-13
Payer: MEDICARE

## 2025-02-13 VITALS
DIASTOLIC BLOOD PRESSURE: 78 MMHG | SYSTOLIC BLOOD PRESSURE: 124 MMHG | HEIGHT: 63 IN | OXYGEN SATURATION: 97 % | WEIGHT: 156 LBS | BODY MASS INDEX: 27.64 KG/M2 | HEART RATE: 76 BPM | RESPIRATION RATE: 16 BRPM

## 2025-02-13 DIAGNOSIS — E78.2 MIXED HYPERLIPIDEMIA: ICD-10-CM

## 2025-02-13 DIAGNOSIS — I10 ESSENTIAL HYPERTENSION: Primary | ICD-10-CM

## 2025-02-13 DIAGNOSIS — R06.02 SHORTNESS OF BREATH: ICD-10-CM

## 2025-02-13 PROCEDURE — 99213 OFFICE O/P EST LOW 20 MIN: CPT | Performed by: INTERNAL MEDICINE

## 2025-02-13 NOTE — PROGRESS NOTES
[facility]  [address] [id number]  Cardiology Follow Up    Kelsi Cabrera  1960  [id number]      Assessment & Plan  Essential hypertension  Importance of salt restriction reinforced.  Continue present medications which were reviewed.  Patient had cardiac catheterization in February 2024.  No significant CAD was noted.  Results reviewed with patient and family.  Mixed hyperlipidemia  Continue fenofibric acid.  Patient has intolerance to statins.  Shortness of breath  Stable.  Patient does have history of smoking.  Patient counseled to quit smoking completely to prevent future cardiovascular vents.  Patient also has history of COPD followed by pulmonary.       Chief Complaint   Patient presents with    Follow-up       Interval History:   Patient presents for follow-up visit.  Patient denies any history of chest pain shortness of breath.  Patient denies any history of leg edema or orthopnea PND.  No history of presyncope syncope.  Patient states compliance with the present list of medications.  Patient trying to quit smoking.    Patient Active Problem List   Diagnosis    Lumbar radiculopathy    Gastro-esophageal reflux disease without esophagitis    COPD (chronic obstructive pulmonary disease) (HCC)    Right hip pain    Essential hypertension    Other hyperlipidemia    Lumbar spondylosis    Moderate persistent asthma with acute exacerbation    Rheumatoid arthritis involving multiple sites (HCC)    Allergic rhinitis due to allergen    Primary osteoarthritis of left hip    Chronic tension-type headache, not intractable    Cervical spondylosis    Cervical radiculopathy    Migraine    Neuropathy    Cervical spinal stenosis    Memory impairment    Bilateral occipital neuralgia    Chronic pain syndrome    Situational anxiety    Aspirin allergy    Abnormal stress test    Tobacco abuse    Primary osteoarthritis of one hip, right    Status post total hip replacement, right     Rheumatoid arthritis involving multiple sites with positive rheumatoid factor (HCC)     Past Medical History:   Diagnosis Date    Anxiety     Arthritis     Asthma     Avascular necrosis of bone of hip, right (HCC)     Avascular necrosis of hip, right (HCC) 10/03/2017    Chronic obstructive pulmonary disease (formerly [facility]) 02/12/2019    COPD (chronic obstructive pulmonary disease) (formerly [facility])     GERD (gastroesophageal reflux disease)     Hyperlipidemia     Hypertension     Hypoglycemia     Infectious viral hepatitis     Lumbar radicular pain     Lyme disease     Pneumonia     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[facility])      Social History     Socioeconomic History    Marital status: /Civil Union     Spouse name: Not on file    Number of children: Not on file    Years of education: Not on file    Highest education level: Not on file   Occupational History    Not on file   Tobacco Use    Smoking status: Every Day     Average packs/day: 1 pack/day for 30.0 years (30.0 ttl pk-yrs)     Types: Cigarettes     Start date: 1987     Passive exposure: Past    Smokeless tobacco: Never    Tobacco comments:     Currently 1-3 cigarettes a day    Vaping Use    Vaping status: Never Used   Substance and Sexual Activity    Alcohol use: Never    Drug use: Never    Sexual activity: Not Currently     Partners: Male   Other Topics Concern    Not on file   Social History Narrative    Not on file     Social Drivers of Health     Financial Resource Strain: Not on file   Food Insecurity: No Food Insecurity (2/28/2024)    Nursing - Inadequate Food Risk Classification     Worried About Running Out of Food in the Last Year: Never true     Ran Out of Food in the Last Year: Never true     Ran Out of Food in the Last Year: Not on file   Transportation Needs: No Transportation Needs (2/28/2024)    PRAPARE - Transportation     Lack of Transportation (Medical): No     Lack of Transportation (Non-Medical): No   Physical Activity: Not on file   Stress: Not on file    Social Connections: Not on file   Intimate Partner Violence: Not on file   Housing Stability: Low Risk  (2/28/2024)    Housing Stability Vital Sign     Unable to Pay for Housing in the Last Year: No     Number of Times Moved in the Last Year: 1     Homeless in the Last Year: No      Family History   Problem Relation Age of Onset    Bronchiolitis Mother      Past Surgical History:   Procedure Laterality Date    APPENDECTOMY      CARDIAC CATHETERIZATION Left 02/28/2024    Procedure: Cardiac Left Heart Cath;  Surgeon: Lizz Adorno MD;  Location: [facility];  Service: Cardiology    CARDIAC CATHETERIZATION N/A 02/28/2024    Procedure: Cardiac Coronary Angiogram;  Surgeon: Lizz Adorno MD;  Location: [facility];  Service: Cardiology    CARDIAC CATHETERIZATION  02/28/2024    Procedure: Cardiac catheterization;  Surgeon: Lizz Adorno MD;  Location: [facility];  Service: Cardiology    CARPAL TUNNEL RELEASE      CHOLECYSTECTOMY      COLONOSCOPY      DE ARTHRP ACETBLR/PROX FEM PROSTC AGRFT/ALGRFT Right 5/30/2024    Procedure: ARTHROPLASTY HIP TOTAL ANTERIOR;  Surgeon: Kathya Nye MD;  Location: [facility];  Service: Orthopedics    SINUS SURGERY         Current Outpatient Medications:     ALPRAZolam (XANAX) 0.5 mg tablet, As needed, Disp: , Rfl:     Azelastine HCl 137 MCG/SPRAY SOLN, , Disp: , Rfl:     budesonide-formoterol (SYMBICORT) 160-4.5 mcg/act inhaler, Inhale 2 puffs 2 (two) times a day, Disp: , Rfl:     Cholecalciferol (VITAMIN D3) 2000 units CHEW, Chew 5,000 Units daily, Disp: , Rfl:     Choline Fenofibrate (Fenofibric Acid) 135 MG CPDR, TAKE 1 CAPSULE BY MOUTH EVERY DAY, Disp: 30 capsule, Rfl: 0    cloNIDine (CATAPRES) 0.2 mg tablet, Take 1 tablet (0.2 mg total) by mouth 2 (two) times a day, Disp: 60 tablet, Rfl: 0    fexofenadine (ALLEGRA) 180 MG tablet, Take 180 mg by mouth daily, Disp: , Rfl:     fluticasone (FLONASE) 50 mcg/act nasal spray, PUT 1 SPRAY IN EACH  NOSTRIL ONCE DAILY, Disp: 48 mL, Rfl: 1    hydroxychloroquine (PLAQUENIL) 200 mg tablet, Take 1.5 tablets (300 mg total) by mouth daily, Disp: 135 tablet, Rfl: 1    nitroglycerin (NITROSTAT) 0.4 mg SL tablet, Place 1 tablet (0.4 mg total) under the tongue every 5 (five) minutes as needed for chest pain, Disp: 30 tablet, Rfl: 3    oxyCODONE (ROXICODONE) 10 MG TABS, TAKE 1 TABLET (10 MG TOTAL) BY MOUTH 2 (TWO) TIMES A DAY. MAX DAILY AMOUNT: 20 MG, Disp: , Rfl:     predniSONE 10 mg tablet, PLEASE SEE ATTACHED FOR DETAILED DIRECTIONS, Disp: , Rfl:     promethazine-dextromethorphan (PHENERGAN-DM) 6.25-15 mg/5 mL oral syrup, TAKE 5 MILLILITERS BY MOUTH 4 TIMES A DAY AS NEEDED FOR COUGH, Disp: , Rfl:     triamcinolone (KENALOG) 0.1 % ointment, Apply topically 2 (two) times a day, Disp: 80 g, Rfl: 0  Allergies   Allergen Reactions    Aspirin Anaphylaxis and Other (See Comments)    Iodine - Food Allergy Anaphylaxis    Penicillins Anaphylaxis    Pork Allergy - Food Allergy Other (See Comments)     Does not eat pork for Yazidi reasons    Soybean Oil - Food Allergy Other (See Comments)    Statins Myalgia       Labs:  Appointment on 01/31/2025   Component Date Value    Sodium 01/31/2025 141     Potassium 01/31/2025 4.0     Chloride 01/31/2025 107     CO2 01/31/2025 28     ANION GAP 01/31/2025 6     BUN 01/31/2025 8     Creatinine 01/31/2025 0.65     Glucose 01/31/2025 91     Calcium 01/31/2025 9.9     AST 01/31/2025 23     ALT 01/31/2025 21     Alkaline Phosphatase 01/31/2025 42     Total Protein 01/31/2025 7.4     Albumin 01/31/2025 4.7     Total Bilirubin 01/31/2025 0.43     eGFR 01/31/2025 92     WBC 01/31/2025 6.07     RBC 01/31/2025 5.06     Hemoglobin 01/31/2025 15.1     Hematocrit 01/31/2025 46.4 (H)     MCV 01/31/2025 92     MCH 01/31/2025 29.8     MCHC 01/31/2025 32.5     RDW 01/31/2025 13.1     MPV 01/31/2025 10.4     Platelets 01/31/2025 331     nRBC 01/31/2025 0     Segmented % 01/31/2025 49     Immature Grans %  "01/31/2025 0     Lymphocytes % 01/31/2025 39     Monocytes % 01/31/2025 8     Eosinophils Relative 01/31/2025 3     Basophils Relative 01/31/2025 1     Absolute Neutrophils 01/31/2025 2.96     Absolute Immature Grans 01/31/2025 0.02     Absolute Lymphocytes 01/31/2025 2.39     Absolute Monocytes 01/31/2025 0.46     Eosinophils Absolute 01/31/2025 0.18     Basophils Absolute 01/31/2025 0.06      Imaging: No results found.    Review of Systems:  Review of Systems  REVIEW OF SYSTEMS:  Constitutional:  Denies fever or chills   Eyes:  Denies change in visual acuity   HENT:  Denies nasal congestion or sore throat   Respiratory:   shortness of breath   Cardiovascular:  Denies chest pain or edema   GI:  Denies abdominal pain, nausea, vomiting, bloody stools or diarrhea   :  Denies dysuria, frequency, difficulty in micturition and nocturia  Musculoskeletal:  Denies back pain or joint pain   Neurologic:  Denies headache, focal weakness or sensory changes   Endocrine:  Denies polyuria or polydipsia   Lymphatic:  Denies swollen glands   Psychiatric:  Denies depression or anxiety    Physical Exam:    /78 (BP Location: Left arm, Patient Position: Sitting, Cuff Size: Standard)   Pulse 76   Resp 16   Ht 5' 3\" (1.6 m)   Wt 70.8 kg (156 lb)   SpO2 97%   BMI 27.63 kg/m²     Physical Exam  PHYSICAL EXAM:  General:  Patient is not in acute distress   Head: Normocephalic, Atraumatic.  HEENT:  Both pupils normal-size atraumatic, normocephalic, nonicteric  Neck:  JVP not raised. Trachea central. No carotid bruit  Respiratory: Decreased breath sounds bilateral  Cardiovascular:  Regular rate and rhythm no S3 no murmurs  GI:  Abdomen soft nontender. No organomegaly.   Lymphatic:  No cervical or inguinal lymphadenopathy  Neurologic:  Patient is awake alert, oriented . Grossly nonfocal  Extremities no edema      "

## 2025-02-13 NOTE — ASSESSMENT & PLAN NOTE
Importance of salt restriction reinforced.  Continue present medications which were reviewed.  Patient had cardiac catheterization in February 2024.  No significant CAD was noted.  Results reviewed with patient and family.

## 2025-02-23 DIAGNOSIS — I10 ESSENTIAL HYPERTENSION: ICD-10-CM

## 2025-02-24 ENCOUNTER — APPOINTMENT (OUTPATIENT)
Dept: LAB | Facility: CLINIC | Age: 65
End: 2025-02-24
Payer: MEDICARE

## 2025-02-24 DIAGNOSIS — E78.2 MIXED HYPERLIPIDEMIA: ICD-10-CM

## 2025-02-24 LAB
CHOLEST SERPL-MCNC: 168 MG/DL (ref ?–200)
HDLC SERPL-MCNC: 64 MG/DL
LDLC SERPL CALC-MCNC: 90 MG/DL (ref 0–100)
NONHDLC SERPL-MCNC: 104 MG/DL
TRIGL SERPL-MCNC: 70 MG/DL (ref ?–150)

## 2025-02-24 PROCEDURE — 36415 COLL VENOUS BLD VENIPUNCTURE: CPT

## 2025-02-24 PROCEDURE — 80061 LIPID PANEL: CPT

## 2025-02-24 RX ORDER — CLONIDINE HYDROCHLORIDE 0.2 MG/1
0.2 TABLET ORAL 2 TIMES DAILY
Qty: 180 TABLET | Refills: 1 | Status: SHIPPED | OUTPATIENT
Start: 2025-02-24

## 2025-02-25 ENCOUNTER — RESULTS FOLLOW-UP (OUTPATIENT)
Dept: CARDIOLOGY CLINIC | Facility: CLINIC | Age: 65
End: 2025-02-25

## 2025-02-26 NOTE — TELEPHONE ENCOUNTER
----- Message from Anuradha Sauceda MD sent at 2/25/2025  9:52 AM EST -----  Please call the patient.  Lipid panel overall acceptable.

## 2025-03-12 ENCOUNTER — OFFICE VISIT (OUTPATIENT)
Dept: INTERNAL MEDICINE CLINIC | Facility: CLINIC | Age: 65
End: 2025-03-12
Payer: MEDICARE

## 2025-03-12 VITALS
HEART RATE: 73 BPM | SYSTOLIC BLOOD PRESSURE: 118 MMHG | TEMPERATURE: 96.6 F | OXYGEN SATURATION: 97 % | HEIGHT: 63 IN | BODY MASS INDEX: 28.35 KG/M2 | DIASTOLIC BLOOD PRESSURE: 80 MMHG | WEIGHT: 160 LBS

## 2025-03-12 DIAGNOSIS — I10 ESSENTIAL HYPERTENSION: ICD-10-CM

## 2025-03-12 DIAGNOSIS — J43.8 OTHER EMPHYSEMA (HCC): ICD-10-CM

## 2025-03-12 DIAGNOSIS — Z00.00 MEDICARE ANNUAL WELLNESS VISIT, SUBSEQUENT: Primary | ICD-10-CM

## 2025-03-12 DIAGNOSIS — M06.9 RHEUMATOID ARTHRITIS INVOLVING MULTIPLE SITES, UNSPECIFIED WHETHER RHEUMATOID FACTOR PRESENT (HCC): ICD-10-CM

## 2025-03-12 DIAGNOSIS — F41.8 SITUATIONAL ANXIETY: ICD-10-CM

## 2025-03-12 DIAGNOSIS — M54.16 LUMBAR RADICULOPATHY: ICD-10-CM

## 2025-03-12 DIAGNOSIS — E78.49 OTHER HYPERLIPIDEMIA: ICD-10-CM

## 2025-03-12 DIAGNOSIS — Z72.0 TOBACCO ABUSE: ICD-10-CM

## 2025-03-12 PROBLEM — M05.79 RHEUMATOID ARTHRITIS INVOLVING MULTIPLE SITES WITH POSITIVE RHEUMATOID FACTOR (HCC): Status: RESOLVED | Noted: 2024-12-26 | Resolved: 2025-03-12

## 2025-03-12 PROCEDURE — G0438 PPPS, INITIAL VISIT: HCPCS | Performed by: NURSE PRACTITIONER

## 2025-03-12 PROCEDURE — 99214 OFFICE O/P EST MOD 30 MIN: CPT | Performed by: NURSE PRACTITIONER

## 2025-03-12 PROCEDURE — G2211 COMPLEX E/M VISIT ADD ON: HCPCS | Performed by: NURSE PRACTITIONER

## 2025-03-12 NOTE — PROGRESS NOTES
Name: Kelsi Cabrera      : 1960      MRN: [id number]  Encounter Provider: ALETHEA Scanlon  Encounter Date: 3/12/2025   Encounter department: [facility] INTERNAL MEDICINE    Assessment & Plan  Medicare annual wellness visit, subsequent         Essential hypertension  Stable.  Continue clonidine.          Other emphysema (HCC)  Stable.  On symbicort.   Managed by pulmonary.          Rheumatoid arthritis involving multiple sites, unspecified whether rheumatoid factor present (HCC)  On plaquenil.   Sees rheumatology.          Situational anxiety  Takes xanax as needed.          Tobacco abuse  Recommend continued cessation.   Scheduled CT lung cancer screening ordered by pulmonary.          Other hyperlipidemia  On a fenofibrate.          Lumbar radiculopathy  Discussed physical therapy. She would like to hold off for now.  She may need imaging if pain persists.               Preventive health issues were discussed with patient, and age appropriate screening tests were ordered as noted in patient's After Visit Summary. Personalized health advice and appropriate referrals for health education or preventive services given if needed, as noted in patient's After Visit Summary.    History of Present Illness     Kelsi is here today for follow up.  She is doing ok.  She has chronic low back pain that radiates to her right groin and down her leg.     Her breathing continues to be a problem when she wakes up in the morning. She recently saw pulmonary. She uses the nebulizer first thing in the morning which has helped.            Patient Care Team:  ALETHEA Scanlon as PCP - General (Internal Medicine)  Pooja Diaz PA-C as Physician Assistant (Physician Assistant)    Review of Systems   Constitutional:  Negative for activity change, appetite change, fatigue and unexpected weight change.   Eyes:  Negative for visual disturbance.   Respiratory:  Positive for shortness of breath. Negative for  cough, chest tightness and wheezing.    Cardiovascular:  Negative for chest pain, palpitations and leg swelling.   Gastrointestinal:  Negative for abdominal pain, blood in stool, constipation and diarrhea.   Genitourinary:  Negative for difficulty urinating.   Musculoskeletal:  Positive for arthralgias.   Neurological:  Negative for dizziness, light-headedness and headaches.   Psychiatric/Behavioral:  Negative for sleep disturbance.      Medical History Reviewed by provider this encounter:       Annual Wellness Visit Questionnaire   Kelsi is here for her Subsequent Wellness visit. Last Medicare Wellness visit information reviewed, patient interviewed and updates made to the record today.      Health Risk Assessment:   Patient rates overall health as good. Patient feels that their physical health rating is same. Patient is satisfied with their life. Eyesight was rated as same. Hearing was rated as same. Patient feels that their emotional and mental health rating is same. Patients states they are never, rarely angry. Patient states they are never, rarely unusually tired/fatigued. Pain experienced in the last 7 days has been none. Patient states that she has experienced no weight loss or gain in last 6 months.     Depression Screening:   PHQ-2 Score: 1      Fall Risk Screening:   In the past year, patient has experienced: no history of falling in past year      Urinary Incontinence Screening:   Patient has not leaked urine accidently in the last six months.     Home Safety:  Patient does not have trouble with stairs inside or outside of their home. Patient has working smoke alarms and has working carbon monoxide detector. Home safety hazards include: none.     Nutrition:   Current diet is Regular.     Medications:   Patient is currently taking over-the-counter supplements. OTC medications include: see medication list. Patient is able to manage medications.     Activities of Daily Living (ADLs)/Instrumental Activities  of Daily Living (IADLs):   Walk and transfer into and out of bed and chair?: Yes  Dress and groom yourself?: Yes    Bathe or shower yourself?: Yes    Feed yourself? Yes  Do your laundry/housekeeping?: Yes  Manage your money, pay your bills and track your expenses?: Yes  Make your own meals?: Yes    Do your own shopping?: Yes    Previous Hospitalizations:   Any hospitalizations or ED visits within the last 12 months?: Yes    How many hospitalizations have you had in the last year?: 1-2    Advance Care Planning:   Living will: No    Durable POA for healthcare: No      PREVENTIVE SCREENINGS      Cardiovascular Screening:    General: Screening Not Indicated and History Lipid Disorder      Diabetes Screening:     General: Screening Current      Colorectal Cancer Screening:     General: Screening Current      Breast Cancer Screening:       Due for: Mammogram        Cervical Cancer Screening:    General: Screening Not Indicated      Osteoporosis Screening:    General: Screening Not Indicated and History Osteoporosis      Abdominal Aortic Aneurysm (AAA) Screening:        General: Screening Not Indicated      Lung Cancer Screening:       Due for: Low Dose CT (LDCT)      Hepatitis C Screening:    General: Screening Current    Screening, Brief Intervention, and Referral to Treatment (SBIRT)     Screening  Typical number of drinks in a day: 0  Typical number of drinks in a week: 0  Interpretation: Low risk drinking behavior.    AUDIT-C Screenin) How often did you have a drink containing alcohol in the past year? never  2) How many drinks did you have on a typical day when you were drinking in the past year? 0  3) How often did you have 6 or more drinks on one occasion in the past year? never    AUDIT-C Score: 0  Interpretation: Score 0-2 (female): Negative screen for alcohol misuse    Single Item Drug Screening:  How often have you used an illegal drug (including marijuana) or a prescription medication for non-medical  "reasons in the past year? never    Single Item Drug Screen Score: 0  Interpretation: Negative screen for possible drug use disorder    Social Drivers of Health     Food Insecurity: No Food Insecurity (3/12/2025)    Hunger Vital Sign     Worried About Running Out of Food in the Last Year: Never true     Ran Out of Food in the Last Year: Never true   Transportation Needs: No Transportation Needs (3/12/2025)    PRAPARE - Transportation     Lack of Transportation (Medical): No     Lack of Transportation (Non-Medical): No   Housing Stability: Low Risk  (3/12/2025)    Housing Stability Vital Sign     Unable to Pay for Housing in the Last Year: No     Number of Times Moved in the Last Year: 0     Homeless in the Last Year: No   Utilities: Not At Risk (3/12/2025)    [facility] Utilities     Threatened with loss of utilities: No     No results found.    Objective   /80   Pulse 73   Temp (!) 96.6 °F (35.9 °C)   Ht 5' 3\" (1.6 m)   Wt 72.6 kg (160 lb)   SpO2 97%   BMI 28.34 kg/m²     Physical Exam  Vitals reviewed.   Constitutional:       Appearance: Normal appearance. She is well-developed.   HENT:      Head: Normocephalic and atraumatic.   Eyes:      Conjunctiva/sclera: Conjunctivae normal.   Cardiovascular:      Rate and Rhythm: Normal rate and regular rhythm.      Heart sounds: Normal heart sounds.   Pulmonary:      Effort: Pulmonary effort is normal.      Breath sounds: Normal breath sounds.   Abdominal:      General: Bowel sounds are normal.      Palpations: Abdomen is soft.   Musculoskeletal:         General: Normal range of motion.      Right lower leg: No edema.      Left lower leg: No edema.   Skin:     General: Skin is warm and dry.   Neurological:      Mental Status: She is alert and oriented to person, place, and time.   Psychiatric:         Mood and Affect: Mood normal.         Behavior: Behavior normal.         "

## 2025-03-12 NOTE — ASSESSMENT & PLAN NOTE
Discussed physical therapy. She would like to hold off for now.  She may need imaging if pain persists.

## 2025-03-18 ENCOUNTER — HOSPITAL ENCOUNTER (OUTPATIENT)
Dept: RADIOLOGY | Facility: MEDICAL CENTER | Age: 65
Discharge: HOME | End: 2025-03-18
Payer: MEDICARE

## 2025-03-18 DIAGNOSIS — Z13.820 OSTEOPOROSIS SCREENING: ICD-10-CM

## 2025-03-18 DIAGNOSIS — M81.8 OTHER OSTEOPOROSIS WITHOUT CURRENT PATHOLOGICAL FRACTURE: ICD-10-CM

## 2025-03-18 PROCEDURE — 77080 DXA BONE DENSITY AXIAL: CPT

## 2025-03-24 ENCOUNTER — NURSE TRIAGE (OUTPATIENT)
Age: 65
End: 2025-03-24

## 2025-03-24 ENCOUNTER — APPOINTMENT (EMERGENCY)
Dept: CT IMAGING | Facility: HOSPITAL | Age: 65
End: 2025-03-24
Payer: MEDICARE

## 2025-03-24 ENCOUNTER — HOSPITAL ENCOUNTER (EMERGENCY)
Facility: HOSPITAL | Age: 65
Discharge: HOME | End: 2025-03-24
Attending: EMERGENCY MEDICINE
Payer: MEDICARE

## 2025-03-24 ENCOUNTER — APPOINTMENT (EMERGENCY)
Dept: RADIOLOGY | Facility: HOSPITAL | Age: 65
End: 2025-03-24
Payer: MEDICARE

## 2025-03-24 VITALS
TEMPERATURE: 98.5 F | RESPIRATION RATE: 21 BRPM | OXYGEN SATURATION: 99 % | SYSTOLIC BLOOD PRESSURE: 149 MMHG | BODY MASS INDEX: 28.79 KG/M2 | HEART RATE: 63 BPM | WEIGHT: 162.48 LBS | HEIGHT: 63 IN | DIASTOLIC BLOOD PRESSURE: 66 MMHG

## 2025-03-24 DIAGNOSIS — G44.229 CHRONIC TENSION-TYPE HEADACHE, NOT INTRACTABLE: Primary | ICD-10-CM

## 2025-03-24 DIAGNOSIS — U07.1 COVID-19: ICD-10-CM

## 2025-03-24 DIAGNOSIS — G43.009 MIGRAINE WITHOUT AURA AND WITHOUT STATUS MIGRAINOSUS, NOT INTRACTABLE: ICD-10-CM

## 2025-03-24 LAB
ALBUMIN SERPL BCG-MCNC: 4.8 G/DL (ref 3.5–5)
ALP SERPL-CCNC: 44 U/L (ref 34–104)
ALT SERPL W P-5'-P-CCNC: 23 U/L (ref 7–52)
ANION GAP SERPL CALCULATED.3IONS-SCNC: 6 MMOL/L (ref 4–13)
AST SERPL W P-5'-P-CCNC: 22 U/L (ref 13–39)
ATRIAL RATE: 67 BPM
BASOPHILS # BLD AUTO: 0.07 THOUSANDS/ÂΜL (ref 0–0.1)
BASOPHILS NFR BLD AUTO: 1 % (ref 0–1)
BILIRUB SERPL-MCNC: 0.53 MG/DL (ref 0.2–1)
BNP SERPL-MCNC: 93 PG/ML (ref 0–100)
BUN SERPL-MCNC: 8 MG/DL (ref 5–25)
CALCIUM SERPL-MCNC: 9.9 MG/DL (ref 8.4–10.2)
CARDIAC TROPONIN I PNL SERPL HS: 4 NG/L (ref ?–50)
CHLORIDE SERPL-SCNC: 107 MMOL/L (ref 96–108)
CO2 SERPL-SCNC: 28 MMOL/L (ref 21–32)
CREAT SERPL-MCNC: 0.62 MG/DL (ref 0.6–1.3)
EOSINOPHIL # BLD AUTO: 0.15 THOUSAND/ÂΜL (ref 0–0.61)
EOSINOPHIL NFR BLD AUTO: 2 % (ref 0–6)
ERYTHROCYTE [DISTWIDTH] IN BLOOD BY AUTOMATED COUNT: 13.1 % (ref 11.6–15.1)
FLUAV AG UPPER RESP QL IA.RAPID: NEGATIVE
FLUBV AG UPPER RESP QL IA.RAPID: NEGATIVE
GFR SERPL CREATININE-BSD FRML MDRD: 94 ML/MIN/1.73SQ M
GLUCOSE SERPL-MCNC: 87 MG/DL (ref 65–140)
GLUCOSE SERPL-MCNC: 93 MG/DL (ref 65–140)
HCT VFR BLD AUTO: 45.1 % (ref 34.8–46.1)
HGB BLD-MCNC: 14.4 G/DL (ref 11.5–15.4)
IMM GRANULOCYTES # BLD AUTO: 0.02 THOUSAND/UL (ref 0–0.2)
IMM GRANULOCYTES NFR BLD AUTO: 0 % (ref 0–2)
LYMPHOCYTES # BLD AUTO: 2.28 THOUSANDS/ÂΜL (ref 0.6–4.47)
LYMPHOCYTES NFR BLD AUTO: 31 % (ref 14–44)
MCH RBC QN AUTO: 29.5 PG (ref 26.8–34.3)
MCHC RBC AUTO-ENTMCNC: 31.9 G/DL (ref 31.4–37.4)
MCV RBC AUTO: 92 FL (ref 82–98)
MONOCYTES # BLD AUTO: 0.58 THOUSAND/ÂΜL (ref 0.17–1.22)
MONOCYTES NFR BLD AUTO: 8 % (ref 4–12)
NEUTROPHILS # BLD AUTO: 4.28 THOUSANDS/ÂΜL (ref 1.85–7.62)
NEUTS SEG NFR BLD AUTO: 58 % (ref 43–75)
NRBC BLD AUTO-RTO: 0 /100 WBCS
P AXIS: 74 DEGREES
PLATELET # BLD AUTO: 306 THOUSANDS/UL (ref 149–390)
PMV BLD AUTO: 10.9 FL (ref 8.9–12.7)
POTASSIUM SERPL-SCNC: 4.1 MMOL/L (ref 3.5–5.3)
PR INTERVAL: 144 MS
PROT SERPL-MCNC: 7.6 G/DL (ref 6.4–8.4)
QRS AXIS: 81 DEGREES
QRSD INTERVAL: 86 MS
QT INTERVAL: 436 MS
QTC INTERVAL: 460 MS
RBC # BLD AUTO: 4.88 MILLION/UL (ref 3.81–5.12)
SARS-COV+SARS-COV-2 AG RESP QL IA.RAPID: POSITIVE
SODIUM SERPL-SCNC: 141 MMOL/L (ref 135–147)
T WAVE AXIS: 65 DEGREES
VENTRICULAR RATE: 67 BPM
WBC # BLD AUTO: 7.38 THOUSAND/UL (ref 4.31–10.16)

## 2025-03-24 PROCEDURE — 87811 SARS-COV-2 COVID19 W/OPTIC: CPT | Performed by: EMERGENCY MEDICINE

## 2025-03-24 PROCEDURE — 80053 COMPREHEN METABOLIC PANEL: CPT | Performed by: EMERGENCY MEDICINE

## 2025-03-24 PROCEDURE — 96365 THER/PROPH/DIAG IV INF INIT: CPT

## 2025-03-24 PROCEDURE — 99285 EMERGENCY DEPT VISIT HI MDM: CPT

## 2025-03-24 PROCEDURE — 84484 ASSAY OF TROPONIN QUANT: CPT | Performed by: EMERGENCY MEDICINE

## 2025-03-24 PROCEDURE — 83880 ASSAY OF NATRIURETIC PEPTIDE: CPT | Performed by: EMERGENCY MEDICINE

## 2025-03-24 PROCEDURE — 82948 REAGENT STRIP/BLOOD GLUCOSE: CPT

## 2025-03-24 PROCEDURE — 72125 CT NECK SPINE W/O DYE: CPT

## 2025-03-24 PROCEDURE — 96361 HYDRATE IV INFUSION ADD-ON: CPT

## 2025-03-24 PROCEDURE — 85025 COMPLETE CBC W/AUTO DIFF WBC: CPT | Performed by: EMERGENCY MEDICINE

## 2025-03-24 PROCEDURE — 96375 TX/PRO/DX INJ NEW DRUG ADDON: CPT

## 2025-03-24 PROCEDURE — 36415 COLL VENOUS BLD VENIPUNCTURE: CPT | Performed by: EMERGENCY MEDICINE

## 2025-03-24 PROCEDURE — 93005 ELECTROCARDIOGRAM TRACING: CPT

## 2025-03-24 PROCEDURE — 87804 INFLUENZA ASSAY W/OPTIC: CPT | Performed by: EMERGENCY MEDICINE

## 2025-03-24 PROCEDURE — 70450 CT HEAD/BRAIN W/O DYE: CPT

## 2025-03-24 PROCEDURE — 93010 ELECTROCARDIOGRAM REPORT: CPT | Performed by: INTERNAL MEDICINE

## 2025-03-24 PROCEDURE — 99285 EMERGENCY DEPT VISIT HI MDM: CPT | Performed by: EMERGENCY MEDICINE

## 2025-03-24 PROCEDURE — 71045 X-RAY EXAM CHEST 1 VIEW: CPT

## 2025-03-24 RX ORDER — METOCLOPRAMIDE HYDROCHLORIDE 5 MG/ML
10 INJECTION INTRAMUSCULAR; INTRAVENOUS ONCE
Status: COMPLETED | OUTPATIENT
Start: 2025-03-24 | End: 2025-03-24

## 2025-03-24 RX ORDER — ACETAMINOPHEN 325 MG/1
650 TABLET ORAL ONCE
Status: COMPLETED | OUTPATIENT
Start: 2025-03-24 | End: 2025-03-24

## 2025-03-24 RX ORDER — METOCLOPRAMIDE 5 MG/1
5 TABLET ORAL EVERY 8 HOURS PRN
Qty: 20 TABLET | Refills: 0 | Status: SHIPPED | OUTPATIENT
Start: 2025-03-24

## 2025-03-24 RX ORDER — MAGNESIUM SULFATE HEPTAHYDRATE 40 MG/ML
2 INJECTION, SOLUTION INTRAVENOUS ONCE
Status: COMPLETED | OUTPATIENT
Start: 2025-03-24 | End: 2025-03-24

## 2025-03-24 RX ORDER — SUMATRIPTAN SUCCINATE 25 MG/1
25 TABLET ORAL ONCE AS NEEDED
Qty: 7 TABLET | Refills: 0 | Status: SHIPPED | OUTPATIENT
Start: 2025-03-24

## 2025-03-24 RX ORDER — DIPHENHYDRAMINE HYDROCHLORIDE 50 MG/ML
25 INJECTION, SOLUTION INTRAMUSCULAR; INTRAVENOUS ONCE
Status: COMPLETED | OUTPATIENT
Start: 2025-03-24 | End: 2025-03-24

## 2025-03-24 RX ADMIN — MAGNESIUM SULFATE HEPTAHYDRATE 2 G: 40 INJECTION, SOLUTION INTRAVENOUS at 13:52

## 2025-03-24 RX ADMIN — DIPHENHYDRAMINE HYDROCHLORIDE 25 MG: 50 INJECTION, SOLUTION INTRAMUSCULAR; INTRAVENOUS at 13:53

## 2025-03-24 RX ADMIN — ACETAMINOPHEN 650 MG: 325 TABLET, FILM COATED ORAL at 13:53

## 2025-03-24 RX ADMIN — SODIUM CHLORIDE 1000 ML: 0.9 INJECTION, SOLUTION INTRAVENOUS at 13:55

## 2025-03-24 RX ADMIN — METOCLOPRAMIDE HYDROCHLORIDE 10 MG: 5 INJECTION INTRAMUSCULAR; INTRAVENOUS at 13:59

## 2025-03-24 NOTE — ED PROVIDER NOTES
Time reflects when diagnosis was documented in both MDM as applicable and the Disposition within this note       Time User Action Codes Description Comment    3/24/2025  3:30 PM Pat Diaz Add [G44.229] Chronic tension-type headache, not intractable     3/24/2025  3:30 PM Pat Diaz Add [G43.009] Migraine without aura and without status migrainosus, not intractable     3/24/2025  3:30 PM Pat Diaz Add [U07.1] COVID-19           ED Disposition       ED Disposition   Discharge    Condition   Stable    Date/Time   Mon Mar 24, 2025  3:30 PM    Comment   Kelsi Cabrera discharge to home/self care.                   Assessment & Plan       Medical Decision Making  Patient here with multiple complaints, primarily headache worsening for 1 week to 10 days.  Described as burning in nature.  Has associated left-sided neck pain radiating down the left arm.  Palpable left muscular spasm with worsening of pain with palpation.    Records reviewed.  Patient last seen by neurology in April 2023.  At that time patient was diagnosed with bilateral occipital neuralgia.  She was not started on chronic headache medication.  She states that she also would typically get migraine type headaches.  Today's headache has been present for 1 week with pain radiating into the left neck.  Pain is described as burning.  She is also experiencing radiculopathy of the left upper extremity with obvious muscular tenderness in the left paraspinal muscles and trapezius.  Suspect multiple etiologies contributing to persistent headache.  Previously had a x-ray of the C-spine due to persistent cervical radiculopathy.  Based on change in character and severity of headache as well as worsening radiculopathy type symptoms likely contributing to attention element of headache, will obtain CT scan of the head and C-spine.    Amount and/or Complexity of Data Reviewed  External Data Reviewed: radiology and notes.     Details: See  narrative + ED course  Labs: ordered. Decision-making details documented in ED Course.  Radiology: ordered. Decision-making details documented in ED Course.     Details: Consolidation or infiltrate on chest x-ray,  No acute intracranial hemorrhage appreciated  ECG/medicine tests: independent interpretation performed.     Details: Normal sinus rhythm at 67 bpm, normal axis, normal intervals, previously identified T wave inversion resolved,    Risk  OTC drugs.  Prescription drug management.  Decision regarding hospitalization.        ED Course as of 03/24/25 1543   Mon Mar 24, 2025   1337 CT lung screening program (3/16/25 0928)   1337 DXA bone density spine hip and pelvis (3/19/25 1000)   1449 FLU/COVID Rapid Antigen (30 min. TAT) - Preferred screening test in ED(!)   1504 B-Type Natriuretic Peptide(BNP)   1504 HS Troponin 0hr (reflex protocol)   1504 CBC and differential   1504 CT head without contrast   1505 CT cervical spine without contrast   1505 XR chest 1 view portable   1530 Patient symptomatically improved.  Reassured by CT scan of the head and neck as well as reassuring laboratory studies.  Discussed positive COVID results likely contributing to headache as well.  Offered p.o. medications on discharge for migraine management at home.       Medications   metoclopramide (REGLAN) injection 10 mg (10 mg Intravenous Given 3/24/25 1359)   diphenhydrAMINE (BENADRYL) injection 25 mg (25 mg Intravenous Given 3/24/25 1353)   magnesium sulfate 2 g/50 mL IVPB (premix) 2 g (0 g Intravenous Stopped 3/24/25 1452)   sodium chloride 0.9 % bolus 1,000 mL (1,000 mL Intravenous New Bag 3/24/25 1355)   acetaminophen (TYLENOL) tablet 650 mg (650 mg Oral Given 3/24/25 1353)       ED Risk Strat Scores                            SBIRT 22yo+      Flowsheet Row Most Recent Value   Initial Alcohol Screen: US AUDIT-C     1. How often do you have a drink containing alcohol? 0 Filed at: 03/24/2025 1307   2. How many drinks containing  alcohol do you have on a typical day you are drinking?  0 Filed at: 03/24/2025 1307   3b. FEMALE Any Age, or MALE 65+: How often do you have 4 or more drinks on one occassion? 0 Filed at: 03/24/2025 1307   Audit-C Score 0 Filed at: 03/24/2025 1307   FANI: How many times in the past year have you...    Used an illegal drug or used a prescription medication for non-medical reasons? Never Filed at: 03/24/2025 1307                            History of Present Illness       Chief Complaint   Patient presents with    Syncope     Pt presents to ER via EMS from home  d/t an episode of witnessed syncope, anxiety, chest pain that is transient. Pt had has a HA for 10 days that progessively gotten worse. -HS -BT +loc     Numbness     Pt c/o numbness and tingling on the left side that started a few days ago        Past Medical History:   Diagnosis Date    Anxiety     Arthritis     Asthma     Avascular necrosis of bone of hip, right (HCC)     Avascular necrosis of hip, right (HCC) 10/03/2017    Chronic obstructive pulmonary disease (HCC) 02/12/2019    COPD (chronic obstructive pulmonary disease) (HCC)     GERD (gastroesophageal reflux disease)     Hyperlipidemia     Hypertension     Hypoglycemia     Infectious viral hepatitis     Lumbar radicular pain     Lyme disease     Pneumonia     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HCC)       Past Surgical History:   Procedure Laterality Date    APPENDECTOMY      CARDIAC CATHETERIZATION Left 02/28/2024    Procedure: Cardiac Left Heart Cath;  Surgeon: Lizz Adorno MD;  Location: [facility];  Service: Cardiology    CARDIAC CATHETERIZATION N/A 02/28/2024    Procedure: Cardiac Coronary Angiogram;  Surgeon: Lizz Adorno MD;  Location: [facility];  Service: Cardiology    CARDIAC CATHETERIZATION  02/28/2024    Procedure: Cardiac catheterization;  Surgeon: Lizz Adorno MD;  Location: [facility];  Service: Cardiology    CARPAL TUNNEL RELEASE      CHOLECYSTECTOMY       COLONOSCOPY      PA ARTHRP ACETBLR/PROX FEM PROSTC AGRFT/ALGRFT Right 5/30/2024    Procedure: ARTHROPLASTY HIP TOTAL ANTERIOR;  Surgeon: Kathya Nye MD;  Location:  MAIN OR;  Service: Orthopedics    SINUS SURGERY        Family History   Problem Relation Age of Onset    Bronchiolitis Mother       Social History     Tobacco Use    Smoking status: Every Day     Current packs/day: 0.25     Average packs/day: 0.8 packs/day for 38.2 years (32.1 ttl pk-yrs)     Types: Cigarettes     Start date: 1987     Passive exposure: Past    Smokeless tobacco: Never    Tobacco comments:     Currently 1-3 cigarettes a day    Vaping Use    Vaping status: Never Used   Substance Use Topics    Alcohol use: Never    Drug use: Never      E-Cigarette/Vaping    E-Cigarette Use Never User       E-Cigarette/Vaping Substances      I have reviewed and agree with the history as documented.     Patient is a 64-year-old female who presents with multiple complaints including headache, neck pain, syncope, dyspnea, dizziness.           Review of Systems   Constitutional:  Positive for chills and fatigue.   Respiratory:  Positive for shortness of breath.    Neurological:  Positive for numbness and headaches.           Objective       ED Triage Vitals   Temperature Pulse Blood Pressure Respirations SpO2 Patient Position - Orthostatic VS   03/24/25 1311 03/24/25 1311 03/24/25 1311 03/24/25 1311 03/24/25 1311 03/24/25 1311   98.5 °F (36.9 °C) 74 137/63 18 98 % Lying      Temp Source Heart Rate Source BP Location FiO2 (%) Pain Score    03/24/25 1311 03/24/25 1311 03/24/25 1311 -- 03/24/25 1302    Oral Monitor Right arm  10 - Worst Possible Pain      Vitals      Date and Time Temp Pulse SpO2 Resp BP Pain Score FACES Pain Rating User   03/24/25 1500 -- 63 99 % 21 149/66 -- -- AM   03/24/25 1353 -- -- -- -- -- 8 -- AM   03/24/25 1311 98.5 °F (36.9 °C) 74 98 % 18 137/63 -- -- SC   03/24/25 1302 -- -- -- -- -- 10 - Worst Possible Pain -- AM             Physical Exam  Vitals and nursing note reviewed.   Constitutional:       General: She is not in acute distress.     Appearance: Normal appearance.   HENT:      Head: Normocephalic and atraumatic.      Right Ear: External ear normal.      Left Ear: External ear normal.      Nose: Nose normal.   Cardiovascular:      Rate and Rhythm: Normal rate and regular rhythm.   Pulmonary:      Effort: Pulmonary effort is normal.      Breath sounds: Normal breath sounds.   Abdominal:      General: There is no distension.      Palpations: Abdomen is soft.      Tenderness: There is no abdominal tenderness.   Musculoskeletal:      Cervical back: Spasms and tenderness present. No bony tenderness. No pain with movement.      Right lower leg: No edema.      Left lower leg: No edema.   Skin:     General: Skin is warm and dry.   Neurological:      General: No focal deficit present.      Mental Status: She is alert and oriented to person, place, and time. Mental status is at baseline.      Cranial Nerves: No cranial nerve deficit or facial asymmetry.      Motor: Motor function is intact. No pronator drift.      Coordination: Coordination is intact. Finger-Nose-Finger Test normal.   Psychiatric:         Behavior: Behavior normal.         Results Reviewed       Procedure Component Value Units Date/Time    B-Type Natriuretic Peptide(BNP) [[id number]]  (Normal) Collected: 03/24/25 1409    Lab Status: Final result Specimen: Blood from Arm, Left Updated: 03/24/25 1447     BNP 93 pg/mL     FLU/COVID Rapid Antigen (30 min. TAT) - Preferred screening test in ED [[id number]]  (Abnormal) Collected: 03/24/25 1409    Lab Status: Final result Specimen: Nares from Nose Updated: 03/24/25 1437     SARS COV Rapid Antigen Positive     Influenza A Rapid Antigen Negative     Influenza B Rapid Antigen Negative    Narrative:      This test has been performed using the [employer] 2 FLU+SARS Antigen test under the Emergency Use Authorization (EUA). This test  has been validated by the  and verified by the performing laboratory. The Fatou uses lateral flow immunofluorescent sandwich assay to detect SARS-COV, Influenza A and Influenza B Antigen.     The [employer] Fat[employer] 2 SARS Antigen test does not differentiate between SARS-CoV and SARS-CoV-2.     Negative results are presumptive and may be confirmed with a molecular assay, if necessary, for patient management. Negative results do not rule out SARS-CoV-2 or influenza infection and should not be used as the sole basis for treatment or patient management decisions. A negative test result may occur if the level of antigen in a sample is below the limit of detection of this test.     Positive results are indicative of the presence of viral antigens, but do not rule out bacterial infection or co-infection with other viruses.     All test results should be used as an adjunct to clinical observations and other information available to the provider.    FOR PEDIATRIC PATIENTS - copy/paste COVID Guidelines URL to browser: [email or web address]    HS Troponin 0hr (reflex protocol) [[id number]]  (Normal) Collected: 03/24/25 1349    Lab Status: Final result Specimen: Blood from Arm, Left Updated: 03/24/25 1434     hs TnI 0hr 4 ng/L     HS Troponin I 2hr [[id number]]     Lab Status: No result Specimen: Blood     Comprehensive metabolic panel [[id number]] Collected: 03/24/25 1348    Lab Status: Final result Specimen: Blood from Arm, Left Updated: 03/24/25 1429     Sodium 141 mmol/L      Potassium 4.1 mmol/L      Chloride 107 mmol/L      CO2 28 mmol/L      ANION GAP 6 mmol/L      BUN 8 mg/dL      Creatinine 0.62 mg/dL      Glucose 87 mg/dL      Calcium 9.9 mg/dL      AST 22 U/L      ALT 23 U/L      Alkaline Phosphatase 44 U/L      Total Protein 7.6 g/dL      Albumin 4.8 g/dL      Total Bilirubin 0.53 mg/dL      eGFR 94 ml/min/1.73sq m     Narrative:      National Kidney Disease  Foundation guidelines for Chronic Kidney Disease (CKD):     Stage 1 with normal or high GFR (GFR > 90 mL/min/1.73 square meters)    Stage 2 Mild CKD (GFR = 60-89 mL/min/1.73 square meters)    Stage 3A Moderate CKD (GFR = 45-59 mL/min/1.73 square meters)    Stage 3B Moderate CKD (GFR = 30-44 mL/min/1.73 square meters)    Stage 4 Severe CKD (GFR = 15-29 mL/min/1.73 square meters)    Stage 5 End Stage CKD (GFR <15 mL/min/1.73 square meters)  Note: GFR calculation is accurate only with a steady state creatinine    CBC and differential [[id number]] Collected: 03/24/25 1348    Lab Status: Final result Specimen: Blood from Arm, Left Updated: 03/24/25 1357     WBC 7.38 Thousand/uL      RBC 4.88 Million/uL      Hemoglobin 14.4 g/dL      Hematocrit 45.1 %      MCV 92 fL      MCH 29.5 pg      MCHC 31.9 g/dL      RDW 13.1 %      MPV 10.9 fL      Platelets 306 Thousands/uL      nRBC 0 /100 WBCs      Segmented % 58 %      Immature Grans % 0 %      Lymphocytes % 31 %      Monocytes % 8 %      Eosinophils Relative 2 %      Basophils Relative 1 %      Absolute Neutrophils 4.28 Thousands/µL      Absolute Immature Grans 0.02 Thousand/uL      Absolute Lymphocytes 2.28 Thousands/µL      Absolute Monocytes 0.58 Thousand/µL      Eosinophils Absolute 0.15 Thousand/µL      Basophils Absolute 0.07 Thousands/µL     Fingerstick Glucose (POCT) [[id number]]  (Normal) Collected: 03/24/25 1309    Lab Status: Final result Specimen: Blood Updated: 03/24/25 1310     POC Glucose 93 mg/dl             XR chest 1 view portable   Final Interpretation by Harsha Posada MD (03/24 1433)      No acute cardiopulmonary disease.            Workstation performed: [id number]         CT cervical spine without contrast   Final Interpretation by Manpreet Loredo MD (03/24 1417)      No cervical spine fracture or traumatic malalignment.                  Workstation performed: [id number]         CT head without contrast   Final Interpretation by Manpreet Loredo MD  (03/24 1415)      No acute intracranial abnormality.                  Workstation performed: [id number]             Procedures    ED Medication and Procedure Management   Prior to Admission Medications   Prescriptions Last Dose Informant Patient Reported? Taking?   ALPRAZolam (XANAX) 0.5 mg tablet  Self Yes No   Sig: As needed   Azelastine HCl 137 MCG/SPRAY SOLN  Self Yes No   Cholecalciferol (VITAMIN D3) 2000 units CHEW  Self Yes No   Sig: Chew 5,000 Units daily   Choline Fenofibrate (Fenofibric Acid) 135 MG CPDR   No No   Sig: TAKE 1 CAPSULE BY MOUTH EVERY DAY   budesonide-formoterol (SYMBICORT) 160-4.5 mcg/act inhaler  Self Yes No   Sig: Inhale 2 puffs 2 (two) times a day   cloNIDine (CATAPRES) 0.2 mg tablet   No No   Sig: TAKE 1 TABLET BY MOUTH 2 TIMES A DAY.   fexofenadine (ALLEGRA) 180 MG tablet  Self Yes No   Sig: Take 180 mg by mouth daily   fluticasone (FLONASE) 50 mcg/act nasal spray   No No   Sig: PUT 1 SPRAY IN EACH NOSTRIL ONCE DAILY   hydroxychloroquine (PLAQUENIL) 200 mg tablet   No No   Sig: Take 1.5 tablets (300 mg total) by mouth daily   nitroglycerin (NITROSTAT) 0.4 mg SL tablet  Self No No   Sig: Place 1 tablet (0.4 mg total) under the tongue every 5 (five) minutes as needed for chest pain   oxyCODONE (ROXICODONE) 10 MG TABS   Yes No   Sig: TAKE 1 TABLET (10 MG TOTAL) BY MOUTH 2 (TWO) TIMES A DAY. MAX DAILY AMOUNT: 20 MG   predniSONE 10 mg tablet   Yes No   Sig: PLEASE SEE ATTACHED FOR DETAILED DIRECTIONS   promethazine-dextromethorphan (PHENERGAN-DM) 6.25-15 mg/5 mL oral syrup  Self Yes No   Sig: TAKE 5 MILLILITERS BY MOUTH 4 TIMES A DAY AS NEEDED FOR COUGH   triamcinolone (KENALOG) 0.1 % ointment   No No   Sig: Apply topically 2 (two) times a day      Facility-Administered Medications: None     Patient's Medications   Discharge Prescriptions    METOCLOPRAMIDE (REGLAN) 5 MG TABLET    Take 1 tablet (5 mg total) by mouth every 8 (eight) hours as needed (nausea, vomiting, headache)       Start  Date: 3/24/2025 End Date: --       Order Dose: 5 mg       Quantity: 20 tablet    Refills: 0    SUMATRIPTAN (IMITREX) 25 MG TABLET    Take 1 tablet (25 mg total) by mouth once as needed for migraine for up to 7 doses       Start Date: 3/24/2025 End Date: --       Order Dose: 25 mg       Quantity: 7 tablet    Refills: 0     No discharge procedures on file.  ED SEPSIS DOCUMENTATION   Time reflects when diagnosis was documented in both MDM as applicable and the Disposition within this note       Time User Action Codes Description Comment    3/24/2025  3:30 PM Pat Diaz Add [G44.229] Chronic tension-type headache, not intractable     3/24/2025  3:30 PM Pat Diaz [G43.009] Migraine without aura and without status migrainosus, not intractable     3/24/2025  3:30 PM Pat Diaz [U07.1] COVID-19                  Pat Diaz MD  03/24/25 2567

## 2025-03-24 NOTE — TELEPHONE ENCOUNTER
"FOLLOW UP: Pt would like to come in to office for and EKG.    REASON FOR CONVERSATION: Chest Pain    SYMPTOMS: Pt called stating that she has had chest pain with headache over the past 10 days. She states last night into today she has had constant chest pain, headache. She states that she is having trouble keeping her eyes open because of the pain. She states she has nausea, dizziness and SOB as well. Advised pt she needs to to the ER. However pt refused to go to ER and is requesting to see. Dr Sauceda for and EKG.    OTHER: 174/82 p106    DISPOSITION: Go to ED/[facility] Now (Or to Office with PCP Approval)        Reason for Disposition   Chest pain or 'angina' comes and goes and is happening more often (increasing in frequency) or getting worse (increasing in severity) (Exception: Chest pains that last only a few seconds.)    Answer Assessment - Initial Assessment Questions  1. LOCATION: \"Where does it hurt?\"        Center of chest   2. RADIATION: \"Does the pain go anywhere else?\" (e.g., into neck, jaw, arms, back)      Head, neck   3. ONSET: \"When did the chest pain begin?\" (Minutes, hours or days)       10 days ago   4. PATTERN: \"Does the pain come and go, or has it been constant since it started?\"  \"Does it get worse with exertion?\"       Comes and goes, but from last night into today it has been constant   5. DURATION: \"How long does it last\" (e.g., seconds, minutes, hours)      hours  6. SEVERITY: \"How bad is the pain?\"  (e.g., Scale 1-10; mild, moderate, or severe)      9 pain level   7. CARDIAC RISK FACTORS: \"Do you have any history of heart problems or risk factors for heart disease?\" (e.g., angina, prior heart attack; diabetes, high blood pressure, high cholesterol, smoker, or strong family history of heart disease)      High cholesterol, high blood pressure , smoker  8. PULMONARY RISK FACTORS: \"Do you have any history of lung disease?\"  (e.g., blood clots in lung, asthma, emphysema, birth control pills)      " "Asthma, COPD  9. CAUSE: \"What do you think is causing the chest pain?\"      Unsure   10. OTHER SYMPTOMS: \"Do you have any other symptoms?\" (e.g., dizziness, nausea, vomiting, sweating, fever, difficulty breathing, cough)        Headache, nausea, dizziness, SOB    Protocols used: Chest Pain-Adult-[place]    "

## 2025-03-24 NOTE — TELEPHONE ENCOUNTER
Called and lvm for pt relaying  response.     Please schedule pt for a appt for further evaluation.     Pt is scheduled with  8/15/25.    Pt will need a sooner appt to see a provider for a EKG.

## 2025-03-24 NOTE — TELEPHONE ENCOUNTER
Outbound call made to Patient  who is on Consent form. Patient  said the Patient told him what was advised and he had convinced her to go but then she collapsed so he called 911. Patient  said she was doing much better in the ambulance and he is following them to the hospital as we spoke.     Dr. Olmedo/Latosha Scott PA: Sent for awareness, thank you!

## 2025-03-24 NOTE — TELEPHONE ENCOUNTER
Outbound call made to Patient at [phone] and there was no answer a voicemail was left requesting return call.     Outbound call made to Patient at [phone] and the number was no longer in service.

## 2025-03-24 NOTE — TELEPHONE ENCOUNTER
"FOLLOW UP: Patient saw Dr. Olmedo 04/04/2023 and Latosha GARCIAS on 11/03/2023. Dr. Olmedo/Latosha GARCIAS please advise if possible. CTS advised to go to the ED and Patient refused. Patient would like medication to make the pain go away. Thank you!    REASON FOR CONVERSATION: Pain in head radiating to shoulders and chest and facial and bilateral hand numbness    SYMPTOMS:  Patient said she has pain on the top of her head that goes down her shoulders and into her chest. CTS asked Patient if it is on one side specifically and Patient replied the pain is \"in the middle.\" Patient said she has had migraines before but not like this. Patient said her pain is a 9-10 out of pain scale of 10. Patient cannot sleep due to severe pain. Patient said her face and bilateral hands are numb, they started being numb last night.     OTHER: CTS advised Patient to go to ED for evaluation and to rule out any serious complications. CTS made patient aware she may need blood work and imaging to further investigate her complaints as they are new. Nyasia refused CTS calling the EMS for her and said her  would be able to take her but she does not want to go. Patient prefers advisement from our Providers and wants medication.     DISPOSITION: Call  Now      Reason for Disposition   Numbness of the face, arm or leg on one side of the body and new-onset    Answer Assessment - Initial Assessment Questions  .MIGRAINEHA   When did migraine start?  10 days ago it started but was intermittent and the last 2 days it is contanstant.    Location/Description: Patient said she has pain on the top of her head goes down her shoulders and into her chest. CTS asked Patient if it is on one side specifically and Patient replied the pain is \"in the middle.\" Patient said she has had migraines before but not like this. Patient said her pain is a 9-10 out of pain scale of 10.      Associated symptoms: Patient cannot sleep due to severe pain. " Patient said her face and bilateral hands are numb, they started being numb last night.     Alleviating factors: Patient denies anything has helped.     What medications have you tried for this migraine headache? Patient said she took lorazepam and everything she could but nothing works.     Current migraine medications are confirmed as: Patient is not on any migraine medication.    Protocols used: Headache-Adult-[place]

## 2025-03-24 NOTE — TELEPHONE ENCOUNTER
Pt last seen by kiley almost 17 months ago.  Seen predominately for neuropathy and right foot pain.  These are all new sxs , greater than one year since seen. Needs to go to er asap due to multiplicity of sxs. Pt also seen by dr branch from pain mx.  Needs er eval for chest pain, elevated bp and heart rate, etc. Unable to give medication due to new issues.  Recommend er asap.

## 2025-03-24 NOTE — TELEPHONE ENCOUNTER
If the patient refuses to go to the ER, I would recommend her to be seen by her primary care physician to start.    She did have cardiac catheterization last year which did not show any significant CAD.    However, I agree that she needs to be evaluated.    She can definitely come in for an EKG but she needs provider evaluation.

## 2025-03-24 NOTE — TELEPHONE ENCOUNTER
Patient not seen since 2023 (not for headaches).  If reporting severe HA with additional neurologic symptoms along with elevated BP, needs to be evaluated in the ED.  We cannot send in any meds for her, needs ED eval urgently.  Please stress our concern and importance of ED eval.  Looks like she contacted cardiology and they also advised ED (she was c/o CP and SOB too)    Cc'ing PCP as well for RAMAN

## 2025-03-24 NOTE — TELEPHONE ENCOUNTER
"FOLLOW UP: Patient saw Dr. Olmedo 04/04/2023 and Latosha GARCIAS on 11/03/2023. Dr. Olmedo/Latosha GARCIAS please advise if possible. CTS advised to go to the ED and Patient refused. Patient would like medication to make the pain go away. Thank you!    REASON FOR CONVERSATION: Pain in head radiating to shoulders and chest and facial and bilateral hand numbness    SYMPTOMS:  Patient said she has pain on the top of her head that goes down her shoulders and into her chest. CTS asked Patient if it is on one side specifically and Patient replied the pain is \"in the middle.\" Patient said she has had migraines before but not like this. Patient said her pain is a 9-10 out of pain scale of 10. Patient cannot sleep due to severe pain. Patient said her face and bilateral hands are numb, they started being numb last night. Patient also mentioned her blood pressure was 180 systolic, her heart rate is 109 and her head feels like it could explode.     OTHER: CTS advised Patient to go to ED for evaluation and to rule out any serious complications. CTS made patient aware she may need blood work and imaging to further investigate her complaints as they are new. Nyasia refused CTS calling the EMS for her and said her  would be able to take her but she does not want to go. Patient prefers advisement from our Providers and wants medication.     DISPOSITION: Call  Now      Reason for Disposition   Numbness of the face, arm or leg on one side of the body and new-onset    Answer Assessment - Initial Assessment Questions  .MIGRAINEHA   When did migraine start?  10 days ago it started but was intermittent and the last 2 days it is contanstant.    Location/Description: Patient said she has pain on the top of her head goes down her shoulders and into her chest. CTS asked Patient if it is on one side specifically and Patient replied the pain is \"in the middle.\" Patient said she has had migraines before but not like this. " Patient said her pain is a 9-10 out of pain scale of 10.      Associated symptoms: Patient cannot sleep due to severe pain. Patient said her face and bilateral hands are numb, they started being numb last night. Patient also mentioned her blood pressure was 180 systolic, her heart rate is 109 and her head feels like it could explode.     Alleviating factors: Patient denies anything has helped.     What medications have you tried for this migraine headache? Patient said she took lorazepam and everything she could but nothing works.     Current migraine medications are confirmed as: Patient is not on any migraine medication.    Protocols used: Headache-Adult-OH

## 2025-03-24 NOTE — TELEPHONE ENCOUNTER
Caller: Kelsi     Doctor: Dr. Sauceda     Reason for call: patient is having a lot of SOB, headaches that lead from her head, down to her neck and shoulders. A lot of back pain as well     Call back#: [phone]

## 2025-03-24 NOTE — TELEPHONE ENCOUNTER
Regarding: Headaches  ----- Message from Diana WATKINS sent at 3/24/2025 10:53 AM EDT -----  Patient called in requesting an appointment with  due to worsening symptoms. I have reschedule and added to [facility]. Patient agreed to be triaged due to worsening headaches for ten days straight now. I call CTS and lines were busy. Please call back .

## 2025-03-31 DIAGNOSIS — G43.009 MIGRAINE WITHOUT AURA AND WITHOUT STATUS MIGRAINOSUS, NOT INTRACTABLE: ICD-10-CM

## 2025-03-31 DIAGNOSIS — U07.1 COVID-19: ICD-10-CM

## 2025-03-31 DIAGNOSIS — G44.229 CHRONIC TENSION-TYPE HEADACHE, NOT INTRACTABLE: ICD-10-CM

## 2025-03-31 RX ORDER — METOCLOPRAMIDE 5 MG/1
5 TABLET ORAL EVERY 8 HOURS PRN
Qty: 20 TABLET | Refills: 0 | Status: SHIPPED | OUTPATIENT
Start: 2025-03-31

## 2025-05-13 ENCOUNTER — OFFICE VISIT (OUTPATIENT)
Dept: OBGYN CLINIC | Facility: CLINIC | Age: 65
End: 2025-05-13
Payer: COMMERCIAL

## 2025-05-13 ENCOUNTER — HOSPITAL ENCOUNTER (OUTPATIENT)
Dept: RADIOLOGY | Facility: HOSPITAL | Age: 65
Discharge: HOME | End: 2025-05-13
Attending: ORTHOPAEDIC SURGERY
Payer: MEDICARE

## 2025-05-13 DIAGNOSIS — M54.50 LUMBAR PAIN: ICD-10-CM

## 2025-05-13 DIAGNOSIS — Z96.641 STATUS POST TOTAL HIP REPLACEMENT, RIGHT: ICD-10-CM

## 2025-05-13 DIAGNOSIS — M54.16 RADICULOPATHY, LUMBAR REGION: Primary | ICD-10-CM

## 2025-05-13 PROCEDURE — 73502 X-RAY EXAM HIP UNI 2-3 VIEWS: CPT

## 2025-05-13 PROCEDURE — 99214 OFFICE O/P EST MOD 30 MIN: CPT | Performed by: ORTHOPAEDIC SURGERY

## 2025-05-13 PROCEDURE — 72100 X-RAY EXAM L-S SPINE 2/3 VWS: CPT

## 2025-05-13 NOTE — PROGRESS NOTES
Name: Kelsi Cabrera      : 1960      MRN: [id number]  Encounter Provider: Kathya Nye MD  Encounter Date: 2025   Encounter department: [facility]  :  Assessment & Plan  Status post total hip replacement, right    Orders:    XR hip/pelv 2-3 vws right if performed; Future  Patient is roughly 11 and half months status post right anterior total hip arthroplasty performed on 2024  Weight-bear as tolerated  X-ray right hip was obtained and reviewed in the office today which showed stable implant with no sign of loosening  Patient is aware she will be a lifetime antibiotics for dental prophylaxis  Will repeat x-ray of the right hip at the next office visit  Radiculopathy, lumbar region  Patient has right sided lumbar radiculopathy and weakness with dorsiflexion   X-ray lumbar spine was obtained and reviewed in the office today with the patient   Will me ordering MRI lumbar spine to rule out lumbar radiculopathy and also will be referring patient to Dr. Ackerman to review an MRI lumbar spine.        Lumbar pain    Orders:    XR spine lumbar minimum 4 views non injury; Future        Follow up visit :  1 year right        The above stated was discussed in layman's terms and the patient expressed understanding.  All questions were answered to the patient's satisfaction.       Subjective:   Kelsi Cabrera is a 65 y.o. female who presents presents today 11 and half months status post right anterior total hip arthroplasty performed on 2024.She notes her hip is well. She notes soreness in the hip with weather change. She feels her muscles have not healed. She notes her pain is when walking.She also pain over lateral knee down to the ankle when walking.She states her right leg feels heavy and sore and has trouble walking. She feels feels numbness in the right foot.     Patient has treated in the past with Dr. Summers in  and received cervical steroid  injections.       Review of systems negative unless otherwise specified in HPI    Past Medical History:   Diagnosis Date    Anxiety     Arthritis     Asthma     Avascular necrosis of bone of hip, right (HCC)     Avascular necrosis of hip, right (HCC) 10/03/2017    Chronic obstructive pulmonary disease (HCC) 02/12/2019    COPD (chronic obstructive pulmonary disease) (HCC)     GERD (gastroesophageal reflux disease)     Hyperlipidemia     Hypertension     Hypoglycemia     Infectious viral hepatitis     Lumbar radicular pain     Lyme disease     Pneumonia     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HCC)        Past Surgical History:   Procedure Laterality Date    APPENDECTOMY      CARDIAC CATHETERIZATION Left 02/28/2024    Procedure: Cardiac Left Heart Cath;  Surgeon: Lizz Adorno MD;  Location: [facility];  Service: Cardiology    CARDIAC CATHETERIZATION N/A 02/28/2024    Procedure: Cardiac Coronary Angiogram;  Surgeon: Lizz Adorno MD;  Location: [facility];  Service: Cardiology    CARDIAC CATHETERIZATION  02/28/2024    Procedure: Cardiac catheterization;  Surgeon: Lizz Adorno MD;  Location: [facility];  Service: Cardiology    CARPAL TUNNEL RELEASE      CHOLECYSTECTOMY      COLONOSCOPY      DC ARTHRP ACETBLR/PROX FEM PROSTC AGRFT/ALGRFT Right 5/30/2024    Procedure: ARTHROPLASTY HIP TOTAL ANTERIOR;  Surgeon: Kathya Nye MD;  Location:  MAIN OR;  Service: Orthopedics    SINUS SURGERY         Family History   Problem Relation Age of Onset    Bronchiolitis Mother        Social History     Occupational History    Not on file   Tobacco Use    Smoking status: Every Day     Current packs/day: 0.25     Average packs/day: 0.8 packs/day for 38.4 years (32.1 ttl pk-yrs)     Types: Cigarettes     Start date: 1987     Passive exposure: Past    Smokeless tobacco: Never    Tobacco comments:     Currently 1-3 cigarettes a day    Vaping Use    Vaping status: Never Used   Substance and Sexual  Activity    Alcohol use: Never    Drug use: Never    Sexual activity: Not Currently     Partners: Male         Current Outpatient Medications:     ALPRAZolam (XANAX) 0.5 mg tablet, As needed, Disp: , Rfl:     Azelastine HCl 137 MCG/SPRAY SOLN, , Disp: , Rfl:     budesonide-formoterol (SYMBICORT) 160-4.5 mcg/act inhaler, Inhale 2 puffs 2 (two) times a day, Disp: , Rfl:     Cholecalciferol (VITAMIN D3) 2000 units CHEW, Chew 5,000 Units daily, Disp: , Rfl:     Choline Fenofibrate (Fenofibric Acid) 135 MG CPDR, TAKE 1 CAPSULE BY MOUTH EVERY DAY, Disp: 30 capsule, Rfl: 0    cloNIDine (CATAPRES) 0.2 mg tablet, TAKE 1 TABLET BY MOUTH 2 TIMES A DAY., Disp: 180 tablet, Rfl: 1    fexofenadine (ALLEGRA) 180 MG tablet, Take 180 mg by mouth daily, Disp: , Rfl:     fluticasone (FLONASE) 50 mcg/act nasal spray, PUT 1 SPRAY IN EACH NOSTRIL ONCE DAILY, Disp: 48 mL, Rfl: 1    hydroxychloroquine (PLAQUENIL) 200 mg tablet, Take 1.5 tablets (300 mg total) by mouth daily, Disp: 135 tablet, Rfl: 1    metoclopramide (Reglan) 5 mg tablet, Take 1 tablet (5 mg total) by mouth every 8 (eight) hours as needed (nausea, vomiting, headache), Disp: 20 tablet, Rfl: 0    nitroglycerin (NITROSTAT) 0.4 mg SL tablet, Place 1 tablet (0.4 mg total) under the tongue every 5 (five) minutes as needed for chest pain, Disp: 30 tablet, Rfl: 3    oxyCODONE (ROXICODONE) 10 MG TABS, TAKE 1 TABLET (10 MG TOTAL) BY MOUTH 2 (TWO) TIMES A DAY. MAX DAILY AMOUNT: 20 MG, Disp: , Rfl:     predniSONE 10 mg tablet, PLEASE SEE ATTACHED FOR DETAILED DIRECTIONS, Disp: , Rfl:     SUMAtriptan (Imitrex) 25 mg tablet, Take 1 tablet (25 mg total) by mouth once as needed for migraine for up to 7 doses, Disp: 7 tablet, Rfl: 0    triamcinolone (KENALOG) 0.1 % ointment, Apply topically 2 (two) times a day, Disp: 80 g, Rfl: 0    Allergies   Allergen Reactions    Aspirin Anaphylaxis and Other (See Comments)    Iodinated Contrast Media Other (See Comments)     Iodinated contrast media  (substance)    Iodine - Food Allergy Anaphylaxis    Penicillin G Hives     Product containing penicillin (product)    Penicillins Anaphylaxis    Pork Allergy - Food Allergy Other (See Comments)     Does not eat pork for Congregational reasons    Soybean Oil - Food Allergy Other (See Comments)    Statins Myalgia          There were no vitals filed for this visit.  There is no height or weight on file to calculate BMI.  Wt Readings from Last 3 Encounters:   03/24/25 73.7 kg (162 lb 7.7 oz)   03/12/25 72.6 kg (160 lb)   02/13/25 70.8 kg (156 lb)       Objective:            Physical Exam  Physical Exam:      General Appearance:    Alert, cooperative, no distress, appears stated age   Head:    Normocephalic, without obvious abnormality, atraumatic   Eyes:    conjunctiva/corneas clear, both eyes         Nose:   Nares normal, septum midline, no drainage    Throat:   Lips normal; teeth and gums normal   Neck:    symmetrical, trachea midline, ;     thyroid:  no enlargement/   Back:     Symmetric, no curvature, ROM normal   Lungs:   No audible wheezing or labored breathing   Chest Wall:    No tenderness or deformity    Heart:    Regular rate and rhythm                         Pulses:   2+ and symmetric all extremities   Skin:   Skin color, texture, turgor normal, no rashes or lesions   Neurologic:   normal strength, sensation and reflexes     throughout                       Ortho Exam  Right hip  Surgical incision well healed   No erythema or open wounds  Negative Stinchfield  No tenderness to palpation over the greater trochanteric region  Neurovascularly Intact Distally      Lumbar spine   SLR : +   DF 3/5   PF 5/5   Decrease sensation right lower extremity     Diagnostics, reviewed and taken today if performed as documented:    The attending physician has personally reviewed the pertinent films in PACS and interpretation is as follows: X-ray right hip demonstrates status post RICKI adequate alignment of implant, with no sign of  "loosening   Lumbosacral radiographs show maintenance of disc space; no listhesis noted      Procedures, if performed today:    Procedures    None performed      Scribe Attestation      I,:  Espinoza Floyd MA am acting as a scribe while in the presence of the attending physician.:       I,:  Kathya Nye MD personally performed the services described in this documentation    as scribed in my presence.:               Portions of the record may have been created with voice recognition software.  Occasional wrong word or \"sound a like\" substitutions may have occurred due to the inherent limitations of voice recognition software.  Read the chart carefully and recognize, using context, where substitutions have occurred.  "

## 2025-05-13 NOTE — ASSESSMENT & PLAN NOTE
Orders:    XR hip/pelv 2-3 vws right if performed; Future  Patient is roughly 11 and half months status post right anterior total hip arthroplasty performed on 5/30/2024  Weight-bear as tolerated  X-ray right hip was obtained and reviewed in the office today which showed stable implant with no sign of loosening  Patient is aware she will be a lifetime antibiotics for dental prophylaxis  Will repeat x-ray of the right hip at the next office visit

## 2025-05-20 DIAGNOSIS — L20.84 INTRINSIC ECZEMA: ICD-10-CM

## 2025-05-21 RX ORDER — TRIAMCINOLONE ACETONIDE 1 MG/G
OINTMENT TOPICAL 2 TIMES DAILY
Qty: 80 G | Refills: 0 | Status: SHIPPED | OUTPATIENT
Start: 2025-05-21

## 2025-05-28 ENCOUNTER — OFFICE VISIT (OUTPATIENT)
Dept: GASTROENTEROLOGY | Facility: CLINIC | Age: 65
End: 2025-05-28
Payer: MEDICARE

## 2025-05-28 VITALS
TEMPERATURE: 97.5 F | SYSTOLIC BLOOD PRESSURE: 132 MMHG | BODY MASS INDEX: 26.93 KG/M2 | DIASTOLIC BLOOD PRESSURE: 68 MMHG | HEIGHT: 63 IN | HEART RATE: 71 BPM | OXYGEN SATURATION: 98 % | WEIGHT: 152 LBS

## 2025-05-28 DIAGNOSIS — K59.00 CONSTIPATION, UNSPECIFIED CONSTIPATION TYPE: Primary | ICD-10-CM

## 2025-05-28 PROCEDURE — 99213 OFFICE O/P EST LOW 20 MIN: CPT | Performed by: PHYSICIAN ASSISTANT

## 2025-05-28 RX ORDER — LINACLOTIDE 72 UG/1
1 CAPSULE, GELATIN COATED ORAL
Qty: 30 CAPSULE | Refills: 5 | Status: SHIPPED | OUTPATIENT
Start: 2025-05-28

## 2025-05-28 RX ORDER — DEXTROMETHORPHAN HYDROBROMIDE AND PROMETHAZINE HYDROCHLORIDE 15; 6.25 MG/5ML; MG/5ML
SYRUP ORAL
COMMUNITY
Start: 2025-05-12

## 2025-05-28 NOTE — PROGRESS NOTES
"Name: Kelsi Cabrera      : 1960      MRN: [id number]  Encounter Provider: Pooja Diaz PA-C  Encounter Date: 2025   Encounter department: [facility] [place]  :  Assessment & Plan  Constipation, unspecified constipation type  64 yo female who presents to the GI clinic with constipation. She was on pain medications status post hip surgery.     Will begin Linzess 72mcg daily.    She is due for a colonoscopy but she wishes to hold off right now until she is done following up with ortho regarding her back.     High fiber diet given and reviewed.        History of Present Illness   HPI  Kelsi Cabrera is a 65 y.o. female who presents with constipation.     She reports that this worsened after taking narcotics for her back. She will utilize Miralax as needed but reports it doesn't work all of the time. She denies nausea or vomiting. She reports RLQ cramping on occasion. She was going to try Smooth Move tea.     Her last colonoscopy was in  she is due this year.     Her reflux is under control without PPI therapy.          Review of Systems       Objective   /68 (BP Location: Left arm, Patient Position: Sitting, Cuff Size: Standard)   Pulse 71   Temp 97.5 °F (36.4 °C) (Temporal)   Ht 5' 3\" (1.6 m)   Wt 68.9 kg (152 lb)   SpO2 98%   BMI 26.93 kg/m²      Physical Exam      "

## 2025-06-10 ENCOUNTER — OFFICE VISIT (OUTPATIENT)
Dept: NEUROLOGY | Facility: CLINIC | Age: 65
End: 2025-06-10
Payer: MEDICARE

## 2025-06-10 VITALS
HEART RATE: 71 BPM | WEIGHT: 153 LBS | BODY MASS INDEX: 27.11 KG/M2 | DIASTOLIC BLOOD PRESSURE: 66 MMHG | HEIGHT: 63 IN | SYSTOLIC BLOOD PRESSURE: 110 MMHG

## 2025-06-10 DIAGNOSIS — G62.9 NEUROPATHY: ICD-10-CM

## 2025-06-10 DIAGNOSIS — M47.816 LUMBAR SPONDYLOSIS: Primary | ICD-10-CM

## 2025-06-10 DIAGNOSIS — G57.30 PERONEAL NEUROPATHY: ICD-10-CM

## 2025-06-10 DIAGNOSIS — G60.3 IDIOPATHIC PROGRESSIVE NEUROPATHY: ICD-10-CM

## 2025-06-10 DIAGNOSIS — G60.9 HEREDITARY AND IDIOPATHIC NEUROPATHY, UNSPECIFIED: ICD-10-CM

## 2025-06-10 DIAGNOSIS — G62.9 NEUROPATHY: Primary | ICD-10-CM

## 2025-06-10 PROCEDURE — 99214 OFFICE O/P EST MOD 30 MIN: CPT | Performed by: PSYCHIATRY & NEUROLOGY

## 2025-06-10 RX ORDER — LEVALBUTEROL INHALATION SOLUTION 1.25 MG/3ML
SOLUTION RESPIRATORY (INHALATION)
COMMUNITY
Start: 2025-05-25

## 2025-06-10 NOTE — ASSESSMENT & PLAN NOTE
"65-year-old female last seen by neurology 11/2023 presents due to right lower extremity pain that has been ongoing since 2022.  Since last visit, patient had a right hip replacement 5/2024 due to AVN.  Patient states that the pain has not \"healed.\"  Patient originally scheduled for EMG of right lower extremity in 2023, but was unable to complete due to anxiety  States that the pain occurs randomly throughout the day and is not exacerbated with movement or sitting.  Also endorses that her feet are always cold.  Patient smokes 3 cigarettes/day.  Dorsalis pedis pulses +2 bilaterally and feet warm to touch on exam.  Labs: B1 - 117.6, B6 - 7.1, B9 - 15.1, B12 - 373, Lyme Titers - negative  Last A1C 9 months ago 5.6%  CTH 3/2025 completed d/t HA in ED: NAIA  CT C spine 3/2025: no cervical spine fx or traumatic malalignment; grade 1 anterolisthesis at C3-C4 and C6-C7, multilevel cervical degenerative changes  Lumbar spine MRI w/o contrast ordered by ortho but not completed yet  Patient requesting open MRI due to anxiety/claustrophobia.   On PE, pain most severe along R peroneal nerve distribution but notes fluctuating changes to sensation on pinprick, vibration, and temperature on multiple exam attempts. However, notes that pain occurs from R hip and stops before toes. Characterizes pain as \"shooting\"     PLAN:  EMG of RLE and LLE ordered with emphasis on RLE if patient unable to tolerate both   Will recommend patient follow with Dr. Gaston or Dr. Negron  Will repeat B12, lyme's, B6, B1, and Sjogren's antibodies   Encouraged patient to complete MRI of L spine ordered by ortho   Can consider lidoderm patch next visit if symptoms persist as patient hesitant to start oral medication at this time due to concern for side effects   Follow-up with neurology after completion of EMG or sooner if patient experiences new or worsening symptoms        " Xolair Pregnancy And Lactation Text: This medication is Pregnancy Category B and is considered safe during pregnancy. This medication is excreted in breast milk.

## 2025-06-10 NOTE — PROGRESS NOTES
"Name: Kelsi Cabrera      : 1960      MRN: [id number]  Encounter Provider: Yesenia Olmedo MD  Encounter Date: 6/10/2025   Encounter department: [facility] [place]  :  Assessment & Plan  Neuropathy  65-year-old female last seen by neurology 2023 presents due to right lower extremity pain that has been ongoing since .  Since last visit, patient had a right hip replacement 2024 due to AVN.  Patient states that the pain has not \"healed.\"  Patient originally scheduled for EMG of right lower extremity in , but was unable to complete due to anxiety  States that the pain occurs randomly throughout the day and is not exacerbated with movement or sitting.  Also endorses that her feet are always cold.  Patient smokes 3 cigarettes/day.  Dorsalis pedis pulses +2 bilaterally and feet warm to touch on exam.  Labs: B1 - 117.6, B6 - 7.1, B9 - 15.1, B12 - 373, Lyme Titers - negative  Last A1C 9 months ago 5.6%  CTH 3/2025 completed d/t HA in ED: NAIA  CT C spine 3/2025: no cervical spine fx or traumatic malalignment; grade 1 anterolisthesis at C3-C4 and C6-C7, multilevel cervical degenerative changes  Lumbar spine MRI w/o contrast ordered by ortho but not completed yet  Patient requesting open MRI due to anxiety/claustrophobia.   On PE, pain most severe along R peroneal nerve distribution but notes fluctuating changes to sensation on pinprick, vibration, and temperature on multiple exam attempts. However, notes that pain occurs from R hip and stops before toes. Characterizes pain as \"shooting\"     PLAN:  EMG of RLE and LLE ordered with emphasis on RLE if patient unable to tolerate both   Will recommend patient follow with Dr. Gaston or Dr. Negron  Will repeat B12, lyme's, B6, B1, and Sjogren's antibodies   Encouraged patient to complete MRI of L spine ordered by ortho   Can consider lidoderm patch next visit if symptoms persist as patient hesitant to start oral medication at this time due to " "concern for side effects   Follow-up with neurology after completion of EMG or sooner if patient experiences new or worsening symptoms              History of Present Illness   64 y/o F PMHx lumbar radiculopathy, HTN, RA, cervical radiculopathy, memory difficulties, R hip replacement 2/2 avascular necrosis, neruopathy, and migraine headaches presents as ED follow-up for RLE pain. Last seen in office 11/2023 and was diagnosed with b/l occipital neuralgia at that time and had EMG ordered for RLE. Unfortunately, patient unable to obtain EMG. Did have R hip replacement one year ago but per chart review had similar complaints in 2022. Describes as sudden, random shooting nerve pain from hip and ends before toes. Notes most severe pain in R lateral calf along fibula. Denies any recent falls or injuries. States pain not exacerbated by movement but notes that her feet are always \"cold\". Has tried oxycodone for the pain but continues to have shooting nerve pain. States that pain is so severe it will occasionally cause her to wake up from sleep. Also endorses RLE weakness but denies any difficulty with walking.       Recently seen in ED 3/2025 d/t intractable miraine ALBERT. CTH, CT C spine in ED negative for acute abnormality. Patient currently not an prophylactic migraine headache medication but does have Imitrex for abortive migraine headache therapy.        Review of Systems   Constitutional:  Negative for appetite change, fatigue and fever.   HENT: Negative.  Negative for hearing loss, tinnitus, trouble swallowing and voice change.    Eyes: Negative.  Negative for photophobia, pain and visual disturbance.   Respiratory: Negative.  Negative for shortness of breath.    Cardiovascular: Negative.  Negative for palpitations.   Gastrointestinal: Negative.  Negative for nausea and vomiting.   Endocrine: Negative.  Negative for cold intolerance.   Genitourinary: Negative.  Negative for dysuria, frequency and urgency. "   Musculoskeletal:  Negative for back pain, gait problem, myalgias, neck pain and neck stiffness.   Skin: Negative.  Negative for rash.   Allergic/Immunologic: Negative.    Neurological:  Negative for dizziness, tremors, seizures, syncope, facial asymmetry, speech difficulty, weakness, light-headedness, numbness and headaches.   Hematological: Negative.  Does not bruise/bleed easily.   Psychiatric/Behavioral: Negative.  Negative for confusion, hallucinations and sleep disturbance.    All other systems reviewed and are negative.   I have personally reviewed the MA's review of systems and made changes as necessary.  R leg & foot pain not any better       Objective   There were no vitals taken for this visit.    Physical Exam  Constitutional:       General: She is not in acute distress.  HENT:      Head: Normocephalic and atraumatic.      Nose: No congestion.     Eyes:      General: Lids are normal.      Extraocular Movements: Extraocular movements intact.      Pupils: Pupils are equal, round, and reactive to light.       Cardiovascular:      Rate and Rhythm: Normal rate.      Pulses: Normal pulses.   Pulmonary:      Effort: No respiratory distress.   Abdominal:      General: There is no distension.     Musculoskeletal:         General: No deformity.      Cervical back: Normal range of motion and neck supple.      Right lower leg: No edema.      Left lower leg: No edema.     Skin:     General: Skin is warm and dry.     Neurological:      General: No focal deficit present.      Deep Tendon Reflexes:      Reflex Scores:       Bicep reflexes are 2+ on the right side and 2+ on the left side.       Brachioradialis reflexes are 2+ on the right side and 2+ on the left side.       Patellar reflexes are 1+ on the right side and 1+ on the left side.       Achilles reflexes are 1+ on the right side and 1+ on the left side.    Psychiatric:         Speech: Speech normal.       Neurological Exam  Mental Status  Awake, alert and  oriented to person, place and time. Speech is normal. Language is fluent with no aphasia.    Cranial Nerves  CN II: Visual acuity is normal. Visual fields full to confrontation.  CN III, IV, VI: Extraocular movements intact bilaterally. Normal lids and orbits bilaterally. Pupils equal round and reactive to light bilaterally.  CN V: Facial sensation is normal.  CN VII: Full and symmetric facial movement.  CN VIII: Hearing is normal.  CN IX, X: Palate elevates symmetrically. Normal gag reflex.  CN XI: Shoulder shrug strength is normal.  CN XII: Tongue midline without atrophy or fasciculations.    Motor   Strength is 5/5 in all four extremities except as noted.                                             Right                     Left  Hip flexion                                                       4+  Knee extension                                               4+    Sensory  Light touch abnormality: Pinprick abnormality: Temperature abnormality: Vibration abnormality: Sensation: Decreased sensation to light touch, pinprick, and temperature in RLE from before toes to midshin;   Decreased vibratory sensation in b/l medial malleoli .     Reflexes                                            Right                      Left  Brachioradialis                    2+                         2+  Biceps                                 2+                         2+  Patellar                                1+                         1+  Achilles                                1+                         1+    Right pathological reflexes: Zeus's absent.  Left pathological reflexes: Zeus's absent.    Coordination  Right: Finger-to-nose normal. Rapid alternating movement normal.Left: Finger-to-nose normal. Rapid alternating movement normal.    Gait  Casual gait: Antalgic gait.  Has limp on RLE limited by pain.

## 2025-06-24 ENCOUNTER — APPOINTMENT (OUTPATIENT)
Dept: LAB | Facility: CLINIC | Age: 65
End: 2025-06-24
Payer: MEDICARE

## 2025-06-24 DIAGNOSIS — G60.9 HEREDITARY AND IDIOPATHIC NEUROPATHY, UNSPECIFIED: ICD-10-CM

## 2025-06-24 DIAGNOSIS — G62.9 NEUROPATHY: ICD-10-CM

## 2025-06-24 DIAGNOSIS — G60.3 IDIOPATHIC PROGRESSIVE NEUROPATHY: ICD-10-CM

## 2025-06-24 LAB — VIT B12 SERPL-MCNC: 206 PG/ML (ref 180–914)

## 2025-06-24 PROCEDURE — 36415 COLL VENOUS BLD VENIPUNCTURE: CPT

## 2025-06-24 PROCEDURE — 86618 LYME DISEASE ANTIBODY: CPT

## 2025-06-24 PROCEDURE — 84207 ASSAY OF VITAMIN B-6: CPT

## 2025-06-24 PROCEDURE — 86235 NUCLEAR ANTIGEN ANTIBODY: CPT

## 2025-06-24 PROCEDURE — 84425 ASSAY OF VITAMIN B-1: CPT

## 2025-06-24 PROCEDURE — 82607 VITAMIN B-12: CPT

## 2025-06-25 ENCOUNTER — RESULTS FOLLOW-UP (OUTPATIENT)
Dept: NEUROLOGY | Facility: CLINIC | Age: 65
End: 2025-06-25

## 2025-06-25 LAB — B BURGDOR IGG+IGM SER QL IA: NEGATIVE

## 2025-06-26 ENCOUNTER — TELEPHONE (OUTPATIENT)
Age: 65
End: 2025-06-26

## 2025-06-26 DIAGNOSIS — E78.2 MIXED HYPERLIPIDEMIA: ICD-10-CM

## 2025-06-26 DIAGNOSIS — I10 ESSENTIAL HYPERTENSION: ICD-10-CM

## 2025-06-26 DIAGNOSIS — M06.9 RHEUMATOID ARTHRITIS INVOLVING MULTIPLE SITES, UNSPECIFIED WHETHER RHEUMATOID FACTOR PRESENT (HCC): ICD-10-CM

## 2025-06-26 RX ORDER — HYDROXYCHLOROQUINE SULFATE 200 MG/1
300 TABLET, FILM COATED ORAL DAILY
Qty: 135 TABLET | Refills: 1 | Status: SHIPPED | OUTPATIENT
Start: 2025-06-26 | End: 2025-12-23

## 2025-06-26 NOTE — TELEPHONE ENCOUNTER
Caller: [facility]     Doctor: Dr. Wilkins     Reason for call: [facility] patient is going to get her MRI at [facility] and she needs an insurance authorization.     [facility]  Phone- [phone]  Fax- [phone]  NPI- [id number]  Tax ID- [id number]  Address-[address] [place] [address]    [employer] message sent to [facility].       Call back#: [phone]

## 2025-06-27 LAB — VIT B6 SERPL-MCNC: 11.1 UG/L (ref 3.4–65.2)

## 2025-06-27 RX ORDER — CLONIDINE HYDROCHLORIDE 0.2 MG/1
0.2 TABLET ORAL 2 TIMES DAILY
Qty: 180 TABLET | Refills: 1 | Status: SHIPPED | OUTPATIENT
Start: 2025-06-27

## 2025-06-27 RX ORDER — FENOFIBRIC ACID 135 MG/1
CAPSULE, DELAYED RELEASE ORAL
Qty: 30 CAPSULE | Refills: 5 | Status: SHIPPED | OUTPATIENT
Start: 2025-06-27

## 2025-06-28 LAB — VIT B1 SERPL-SCNC: 10 NMOL/L

## 2025-07-03 LAB
ENA SS-A AB SER IA-ACNC: <0.5 U/ML (ref ?–10)
ENA SS-B IGG SER IA-ACNC: <0.6 U/ML (ref ?–10)

## 2025-07-07 ENCOUNTER — TELEPHONE (OUTPATIENT)
Age: 65
End: 2025-07-07

## 2025-07-07 NOTE — TELEPHONE ENCOUNTER
Patient called requesting B12 vaccine(s) for the following reason(s): stated seen neurology and had blood work done and has low B12    No order in system. Please advise and/or schedule accordingly.

## 2025-07-08 NOTE — TELEPHONE ENCOUNTER
Spoke w/ pt. And sched'd for 7/11. She didn't want to schedule any more until she checks with her ride and her schedule,.

## 2025-07-09 ENCOUNTER — OFFICE VISIT (OUTPATIENT)
Dept: OBGYN CLINIC | Facility: CLINIC | Age: 65
End: 2025-07-09
Payer: MEDICARE

## 2025-07-09 VITALS — HEIGHT: 63 IN | WEIGHT: 153 LBS | BODY MASS INDEX: 27.11 KG/M2

## 2025-07-09 DIAGNOSIS — M79.604 RIGHT LEG PAIN: Primary | ICD-10-CM

## 2025-07-09 PROCEDURE — 99214 OFFICE O/P EST MOD 30 MIN: CPT | Performed by: ORTHOPAEDIC SURGERY

## 2025-07-09 RX ORDER — AZITHROMYCIN 500 MG/1
TABLET, FILM COATED ORAL
COMMUNITY
Start: 2025-07-08

## 2025-07-09 RX ORDER — AZITHROMYCIN 250 MG/1
TABLET, FILM COATED ORAL
COMMUNITY
Start: 2025-07-08

## 2025-07-09 RX ORDER — IPRATROPIUM BROMIDE AND ALBUTEROL SULFATE 2.5; .5 MG/3ML; MG/3ML
SOLUTION RESPIRATORY (INHALATION)
COMMUNITY

## 2025-07-09 NOTE — PROGRESS NOTES
"Name: Kelsi Cabrera      : 1960       MRN: [id number]   Encounter Provider: Oni Wilkins MD   Encounter Date: 25  Encounter department: [facility]      History of Present Illness    Kelsi Cabrera is a 65 y.o. female who presents for initial consultation regarding radiating symptoms down her right leg. She states that she has PMHx of right RICKI performed by Dr. Nye. She states that she gets numbness and tingling from her hip to her toes. She states that she also notices that her muscles on her right leg have not fully recovered and feels weakness in her leg with her ADL's. She states that she has not had previous injections to her lumbar spine.      Previous pain management care: No  Previous physical therapy in the past 6 months for involved area: No  Diabetic: No   Lab Results   Component Value Date/Time    HGBA1C 5.6 2024 09:52 AM     Nicotine use: Yes, 1 cigarette/day  BMI: Estimated body mass index is 27.1 kg/m² as calculated from the following:    Height as of this encounter: 5' 3\" (1.6 m).    Weight as of this encounter: 69.4 kg (153 lb).  Blood thinners: None      ALLERGIES: Allergies[1]    MEDICATIONS:  Current Medications[2]     PAST MEDICAL HISTORY:   Past Medical History[3]    PAST SURGICAL HISTORY:  Past Surgical History[4]    SOCIAL HISTORY:  Tobacco Use History[5]     Physical Exam    65 y.o. female sitting comfortably on exam chair in no apparent distress.   Patient ambulates with normal gait.  Normal sagittal and coronal balance.  Able to walk heel to toe.  Mild tenderness over the lumbar spine.    Sensation intact to light L1-S1 dermatoms of the left lower extremity.  Sensation intact to light L1-S1 dermatoms of the left right extremity.    Left Motor: 5/5 quadriceps, 5/5 hamstrings, 5/5 dorsiflexion, 5/5 plantarflexion, 5/5 inversion, 5/5 eversion  Right Motor: 5/5 quadriceps, 5/5 hamstrings, 5/5 " dorsiflexion, 5/5 plantarflexion, 5/5 inversion, 5/5 eversion        RADIOGRAPHIC STUDIES:  XR lumbar spine 2-3 view (5/13/2025): Mild to moderate multilevel lumbar degenerative disc without instability or deformity.  MRI Lumbar wo contrast (7/2/2025): Mild multilevel lumbar degenerative disc disease.  The canal is wide open.  There is no spinal stenosis disc herniation or fracture.    SPINE AND PAIN PROCEDURES:   None        Assessment & Plan  Right leg pain             PLAN:  65 y.o. female status post right total hip arthroplasty with persistent right lower extremity symptoms.  Symptoms to follow the L5 nerve root distribution.  She did have physical therapy following the hip replacement and has had prior injection to other body parts.    On physical examination the symptoms do appear radicular however there is no indication of nerve root compression on the MRI studies and there is no evidence of instability on x-ray.  Given the relatively benign MRI study, and by there is very little treatment that can be offered respect to the lumbar spine.  I did discuss the possibility of going to aqua therapy to see if that taking pressure off the right hip will improve her symptoms.  She declined.  Also discussed the role of pain management referral for possible injections.  However given the fact that the MRI study does not show evidence of nerve root compression, surgical treatment is not an option.    I did review the MRI study with the patient and her  also reviewed the x-rays.  I am glad to answer additional questions that they may have in the future however at this time, there is no indication for surgical intervention of the lumbar spine.    Follow-up on as-needed basis.         Scribe Attestation      I,:  Agusto Nicole am acting as a scribe while in the presence of the attending physician.:       I,:  Oni Wilkins MD personally performed the services described in this documentation    as scribed in my  presence.:                    [1]   Allergies  Allergen Reactions    Aspirin Anaphylaxis and Other (See Comments)    Iodinated Contrast Media Other (See Comments)     Iodinated contrast media (substance)    Iodine - Food Allergy Anaphylaxis    Penicillin G Hives     Product containing penicillin (product)    Penicillins Anaphylaxis    Pork Allergy - Food Allergy Other (See Comments)     Does not eat pork for Yazdanism reasons    Soybean Oil - Food Allergy Other (See Comments)    Statins Myalgia   [2]   Current Outpatient Medications:     ALPRAZolam (XANAX) 0.5 mg tablet, As needed, Disp: , Rfl:     Azelastine HCl 137 MCG/SPRAY SOLN, , Disp: , Rfl:     Cholecalciferol (VITAMIN D3) 2000 units CHEW, Chew 5,000 Units in the morning., Disp: , Rfl:     Choline Fenofibrate (Fenofibric Acid) 135 MG CPDR, TAKE 1 CAPSULE BY MOUTH EVERY DAY, Disp: 30 capsule, Rfl: 5    cloNIDine (CATAPRES) 0.2 mg tablet, Take 1 tablet (0.2 mg total) by mouth 2 (two) times a day, Disp: 180 tablet, Rfl: 1    fexofenadine (ALLEGRA) 180 MG tablet, Take 180 mg by mouth in the morning., Disp: , Rfl:     hydroxychloroquine (PLAQUENIL) 200 mg tablet, Take 1.5 tablets (300 mg total) by mouth daily, Disp: 135 tablet, Rfl: 1    levalbuterol (XOPENEX) 1.25 mg/3 mL nebulizer solution, TAKE 3 ML (1.25 MG TOTAL) BY NEBULIZATION EVERY 4 (FOUR) HOURS AS NEEDED FOR WHEEZING., Disp: , Rfl:     linaCLOtide (Linzess) 72 MCG CAPS, Take 1 capsule by mouth daily before breakfast, Disp: 30 capsule, Rfl: 5    predniSONE 10 mg tablet, , Disp: , Rfl:     triamcinolone (KENALOG) 0.1 % ointment, APPLY TO AFFECTED AREA TWICE A DAY (Patient taking differently: Apply topically as needed To affected area), Disp: 80 g, Rfl: 0    budesonide-formoterol (SYMBICORT) 160-4.5 mcg/act inhaler, Inhale 2 puffs in the morning and 2 puffs in the evening., Disp: , Rfl:     fluticasone (FLONASE) 50 mcg/act nasal spray, PUT 1 SPRAY IN EACH NOSTRIL ONCE DAILY (Patient not taking: Reported on  6/10/2025), Disp: 48 mL, Rfl: 1    metoclopramide (Reglan) 5 mg tablet, Take 1 tablet (5 mg total) by mouth every 8 (eight) hours as needed (nausea, vomiting, headache) (Patient not taking: Reported on 5/28/2025), Disp: 20 tablet, Rfl: 0    nitroglycerin (NITROSTAT) 0.4 mg SL tablet, Place 1 tablet (0.4 mg total) under the tongue every 5 (five) minutes as needed for chest pain, Disp: 30 tablet, Rfl: 3    oxyCODONE (ROXICODONE) 10 MG TABS, , Disp: , Rfl:     promethazine-dextromethorphan (PHENERGAN-DM) 6.25-15 mg/5 mL oral syrup, TAKE 5 MILLILITERS BY MOUTH 4 TIMES A DAY AS NEEDED FOR COUGH (Patient taking differently: as needed), Disp: , Rfl:     SUMAtriptan (Imitrex) 25 mg tablet, Take 1 tablet (25 mg total) by mouth once as needed for migraine for up to 7 doses, Disp: 7 tablet, Rfl: 0  [3]   Past Medical History:  Diagnosis Date    Anxiety     Arthritis     Asthma     Avascular necrosis of bone of hip, right (HCC)     Avascular necrosis of hip, right (HCC) 10/03/2017    Chronic obstructive pulmonary disease (HCC) 02/12/2019    COPD (chronic obstructive pulmonary disease) (HCC)     GERD (gastroesophageal reflux disease)     Hyperlipidemia     Hypertension     Hypoglycemia     Infectious viral hepatitis     Lumbar radicular pain     Lyme disease     Pneumonia     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HCC)    [4]   Past Surgical History:  Procedure Laterality Date    APPENDECTOMY      CARDIAC CATHETERIZATION Left 02/28/2024    Procedure: Cardiac Left Heart Cath;  Surgeon: Lizz Adorno MD;  Location: [facility];  Service: Cardiology    CARDIAC CATHETERIZATION N/A 02/28/2024    Procedure: Cardiac Coronary Angiogram;  Surgeon: Lizz Adorno MD;  Location: [facility];  Service: Cardiology    CARDIAC CATHETERIZATION  02/28/2024    Procedure: Cardiac catheterization;  Surgeon: Lizz Adorno MD;  Location: [facility];  Service: Cardiology    CARPAL TUNNEL RELEASE      CHOLECYSTECTOMY       COLONOSCOPY      [place] ARTHRP ACETBLR/PROX FEM PROSTC AGRFT/ALGRFT Right 5/30/2024    Procedure: ARTHROPLASTY HIP TOTAL ANTERIOR;  Surgeon: Kathya Nye MD;  Location:  MAIN OR;  Service: Orthopedics    SINUS SURGERY     [5]   Social History  Tobacco Use   Smoking Status Every Day    Current packs/day: 0.25    Average packs/day: 0.8 packs/day for 38.5 years (32.1 ttl pk-yrs)    Types: Cigarettes    Start date: 1987    Passive exposure: Past   Smokeless Tobacco Never   Tobacco Comments    Currently 1-3 cigarettes a day

## 2025-07-11 ENCOUNTER — CLINICAL SUPPORT (OUTPATIENT)
Dept: INTERNAL MEDICINE CLINIC | Facility: CLINIC | Age: 65
End: 2025-07-11
Payer: MEDICARE

## 2025-07-11 DIAGNOSIS — E53.8 B12 DEFICIENCY: Primary | ICD-10-CM

## 2025-07-11 PROCEDURE — 96372 THER/PROPH/DIAG INJ SC/IM: CPT

## 2025-07-11 RX ORDER — CYANOCOBALAMIN 1000 UG/ML
1000 INJECTION, SOLUTION INTRAMUSCULAR; SUBCUTANEOUS ONCE
Status: COMPLETED | OUTPATIENT
Start: 2025-07-11 | End: 2025-07-11

## 2025-07-11 RX ADMIN — CYANOCOBALAMIN 1000 MCG: 1000 INJECTION, SOLUTION INTRAMUSCULAR; SUBCUTANEOUS at 10:30

## 2025-07-18 ENCOUNTER — CLINICAL SUPPORT (OUTPATIENT)
Dept: INTERNAL MEDICINE CLINIC | Facility: CLINIC | Age: 65
End: 2025-07-18
Payer: MEDICARE

## 2025-07-18 DIAGNOSIS — E53.8 B12 DEFICIENCY: Primary | ICD-10-CM

## 2025-07-18 PROCEDURE — 96372 THER/PROPH/DIAG INJ SC/IM: CPT

## 2025-07-18 RX ORDER — CYANOCOBALAMIN 1000 UG/ML
1000 INJECTION, SOLUTION INTRAMUSCULAR; SUBCUTANEOUS ONCE
Status: COMPLETED | OUTPATIENT
Start: 2025-07-18 | End: 2025-07-18

## 2025-07-18 RX ADMIN — CYANOCOBALAMIN 1000 MCG: 1000 INJECTION, SOLUTION INTRAMUSCULAR; SUBCUTANEOUS at 15:04

## 2025-07-19 DIAGNOSIS — J45.41 MODERATE PERSISTENT ASTHMA WITH ACUTE EXACERBATION: ICD-10-CM

## 2025-07-20 RX ORDER — FLUTICASONE PROPIONATE 50 MCG
SPRAY, SUSPENSION (ML) NASAL
Qty: 48 ML | Refills: 1 | Status: SHIPPED | OUTPATIENT
Start: 2025-07-20

## 2025-07-22 DIAGNOSIS — E78.2 MIXED HYPERLIPIDEMIA: ICD-10-CM

## 2025-07-24 RX ORDER — FENOFIBRIC ACID 135 MG/1
1 CAPSULE, DELAYED RELEASE ORAL DAILY
Qty: 90 CAPSULE | Refills: 1 | Status: SHIPPED | OUTPATIENT
Start: 2025-07-24

## 2025-07-25 ENCOUNTER — CLINICAL SUPPORT (OUTPATIENT)
Dept: INTERNAL MEDICINE CLINIC | Facility: CLINIC | Age: 65
End: 2025-07-25
Payer: MEDICARE

## 2025-07-25 DIAGNOSIS — E53.8 B12 DEFICIENCY: Primary | ICD-10-CM

## 2025-07-25 PROCEDURE — 96372 THER/PROPH/DIAG INJ SC/IM: CPT

## 2025-07-25 RX ORDER — CYANOCOBALAMIN 1000 UG/ML
1000 INJECTION, SOLUTION INTRAMUSCULAR; SUBCUTANEOUS
Status: SHIPPED | OUTPATIENT
Start: 2025-07-25

## 2025-07-25 RX ADMIN — CYANOCOBALAMIN 1000 MCG: 1000 INJECTION, SOLUTION INTRAMUSCULAR; SUBCUTANEOUS at 09:56

## 2025-08-01 ENCOUNTER — CLINICAL SUPPORT (OUTPATIENT)
Dept: INTERNAL MEDICINE CLINIC | Facility: CLINIC | Age: 65
End: 2025-08-01
Payer: MEDICARE

## 2025-08-01 DIAGNOSIS — E53.8 B12 DEFICIENCY: Primary | ICD-10-CM

## 2025-08-01 PROCEDURE — 96372 THER/PROPH/DIAG INJ SC/IM: CPT

## 2025-08-01 RX ADMIN — CYANOCOBALAMIN 1000 MCG: 1000 INJECTION, SOLUTION INTRAMUSCULAR; SUBCUTANEOUS at 10:13

## 2025-08-07 ENCOUNTER — CLINICAL SUPPORT (OUTPATIENT)
Dept: INTERNAL MEDICINE CLINIC | Facility: CLINIC | Age: 65
End: 2025-08-07
Payer: MEDICARE

## 2025-08-07 ENCOUNTER — APPOINTMENT (OUTPATIENT)
Dept: LAB | Facility: CLINIC | Age: 65
End: 2025-08-07
Attending: INTERNAL MEDICINE
Payer: MEDICARE

## 2025-08-07 DIAGNOSIS — E53.8 B12 DEFICIENCY: Primary | ICD-10-CM

## 2025-08-07 PROCEDURE — 96372 THER/PROPH/DIAG INJ SC/IM: CPT

## 2025-08-07 RX ADMIN — CYANOCOBALAMIN 1000 MCG: 1000 INJECTION, SOLUTION INTRAMUSCULAR; SUBCUTANEOUS at 10:33

## 2025-08-15 ENCOUNTER — CLINICAL SUPPORT (OUTPATIENT)
Dept: INTERNAL MEDICINE CLINIC | Facility: CLINIC | Age: 65
End: 2025-08-15
Payer: MEDICARE

## (undated) DEVICE — HANDPIECE SET WITH RETRACTABLE COAXIAL FAN SPRAY TIP AND SUCTION TUBE: Brand: INTERPULSE

## (undated) DEVICE — GLOVE SRG BIOGEL 8.5

## (undated) DEVICE — COBAN 4 IN STERILE

## (undated) DEVICE — GLIDESHEATH SLENDER STAINLESS STEEL KIT: Brand: GLIDESHEATH SLENDER

## (undated) DEVICE — NEPTUNE E-SEP SMOKE EVACUATION PENCIL, COATED, 70MM BLADE, PUSH BUTTON SWITCH: Brand: NEPTUNE E-SEP

## (undated) DEVICE — CHLORAPREP HI-LITE 26ML ORANGE

## (undated) DEVICE — IMPERVIOUS STOCKINETTE: Brand: DEROYAL

## (undated) DEVICE — NEEDLE PERCUTANEOUS 21G X 4CM

## (undated) DEVICE — DGW .035 FC J3MM 260CM TEF: Brand: EMERALD

## (undated) DEVICE — OSCILLATING TIP SAW CARTRIDGE: Brand: PRECISION FALCON

## (undated) DEVICE — BASIC SINGLE BASIN-LF: Brand: MEDLINE INDUSTRIES, INC.

## (undated) DEVICE — DRESSING MEPILEX AG BORDER POST-OP 4 X 8 IN

## (undated) DEVICE — 3M™ TEGADERM™ TRANSPARENT FILM DRESSING FRAME STYLE, 1628, 6 IN X 8 IN (15 CM X 20 CM), 10/CT 8CT/CASE: Brand: 3M™ TEGADERM™

## (undated) DEVICE — 40601 PROLONGED POSITIONING SYSTEM: Brand: 40601 PROLONGED POSITIONING SYSTEM

## (undated) DEVICE — BETHLEHEM UNIV MAJOR ORTHO,KIT: Brand: CARDINAL HEALTH

## (undated) DEVICE — SKN PRP WNG SPNGE PVP SCRB STR: Brand: MEDLINE INDUSTRIES, INC.

## (undated) DEVICE — STERILE DOUBLE BASIN SET PACK: Brand: CARDINAL HEALTH

## (undated) DEVICE — STIRRUP STRAP ADULT DISP

## (undated) DEVICE — CATH DIAG 5FR IMPULSE 110CM 6S PIG 145 ANG

## (undated) DEVICE — SUT STRATAFIX SPIRAL PDS PLUS 1 CTX 18 IN SXPP1A400

## (undated) DEVICE — SUT VICRYL 2-0 CT-1 27 IN J259H

## (undated) DEVICE — HEAVY DUTY TABLE COVER: Brand: CONVERTORS

## (undated) DEVICE — TOWEL SET X-RAY

## (undated) DEVICE — HOOD: Brand: FLYTE, SURGICOOL

## (undated) DEVICE — SUT MONOCRYL 3-0 PS-2 18 IN Y497G

## (undated) DEVICE — CAPIT HIP UPCHRG GRIPTON CUP

## (undated) DEVICE — CAPIT HIP COP -CMNT/POR-ACTIS

## (undated) DEVICE — GLOVE SRG BIOGEL 8

## (undated) DEVICE — SPONGE SCRUB 4 PCT CHLORHEXIDINE

## (undated) DEVICE — 3M™ IOBAN™ 2 ANTIMICROBIAL INCISE DRAPE 6650EZ: Brand: IOBAN™ 2

## (undated) DEVICE — ADHESIVE SKIN HIGH VISCOSITY EXOFIN 1ML

## (undated) DEVICE — RADIFOCUS OPTITORQUE ANGIOGRAPHIC CATHETER: Brand: OPTITORQUE

## (undated) DEVICE — ELECTRODE BLADE E-Z CLEAN 4IN -0014A

## (undated) DEVICE — NEEDLE 23G X 1 1/2 SAFETY-GLIDE THIN WALL

## (undated) DEVICE — PAD GROUNDING DUAL ADULT

## (undated) DEVICE — DRAPE CAMERA/LASER

## (undated) DEVICE — DRAPE SHEET THREE QUARTER

## (undated) DEVICE — GLOVE INDICATOR PI UNDERGLOVE SZ 8.5 BLUE

## (undated) DEVICE — DRAPE C-ARM X-RAY